# Patient Record
Sex: MALE | Race: WHITE | NOT HISPANIC OR LATINO | Employment: FULL TIME | URBAN - METROPOLITAN AREA
[De-identification: names, ages, dates, MRNs, and addresses within clinical notes are randomized per-mention and may not be internally consistent; named-entity substitution may affect disease eponyms.]

---

## 2017-01-22 ENCOUNTER — HOSPITAL ENCOUNTER (INPATIENT)
Facility: HOSPITAL | Age: 72
LOS: 2 days | Discharge: HOME | DRG: 726 | End: 2017-01-24
Attending: EMERGENCY MEDICINE | Admitting: INTERNAL MEDICINE
Payer: MEDICARE

## 2017-01-22 DIAGNOSIS — G47.33 OBSTRUCTIVE SLEEP APNEA: ICD-10-CM

## 2017-01-22 DIAGNOSIS — N39.0 URINARY TRACT INFECTION WITH HEMATURIA, SITE UNSPECIFIED: ICD-10-CM

## 2017-01-22 DIAGNOSIS — R65.10 SIRS (SYSTEMIC INFLAMMATORY RESPONSE SYNDROME): ICD-10-CM

## 2017-01-22 DIAGNOSIS — I10 ESSENTIAL HYPERTENSION: ICD-10-CM

## 2017-01-22 DIAGNOSIS — R31.9 URINARY TRACT INFECTION WITH HEMATURIA, SITE UNSPECIFIED: ICD-10-CM

## 2017-01-22 DIAGNOSIS — R33.9 URINARY RETENTION: Primary | ICD-10-CM

## 2017-01-22 DIAGNOSIS — D72.823 LEUKEMOID REACTION: ICD-10-CM

## 2017-01-22 DIAGNOSIS — K59.00 CONSTIPATION: ICD-10-CM

## 2017-01-22 LAB
ALBUMIN SERPL BCP-MCNC: 4.1 G/DL
ALP SERPL-CCNC: 60 U/L
ALT SERPL W/O P-5'-P-CCNC: 30 U/L
ANION GAP SERPL CALC-SCNC: 13 MMOL/L
AST SERPL-CCNC: 25 U/L
BACTERIA #/AREA URNS AUTO: ABNORMAL /HPF
BASOPHILS # BLD AUTO: 0.02 K/UL
BASOPHILS NFR BLD: 0.1 %
BILIRUB SERPL-MCNC: 1.3 MG/DL
BILIRUB UR QL STRIP: NEGATIVE
BUN SERPL-MCNC: 18 MG/DL
CALCIUM SERPL-MCNC: 10.1 MG/DL
CHLORIDE SERPL-SCNC: 106 MMOL/L
CLARITY UR REFRACT.AUTO: CLEAR
CO2 SERPL-SCNC: 20 MMOL/L
COLOR UR AUTO: YELLOW
CREAT SERPL-MCNC: 1 MG/DL
DIFFERENTIAL METHOD: ABNORMAL
EOSINOPHIL # BLD AUTO: 0 K/UL
EOSINOPHIL NFR BLD: 0 %
ERYTHROCYTE [DISTWIDTH] IN BLOOD BY AUTOMATED COUNT: 12.8 %
EST. GFR  (AFRICAN AMERICAN): >60 ML/MIN/1.73 M^2
EST. GFR  (NON AFRICAN AMERICAN): >60 ML/MIN/1.73 M^2
GLUCOSE SERPL-MCNC: 127 MG/DL
GLUCOSE UR QL STRIP: NEGATIVE
HCT VFR BLD AUTO: 49.2 %
HGB BLD-MCNC: 17.3 G/DL
HGB UR QL STRIP: ABNORMAL
KETONES UR QL STRIP: NEGATIVE
LACTATE SERPL-SCNC: 1.7 MMOL/L
LEUKOCYTE ESTERASE UR QL STRIP: NEGATIVE
LYMPHOCYTES # BLD AUTO: 0.5 K/UL
LYMPHOCYTES NFR BLD: 2.7 %
MCH RBC QN AUTO: 31 PG
MCHC RBC AUTO-ENTMCNC: 35.2 %
MCV RBC AUTO: 88 FL
MICROSCOPIC COMMENT: ABNORMAL
MONOCYTES # BLD AUTO: 0.9 K/UL
MONOCYTES NFR BLD: 5.1 %
NEUTROPHILS # BLD AUTO: 16.6 K/UL
NEUTROPHILS NFR BLD: 91.7 %
NITRITE UR QL STRIP: NEGATIVE
PH UR STRIP: 6 [PH] (ref 5–8)
PLATELET # BLD AUTO: 255 K/UL
PMV BLD AUTO: 9.9 FL
POTASSIUM SERPL-SCNC: 3.7 MMOL/L
PROT SERPL-MCNC: 7.5 G/DL
PROT UR QL STRIP: NEGATIVE
RBC # BLD AUTO: 5.58 M/UL
RBC #/AREA URNS AUTO: 71 /HPF (ref 0–4)
SODIUM SERPL-SCNC: 139 MMOL/L
SP GR UR STRIP: 1.01 (ref 1–1.03)
URN SPEC COLLECT METH UR: ABNORMAL
UROBILINOGEN UR STRIP-ACNC: 1 EU/DL
WBC # BLD AUTO: 18.05 K/UL
WBC #/AREA URNS AUTO: 1 /HPF (ref 0–5)

## 2017-01-22 PROCEDURE — 11000001 HC ACUTE MED/SURG PRIVATE ROOM

## 2017-01-22 PROCEDURE — 63600175 PHARM REV CODE 636 W HCPCS: Performed by: STUDENT IN AN ORGANIZED HEALTH CARE EDUCATION/TRAINING PROGRAM

## 2017-01-22 PROCEDURE — 80053 COMPREHEN METABOLIC PANEL: CPT

## 2017-01-22 PROCEDURE — 25000003 PHARM REV CODE 250: Performed by: STUDENT IN AN ORGANIZED HEALTH CARE EDUCATION/TRAINING PROGRAM

## 2017-01-22 PROCEDURE — 99284 EMERGENCY DEPT VISIT MOD MDM: CPT | Mod: ,,, | Performed by: EMERGENCY MEDICINE

## 2017-01-22 PROCEDURE — 81001 URINALYSIS AUTO W/SCOPE: CPT

## 2017-01-22 PROCEDURE — 51702 INSERT TEMP BLADDER CATH: CPT

## 2017-01-22 PROCEDURE — 83605 ASSAY OF LACTIC ACID: CPT

## 2017-01-22 PROCEDURE — 99285 EMERGENCY DEPT VISIT HI MDM: CPT

## 2017-01-22 PROCEDURE — 96365 THER/PROPH/DIAG IV INF INIT: CPT

## 2017-01-22 PROCEDURE — 96361 HYDRATE IV INFUSION ADD-ON: CPT

## 2017-01-22 PROCEDURE — 87040 BLOOD CULTURE FOR BACTERIA: CPT

## 2017-01-22 PROCEDURE — 85025 COMPLETE CBC W/AUTO DIFF WBC: CPT

## 2017-01-22 RX ORDER — ONDANSETRON 8 MG/1
8 TABLET, ORALLY DISINTEGRATING ORAL EVERY 8 HOURS PRN
Status: DISCONTINUED | OUTPATIENT
Start: 2017-01-22 | End: 2017-01-24 | Stop reason: HOSPADM

## 2017-01-22 RX ORDER — IBUPROFEN 200 MG
200 TABLET ORAL EVERY 6 HOURS PRN
Status: DISCONTINUED | OUTPATIENT
Start: 2017-01-22 | End: 2017-01-23

## 2017-01-22 RX ORDER — IBUPROFEN 600 MG/1
600 TABLET ORAL EVERY 6 HOURS PRN
Status: DISCONTINUED | OUTPATIENT
Start: 2017-01-22 | End: 2017-01-22

## 2017-01-22 RX ORDER — POLYETHYLENE GLYCOL 3350 17 G/17G
17 POWDER, FOR SOLUTION ORAL DAILY
Status: DISCONTINUED | OUTPATIENT
Start: 2017-01-23 | End: 2017-01-24 | Stop reason: HOSPADM

## 2017-01-22 RX ORDER — SODIUM CHLORIDE 0.9 % (FLUSH) 0.9 %
3 SYRINGE (ML) INJECTION EVERY 8 HOURS
Status: DISCONTINUED | OUTPATIENT
Start: 2017-01-23 | End: 2017-01-23

## 2017-01-22 RX ORDER — LISINOPRIL 20 MG/1
20 TABLET ORAL DAILY
Status: DISCONTINUED | OUTPATIENT
Start: 2017-01-23 | End: 2017-01-24 | Stop reason: HOSPADM

## 2017-01-22 RX ADMIN — CEFTRIAXONE 2 G: 2 INJECTION, SOLUTION INTRAVENOUS at 09:01

## 2017-01-22 RX ADMIN — SODIUM CHLORIDE 1000 ML: 0.9 INJECTION, SOLUTION INTRAVENOUS at 07:01

## 2017-01-22 NOTE — ED TRIAGE NOTES
Pt states the last time he was able to urinate was 0700. Pt states he had abd pain from being unable to urinate and he thought he may have been constipated. Pt states having trouble urinating in the past but has always been able to go, today he was unable to go.

## 2017-01-22 NOTE — IP AVS SNAPSHOT
[facility]  [address]  [facility] [address]  Phone: [phone]           Patient Discharge Instructions     Our goal is to set you up for success. This packet includes information on your condition, medications, and your home care. It will help you to care for yourself so you don't get sicker and need to go back to the hospital.     Please ask your nurse if you have any questions.        There are many details to remember when preparing to leave the hospital. Here is what you will need to do:    1. Take your medicine. If you are prescribed medications, review your Medication List in the following pages. You may have new medications to  at the pharmacy and others that you'll need to stop taking. Review the instructions for how and when to take your medications. Talk with your doctor or nurses if you are unsure of what to do.     2. Go to your follow-up appointments. Specific follow-up information is listed in the following pages. Your may be contacted by a transition nurse or clinical provider about future appointments. Be sure we have all of the phone numbers to reach you, if needed. Please contact your provider's office if you are unable to make an appointment.     3. Watch for warning signs. Your doctor or nurse will give you detailed warning signs to watch for and when to call for assistance. These instructions may also include educational information about your condition. If you experience any of warning signs to your health, call your doctor.               [facility]  Unless otherwise directed by your provider, please contact [facility], our nurse care line that is available for 24/7 assistance.     [phone] (toll-free)    Registered nurses in the [facility] provide clinical advisement, health education, appointment booking, and other advisory services.                    ** Verify the list of medication(s) below is accurate and up  to date. Carry this with you in case of emergency. If your medications have changed, please notify your healthcare provider.             Medication List      START taking these medications        Additional Info                      tamsulosin 0.4 mg Cp24   Commonly known as:  FLOMAX   Quantity:  30 capsule   Refills:  11   Dose:  0.4 mg    Last time this was given:  0.4 mg on 1/24/2017  9:28 AM   Instructions:  Take 1 capsule (0.4 mg total) by mouth once daily.     Begin Date    AM    Noon    PM    Bedtime         CONTINUE taking these medications        Additional Info                      CENTRUM SILVER ORAL   Refills:  0   Dose:  1 tablet    Instructions:  Take 1 tablet by mouth once daily.     Begin Date    AM    Noon    PM    Bedtime       lisinopril 20 MG tablet   Commonly known as:  PRINIVIL,ZESTRIL   Quantity:  90 tablet   Refills:  3   Dose:  20 mg    Last time this was given:  20 mg on 1/24/2017  9:28 AM   Instructions:  Take 1 tablet (20 mg total) by mouth once daily.     Begin Date    AM    Noon    PM    Bedtime         STOP taking these medications     ADVIL PM ORAL            Where to Get Your Medications      These medications were sent to [id number] - [place] [facility] - [address]  [address] [address]     Phone:  [phone]     tamsulosin 0.4 mg Cp24                  Please bring to all follow up appointments:    1. A copy of your discharge instructions.  2. All medicines you are currently taking in their original bottles.  3. Identification and insurance card.    Please arrive 15 minutes ahead of scheduled appointment time.    Please call 24 hours in advance if you must reschedule your appointment and/or time.        Your Scheduled Appointments     Jan 27, 2017  2:20 PM CST   Consult with MD Bryson Regalado - Urology 4th Floor ([address] [address] )    [address]  [facility] [address]   [phone]            Feb 08, 2017  8:40 AM [facility]  "Follow Up with MD Bryson Tamayo - Internal Medicine ([facility])    [address]  [facility] [address] [phone]                  Primary Diagnosis     Your primary diagnosis was:  Retention Of Urine      Admission Information     Date & Time Provider Department CSN    1/22/2017  3:37 PM Marcella Jimenez MD [facility] [id number]      Care Providers     Provider Role Specialty Primary office phone    Marcella Jimenez MD Attending Provider Hospitalist [phone]    Marcella Jimenez MD Team Attending  Hospitalist [phone]      Your Vitals Were     BP Pulse Temp Resp Height Weight    126/76 (BP Location: Left arm, Patient Position: Lying, BP Method: Automatic) 108 98.5 °F (36.9 °C) (Oral) 16 6' 1" (1.854 m) 113.4 kg (250 lb)    SpO2 BMI             95% 32.98 kg/m2         Recent Lab Values        12/9/2016                           8:25 AM           A1C 5.4           Comment for A1C at  8:25 AM on 12/9/2016:  According to ADA guidelines, hemoglobin A1C <7.0% represents  optimal control in non-pregnant diabetic patients.  Different  metrics may apply to specific populations.   Standards of Medical Care in Diabetes - 2016.  For the purpose of screening for the presence of diabetes:  <5.7%     Consistent with the absence of diabetes  5.7-6.4%  Consistent with increasing risk for diabetes   (prediabetes)  >or=6.5%  Consistent with diabetes  Currently no consensus exists for use of hemoglobin A1C  for diagnosis of diabetes for children.        Pending Labs     Order Current Status    Urine culture **CANNOT BE ORDERED STAT** In process    Blood culture #1 **CANNOT BE ORDERED STAT** Preliminary result    Blood culture #2 **CANNOT BE ORDERED STAT** Preliminary result      Allergies as of 1/24/2017     No Known Allergies      Advance Directives     An advance directive is a document which, in the event you are no longer able to make " decisions for yourself, tells your healthcare team what kind of treatment you do or do not want to receive, or who you would like to make those decisions for you.  If you do not currently have an advance directive, [facility] encourages you to create one.  For more information call:  [phone]), [phone] ([phone]),  or log on to [email or web address].        Smoking Cessation     If you would like to quit smoking:   You may be eligible for free services if you are a [place] resident and started smoking cigarettes before September 1, 1988.  Call the Smoking Cessation Trust ([facility]) toll free at [phone] or [phone].   Call 5-998-QUIT-NOW if you do not meet the above criteria.            Language Assistance Services     ATTENTION: Language assistance services are available, free of charge. Please call [phone].      ATENCIÓN: Si habla español, tiene a caldwell disposición servicios gratuitos de asistencia lingüística. Llame al [phone].     CHÚ Ý: N?u b?n nói Ti?ng Vi?t, có các d?ch v? h? tr? ngôn ng? mi?n phí dành cho b?n. G?i s? [phone].         [facility] complies with applicable Federal civil rights laws and does not discriminate on the basis of race, color, national origin, age, disability, or sex.

## 2017-01-22 NOTE — ED PROVIDER NOTES
Encounter Date: 1/22/2017    SCRIBE #1 NOTE: I, Orquidea Sanchez, am scribing for, and in the presence of,  Dr. Casper. I have scribed the following portions of the note - the Resident attestation.       History     Chief Complaint   Patient presents with    Male  Problem     can't urinate  no urine since 7 am and no bm gave myself enema had soft stool     Review of patient's allergies indicates:  No Known Allergies  HPI Comments: 72 y/o male w/ hx of HTN presents for evaluation of urinary retention since 7AM. In the past weeks pt has had increased frequency, weak stream, and difficulty urinating. Pt has an appointment w/ urologist in the upcoming week. Pt denies any weakness or numbness. Pt denies any loss of bowel. No fever. No hematuria. No previous episodes.     The history is provided by the patient.     Past Medical History   Diagnosis Date    Allergy 12/3/2015    Arthritis     Degenerative disc disease     Hypertension     Nuclear sclerosis - Both Eyes 7/30/2012     Past Medical History Pertinent Negatives   Diagnosis Date Noted    *Atrial fibrillation 9/18/2012    Anemia 9/18/2012    Anxiety 9/18/2012    Asthma 9/18/2012    Cancer 9/18/2012    CHF (congestive heart failure) 9/18/2012    Chronic kidney disease 9/18/2012    Clotting disorder 9/18/2012    COPD (chronic obstructive pulmonary disease) 9/18/2012    Coronary artery disease 9/18/2012    Deep vein thrombosis 9/18/2012    DEMENTIA 9/18/2012    Depression 9/18/2012    Diabetes mellitus type I 9/18/2012    Diabetes mellitus type II 9/18/2012    Emphysema of lung 9/18/2012    GERD (gastroesophageal reflux disease) 9/18/2012    Glaucoma 9/18/2012    Heart murmur 9/18/2012    HIV infection 9/18/2012    Hyperlipidemia 9/18/2012    Meningitis 9/18/2012    Myocardial infarction 9/18/2012    Neuromuscular disorder 9/18/2012    Osteoporosis 9/18/2012    Pulmonary embolism 9/18/2012    Seizures 9/18/2012    Sickle cell  anemia 9/18/2012    Stroke 9/18/2012    Substance abuse 9/18/2012    Thyroid disease 9/18/2012    Tuberculosis 9/18/2012     Past Surgical History   Procedure Laterality Date    Eye foreign body removal       childhood    Back surgery  2002    Eye surgery       Family History   Problem Relation Age of Onset    Cataracts Mother     Hypertension Mother     Cancer Father     Heart disease Father     Heart disease Brother     Stroke Paternal Grandfather     Heart disease Paternal Grandfather      Social History   Substance Use Topics    Smoking status: Former Smoker     Packs/day: 2.00     Years: 4.00     Types: Cigarettes, Cigars     Quit date: 1/1/1973    Smokeless tobacco: None    Alcohol use 0.6 oz/week     1 Cans of beer per week      Comment: social     Review of Systems   Constitutional: Negative for chills and fever.   HENT: Negative for rhinorrhea and sore throat.    Eyes: Negative for pain and visual disturbance.   Respiratory: Negative for cough and shortness of breath.    Cardiovascular: Negative for chest pain and leg swelling.   Gastrointestinal: Positive for constipation. Negative for abdominal pain and vomiting.   Genitourinary: Positive for difficulty urinating and frequency.   Musculoskeletal: Negative for back pain and neck pain.   Skin: Negative for wound.   Neurological: Negative for weakness and numbness.       Physical Exam   Initial Vitals   BP Pulse Resp Temp SpO2   01/22/17 1533 01/22/17 1533 01/22/17 1533 01/22/17 1533 01/22/17 1533   162/91 140 18 98.7 °F (37.1 °C) 97 %     Physical Exam    Nursing note and vitals reviewed.  Constitutional:   Agitated. Moderate distress.    HENT:   Mouth/Throat: Oropharynx is clear and moist.   Eyes: EOM are normal. Pupils are equal, round, and reactive to light.   Neck: Normal range of motion. Neck supple.   Cardiovascular: Regular rhythm and normal heart sounds. Exam reveals no gallop and no friction rub.    No murmur heard.  Tachycardia.     Abdominal: He exhibits distension. There is no tenderness.   Tenderness to palpation of the suprapubic region.    Musculoskeletal: Normal range of motion. He exhibits no edema or tenderness.   Neurological: He is alert and oriented to person, place, and time. No cranial nerve deficit or sensory deficit.   Skin: Skin is warm and dry.         ED Course   Procedures  Labs Reviewed   CBC W/ AUTO DIFFERENTIAL - Abnormal; Notable for the following:        Result Value    WBC 18.05 (*)     Gran # 16.6 (*)     Lymph # 0.5 (*)     Gran% 91.7 (*)     Lymph% 2.7 (*)     All other components within normal limits   COMPREHENSIVE METABOLIC PANEL - Abnormal; Notable for the following:     CO2 20 (*)     Glucose 127 (*)     Total Bilirubin 1.3 (*)     All other components within normal limits   URINALYSIS - Abnormal; Notable for the following:     Occult Blood UA 2+ (*)     All other components within normal limits    Narrative:     1 cup of urine   URINALYSIS MICROSCOPIC - Abnormal; Notable for the following:     RBC, UA 71 (*)     All other components within normal limits    Narrative:     1 cup of urine   CULTURE, URINE   LACTIC ACID, PLASMA             Medical Decision Making:   History:   Old Medical Records: I decided to obtain old medical records.  Clinical Tests:   Lab Tests: Ordered and Reviewed  Radiological Study: Ordered and Reviewed       APC / Resident Notes:   72 y/o male w/ hx of HTN presents for evaluation of acute urinary retention. For weeks increased frequency, difficulty urinating, and weak stream. Pt denies any neuro sx. Denies any fever or blood in urine. Tachy cardic. BP stable. Afebrile. L CTAB. RRR. Abd tender suprapubic. Neuro intact. Ddx: Urinary retention, side effects, BPH, constipation, cada equina. Will evaluate w/ posadas placement, cbc, cmp, UA, and urine culture.   Keenan Nazario MD  [employer] EM PGY1  01/22/2017 4:48 PM    Update 8:50 PM Pt put out 1200 cc of urine after posadas placement. Pt WBC was  elevated at 18. Creatine normal. Urine demonstrated RBC but no WBC and few bacteria. Nurse reports throwing the initial 1200 away, so had to use sample after initial large output. Pt noted to be tachycardic to 110's despite 1L of fluid. Fever noted to be 100.0F at bedside but was not documented. May represent obstructive UTI vs prostatitis. As pt meets SIRS criteria will admit to medicine for IV abx and further management. Pt understand and agrees to the plan. Discussed case w/ medicine.   Keenan Nazario MD  [employer] [facility] PGY1  01/22/2017 8:53 PM           Scribe Attestation:   Scribe #1: I performed the above scribed service and the documentation accurately describes the services I performed. I attest to the accuracy of the note.    Attending Attestation:   Physician Attestation Statement for Resident:  As the supervising MD   Physician Attestation Statement: I have personally seen and examined this patient.   I agree with the above history. -: This is a 71 year old male with history of HTN who presents to the ED with acute urinary retention x 21 hours. Pt endorses drinking a lot of water but is unable to urinate. Pt reports similar symptoms in the past with poor stream and frequency but never retention. He stated he produced a liquid BM this morning. Pt denies fever, flank pain, hematuria.    As the supervising MD I agree with the above PE.   -: Suprapubic tenderness. No other tenderness. Tachycardic.    As the supervising MD I agree with the above treatment, course, plan, and disposition.   -: Ultrasound showed over 500 cc's of fluid. Put in posadas. Will get KUB, UA, CBC, urine culture.           Physician Attestation for Scribe:  Physician Attestation Statement for Scribe #1: I, Dr. Casper, reviewed documentation, as scribed by Orquidea Sanchez in my presence, and it is both accurate and complete.                 ED Course     Clinical Impression:   Urinary Retention.   Disposition:   Disposition:  Admitted  Condition: Serious       Keenan Nazario MD  Resident  01/25/17 2049       King Casper MD  02/03/17 6229

## 2017-01-22 NOTE — ED NOTES
LOC: The patient is awake, alert, and oriented to place, time, situation. Affect is appropriated.  Speech is appropriate and clear.     APPEARANCE: Patient resting comfortably in no acute distress.  Patient is clean and well groomed.    SKIN: The skin is warm and dry; color consistent with ethnicity.  Patient has normal skin turgor and moist mucus membranes.  Skin intact; no breakdown or bruising noted.     MUSCULOSKELETAL: Patient moving upper and lower extremities without difficulty.  Denies weakness.     RESPIRATORY: Airway is open and patent. Lungs clear to auscultation in all lobes. Respirations spontaneous, even, easy, and non-labored.  Patient has a normal effort and rate.  No accessory muscle use noted. Denies cough.     CARDIAC:  Sinus tachycardia and rate noted.  No peripheral edema noted.   No complaints of chest pain      ABDOMEN: Soft and non tender to palpation.  No distention noted. Bowel sounds present x 4. Pt states he had intense abd pain prior to cath placement. Pt states feeling much better now.    NEUROLOGIC: PERRLA;  eyes open spontaneously.  Behavior appropriate to situation.  Follows commands; facial expression symmetrical; equal hand grasps bilaterally.  Purposeful motor response noted; normal sensation in all extremities.

## 2017-01-23 PROBLEM — R31.29 MICROSCOPIC HEMATURIA: Status: ACTIVE | Noted: 2017-01-23

## 2017-01-23 PROBLEM — R65.10 SIRS (SYSTEMIC INFLAMMATORY RESPONSE SYNDROME): Status: ACTIVE | Noted: 2017-01-23

## 2017-01-23 PROBLEM — R97.20 ELEVATED PSA, LESS THAN 10 NG/ML: Status: ACTIVE | Noted: 2017-01-23

## 2017-01-23 LAB
ANION GAP SERPL CALC-SCNC: 9 MMOL/L
BASOPHILS # BLD AUTO: 0.02 K/UL
BASOPHILS NFR BLD: 0.1 %
BUN SERPL-MCNC: 17 MG/DL
CALCIUM SERPL-MCNC: 9.4 MG/DL
CHLORIDE SERPL-SCNC: 105 MMOL/L
CO2 SERPL-SCNC: 26 MMOL/L
CREAT SERPL-MCNC: 1 MG/DL
DIFFERENTIAL METHOD: ABNORMAL
EOSINOPHIL # BLD AUTO: 0.1 K/UL
EOSINOPHIL NFR BLD: 0.4 %
ERYTHROCYTE [DISTWIDTH] IN BLOOD BY AUTOMATED COUNT: 13.3 %
EST. GFR  (AFRICAN AMERICAN): >60 ML/MIN/1.73 M^2
EST. GFR  (NON AFRICAN AMERICAN): >60 ML/MIN/1.73 M^2
GLUCOSE SERPL-MCNC: 88 MG/DL
GRAM STN SPEC: NORMAL
HCT VFR BLD AUTO: 49.3 %
HGB BLD-MCNC: 16.7 G/DL
LYMPHOCYTES # BLD AUTO: 2.3 K/UL
LYMPHOCYTES NFR BLD: 14.9 %
MCH RBC QN AUTO: 30.7 PG
MCHC RBC AUTO-ENTMCNC: 33.9 %
MCV RBC AUTO: 91 FL
MONOCYTES # BLD AUTO: 1.7 K/UL
MONOCYTES NFR BLD: 10.7 %
NEUTROPHILS # BLD AUTO: 11.5 K/UL
NEUTROPHILS NFR BLD: 73.7 %
PLATELET # BLD AUTO: 266 K/UL
PMV BLD AUTO: 10 FL
POTASSIUM SERPL-SCNC: 3.6 MMOL/L
RBC # BLD AUTO: 5.44 M/UL
SODIUM SERPL-SCNC: 140 MMOL/L
WBC # BLD AUTO: 15.53 K/UL

## 2017-01-23 PROCEDURE — 99222 1ST HOSP IP/OBS MODERATE 55: CPT | Mod: GC,,, | Performed by: UROLOGY

## 2017-01-23 PROCEDURE — 36415 COLL VENOUS BLD VENIPUNCTURE: CPT

## 2017-01-23 PROCEDURE — 11000001 HC ACUTE MED/SURG PRIVATE ROOM

## 2017-01-23 PROCEDURE — 87086 URINE CULTURE/COLONY COUNT: CPT

## 2017-01-23 PROCEDURE — 27000190 HC CPAP FULL FACE MASK W/VALVE

## 2017-01-23 PROCEDURE — 87205 SMEAR GRAM STAIN: CPT

## 2017-01-23 PROCEDURE — 94760 N-INVAS EAR/PLS OXIMETRY 1: CPT

## 2017-01-23 PROCEDURE — 94660 CPAP INITIATION&MGMT: CPT

## 2017-01-23 PROCEDURE — 80048 BASIC METABOLIC PNL TOTAL CA: CPT

## 2017-01-23 PROCEDURE — 25500020 PHARM REV CODE 255: Performed by: INTERNAL MEDICINE

## 2017-01-23 PROCEDURE — 85025 COMPLETE CBC W/AUTO DIFF WBC: CPT

## 2017-01-23 PROCEDURE — 25000003 PHARM REV CODE 250: Performed by: STUDENT IN AN ORGANIZED HEALTH CARE EDUCATION/TRAINING PROGRAM

## 2017-01-23 PROCEDURE — 99233 SBSQ HOSP IP/OBS HIGH 50: CPT | Mod: GC,,, | Performed by: INTERNAL MEDICINE

## 2017-01-23 RX ORDER — ACETAMINOPHEN 325 MG/1
650 TABLET ORAL EVERY 6 HOURS PRN
Status: DISCONTINUED | OUTPATIENT
Start: 2017-01-23 | End: 2017-01-24 | Stop reason: HOSPADM

## 2017-01-23 RX ORDER — SODIUM CHLORIDE 0.9 % (FLUSH) 0.9 %
3 SYRINGE (ML) INJECTION
Status: DISCONTINUED | OUTPATIENT
Start: 2017-01-23 | End: 2017-01-24 | Stop reason: HOSPADM

## 2017-01-23 RX ORDER — ACETAMINOPHEN 325 MG/1
650 TABLET ORAL EVERY 6 HOURS PRN
Refills: 0 | Status: CANCELLED | COMMUNITY
Start: 2017-01-23

## 2017-01-23 RX ORDER — TAMSULOSIN HYDROCHLORIDE 0.4 MG/1
0.4 CAPSULE ORAL DAILY
Qty: 30 CAPSULE | Refills: 11 | Status: CANCELLED | OUTPATIENT
Start: 2017-01-23 | End: 2018-01-23

## 2017-01-23 RX ORDER — AMOXICILLIN 250 MG
1 CAPSULE ORAL DAILY
Status: DISCONTINUED | OUTPATIENT
Start: 2017-01-23 | End: 2017-01-24 | Stop reason: HOSPADM

## 2017-01-23 RX ORDER — TAMSULOSIN HYDROCHLORIDE 0.4 MG/1
0.4 CAPSULE ORAL DAILY
Status: DISCONTINUED | OUTPATIENT
Start: 2017-01-23 | End: 2017-01-24 | Stop reason: HOSPADM

## 2017-01-23 RX ADMIN — TAMSULOSIN HYDROCHLORIDE 0.4 MG: 0.4 CAPSULE ORAL at 08:01

## 2017-01-23 RX ADMIN — STANDARDIZED SENNA CONCENTRATE AND DOCUSATE SODIUM 1 TABLET: 8.6; 5 TABLET, FILM COATED ORAL at 08:01

## 2017-01-23 RX ADMIN — IOHEXOL 125 ML: 350 INJECTION, SOLUTION INTRAVENOUS at 12:01

## 2017-01-23 RX ADMIN — LISINOPRIL 20 MG: 20 TABLET ORAL at 08:01

## 2017-01-23 NOTE — ASSESSMENT & PLAN NOTE
-likely due to leukemoid reactions versus UTI  -Rocephin IV x1 in ED; wait for NGTD x 24 hours on BCx  -UA is not strongly suggestive of UTI: few bacteria, no leukocyte esterase, nitrite negative  -The sample was post void of initial output   -IV fluids  -Lactic acid normal  -Prostate exam was negative for any prostate pain, so no high suspicion for acute prostatitis

## 2017-01-23 NOTE — ASSESSMENT & PLAN NOTE
Most recent PSA increased to 4.4  Has family hx of prostate cancer  Patient will likely require prostate biopsy after resolution of acute issues

## 2017-01-23 NOTE — SUBJECTIVE & OBJECTIVE
Interval History: NAEON. VSS. Patient reports improvement in his retention and is able to ambulate to the bathroom without issue.     Review of Systems   Constitutional: Negative for chills, fatigue and fever.   HENT: Negative for congestion.    Eyes: Negative for visual disturbance.   Respiratory: Negative for cough, chest tightness and wheezing.    Cardiovascular: Negative for chest pain and leg swelling.   Gastrointestinal: Positive for constipation. Negative for abdominal pain and blood in stool.   Genitourinary: Positive for decreased urine volume, difficulty urinating, dysuria and frequency. Negative for discharge, flank pain, penile swelling and scrotal swelling.   Musculoskeletal: Negative for arthralgias and back pain.   Skin: Negative for pallor and rash.   Neurological: Negative for dizziness, syncope and light-headedness.   Psychiatric/Behavioral: Negative for hallucinations and sleep disturbance.     Objective:     Vital Signs (Most Recent):  Temp: 97.8 °F (36.6 °C) (01/23/17 0855)  Pulse: 85 (01/23/17 1454)  Resp: 16 (01/23/17 0855)  BP: 108/66 (01/23/17 0855)  SpO2: 95 % (01/23/17 0855) Vital Signs (24h Range):  Temp:  [97.8 °F (36.6 °C)-99 °F (37.2 °C)] 97.8 °F (36.6 °C)  Pulse:  [] 85  Resp:  [16-23] 16  SpO2:  [94 %-97 %] 95 %  BP: (103-132)/(66-90) 108/66     Weight: 113.4 kg (250 lb)  Body mass index is 32.98 kg/(m^2).    Intake/Output Summary (Last 24 hours) at 01/23/17 1608  Last data filed at 01/23/17 1027   Gross per 24 hour   Intake              240 ml   Output             3025 ml   Net            -2785 ml      Physical Exam   Constitutional: He is oriented to person, place, and time. He appears well-developed and well-nourished. No distress.   HENT:   Head: Normocephalic and atraumatic.   Eyes: EOM are normal. Pupils are equal, round, and reactive to light.   Neck: Normal range of motion. Neck supple.   Cardiovascular: Normal rate, regular rhythm and normal heart sounds.  Exam reveals  no gallop and no friction rub.    No murmur heard.  Pulmonary/Chest: Effort normal and breath sounds normal. No respiratory distress. He has no wheezes. He has no rales.   Abdominal: Soft. Bowel sounds are normal. He exhibits no distension and no mass. There is no tenderness. There is no rebound and no guarding.   Genitourinary: Penis normal. No penile tenderness.   Musculoskeletal: Normal range of motion. He exhibits no edema.   Neurological: He is alert and oriented to person, place, and time.   Skin: Skin is warm and dry. He is not diaphoretic.   Psychiatric: He has a normal mood and affect. His behavior is normal. Thought content normal.       Significant Labs:   Blood Culture:   Recent Labs  Lab 01/22/17 2038 01/22/17 2039   LABBLOO No Growth to date No Growth to date     BMP:   Recent Labs  Lab 01/23/17 0417   GLU 88      K 3.6      CO2 26   BUN 17   CREATININE 1.0   CALCIUM 9.4     CBC:   Recent Labs  Lab 01/22/17  1720 01/23/17  0417   WBC 18.05* 15.53*   HGB 17.3 16.7   HCT 49.2 49.3    266       Significant Imaging: I have reviewed all pertinent imaging results/findings within the past 24 hours.

## 2017-01-23 NOTE — H&P
HISTORY & PHYSICAL  Hospital Medicine    Team: [facility] HOSP MED 2    Patient Name: Sreekanth Pitts Jr.  YOB: 1945    Admit Date: 1/22/2017                   LOS: 0    PRESENTING HISTORY     Chief Complaint/Reason for Admission: <principal problem not specified>    History of Present Illness:  Mr. Sreekanth Pitts Jr. is a 71 y.o. male with hx of HTN, BPH who presents to [facility] ED with a complaint of urinary retention since Sunday afternoon. He says he has been drinking plenty of water but is unable to urinate. Pt reports increased urinary frequency, difficulties initiating urination and a weak stream for the past week. In addition, he says he is unable to urinate when he feels constipated. He has been taking OTC laxatives to try to improve urinary retention but came to the ED when these didn't work. Pt denies lower back pain, fever, chills, flank pain weakness, or focal neuro deficits. He does report minor suprapubic tenderness and dysuria.     In the ED, 1200c urine in bladder after catheterization and WBC 18. UA shows RBC but no WBC, nitrite negative and few bacteria. Pt given 2g rocephin. Pt with continued tachycardia (110) despite 1L IVF. Plan to admit to hospital medicine Team 2.    Review of Systems:  General:  Denies weight loss, fever, chills, weakness, fatigue  HENT:  Denies rhinorrhea, sore throat, congestion  Eyes: Denies vision changes, red eyes  CVS:  Denies chest pain, pressure, palpitations  Resp:  Denies shortness of breath, cough, sputum  GI:  Denies diarrhea, +constipation, abdominal pain, nausea, vomiting  :  +dysuria, hematuria, +nocturia, +frequency  MSK:  Denies myalgias, arthralgias  Skin:  Denies rashes, bleeding  Neuro:  Denies headache, dizziness, syncope, numbness, paresthesias  All other systems otherwise negative    PAST HISTORY:     Past Medical History   Diagnosis Date    Allergy 12/3/2015    Arthritis     Degenerative disc disease     Hypertension     Nuclear  sclerosis - Both Eyes 7/30/2012       Past Surgical History   Procedure Laterality Date    Eye foreign body removal       childhood    Back surgery  2002    Eye surgery         Family History   Problem Relation Age of Onset    Cataracts Mother     Hypertension Mother     Cancer Father     Heart disease Father     Heart disease Brother     Stroke Paternal Grandfather     Heart disease Paternal Grandfather        Social History     Social History    Marital status:      Spouse name: N/A    Number of children: N/A    Years of education: N/A     Occupational History     [employer]     Social History Main Topics    Smoking status: Former Smoker     Packs/day: 2.00     Years: 4.00     Types: Cigarettes, Cigars     Quit date: 1/1/1973    Smokeless tobacco: None    Alcohol use 0.6 oz/week     1 Cans of beer per week      Comment: social    Drug use: No    Sexual activity: No     Other Topics Concern    None     Social History Narrative       MEDICATIONS & ALLERGIES:     No current facility-administered medications on file prior to encounter.      Current Outpatient Prescriptions on File Prior to Encounter   Medication Sig    lisinopril (PRINIVIL,ZESTRIL) 20 MG tablet Take 1 tablet (20 mg total) by mouth once daily.    MULTIVITAMIN W-MINERALS/LUTEIN (CENTRUM SILVER ORAL) Take 1 tablet by mouth as needed.        Review of patient's allergies indicates:  No Known Allergies    OBJECTIVE:     Vital Signs:  Temp:  [98.7 °F (37.1 °C)-99 °F (37.2 °C)] 99 °F (37.2 °C)  Pulse:  [118-140] 120  Resp:  [16-23] 23  SpO2:  [96 %-97 %] 96 %  BP: (120-162)/(78-91) 123/79  Body mass index is 32.98 kg/(m^2).     Physical Exam:  General:  Well developed, well nourished, no acute distress  Head: Normocephalic, atraumatic  Eyes: PERRL, EOMI, clear sclera  Throat: No posterior pharyngeal erythema or exudate, no tonsillar exudate  Neck: supple, normal ROM, no thyromegaly   CVS:  S1 and S2 normal, no  murmurs, rubs, tachycardia, gallops, 2+ peripheral pulses  Resp:  Lungs clear to auscultation, no wheezes, rales, rhonchi, cough  GI:  Abdomen soft, non-tender, non-distended, normoactive bowel sounds, no prostate tenderness on rectal exam  MSK:  No muscle atrophy, cyanosis, peripheral edema, full range of motion  Skin:  No rashes, ulcers, erythema  Neuro:  CNII-XII grossly intact  Psych:  Alert and oriented to person, place, and time    Laboratory    Recent Labs  Lab 01/22/17  1720   WBC 18.05*   HGB 17.3   HCT 49.2      MONO 5.1  0.9       Recent Labs  Lab 01/22/17  1720      K 3.7      CO2 20*   BUN 18   CREATININE 1.0   CALCIUM 10.1   PROT 7.5   BILITOT 1.3*   ALKPHOS 60   ALT 30   AST 25     No results found for: INR, PROTIME  Lab Results   Component Value Date    HGBA1C 5.4 12/09/2016     No results for input(s): POCTGLUCOSE in the last 72 hours.    Diagnostic Results:  Labs: Reviewed    ASSESSMENT & PLAN:   Mr. Sreekanth Pitts Jr. is a 71 y.o. male    Current Problems List:  Active Hospital Problems    Diagnosis  POA    Urinary retention [R33.9]  Yes      Resolved Hospital Problems    Diagnosis Date Resolved POA   No resolved problems to display.       Problem Assessment & Treatment Plan:    SIRS  -- Considering UTI vs Acute Bacterial Prostatitis  -- WBC 18, , RR 23   -- Rocephin 2g in ED; continue Rocephin 2g QD  -- Blood cx pending  -- Urine cx pending   -- No prostate tenderness on RIK    Urinary Retention  -- Could be 2/2 to BPH vs Prostatitis vs constipation  -- 500cc urine on bladder scan; 1200cc posadas catheter  -- Urology consult    HTN  -- Continue ACE-i    STEPHIE  -- CPAP QHS    DVT: SCD  Diet: Regular Diet  Dispo: pending work-up    Bryson Hemphill MD  Internal Medicine PGY1    I HAVE PERSONALLY TAKEN THE HISTORY, EXAMINED THIS PATIENT,  AND AGREE WITH THE RESIDENT'S NOTE AS STATED ABOVE WITH THE FOLLOWING EXCEPTIONS:  Patient seen and examined with the intern at 11:45pm on  1/22/17    Mr. Pitts is a 70yo man with a history of BPH with obstuction, HTN and STEPHIE.  He is followed by Urology for the BPH, and had an appt this week to see him.  Unfortunately he now comes with inability to urinate properly.  He has some passing of urine at 7am, but by 11am he could not go at all.  He took laxatives with no relief.      Assessment and plan:  Benign prostatic hypertrophy with urinary obstruction  Microscopic hematuria  -Urology consult  -Start Flomax 0.4mg po qday  -Renal US to rule out hydro    SIRS 3/4  -?UTI ?Inflammatory ?Prostatitis ?Occult bacteremia  -Agree with Rocephin iv until urine and blood cxs negative  -UA is not strongly suggestive of UTI: few bacteria, no leukocyte esterase, nitrite negative   -The sample was post void of initial output   -IV fluids  -Lactic acid normal  -Prostate exam by the intern was negative for any prostate pain, so no high suspicion for acute prostatitis    Essential hypertension  -Continue home medications    Obstructive sleep apnea  -Continue CPAP    Constipation  -Miralax and Senna-S    Recent Results (from the past 24 hour(s))   CBC auto differential    Collection Time: 01/22/17  5:20 PM   Result Value Ref Range    WBC 18.05 (H) 3.90 - 12.70 K/uL    RBC 5.58 4.60 - 6.20 M/uL    Hemoglobin 17.3 14.0 - 18.0 g/dL    Hematocrit 49.2 40.0 - 54.0 %    MCV 88 82 - 98 fL    MCH 31.0 27.0 - 31.0 pg    MCHC 35.2 32.0 - 36.0 %    RDW 12.8 11.5 - 14.5 %    Platelets 255 150 - 350 K/uL    MPV 9.9 9.2 - 12.9 fL    Gran # 16.6 (H) 1.8 - 7.7 K/uL    Lymph # 0.5 (L) 1.0 - 4.8 K/uL    Mono # 0.9 0.3 - 1.0 K/uL    Eos # 0.0 0.0 - 0.5 K/uL    Baso # 0.02 0.00 - 0.20 K/uL    Gran% 91.7 (H) 38.0 - 73.0 %    Lymph% 2.7 (L) 18.0 - 48.0 %    Mono% 5.1 4.0 - 15.0 %    Eosinophil% 0.0 0.0 - 8.0 %    Basophil% 0.1 0.0 - 1.9 %    Differential Method Automated    Comprehensive metabolic panel    Collection Time: 01/22/17  5:20 PM   Result Value Ref Range    Sodium 139 136 - 145  mmol/L    Potassium 3.7 3.5 - 5.1 mmol/L    Chloride 106 95 - 110 mmol/L    CO2 20 (L) 23 - 29 mmol/L    Glucose 127 (H) 70 - 110 mg/dL    BUN, Bld 18 8 - 23 mg/dL    Creatinine 1.0 0.5 - 1.4 mg/dL    Calcium 10.1 8.7 - 10.5 mg/dL    Total Protein 7.5 6.0 - 8.4 g/dL    Albumin 4.1 3.5 - 5.2 g/dL    Total Bilirubin 1.3 (H) 0.1 - 1.0 mg/dL    Alkaline Phosphatase 60 55 - 135 U/L    AST 25 10 - 40 U/L    ALT 30 10 - 44 U/L    Anion Gap 13 8 - 16 mmol/L    eGFR if African American >60.0 >60 mL/min/1.73 m^2    eGFR if non African American >60.0 >60 mL/min/1.73 m^2   Urinalysis    Collection Time: 01/22/17  5:31 PM   Result Value Ref Range    Specimen UA Urine, Catheterized     Color, UA Yellow Yellow, Straw, Sanjana    Appearance, UA Clear Clear    pH, UA 6.0 5.0 - 8.0    Specific Gravity, UA 1.010 1.005 - 1.030    Protein, UA Negative Negative    Glucose, UA Negative Negative    Ketones, UA Negative Negative    Bilirubin (UA) Negative Negative    Occult Blood UA 2+ (A) Negative    Nitrite, UA Negative Negative    Urobilinogen, UA 1.0 <2.0 EU/dL    Leukocytes, UA Negative Negative   Urinalysis Microscopic    Collection Time: 01/22/17  5:31 PM   Result Value Ref Range    RBC, UA 71 (H) 0 - 4 /hpf    WBC, UA 1 0 - 5 /hpf    Bacteria, UA Rare None-Occ /hpf    Microscopic Comment SEE COMMENT    Lactic acid, plasma    Collection Time: 01/22/17  8:37 PM   Result Value Ref Range    Lactate (Lactic Acid) 1.7 0.5 - 2.2 mmol/L

## 2017-01-23 NOTE — ASSESSMENT & PLAN NOTE
Maintain posadas catheter until patient sees Dr. Gaona in clinic on 1/27  Patient may undergo voiding trial as outpatient  Continue abx until culture results, narrow as appropriate  Recommend bowel regimen, should have at least 1 bowel movement per day

## 2017-01-23 NOTE — SUBJECTIVE & OBJECTIVE
Past Medical History   Diagnosis Date    Allergy 12/3/2015    Arthritis     Degenerative disc disease     Hypertension     Nuclear sclerosis - Both Eyes 7/30/2012       Past Surgical History   Procedure Laterality Date    Eye foreign body removal       childhood    Back surgery  2002    Eye surgery         Review of patient's allergies indicates:  No Known Allergies    Family History     Problem Relation (Age of Onset)    Cancer Father    Cataracts Mother    Heart disease Father, Brother, Paternal Grandfather    Hypertension Mother    Stroke Paternal Grandfather          Social History Main Topics    Smoking status: Former Smoker     Packs/day: 2.00     Years: 4.00     Types: Cigarettes, Cigars     Quit date: 1/1/1973    Smokeless tobacco: Not on file    Alcohol use 0.6 oz/week     1 Cans of beer per week      Comment: social    Drug use: No    Sexual activity: No       Review of Systems   Constitutional: Negative for activity change, appetite change, chills, fatigue and fever.   Eyes: Negative for visual disturbance.   Respiratory: Negative for chest tightness and shortness of breath.    Cardiovascular: Negative for chest pain and leg swelling.   Gastrointestinal: Positive for constipation. Negative for abdominal pain, blood in stool, diarrhea, nausea and vomiting.   Genitourinary: Positive for decreased urine volume, difficulty urinating, frequency, hematuria, nocturia and urgency. Negative for flank pain, penile pain, penile swelling, scrotal swelling and testicular pain.   Musculoskeletal: Negative for back pain and gait problem.   Skin: Negative for rash.   Neurological: Negative for syncope and weakness.   Hematological: Does not bruise/bleed easily.   Psychiatric/Behavioral: Negative for behavioral problems.       Objective:     Temp:  [98.4 °F (36.9 °C)-99 °F (37.2 °C)] 98.4 °F (36.9 °C)  Pulse:  [] 85  Resp:  [16-23] 16  SpO2:  [94 %-97 %] 97 %  BP: (103-162)/(73-91) 103/73     Body mass  index is 32.98 kg/(m^2).            Drains     Drain                 Urethral Catheter 01/22/17 1650 Coude 18 Fr. less than 1 day                Physical Exam   Constitutional: No distress.   HENT:   Head: Normocephalic and atraumatic.   Eyes: Conjunctivae are normal. No scleral icterus.   Neck: Normal range of motion.   Cardiovascular: Normal rate.    Pulmonary/Chest: Effort normal. No respiratory distress.   Abdominal: Soft. He exhibits no distension and no mass. There is no tenderness (appropriate). There is no rebound and no guarding.   Genitourinary: Penis normal.   Genitourinary Comments: circ'd  Normal scrotal contents  30g, no nodules, no abscess   Musculoskeletal: Normal range of motion.   SCDs in place   Neurological: He is alert.   Skin: Skin is warm and dry. He is not diaphoretic.     Psychiatric: He has a normal mood and affect.       Significant Labs:    BMP:    Recent Labs  Lab 01/22/17  1720 01/23/17  0417    140   K 3.7 3.6    105   CO2 20* 26   BUN 18 17   CREATININE 1.0 1.0   CALCIUM 10.1 9.4       CBC:    Recent Labs  Lab 01/22/17  1720 01/23/17  0417   WBC 18.05* 15.53*   HGB 17.3 16.7   HCT 49.2 49.3    266     PSA  12.19.2013 2.7  12.03.2015 3.2  12.09.2016 4.4    Significant Imaging: No upper tract imaging.  KUB - stool in rectum and RUQ

## 2017-01-23 NOTE — ASSESSMENT & PLAN NOTE
CT Urogram ordered  Patient will require cystoscopy as outpatient   Continue to treat for suspected UTI

## 2017-01-23 NOTE — PROGRESS NOTES
[facility] Hospital Medicine  Progress Note    Patient Name: Sreekanth Pitts Jr.  MRN: [id number]  Patient Class: IP- Inpatient   Admission Date: 1/22/2017  Length of Stay: 1 days  Attending Physician: Pearl Noriega MD  Primary Care Provider: Rubio Rod MD    [facility] Medicine Team: [facility] HOSP MED 2 Uri Dumont MD    Subjective:     Principal Problem:Urinary retention    HPI:  Mr. Sreekanth Pitts Jr. is a 71 y.o. male with hx of HTN, BPH who presents to [facility] ED with a complaint of urinary retention since Sunday afternoon. He says he has been drinking plenty of water but is unable to urinate. Pt reports increased urinary frequency, difficulties initiating urination and a weak stream for the past week. In addition, he says he is unable to urinate when he feels constipated. He has been taking OTC laxatives to try to improve urinary retention but came to the ED when these didn't work. Pt denies lower back pain, fever, chills, flank pain weakness, or focal neuro deficits. He does report minor suprapubic tenderness and dysuria.      In the ED, 1200c urine in bladder after catheterization and WBC 18. UA shows RBC but no WBC, nitrite negative and few bacteria. Pt given 2g rocephin. Pt with continued tachycardia (110) despite 1L IVF. Plan to admit to hospital medicine Team 2.      Hospital Course:  Patient started on antibiotics and urology consulted. Recommend indwelling posadas with removal in outpatient setting for urinary retention continued evaluation.     Interval History: NAEON. VSS. Patient reports improvement in his retention and is able to ambulate to the bathroom without issue.     Review of Systems   Constitutional: Negative for chills, fatigue and fever.   HENT: Negative for congestion.    Eyes: Negative for visual disturbance.   Respiratory: Negative for cough, chest tightness and wheezing.    Cardiovascular: Negative for chest pain and leg swelling.   Gastrointestinal:  Positive for constipation. Negative for abdominal pain and blood in stool.   Genitourinary: Positive for decreased urine volume, difficulty urinating, dysuria and frequency. Negative for discharge, flank pain, penile swelling and scrotal swelling.   Musculoskeletal: Negative for arthralgias and back pain.   Skin: Negative for pallor and rash.   Neurological: Negative for dizziness, syncope and light-headedness.   Psychiatric/Behavioral: Negative for hallucinations and sleep disturbance.     Objective:     Vital Signs (Most Recent):  Temp: 97.8 °F (36.6 °C) (01/23/17 0855)  Pulse: 85 (01/23/17 1454)  Resp: 16 (01/23/17 0855)  BP: 108/66 (01/23/17 0855)  SpO2: 95 % (01/23/17 0855) Vital Signs (24h Range):  Temp:  [97.8 °F (36.6 °C)-99 °F (37.2 °C)] 97.8 °F (36.6 °C)  Pulse:  [] 85  Resp:  [16-23] 16  SpO2:  [94 %-97 %] 95 %  BP: (103-132)/(66-90) 108/66     Weight: 113.4 kg (250 lb)  Body mass index is 32.98 kg/(m^2).    Intake/Output Summary (Last 24 hours) at 01/23/17 1608  Last data filed at 01/23/17 1027   Gross per 24 hour   Intake              240 ml   Output             3025 ml   Net            -2785 ml      Physical Exam   Constitutional: He is oriented to person, place, and time. He appears well-developed and well-nourished. No distress.   HENT:   Head: Normocephalic and atraumatic.   Eyes: EOM are normal. Pupils are equal, round, and reactive to light.   Neck: Normal range of motion. Neck supple.   Cardiovascular: Normal rate, regular rhythm and normal heart sounds.  Exam reveals no gallop and no friction rub.    No murmur heard.  Pulmonary/Chest: Effort normal and breath sounds normal. No respiratory distress. He has no wheezes. He has no rales.   Abdominal: Soft. Bowel sounds are normal. He exhibits no distension and no mass. There is no tenderness. There is no rebound and no guarding.   Genitourinary: Penis normal. No penile tenderness.   Musculoskeletal: Normal range of motion. He exhibits no  edema.   Neurological: He is alert and oriented to person, place, and time.   Skin: Skin is warm and dry. He is not diaphoretic.   Psychiatric: He has a normal mood and affect. His behavior is normal. Thought content normal.       Significant Labs:   Blood Culture:   Recent Labs  Lab 01/22/17 2038 01/22/17 2039   LABBLOO No Growth to date No Growth to date     BMP:   Recent Labs  Lab 01/23/17 0417   GLU 88      K 3.6      CO2 26   BUN 17   CREATININE 1.0   CALCIUM 9.4     CBC:   Recent Labs  Lab 01/22/17  1720 01/23/17 0417   WBC 18.05* 15.53*   HGB 17.3 16.7   HCT 49.2 49.3    266       Significant Imaging: I have reviewed all pertinent imaging results/findings within the past 24 hours.    Assessment/Plan:      * Urinary retention  -- Could be 2/2 to BPH vs Prostatitis vs constipation  -- 500cc urine on bladder scan; 1200cc posadas catheter  -- Urology consult; recommend keeping indwelling posadas and schedule patient for outpatient clinic follow up for removal and further evaluation  -- CT urogram ordered per recs      Hypertension  - continue home ACEI      Obstructive sleep apnea  - Continue CPAP      SIRS (systemic inflammatory response syndrome)  -likely due to leukemoid reactions versus UTI  -Rocephin IV x1 in ED; wait for NGTD x 24 hours on BCx  -UA is not strongly suggestive of UTI: few bacteria, no leukocyte esterase, nitrite negative  -The sample was post void of initial output   -IV fluids  -Lactic acid normal  -Prostate exam was negative for any prostate pain, so no high suspicion for acute prostatitis      VTE Risk Mitigation         Ordered     Low Risk of VTE  Once      01/22/17 5557          Uri Dumont MD  Department of Hospital Medicine   [facility]

## 2017-01-23 NOTE — ASSESSMENT & PLAN NOTE
-- Could be 2/2 to BPH vs Prostatitis vs constipation  -- 500cc urine on bladder scan; 1200cc posadas catheter  -- Urology consult; recommend keeping indwelling posadas and schedule patient for outpatient clinic follow up for removal and further evaluation  -- CT urogram ordered per recs

## 2017-01-23 NOTE — PLAN OF CARE
Rubio Rod MD   [address] / [place] [address]       [facility] - [place], [place] - [address]  [address]  [place] [address]  Phone: [phone] Fax: [phone]    [id number] - [place], [place] - [address]  [address]  [place] [address]  Phone: [phone] Fax: [phone]    [facility] - [place], [place] - [address] [place] [address]  [address] [place] [address]  [place] [address]  Phone: [phone] Fax: [phone]         01/23/17 1510   Discharge Assessment   Assessment Type Discharge Planning Assessment   Confirmed/corrected address and phone number on facesheet? Yes   Assessment information obtained from? Patient   Expected Length of Stay (days) 3   Communicated expected length of stay with patient/caregiver yes   Prior to hospitilization cognitive status: Alert/Oriented   Prior to hospitalization functional status: Independent   Current cognitive status: Alert/Oriented   Current Functional Status: Independent   Arrived From home or self-care   Lives With spouse   Able to Return to Prior Arrangements yes   Is patient able to care for self after discharge? Yes   Who are your caregiver(s) and their phone number(s)? Mary Pitts (spouse) [phone]   Patient's perception of discharge disposition home or selfcare   Readmission Within The Last 30 Days no previous admission in last 30 days   Patient currently being followed by outpatient case management? No   Patient currently receives home health services? No   Does the patient currently use HME? Yes   Patient currently receives private duty nursing? No   Patient currently receives any other outside agency services? No   Equipment Currently Used at Home CPAP   Do you have any problems affording any of your prescribed medications? No   Is the patient taking medications as prescribed? yes   Do you have any financial concerns preventing you from receiving the healthcare you need? No   Does the patient have  transportation to healthcare appointments? Yes   Transportation Available car;family or friend will provide   On Dialysis? No   Does the patient receive services at the Coumadin Clinic? No   Discharge Plan A Home with family   Patient/Family In Agreement With Plan yes       Patient/ family given the Patient Information and Education packet.

## 2017-01-23 NOTE — CONSULTS
[facility]  Urology  Consult Note    Patient Name: Sreekanth Pitts Jr.  MRN: [id number]  Admission Date: 1/22/2017  Hospital Length of Stay: 1   Code Status: Full Code   Attending Provider: Marquise Welsh MD  Consulting Provider: Kim Hatch MD  Primary Care Physician: Rubio Rod MD  Principal Problem:Urinary retention    Inpatient consult to Urology  Consult performed by: KIM HATCH  Consult ordered by: AYLA RENDON          Subjective:     HPI:  Sreekanth Pitts Jr. is a 71 y.o. male     - 1.5 yrs complaints of frequency, hesitancy, weak stream small volume voids  - no urgency, incontinence; nocturia x0  - presented to ED with abdominal pain yesterday, posadas placed with 1200cc output, first episode of urinary retention  - never been on flomax, finasteride  - never dysuria, UTI, or prostatitis  - +family hx of PCa (Brother with BCR after XRT)   - +hx of STEPHIE, on CPAP  - +laminectomy at L4/L5 2002  - no hx of DM  - has chronic issues with constipation, uses stool softeners/enema/suppositories; most recent BM last night  - hx of elevated PSA to 4.4, never biopsy    - +ED, given PDE5 in the past, no use of it now    - never gross hematuria  - no hx kidney stones, no personal hx of  malignancy, quit smoking age 27 3 years for 2ppd, no chemical or dye exposures, no chemo or radiation      Past Medical History   Diagnosis Date    Allergy 12/3/2015    Arthritis     Degenerative disc disease     Hypertension     Nuclear sclerosis - Both Eyes 7/30/2012       Past Surgical History   Procedure Laterality Date    Eye foreign body removal       childhood    Back surgery  2002    Eye surgery         Review of patient's allergies indicates:  No Known Allergies    Family History     Problem Relation (Age of Onset)    Cancer Father    Cataracts Mother    Heart disease Father, Brother, Paternal Grandfather    Hypertension Mother    Stroke Paternal Grandfather          Social  History Main Topics    Smoking status: Former Smoker     Packs/day: 2.00     Years: 4.00     Types: Cigarettes, Cigars     Quit date: 1/1/1973    Smokeless tobacco: Not on file    Alcohol use 0.6 oz/week     1 Cans of beer per week      Comment: social    Drug use: No    Sexual activity: No       Review of Systems   Constitutional: Negative for activity change, appetite change, chills, fatigue and fever.   Eyes: Negative for visual disturbance.   Respiratory: Negative for chest tightness and shortness of breath.    Cardiovascular: Negative for chest pain and leg swelling.   Gastrointestinal: Positive for constipation. Negative for abdominal pain, blood in stool, diarrhea, nausea and vomiting.   Genitourinary: Positive for decreased urine volume, difficulty urinating, frequency, hematuria, nocturia and urgency. Negative for flank pain, penile pain, penile swelling, scrotal swelling and testicular pain.   Musculoskeletal: Negative for back pain and gait problem.   Skin: Negative for rash.   Neurological: Negative for syncope and weakness.   Hematological: Does not bruise/bleed easily.   Psychiatric/Behavioral: Negative for behavioral problems.       Objective:     Temp:  [98.4 °F (36.9 °C)-99 °F (37.2 °C)] 98.4 °F (36.9 °C)  Pulse:  [] 85  Resp:  [16-23] 16  SpO2:  [94 %-97 %] 97 %  BP: (103-162)/(73-91) 103/73     Body mass index is 32.98 kg/(m^2).            Drains     Drain                 Urethral Catheter 01/22/17 1650 Coude 18 Fr. less than 1 day                Physical Exam   Constitutional: No distress.   HENT:   Head: Normocephalic and atraumatic.   Eyes: Conjunctivae are normal. No scleral icterus.   Neck: Normal range of motion.   Cardiovascular: Normal rate.    Pulmonary/Chest: Effort normal. No respiratory distress.   Abdominal: Soft. He exhibits no distension and no mass. There is no tenderness (appropriate). There is no rebound and no guarding.   Genitourinary: Penis normal.   Genitourinary  Comments: circ'd  Normal scrotal contents  30g, no nodules, no abscess   Musculoskeletal: Normal range of motion.   SCDs in place   Neurological: He is alert.   Skin: Skin is warm and dry. He is not diaphoretic.     Psychiatric: He has a normal mood and affect.       Significant Labs:    BMP:    Recent Labs  Lab 01/22/17  1720 01/23/17  0417    140   K 3.7 3.6    105   CO2 20* 26   BUN 18 17   CREATININE 1.0 1.0   CALCIUM 10.1 9.4       CBC:    Recent Labs  Lab 01/22/17  1720 01/23/17  0417   WBC 18.05* 15.53*   HGB 17.3 16.7   HCT 49.2 49.3    266     PSA  12.19.2013 2.7  12.03.2015 3.2  12.09.2016 4.4    Significant Imaging: No upper tract imaging.  KUB - stool in rectum and RUQ                    Assessment and Plan:     * Urinary retention  Maintain posadas catheter until patient sees Dr. Gaona in clinic on 1/27  Patient may undergo voiding trial as outpatient  Continue abx until culture results, narrow as appropriate  Recommend bowel regimen, should have at least 1 bowel movement per day    Elevated PSA, less than 10 ng/ml  Most recent PSA increased to 4.4  Has family hx of prostate cancer  Patient will likely require prostate biopsy after resolution of acute issues    Microscopic hematuria  CT Urogram ordered  Patient will require cystoscopy as outpatient   Continue to treat for suspected UTI      VTE Risk Mitigation         Ordered     Low Risk of VTE  Once      01/22/17 5416          Thank you for your consult. I will sign off. Please contact us if you have any additional questions.    Graciela Mayorga MD  Urology  [facility]

## 2017-01-24 ENCOUNTER — NURSE TRIAGE (OUTPATIENT)
Dept: ADMINISTRATIVE | Facility: CLINIC | Age: 72
End: 2017-01-24

## 2017-01-24 VITALS
RESPIRATION RATE: 16 BRPM | WEIGHT: 250 LBS | DIASTOLIC BLOOD PRESSURE: 76 MMHG | BODY MASS INDEX: 33.13 KG/M2 | HEART RATE: 108 BPM | OXYGEN SATURATION: 95 % | SYSTOLIC BLOOD PRESSURE: 126 MMHG | HEIGHT: 73 IN | TEMPERATURE: 99 F

## 2017-01-24 PROBLEM — R65.10 SIRS (SYSTEMIC INFLAMMATORY RESPONSE SYNDROME): Status: RESOLVED | Noted: 2017-01-23 | Resolved: 2017-01-24

## 2017-01-24 LAB
ANION GAP SERPL CALC-SCNC: 8 MMOL/L
BACTERIA UR CULT: NO GROWTH
BASOPHILS # BLD AUTO: 0.03 K/UL
BASOPHILS NFR BLD: 0.3 %
BUN SERPL-MCNC: 16 MG/DL
CALCIUM SERPL-MCNC: 8.8 MG/DL
CHLORIDE SERPL-SCNC: 105 MMOL/L
CO2 SERPL-SCNC: 23 MMOL/L
CREAT SERPL-MCNC: 0.9 MG/DL
DIFFERENTIAL METHOD: ABNORMAL
EOSINOPHIL # BLD AUTO: 0.1 K/UL
EOSINOPHIL NFR BLD: 1.2 %
ERYTHROCYTE [DISTWIDTH] IN BLOOD BY AUTOMATED COUNT: 13.4 %
EST. GFR  (AFRICAN AMERICAN): >60 ML/MIN/1.73 M^2
EST. GFR  (NON AFRICAN AMERICAN): >60 ML/MIN/1.73 M^2
GLUCOSE SERPL-MCNC: 87 MG/DL
HCT VFR BLD AUTO: 45.7 %
HGB BLD-MCNC: 15.4 G/DL
LYMPHOCYTES # BLD AUTO: 2.1 K/UL
LYMPHOCYTES NFR BLD: 20 %
MCH RBC QN AUTO: 30.6 PG
MCHC RBC AUTO-ENTMCNC: 33.7 %
MCV RBC AUTO: 91 FL
MONOCYTES # BLD AUTO: 1.1 K/UL
MONOCYTES NFR BLD: 10.2 %
NEUTROPHILS # BLD AUTO: 7.1 K/UL
NEUTROPHILS NFR BLD: 68 %
PLATELET # BLD AUTO: 240 K/UL
PMV BLD AUTO: 9.8 FL
POTASSIUM SERPL-SCNC: 4 MMOL/L
RBC # BLD AUTO: 5.04 M/UL
SODIUM SERPL-SCNC: 136 MMOL/L
WBC # BLD AUTO: 10.41 K/UL

## 2017-01-24 PROCEDURE — 85025 COMPLETE CBC W/AUTO DIFF WBC: CPT

## 2017-01-24 PROCEDURE — 80048 BASIC METABOLIC PNL TOTAL CA: CPT

## 2017-01-24 PROCEDURE — 99238 HOSP IP/OBS DSCHRG MGMT 30/<: CPT | Mod: GC,,, | Performed by: INTERNAL MEDICINE

## 2017-01-24 PROCEDURE — 25000003 PHARM REV CODE 250: Performed by: STUDENT IN AN ORGANIZED HEALTH CARE EDUCATION/TRAINING PROGRAM

## 2017-01-24 PROCEDURE — 94761 N-INVAS EAR/PLS OXIMETRY MLT: CPT

## 2017-01-24 PROCEDURE — 36415 COLL VENOUS BLD VENIPUNCTURE: CPT

## 2017-01-24 RX ORDER — TAMSULOSIN HYDROCHLORIDE 0.4 MG/1
0.4 CAPSULE ORAL DAILY
Qty: 30 CAPSULE | Refills: 11 | Status: SHIPPED | OUTPATIENT
Start: 2017-01-24 | End: 2017-01-24

## 2017-01-24 RX ORDER — TAMSULOSIN HYDROCHLORIDE 0.4 MG/1
0.4 CAPSULE ORAL DAILY
Qty: 30 CAPSULE | Refills: 11 | Status: SHIPPED | OUTPATIENT
Start: 2017-01-24 | End: 2017-06-01 | Stop reason: ALTCHOICE

## 2017-01-24 RX ADMIN — STANDARDIZED SENNA CONCENTRATE AND DOCUSATE SODIUM 1 TABLET: 8.6; 5 TABLET, FILM COATED ORAL at 09:01

## 2017-01-24 RX ADMIN — LISINOPRIL 20 MG: 20 TABLET ORAL at 09:01

## 2017-01-24 RX ADMIN — POLYETHYLENE GLYCOL 3350 17 G: 17 POWDER, FOR SOLUTION ORAL at 09:01

## 2017-01-24 RX ADMIN — TAMSULOSIN HYDROCHLORIDE 0.4 MG: 0.4 CAPSULE ORAL at 09:01

## 2017-01-24 NOTE — PLAN OF CARE
Future Appointments  Date Time Provider Department Center   1/27/2017 2:20 PM Uma Gaona MD [facility] UROLOGY [address]   2/8/2017 8:40 AM Rubio Rod MD [facility] IM [address] [facility]          01/24/17 1503   Final Note   Assessment Type Final Discharge Note   Discharge Disposition Home   Hospital Follow Up  Appt(s) scheduled? Yes   Offered [facility] Bedside Delivery? n/a   Discharge/Hospital Encounter Summary to ([facility]) PCP n/a   Referral to Outpatient Case Management complete? n/a   Referral to / orders for Home Health Complete? n/a   30 day supply of medicines given at discharge, if documented non-compliance / non-adherence? n/a   Any social issues identified prior to discharge? No   Did you assess the readiness or willingness of the family or caregiver to support self management of care? Yes   Right Care Referral Info   Post Acute Recommendation No Care

## 2017-01-24 NOTE — ASSESSMENT & PLAN NOTE
-- Could be 2/2 to BPH vs Prostatitis vs constipation  -- 500cc urine on bladder scan; 1200cc posadas catheter  -- Urology consult; recommend keeping indwelling posadas and schedule patient for outpatient clinic follow up for voiding trial and possible removal/further evaluation  -- CT urogram showed Marked prostatomegaly causing an impression on the inferoposterior urinary bladder

## 2017-01-24 NOTE — ASSESSMENT & PLAN NOTE
-likely due to leukemoid reactions versus UTI; resolved  -Rocephin IV x1 in ED; wait for NGTD x 24 hours on BCx  -UA is not strongly suggestive of UTI: few bacteria, no leukocyte esterase, nitrite negative  -The sample was post void of initial output   -IV fluids  -Lactic acid normal  -Prostate exam was negative for any prostate pain, so no high suspicion for acute prostatitis

## 2017-01-24 NOTE — PROGRESS NOTES
[facility] Hospital Medicine  Progress Note    Patient Name: Sreekanth Pitts Jr.  MRN: [id number]  Patient Class: IP- Inpatient   Admission Date: 1/22/2017  Length of Stay: 2 days  Attending Physician: Marcella Jimenez MD  Primary Care Provider: Rubio Rod MD    [facility] Medicine Team: [facility] 2 Uri Dumont MD    Subjective:     Principal Problem:Urinary retention    HPI:  Mr. Sreekanth Pitts Jr. is a 71 y.o. male with hx of HTN, BPH who presents to [facility] with a complaint of urinary retention since Sunday afternoon. He says he has been drinking plenty of water but is unable to urinate. Pt reports increased urinary frequency, difficulties initiating urination and a weak stream for the past week. In addition, he says he is unable to urinate when he feels constipated. He has been taking OTC laxatives to try to improve urinary retention but came to the ED when these didn't work. Pt denies lower back pain, fever, chills, flank pain weakness, or focal neuro deficits. He does report minor suprapubic tenderness and dysuria.      In the ED, 1200c urine in bladder after catheterization and WBC 18. UA shows RBC but no WBC, nitrite negative and few bacteria. Pt given 2g rocephin. Pt with continued tachycardia (110) despite 1L IVF. Plan to admit to hospital medicine Team 2.      Hospital Course:  Patient started on antibiotics and urology consulted. Recommend indwelling posadas with removal in outpatient setting for urinary retention continued evaluation.     No new subjective & objective note has been filed under this hospital service since the last note was generated.    Assessment/Plan:      SIRS (systemic inflammatory response syndrome)  -likely due to leukemoid reactions versus UTI; resolved  -Rocephin IV x1 in ED; no further antibiotics   -UA is not strongly suggestive of UTI: few bacteria, no leukocyte esterase, nitrite negative  -The sample was post void of initial output   -IV  fluids  -Lactic acid normal  -Prostate exam was negative for any prostate pain, so no high suspicion for acute prostatitis  - BCx 1/22: NGTD      VTE Risk Mitigation         Ordered     Low Risk of VTE  Once      01/22/17 8070          Uri Dumont MD  Department of Hospital Medicine   [facility]

## 2017-01-24 NOTE — PROGRESS NOTES
"[facility] Hospital Medicine  Progress Note    Patient Name: Sreekanth Pitts Jr.  MRN: [id number]  Patient Class: IP- Inpatient   Admission Date: 1/22/2017  Length of Stay: 2 days  Attending Physician: No att. providers found  Primary Care Provider: Rubio Rod MD    [facility] Medicine Team: [facility] Uri Dumont MD    Subjective:     Principal Problem:Urinary retention    HPI:  Mr. Sreekanth Pitts Jr. is a 71 y.o. male with hx of HTN, BPH who presents to [facility] with a complaint of urinary retention since Sunday afternoon. He says he has been drinking plenty of water but is unable to urinate. Pt reports increased urinary frequency, difficulties initiating urination and a weak stream for the past week. In addition, he says he is unable to urinate when he feels constipated. He has been taking OTC laxatives to try to improve urinary retention but came to the ED when these didn't work. Pt denies lower back pain, fever, chills, flank pain weakness, or focal neuro deficits. He does report minor suprapubic tenderness and dysuria.      In the ED, 1200c urine in bladder after catheterization and WBC 18. UA shows RBC but no WBC, nitrite negative and few bacteria. Pt given 2g rocephin. Pt with continued tachycardia (110) despite 1L IVF. Plan to admit to hospital medicine Team 2.      Hospital Course:  Patient started on antibiotics and urology consulted. Recommend indwelling posadas with removal in outpatient setting for urinary retention continued evaluation.     Interval History: NAEON. VSS. Patient denies any abdominal pain and reports "feeling great; when can I go home."    Review of Systems   Constitutional: Negative for chills, fatigue and fever.   HENT: Negative for congestion.    Eyes: Negative for visual disturbance.   Respiratory: Negative for cough, chest tightness and wheezing.    Cardiovascular: Negative for chest pain and leg swelling.   Gastrointestinal: Negative for abdominal " distention, abdominal pain, blood in stool, diarrhea and nausea.   Genitourinary: Negative for difficulty urinating, discharge, flank pain, penile swelling and scrotal swelling.   Musculoskeletal: Negative for arthralgias and back pain.   Skin: Negative for pallor and rash.   Neurological: Negative for dizziness, syncope and light-headedness.   Psychiatric/Behavioral: Negative for hallucinations and sleep disturbance.     Objective:     Vital Signs (Most Recent):  Temp: 98.5 °F (36.9 °C) (01/24/17 0900)  Pulse: 90 (01/24/17 0900)  Resp: 16 (01/24/17 0900)  BP: 126/76 (01/24/17 0900)  SpO2: 95 % (01/24/17 0900) Vital Signs (24h Range):  Temp:  [97.9 °F (36.6 °C)-99.1 °F (37.3 °C)] 98.5 °F (36.9 °C)  Pulse:  [] 90  Resp:  [16] 16  SpO2:  [93 %-100 %] 95 %  BP: (107-126)/(60-79) 126/76     Weight: 113.4 kg (250 lb)  Body mass index is 32.98 kg/(m^2).    Intake/Output Summary (Last 24 hours) at 01/24/17 0940  Last data filed at 01/24/17 0019   Gross per 24 hour   Intake              540 ml   Output             1675 ml   Net            -1135 ml      Physical Exam   Constitutional: He is oriented to person, place, and time. He appears well-developed and well-nourished. No distress.   HENT:   Head: Normocephalic and atraumatic.   Eyes: EOM are normal. Pupils are equal, round, and reactive to light.   Neck: Normal range of motion. Neck supple.   Cardiovascular: Normal rate, regular rhythm and normal heart sounds.  Exam reveals no gallop and no friction rub.    No murmur heard.  Pulmonary/Chest: Effort normal and breath sounds normal. No respiratory distress. He has no wheezes. He has no rales.   Abdominal: Soft. Bowel sounds are normal. He exhibits no distension and no mass. There is no tenderness. There is no rebound and no guarding.   Genitourinary: Penis normal. No penile tenderness.   Genitourinary Comments: Siddiqi in place; no gross hematuria    Musculoskeletal: Normal range of motion. He exhibits no edema.    Neurological: He is alert and oriented to person, place, and time.   Skin: Skin is warm and dry. He is not diaphoretic.   Psychiatric: He has a normal mood and affect. His behavior is normal. Thought content normal.       Significant Labs:   BMP:   Recent Labs  Lab 01/24/17  0421   GLU 87      K 4.0      CO2 23   BUN 16   CREATININE 0.9   CALCIUM 8.8     CBC:   Recent Labs  Lab 01/22/17  1720 01/23/17  0417 01/24/17  0421   WBC 18.05* 15.53* 10.41   HGB 17.3 16.7 15.4   HCT 49.2 49.3 45.7    266 240       Significant Imaging: I have reviewed all pertinent imaging results/findings within the past 24 hours.    Assessment/Plan:      * Urinary retention  -- Could be 2/2 to BPH vs Prostatitis vs constipation  -- 500cc urine on bladder scan; 1200cc posadas catheter  -- Urology consult; recommend keeping indwelling posadas and schedule patient for outpatient clinic follow up for voiding trial and possible removal/further evaluation  -- CT urogram showed Marked prostatomegaly causing an impression on the inferoposterior urinary bladder        Hypertension  - continue home ACEI        Obstructive sleep apnea  - Continue CPAP        Elevated PSA, less than 10 ng/ml        SIRS (systemic inflammatory response syndrome), resolved as of 1/24/2017  -likely due to leukemoid reactions versus UTI; resolved  -Rocephin IV x1 in ED; no further antibiotics   -UA is not strongly suggestive of UTI: few bacteria, no leukocyte esterase, nitrite negative  -The sample was post void of initial output   -IV fluids  -Lactic acid normal  -Prostate exam was negative for any prostate pain, so no high suspicion for acute prostatitis  - BCx 1/22: NGTD      -likely due to leukemoid reactions versus UTI; resolved  -Rocephin IV x1 in ED; wait for NGTD x 24 hours on BCx  -UA is not strongly suggestive of UTI: few bacteria, no leukocyte esterase, nitrite negative  -The sample was post void of initial output   -IV fluids  -Lactic acid  normal  -Prostate exam was negative for any prostate pain, so no high suspicion for acute prostatitis      VTE Risk Mitigation         Ordered     Low Risk of VTE  Once      01/22/17 3442          Uri Dumont MD  Department of Hospital Medicine   [facility]

## 2017-01-24 NOTE — PLAN OF CARE
Problem: Infection, Risk/Actual (Adult)  Intervention: Manage Suspected/Actual Infection    01/24/17 0725   Safety Interventions   Infection Management aseptic technique maintained;cultures obtained and sent to lab   Prevent/Manage Colorectal Surgical Infection   Fever Reduction/Comfort Measures fluid intake increased;lightweight bedding;lightweight clothing       Intervention: Prevent Infection/Maximize Resistance    01/24/17 0725   Hygiene Care   Bathing/Skin Care bedtime care   Respiratory Interventions   Airway/Ventilation Management calming measures promoted   Nutrition Interventions   Glycemic Management oral hydration promoted   Oral Nutrition Promotion physical activity promoted   Pain/Comfort/Sleep Interventions   Sleep/Rest Enhancement awakenings minimized;consistent schedule promoted;noise level reduced;regular sleep/rest pattern promoted;relaxation techniques promoted         Goal: Identify Related Risk Factors and Signs and Symptoms  Related risk factors and signs and symptoms are identified upon initiation of Human Response Clinical Practice Guideline (CPG)   Outcome: Ongoing (interventions implemented as appropriate)    01/24/17 0725   Infection, Risk/Actual   Related Risk Factors (Infection, Risk/Actual) exposure to microbes;prolonged hospitalization;treatment plan   Signs and Symptoms (Infection, Risk/Actual) heart rate increase       Goal: Infection Prevention/Resolution  Patient will demonstrate the desired outcomes by discharge/transition of care.   Outcome: Ongoing (interventions implemented as appropriate)    01/24/17 0725   Infection, Risk/Actual (Adult)   Infection Prevention/Resolution making progress toward outcome

## 2017-01-24 NOTE — TELEPHONE ENCOUNTER
"  Reason for Disposition   Leakage of urine around catheter    Answer Assessment - Initial Assessment Questions  1. SYMPTOMS: "What symptoms are you concerned about?"      Leakage around catheter  2. ONSET:  "When did the symptoms start?"      Pt discharged from hospital today about 1500. Before discharge they put a leg bag on him- when he got home he noted he felt like he was voiding and there was urine coming around the catheter at penis-did notice a few small clots in bag and there is urine in the bag  3. FEVER: "Is there a fever?" If so, ask: "What is the temperature, how was it measured, and when did it start?"      n/a  4. ABDOMINAL PAIN: "Is there any abdominal pain?" (e.g., Scale 1-10; or mild, moderate, severe)      denied  5. URINE COLOR: "What color is the urine?"  "Is there blood present in the urine?" (e.g., clear, yellow, cloudy, tea-colored, blood streaks, bright red)        6. ONSET: "When was the catheter inserted?"      Pt was seen in ER Sunday for urinary retention and had catheter inserted. Reported it was due to enlarged prostate  7. OTHER SYMPTOMS: "Do you have any other symptoms?" (e.g., back pain, bad urine odor)       None reported  8. PREGNANCY: "Is there any chance you are pregnant?" "When was your last menstrual period?"    Protocols used: ST URINARY CATHETER SYMPTOMS AND [id number]    "

## 2017-01-24 NOTE — SUBJECTIVE & OBJECTIVE
"Interval History: NAEON. VSS. Patient denies any abdominal pain and reports "feeling great; when can I go home."    Review of Systems   Constitutional: Negative for chills, fatigue and fever.   HENT: Negative for congestion.    Eyes: Negative for visual disturbance.   Respiratory: Negative for cough, chest tightness and wheezing.    Cardiovascular: Negative for chest pain and leg swelling.   Gastrointestinal: Negative for abdominal distention, abdominal pain, blood in stool, diarrhea and nausea.   Genitourinary: Negative for difficulty urinating, discharge, flank pain, penile swelling and scrotal swelling.   Musculoskeletal: Negative for arthralgias and back pain.   Skin: Negative for pallor and rash.   Neurological: Negative for dizziness, syncope and light-headedness.   Psychiatric/Behavioral: Negative for hallucinations and sleep disturbance.     Objective:     Vital Signs (Most Recent):  Temp: 98.5 °F (36.9 °C) (01/24/17 0900)  Pulse: 90 (01/24/17 0900)  Resp: 16 (01/24/17 0900)  BP: 126/76 (01/24/17 0900)  SpO2: 95 % (01/24/17 0900) Vital Signs (24h Range):  Temp:  [97.9 °F (36.6 °C)-99.1 °F (37.3 °C)] 98.5 °F (36.9 °C)  Pulse:  [] 90  Resp:  [16] 16  SpO2:  [93 %-100 %] 95 %  BP: (107-126)/(60-79) 126/76     Weight: 113.4 kg (250 lb)  Body mass index is 32.98 kg/(m^2).    Intake/Output Summary (Last 24 hours) at 01/24/17 0940  Last data filed at 01/24/17 0019   Gross per 24 hour   Intake              540 ml   Output             1675 ml   Net            -1135 ml      Physical Exam   Constitutional: He is oriented to person, place, and time. He appears well-developed and well-nourished. No distress.   HENT:   Head: Normocephalic and atraumatic.   Eyes: EOM are normal. Pupils are equal, round, and reactive to light.   Neck: Normal range of motion. Neck supple.   Cardiovascular: Normal rate, regular rhythm and normal heart sounds.  Exam reveals no gallop and no friction rub.    No murmur " heard.  Pulmonary/Chest: Effort normal and breath sounds normal. No respiratory distress. He has no wheezes. He has no rales.   Abdominal: Soft. Bowel sounds are normal. He exhibits no distension and no mass. There is no tenderness. There is no rebound and no guarding.   Genitourinary: Penis normal. No penile tenderness.   Genitourinary Comments: Siddiqi in place; no gross hematuria    Musculoskeletal: Normal range of motion. He exhibits no edema.   Neurological: He is alert and oriented to person, place, and time.   Skin: Skin is warm and dry. He is not diaphoretic.   Psychiatric: He has a normal mood and affect. His behavior is normal. Thought content normal.       Significant Labs:   BMP:   Recent Labs  Lab 01/24/17  0421   GLU 87      K 4.0      CO2 23   BUN 16   CREATININE 0.9   CALCIUM 8.8     CBC:   Recent Labs  Lab 01/22/17  1720 01/23/17  0417 01/24/17  0421   WBC 18.05* 15.53* 10.41   HGB 17.3 16.7 15.4   HCT 49.2 49.3 45.7    266 240       Significant Imaging: I have reviewed all pertinent imaging results/findings within the past 24 hours.

## 2017-01-24 NOTE — DISCHARGE SUMMARY
.  DISCHARGE SUMMARY  Hospital Medicine    Team: [facility] HOSP MED 2    Patient Name: Sreekanth Pitts Jr.  YOB: 1945    Admit Date: 1/22/2017    Discharge Date: 01/24/2017    Discharge Attending Physician: Dr. Jimenez    Resident on Service: Dr. Dumont    Chief Complaint: Urinary Retention    Princilpal Diagnoses:  Active Hospital Problems    Diagnosis  POA    *Urinary retention [R33.9]  Yes    Elevated PSA, less than 10 ng/ml [R97.20]  Yes    Microscopic hematuria [R31.29]  Yes    Leukemoid reaction [D72.823]  Yes    Obstructive sleep apnea [G47.33]  Yes    Hypertension [I10]  Yes      Resolved Hospital Problems    Diagnosis Date Resolved POA    SIRS (systemic inflammatory response syndrome) [R65.10] 01/24/2017 Yes       Discharged Condition: Admit problems have resolved      HOSPITAL COURSE:      Initial Presentation:    Mr. Sreekanth Pitts Jr. is a 71 y.o. male with hx of HTN, BPH who presents to [facility] ED with a complaint of urinary retention since Sunday afternoon. He says he has been drinking plenty of water but is unable to urinate. Pt reports increased urinary frequency, difficulties initiating urination and a weak stream for the past week. In addition, he says he is unable to urinate when he feels constipated. He has been taking OTC laxatives to try to improve urinary retention but came to the ED when these didn't work. Pt denies lower back pain, fever, chills, flank pain weakness, or focal neuro deficits. He does report minor suprapubic tenderness and dysuria. In the ED, 1200c urine in bladder after catheterization and WBC 18. UA shows RBC but no WBC, nitrite negative and few bacteria. Pt given 2g rocephin. Pt with continued tachycardia (110) despite 1L IVF.     Course of Principle Problem for Admission:    Patient had 500 mL on on bladder scan however after posadas was placed he subsequently produced 1200 mL with significant relief. Urology was also consulted which recommended keep the  posadas in place and scheduling for a short follow up as an outpatient for a voiding trial and further work up of patient's urinary retention. Patient also underwent a CT urogram which showed marked prostatomegaly causing an impression on the inferoposterior urinary bladder and likely the source of his retention. He also was taking Advil PM at home which contained diphenhydramine whose anti-colinergic properties could also play a role in his retention. He was instructed to stop taking this medication at time of discharge. After his initial posadas placement patient ad no further episodes of abdominal pain. His antibiotics were discontinued as it was felt that his WBC increase was a leukemoid reaction and not an infection; UA was not consistent with infection. He was discharged after the blood cultures were NGTD for >24 hours and patient remained asymptomatic and afebrile.     Other Medical Problems Addressed in the Hospital:    Hypertension  - continue home ACEI          Obstructive sleep apnea  - Continue CPAP          Elevated PSA, less than 10 ng/ml          SIRS (systemic inflammatory response syndrome), resolved as of 1/24/2017  -likely due to leukemoid reactions versus UTI; resolved  -Rocephin IV x1 in ED; no further antibiotics   -UA is not strongly suggestive of UTI: few bacteria, no leukocyte esterase, nitrite negative  -The sample was post void of initial output   -IV fluids  -Lactic acid normal  -Prostate exam was negative for any prostate pain, so no high suspicion for acute prostatitis  - BCx 1/22: NGTD      CONSULTS: Urology    Last CBC/BMP/HgbA1c (if applicable):  Recent Results (from the past 336 hour(s))   CBC auto differential    Collection Time: 01/24/17  4:21 AM   Result Value Ref Range    WBC 10.41 3.90 - 12.70 K/uL    Hemoglobin 15.4 14.0 - 18.0 g/dL    Hematocrit 45.7 40.0 - 54.0 %    Platelets 240 150 - 350 K/uL   CBC auto differential    Collection Time: 01/23/17  4:17 AM   Result Value Ref  Range    WBC 15.53 (H) 3.90 - 12.70 K/uL    Hemoglobin 16.7 14.0 - 18.0 g/dL    Hematocrit 49.3 40.0 - 54.0 %    Platelets 266 150 - 350 K/uL   CBC auto differential    Collection Time: 01/22/17  5:20 PM   Result Value Ref Range    WBC 18.05 (H) 3.90 - 12.70 K/uL    Hemoglobin 17.3 14.0 - 18.0 g/dL    Hematocrit 49.2 40.0 - 54.0 %    Platelets 255 150 - 350 K/uL     Recent Results (from the past 336 hour(s))   Basic Metabolic Panel    Collection Time: 01/24/17  4:21 AM   Result Value Ref Range    Sodium 136 136 - 145 mmol/L    Potassium 4.0 3.5 - 5.1 mmol/L    Chloride 105 95 - 110 mmol/L    CO2 23 23 - 29 mmol/L    BUN, Bld 16 8 - 23 mg/dL    Creatinine 0.9 0.5 - 1.4 mg/dL    Calcium 8.8 8.7 - 10.5 mg/dL    Anion Gap 8 8 - 16 mmol/L   Basic Metabolic Panel    Collection Time: 01/23/17  4:17 AM   Result Value Ref Range    Sodium 140 136 - 145 mmol/L    Potassium 3.6 3.5 - 5.1 mmol/L    Chloride 105 95 - 110 mmol/L    CO2 26 23 - 29 mmol/L    BUN, Bld 17 8 - 23 mg/dL    Creatinine 1.0 0.5 - 1.4 mg/dL    Calcium 9.4 8.7 - 10.5 mg/dL    Anion Gap 9 8 - 16 mmol/L     Lab Results   Component Value Date    HGBA1C 5.4 12/09/2016       Other Pertinent Lab Findings:  BCx NGTD    Pertinent/Significant Diagnostic Studies:  CT cystogram showing marked prostatomegaly causing an impression on the inferoposterior urinary bladder    Special Treatments/Procedures: Placement of indwelling posadas    Disposition:  Home    Future Scheduled Appointments:  Future Appointments  Date Time Provider Department Center   1/27/2017 2:20 PM Uma Gaona MD [facility] UROLOGY [facility]   2/8/2017 8:40 AM Rubio Rod MD [facility]       Discharge Medication List:       Medication List      START taking these medications          tamsulosin 0.4 mg Cp24   Commonly known as:  FLOMAX   Take 1 capsule (0.4 mg total) by mouth once daily.         CONTINUE taking these medications          CENTRUM SILVER ORAL       lisinopril 20 MG tablet    Commonly known as:  PRINIVIL,ZESTRIL   Take 1 tablet (20 mg total) by mouth once daily.         STOP taking these medications          ADVIL PM ORAL            Where to Get Your Medications      These medications were sent to [facility] [place] - [address]  [address] [address]     Phone:  [phone]     tamsulosin 0.4 mg Cp24             Patient Instructions:  No discharge procedures on file.    At the time of discharge patient was told to take all medications as prescribed, to keep all followup appointments, and to call their primary care physician or return to the emergency room if they have any worsening or concerning symptoms.    Signing Physician:  Uri Dumont MD

## 2017-01-24 NOTE — PROGRESS NOTES
D/C instructions given. IV removed with catheter intact. Pt waiting at bedside with wife waiting on hosp transport

## 2017-01-24 NOTE — ASSESSMENT & PLAN NOTE
-likely due to leukemoid reactions versus UTI; resolved  -Rocephin IV x1 in ED; no further antibiotics   -UA is not strongly suggestive of UTI: few bacteria, no leukocyte esterase, nitrite negative  -The sample was post void of initial output   -IV fluids  -Lactic acid normal  -Prostate exam was negative for any prostate pain, so no high suspicion for acute prostatitis  - BCx 1/22: NGTD

## 2017-01-25 ENCOUNTER — PATIENT OUTREACH (OUTPATIENT)
Dept: ADMINISTRATIVE | Facility: CLINIC | Age: 72
End: 2017-01-25
Payer: MEDICARE

## 2017-01-25 ENCOUNTER — OFFICE VISIT (OUTPATIENT)
Dept: UROLOGY | Facility: CLINIC | Age: 72
End: 2017-01-25
Payer: MEDICARE

## 2017-01-25 VITALS
WEIGHT: 254.19 LBS | DIASTOLIC BLOOD PRESSURE: 78 MMHG | SYSTOLIC BLOOD PRESSURE: 115 MMHG | BODY MASS INDEX: 33.54 KG/M2 | HEART RATE: 98 BPM

## 2017-01-25 DIAGNOSIS — Z80.42 FAMILY HISTORY OF PROSTATE CANCER: ICD-10-CM

## 2017-01-25 DIAGNOSIS — N32.89 BLADDER SPASM: Primary | ICD-10-CM

## 2017-01-25 DIAGNOSIS — R31.29 MICROSCOPIC HEMATURIA: ICD-10-CM

## 2017-01-25 DIAGNOSIS — N28.1 COMPLEX RENAL CYST: ICD-10-CM

## 2017-01-25 DIAGNOSIS — R97.20 ELEVATED PSA: ICD-10-CM

## 2017-01-25 DIAGNOSIS — R33.9 URINARY RETENTION: ICD-10-CM

## 2017-01-25 PROCEDURE — 99213 OFFICE O/P EST LOW 20 MIN: CPT | Mod: PBBFAC | Performed by: PHYSICIAN ASSISTANT

## 2017-01-25 PROCEDURE — 99213 OFFICE O/P EST LOW 20 MIN: CPT | Mod: S$PBB,,, | Performed by: PHYSICIAN ASSISTANT

## 2017-01-25 PROCEDURE — 99999 PR PBB SHADOW E&M-EST. PATIENT-LVL III: CPT | Mod: PBBFAC,,, | Performed by: PHYSICIAN ASSISTANT

## 2017-01-25 NOTE — MR AVS SNAPSHOT
[facility] Urology 4th Floor  [address]  [facility] [address]  Phone: [phone]                  Sreekanth Pitts Jr.   2017 10:20 AM   Office Visit    Description:  Male : 1945   Provider:  Ghazal Glaser PA-C   Department:  [facility] Urolog 4th Floor           Reason for Visit     Urinary Retention           Diagnoses this Visit        Comments    Bladder spasm    -  Primary            To Do List           Future Appointments        Provider Department Dept Phone    2017 2:20 PM Uma Gaona MD [facility] Urology 4th Floor [phone]    2017 8:40 AM Rubio Rod MD [facility] - Internal Medicine [phone]      Goals (5 Years of Data)     None      [facility]     [facility] On [facility] Nurse Care Line -  Assistance  Registered nurses in the [facility] On Call Center provide clinical advisement, health education, appointment booking, and other advisory services.  Call for this free service at [phone].             Medications           Message regarding Medications     Verify the changes and/or additions to your medication regime listed below are the same as discussed with your clinician today.  If any of these changes or additions are incorrect, please notify your healthcare provider.             Verify that the below list of medications is an accurate representation of the medications you are currently taking.  If none reported, the list may be blank. If incorrect, please contact your healthcare provider. Carry this list with you in case of emergency.           Current Medications     lisinopril (PRINIVIL,ZESTRIL) 20 MG tablet Take 1 tablet (20 mg total) by mouth once daily.    MULTIVITAMIN W-MINERALS/LUTEIN (CENTRUM SILVER ORAL) Take 1 tablet by mouth once daily.     tamsulosin (FLOMAX) 0.4 mg Cp24 Take 1 capsule (0.4 mg total) by mouth once daily.           Clinical Reference Information           Vital Signs - Last Recorded  Most recent update:  1/25/2017 10:19 AM by Tory Hayden LPN    BP Pulse Wt BMI       115/78 98 115.3 kg (254 lb 3.1 oz) 33.54 kg/m2       Blood Pressure          Most Recent Value    BP  115/78      Allergies as of 1/25/2017     No Known Allergies      Immunizations Administered on Date of Encounter - 1/25/2017     None

## 2017-01-25 NOTE — PROGRESS NOTES
CHIEF COMPLAINT:    Mr. Pitts is a 71 y.o. male presenting for leaking around the catheter.   PRESENTING ILLNESS:    Sreekanth Pitts Jr. is a 71 y.o. male  who presents for leaking around the catheter.  Yesterday he felt bladder pressure and reports urine leaking around the catheter.  He denies a prolong period in which his leg bag was empty or catheter stopped draining.   He also reports seeing a few blood clots in his leg bag.  He has not experienced bladder pressure or leaking of urine today.     Patient presented to the ED on 1/22/17 for inability to urinate, abdominal pain, and constipation.  He reports taking OTC laxatives given he had not had a bowel movement in 1-2 days.  He states he usually goes regularly. He reported increased urinary frequency, difficulties initiating urination and a weak stream for the past week.   Bladder scanned showed 500ml.  Siddiqi catheter was placed and 1.2L of urine was drained from bladder.  Flomax was initiated.  U/a showed microscopic hematuria.  He also was taking Advil PM at home which contained diphenhydramine whose anti-colinergic properties could also play a role in his retention. He was instructed to stop taking this medication at time of discharge. He is scheduled to follow up with Dr. Gaona in 2 days.      CT urogram 1/2017: showed marked prostatomegaly causing an impression on the inferoposterior urinary bladder.  A rounded 1.8 cm hypodensity at the anterior right renal interpolar region demonstrates attenuation greater than expected for simple cyst.      Reports a 1.5 yrs history of frequency, hesitancy, weak stream small volume voids.  No prior history of urgency, incontinence, gross hematuria or nocturia   Denies prior history of urinary retention.  No prior history of flomax and finasteride usage.  No history of dysuria, UTI, or prostatitis  Has Family hx of PCa (Brother with BCR after XRT)   Has hx of STEPHIE, on CPAP  Had a laminectomy at L4/L5 2002  No hx of  DM  Has hx of elevated PSA to 4.4, never biopsy     History of ED, given PDE5 in the past, no use of it now  No hx kidney stones, no personal hx of  malignancy, quit smoking age 27 3 years for 2ppd, no chemical or dye exposures, no chemo or radiation    No history of glaucoma.    REVIEW OF SYSTEMS:    Constitutional: Negative for fever and chills.   HENT: Negative for hearing loss.   Eyes: Negative for visual disturbance.   Respiratory: Negative for shortness of breath.   Cardiovascular: Negative for chest pain.   Gastrointestinal: Negative for vomiting.   Genitourinary:  Negative for urgency, frequency, nocturia, incontinence, dysuria, hematuria, intermittency, hesitancy, and decreased urine volume.   Neurological: Negative for dizziness.   Hematological: Does not bruise/bleed easily.   Psychiatric/Behavioral: Negative for confusion.       PATIENT HISTORY:    Past Medical History   Diagnosis Date    Allergy 12/3/2015    Arthritis     Degenerative disc disease     Hypertension     Nuclear sclerosis - Both Eyes 7/30/2012       Past Surgical History   Procedure Laterality Date    Eye foreign body removal       childhood    Back surgery  2002    Eye surgery         Family History   Problem Relation Age of Onset    Cataracts Mother     Hypertension Mother     Cancer Father     Heart disease Father     Heart disease Brother     Stroke Paternal Grandfather     Heart disease Paternal Grandfather        Social History     Social History    Marital status:      Spouse name: N/A    Number of children: N/A    Years of education: N/A     Occupational History     [employer]     Social History Main Topics    Smoking status: Former Smoker     Packs/day: 2.00     Years: 4.00     Types: Cigarettes, Cigars     Quit date: 1/1/1973    Smokeless tobacco: Not on file    Alcohol use 0.6 oz/week     1 Cans of beer per week      Comment: social    Drug use: No    Sexual activity: No     Other  Topics Concern    Not on file     Social History Narrative       Allergies:  Review of patient's allergies indicates no known allergies.    Medications:    Current Outpatient Prescriptions:     lisinopril (PRINIVIL,ZESTRIL) 20 MG tablet, Take 1 tablet (20 mg total) by mouth once daily., Disp: 90 tablet, Rfl: 3    MULTIVITAMIN W-MINERALS/LUTEIN (CENTRUM SILVER ORAL), Take 1 tablet by mouth once daily. , Disp: , Rfl:     tamsulosin (FLOMAX) 0.4 mg Cp24, Take 1 capsule (0.4 mg total) by mouth once daily., Disp: 30 capsule, Rfl: 11  No current facility-administered medications for this visit.     PHYSICAL EXAMINATION:    Constitutional: He is oriented to person, place, and time. He appears well-developed and well-nourished.  He is in no apparent distress.    Neck: No tracheal deviation present.     Cardiovascular: Normal rate.      Pulmonary/Chest: Effort normal. No respiratory distress.     Abdominal:  He exhibits no distension.  There is no CVA tenderness.     Lymphadenopathy:        Right: No supraclavicular adenopathy present.        Left: No supraclavicular adenopathy present.     Neurological: He is alert and oriented to person, place, and time.     Skin: Skin is warm and dry.     Extremities: No pitting edema noted in lower extremities bilaterally    Psych: Cooperative with normal affect.    Genitourinary: The penis is circumcised. Siddiqi catheter noted.  Yellow urine with one small blood clot noted in leg bag.      Physical Exam      LABS:    Lab Results   Component Value Date    PSA 4.4 (H) 12/09/2016    PSA 3.2 12/03/2015    PSADIAG 2.7 12/19/2013       IMPRESSION:    Encounter Diagnoses   Name Primary?    Bladder spasm Yes    Elevated PSA     Urinary retention     Family history of prostate cancer     Microscopic hematuria     Complex renal cyst          PLAN:    Discussed overactive bladder medication to treat bladder spasms.  He declined medication.   Continue flomax.    Keep follow up appointment  with Dr. Gaona in 2 days for further evaluation elevated psa, urinary retention and microscopic hematuria.     Ghazal Glaser PA-C

## 2017-01-25 NOTE — PATIENT INSTRUCTIONS
Urinary Retention (Male)  Urinary retention is the medical term for difficulty or inability to pass urine, even though your bladder is full.  Causes  The most common cause of urinary retention in men is the bladder outlet being blocked. This can be due to an enlarged prostate gland or a bladder infection. Certain medicines can also cause this problem. This condition is more likely to occur as men get older.    This condition is treated by insertion of a catheter into the bladder to drain the urine. This provides immediate relief. The catheter may need to remain in place for a few days to prevent a recurrence. The catheter has a balloon on the tip which was inflated after insertion. This prevents the catheter from falling out.  Symptoms  Common symptoms of urinary retention include:  · Pain (not experienced by everyone)  · Frequent urination  · Feeling that the bladder is still full after urinating  · Incontinence (not being able to control the release of urine)  · Swollen abdomen  Treatment  This condition is treated by inserting a tube (catheter) into the bladder to drain the urine. This provides immediate relief. The catheter may need to stay in place for a few days. The catheter has a balloon on the tip, which is inflated after insertion. This prevents the catheter from falling out.  Home care  · If you were given antibiotics, take them until they are used up, or your healthcare provider tells you to stop. It is important to finish the antibiotics even though you feel better. This is to make sure your infection has cleared.  · If a catheter was left in place, it is important to keep bacteria from getting into the collection bag. Do not disconnect the catheter from the collection bag.  · Use a leg band to secure the drainage tube, so it does not pull on the catheter. Drain the collection bag when it becomes full using the drain spout at the bottom of the bag.  · Do not pull on or try to remove your catheter.  This will injure your urethra. The catheter must be removed by a healthcare provider.  Follow-up care  Follow up with your healthcare provider, or as advised.  If a catheter was left in place, it can usually be removed within 3 to 7 days. Some conditions require the catheter to stay in longer. Your healthcare provider will tell you when to return to have the catheter removed.  When to seek medical advice  Call your healthcare provider right away if any of these occur:  · Fever of 100.4ºF (38ºC) or higher, or as directed by your healthcare provider  · Bladder or lower-abdominal pain or fullness  · Abdominal swelling, nausea, vomiting, or back pain  · Blood or urine leakage around the catheter  · Bloody urine coming from the catheter (if a new symptom)  · Weakness, dizziness, or fainting  · Confusion or change in usual level of alertness  · If a catheter was left in place, return if:  ¨ Catheter falls out  ¨ Catheter stops draining for 6 hours  © 2073-1508 [employer]. [address] [place] [address], [place] [address]. All rights reserved. This information is not intended as a substitute for professional medical care. Always follow your healthcare professional's instructions.

## 2017-01-27 ENCOUNTER — OFFICE VISIT (OUTPATIENT)
Dept: UROLOGY | Facility: CLINIC | Age: 72
End: 2017-01-27
Payer: MEDICARE

## 2017-01-27 VITALS
BODY MASS INDEX: 34.33 KG/M2 | WEIGHT: 259.06 LBS | DIASTOLIC BLOOD PRESSURE: 77 MMHG | HEART RATE: 96 BPM | SYSTOLIC BLOOD PRESSURE: 112 MMHG | HEIGHT: 73 IN

## 2017-01-27 DIAGNOSIS — R33.9 URINARY RETENTION: Primary | ICD-10-CM

## 2017-01-27 DIAGNOSIS — N48.89 PENILE PAIN: ICD-10-CM

## 2017-01-27 LAB
BACTERIA BLD CULT: NORMAL
BACTERIA BLD CULT: NORMAL

## 2017-01-27 PROCEDURE — 99213 OFFICE O/P EST LOW 20 MIN: CPT | Mod: PBBFAC | Performed by: UROLOGY

## 2017-01-27 PROCEDURE — 99213 OFFICE O/P EST LOW 20 MIN: CPT | Mod: S$PBB,,, | Performed by: UROLOGY

## 2017-01-27 PROCEDURE — 99999 PR PBB SHADOW E&M-EST. PATIENT-LVL III: CPT | Mod: PBBFAC,,, | Performed by: UROLOGY

## 2017-01-27 RX ORDER — DUTASTERIDE 0.5 MG/1
0.5 CAPSULE, LIQUID FILLED ORAL DAILY
Qty: 30 CAPSULE | Refills: 0 | Status: SHIPPED | OUTPATIENT
Start: 2017-01-27 | End: 2017-02-21 | Stop reason: SDUPTHER

## 2017-01-27 RX ORDER — CIPROFLOXACIN 250 MG/1
500 TABLET, FILM COATED ORAL ONCE
Status: CANCELLED | OUTPATIENT
Start: 2017-01-27 | End: 2017-01-27

## 2017-01-27 RX ORDER — LIDOCAINE HYDROCHLORIDE 20 MG/ML
JELLY TOPICAL
Qty: 30 ML | Refills: 3 | Status: SHIPPED | OUTPATIENT
Start: 2017-01-27 | End: 2017-12-01

## 2017-01-27 RX ORDER — LIDOCAINE HYDROCHLORIDE 20 MG/ML
JELLY TOPICAL ONCE
Status: CANCELLED | OUTPATIENT
Start: 2017-01-27 | End: 2017-01-27

## 2017-01-27 NOTE — LETTER
January 27, 2017      Rubio Rod MD  [address]  [facility] [address]           [facility] - Urology 4th Floor  [address]  [facility] [address]  Phone: [phone]          Patient: Sreekanth Pitts Jr.   MR Number: [id number]   YOB: 1945   Date of Visit: 1/27/2017       Dear Dr. Rubio Rod:    Thank you for referring Sreekanth Pitts to me for evaluation. Attached you will find relevant portions of my assessment and plan of care.    If you have questions, please do not hesitate to call me. I look forward to following Sreekanth Pitts along with you.    Sincerely,    Uma Gaona MD    Enclosure  CC:  No Recipients    If you would like to receive this communication electronically, please contact [email or web address] or [phone] to request more information on [employer] access.    For providers and/or their staff who would like to refer a patient to [facility], please contact us through our one-stop-shop provider referral line, [facility], at [phone].    If you feel you have received this communication in error or would no longer like to receive these types of communications, please e-mail [email or web address]

## 2017-01-27 NOTE — PROGRESS NOTES
CHIEF COMPLAINT:    Mr. Pitts is a 71 y.o. male presenting for a consultation at the request of Dr. Rubio Rod. Patient presents with urinary retention.    PRESENTING ILLNESS:    Sreekanth Pitts Jr. is a 71 y.o. male who went into urinary retention after having constipation on 1/22/2107.  He was previously seen and had an elevated PSA.  He was found to have 1200 ml in the bladder at the time of catheter placement.  He is now having normal bowel movements.  He would like to have the catheter out however, he presents for a Friday afternoon appointment.  Prior to catheter placement, he states he had a slow stream, had some urgency and urinary frequency.  He states he is bothered by the catheter particularly at the tip of the penis.      REVIEW OF SYSTEMS:    Review of Systems   Constitutional: Negative.    HENT: Negative.    Eyes: Negative.    Respiratory: Negative.    Cardiovascular: Negative.    Gastrointestinal: Positive for constipation (history of, no longer constipated).   Genitourinary:        Urinary retention, elevated PSA   Musculoskeletal: Negative.    Skin: Negative.    Neurological: Negative.    Endo/Heme/Allergies: Negative.    Psychiatric/Behavioral: Negative.      PATIENT HISTORY:    Past Medical History   Diagnosis Date    Allergy 12/3/2015    Arthritis     Degenerative disc disease     Hypertension     Nuclear sclerosis - Both Eyes 7/30/2012       Past Surgical History   Procedure Laterality Date    Eye foreign body removal       childhood    Back surgery  2002    Eye surgery         Family History   Problem Relation Age of Onset    Cataracts Mother     Hypertension Mother     Cancer Father     Heart disease Father     Heart disease Brother     Stroke Paternal Grandfather     Heart disease Paternal Grandfather        Social History    Marital status:      Occupational History     [employer]     Social History Main Topics    Smoking status: Former Smoker      Packs/day: 2.00     Years: 4.00     Types: Cigarettes, Cigars     Quit date: 1/1/1973    Smokeless tobacco: Not on file    Alcohol use 0.6 oz/week     1 Cans of beer per week      Comment: social    Drug use: No    Sexual activity: No     Allergies:  Review of patient's allergies indicates no known allergies.    Medications:  Outpatient Encounter Prescriptions as of 1/27/2017   Medication Sig Dispense Refill    lisinopril (PRINIVIL,ZESTRIL) 20 MG tablet Take 1 tablet (20 mg total) by mouth once daily. 90 tablet 3    MULTIVITAMIN W-MINERALS/LUTEIN (CENTRUM SILVER ORAL) Take 1 tablet by mouth once daily.       tamsulosin (FLOMAX) 0.4 mg Cp24 Take 1 capsule (0.4 mg total) by mouth once daily. 30 capsule 11    dutasteride (AVODART) 0.5 mg capsule Take 1 capsule (0.5 mg total) by mouth once daily. 30 capsule 0    lidocaine HCL 2% (XYLOCAINE) 2 % jelly Apply topically as needed. 30 mL 3     No facility-administered encounter medications on file as of 1/27/2017.          PHYSICAL EXAMINATION:    The patient generally appears in good health, is appropriately interactive, and is in no apparent distress.    Skin: No lesions.    Mental: Cooperative with normal affect.    Neuro: Grossly intact.    HEENT: Normal. No evidence of lymphadenopathy.    Chest: normal inspiratory effort.    Abdomen: Soft, non-tender. No masses or organomegaly. Bladder is not palpable. No evidence of flank discomfort. No evidence of inguinal hernia.    Extremities: No clubbing, cyanosis, or edema    IMPRESSION:    Encounter Diagnoses   Name Primary?    Urinary retention Yes    Penile pain        PLAN:    1.  Lidocaine gel for pain at the tip of the penis.  Could be referred prostate pain, could be that the catheter drags against the latex.  2.  Return first appointment on Monday am for catheter removal.  That way, he can return to the office if he needs it to be replaced  3.  Will plan to do TRUS/PNB under sedation.    Copy to:    Viola

## 2017-01-27 NOTE — PATIENT INSTRUCTIONS
What are Urodynamics Studies?  Urodynamics studies are a series of tests that give your doctor a close look at the working of your bladder and urethra. The tests can help your doctor learn about any problems storing urine or voiding (eliminating) urine from your body.     The bladder holds urine until it leaves the body through the urethra.   Understanding the lower urinary tract  The lower part of the urinary tract has several parts.  · The bladder stores urine until youre ready to release it.  · The urethra is the tube that carries urine from the bladder out of the body.  · The sphincter is made up of muscles around the opening of the bladder. The sphincter muscles tighten to hold urine in the bladder. They relax to let urine flow. Signals from the brain tell the sphincter when to tighten and relax. These signals also tell the bladder when to contract to let urine flow out of the body.  Why you need a urodynamics study  This test may be ordered if you:  · Are incontinent (leak urine).  · Have a bladder that does not empty all the way.  · Have symptoms such as the need to urinate often or a constant strong need to urinate.  · Have intermittent or weak urine stream.  · Have persistent urinary tract infections.  Preparing for the study  · Tell your doctor about any medications youre taking. Ask if you should stop them before the study.  · Keep a diary of your bathroom habits. Do this for a few days before the study. This diary can be a helpful part of the evaluation.  · Ask if you need to arrive for the study with a full bladder.  © 9762-4390 [employer]. [address] [place] [address], [place] [address]. All rights reserved. This information is not intended as a substitute for professional medical care. Always follow your healthcare professional's instructions.          Urodynamic Studies  Urodynamic studies may be done in your doctors office, a clinic, or a hospital. The studies may take up to an hour  or more. This depends on which tests your doctor does. The tests are generally painless. You wont need sedating medication.     The equipment used for the study varies depending upon the facility and what tests are done.   Tests that may be done  Uroflowmetry. This measures the amount and speed of urine you void from your bladder. You urinate into a funnel. Its attached to a computer that records your urine flow over time. The amount of urine left in your bladder after you void may also be measured right after this test.  Cystometry. This test evaluates how much your bladder can hold. It also measures how strong your bladder muscle is and how well the signals work that tell you when your bladder is full. Your health care provider fills your bladder with sterile water or saline solution, through a catheter. Your doctor will instruct you to report any sensations you feel. Mention if theyre similar to symptoms youve felt at home. Your doctor may ask you to cough, stand and walk, or bear down during this test.  Electromyogram. This helps evaluate the muscle contractions that control urination, such as sphincter muscle contractions. Your health care provider may place electrode patches or wires near your rectum or urethra to make the recording. He or she may ask you to try to tighten or relax your sphincter muscles during this test.  Pressure flow study. This test measures your detrusor, urethral, and abdominal pressures. Detrusor is the muscle surrounding the bladder walls that relaxes to allow your bladder to fill, and and contracts to squeeze out urine. A pressure flow study is often done after cystometry. Youre asked to urinate while a probe in your urethra measures pressures.  Video cystourethrography. This takes video pictures of urine flow through your urinary tract. It can help identify blockages or other problems. The bladder is filled with an X-ray contrast fluid. Then X-ray video pictures are taken as the  fluid is urinated out. Ultrasound imaging may also be combined with routine urodynamic studies.  Ambulatory urodynamics. This test can be used to evaluate you while doing usual activities.  Getting your results  After the study, youll get dressed and return to the consultation room. Test results may be ready soon after the study is finished. Or, you may return to your doctors office in a few days for your results. Your doctor can talk with you about the study report and your options.   © 9194-3683 [employer]. [address] [place] [address]. All rights reserved. This information is not intended as a substitute for professional medical care. Always follow your healthcare professional's instructions.          Cystoscopy  Cystoscopy is a procedure that lets your doctor look directly inside your urethra and bladder. It can be used to:  · Help diagnose a problem with your urethra, bladder, or kidneys.  · Take a sample (biopsy) of bladder or urethral tissue.  · Treat certain problems (such as removing kidney stones).  · Place a stent to bypass an obstruction.  · Take special X-rays of the kidneys.  Based on the findings, your doctor may recommend other tests or treatments.  What is a cystoscope?  A cystoscope is a telescope-like instrument that contains lenses and fiberoptics (small glass wires that make bright light). The cystoscope may be straight and rigid, or flexible to bend around curves in the urethra. The doctor may look directly into the cystoscope, or project the image onto a monitor.  Getting ready  · Ask your doctor if you should stop taking any medications prior to the procedure.  · Ask whether you should avoid eating or drinking anything after midnight before the procedure.  · Follow any other instructions your doctor gives you.  Tell your doctor before the exam if you:  · Take any medications, such as aspirin or blood thinners  · Have allergies to any medications  · Are pregnant    The procedure  Cystoscopy is done in the doctors office or hospital. The doctor and a nurse are present during the procedure. It takes only a few minutes, longer if a biopsy, X-ray, or treatment needs to be done.  During the procedure:  · You lie on an exam table on your back, knees bent and legs apart. You are covered with a drape.  · Your urethra and the area around it are washed. Anesthetic jelly may be applied to numb the urethra. Other pain medication is usually not needed. In some cases, you may be offered a mild sedative to help you relax. If a more extensive procedure is to be done, such as a biopsy or kidney stone removal, general anesthesia may be needed.  · The cystoscope is inserted. A sterile fluid is put into the bladder to expand it. You may feel pressure from this fluid.  · When the procedure is done, the cystoscope is removed.  After the procedure  If you had a sedative, general anesthesia, or spinal anesthesia, you must have someone drive you home. Once youre home:  · Drink plenty of fluids.  · You may have burning or light bleeding when you urinate--this is normal.  · Medications may be prescribed to ease any discomfort or prevent infection. Take these as directed.  · Call your doctor if you have heavy bleeding or blood clots, burning that lasts more than a day, a fever over 100°F  (38° C), or trouble urinating.  © 5923-3340 [employer]. [address] [place] [address], [place] [address]. All rights reserved. This information is not intended as a substitute for professional medical care. Always follow your healthcare professional's instructions.

## 2017-01-30 ENCOUNTER — OFFICE VISIT (OUTPATIENT)
Dept: UROLOGY | Facility: CLINIC | Age: 72
End: 2017-01-30
Payer: MEDICARE

## 2017-01-30 VITALS — HEIGHT: 73 IN | BODY MASS INDEX: 34.36 KG/M2 | WEIGHT: 259.25 LBS

## 2017-01-30 DIAGNOSIS — R33.9 URINARY RETENTION: Primary | ICD-10-CM

## 2017-01-30 PROCEDURE — 99213 OFFICE O/P EST LOW 20 MIN: CPT | Mod: PBBFAC | Performed by: UROLOGY

## 2017-01-30 PROCEDURE — 99999 PR PBB SHADOW E&M-EST. PATIENT-LVL III: CPT | Mod: PBBFAC,,, | Performed by: UROLOGY

## 2017-01-30 PROCEDURE — 99211 OFF/OP EST MAY X REQ PHY/QHP: CPT | Mod: S$PBB,,, | Performed by: UROLOGY

## 2017-01-31 NOTE — PROGRESS NOTES
Patient came in for catheter removal.  This was performed by nurse Cabrera.  The catheter was removed without difficulty and he was instructed to drink copious fluids.  Call back when he has urinated.

## 2017-02-08 ENCOUNTER — OFFICE VISIT (OUTPATIENT)
Dept: INTERNAL MEDICINE | Facility: CLINIC | Age: 72
End: 2017-02-08
Payer: MEDICARE

## 2017-02-08 VITALS
DIASTOLIC BLOOD PRESSURE: 88 MMHG | BODY MASS INDEX: 34.22 KG/M2 | SYSTOLIC BLOOD PRESSURE: 125 MMHG | HEIGHT: 73 IN | WEIGHT: 258.19 LBS | HEART RATE: 78 BPM

## 2017-02-08 DIAGNOSIS — R33.9 URINARY RETENTION: ICD-10-CM

## 2017-02-08 DIAGNOSIS — R97.20 ELEVATED PSA, LESS THAN 10 NG/ML: ICD-10-CM

## 2017-02-08 DIAGNOSIS — D72.823 LEUKEMOID REACTION: ICD-10-CM

## 2017-02-08 DIAGNOSIS — I10 ESSENTIAL HYPERTENSION: Primary | ICD-10-CM

## 2017-02-08 DIAGNOSIS — N28.89 RENAL MASS: ICD-10-CM

## 2017-02-08 DIAGNOSIS — G47.33 OBSTRUCTIVE SLEEP APNEA: ICD-10-CM

## 2017-02-08 PROCEDURE — 99213 OFFICE O/P EST LOW 20 MIN: CPT | Mod: S$PBB,,, | Performed by: FAMILY MEDICINE

## 2017-02-08 PROCEDURE — 99213 OFFICE O/P EST LOW 20 MIN: CPT | Mod: PBBFAC | Performed by: FAMILY MEDICINE

## 2017-02-08 PROCEDURE — 99999 PR PBB SHADOW E&M-EST. PATIENT-LVL III: CPT | Mod: PBBFAC,,, | Performed by: FAMILY MEDICINE

## 2017-02-08 PROCEDURE — 99495 TRANSJ CARE MGMT MOD F2F 14D: CPT | Mod: PBBFAC | Performed by: FAMILY MEDICINE

## 2017-02-08 NOTE — PROGRESS NOTES
Subjective:       Patient ID: Sreekanth Pitts Jr. is a 71 y.o. male.    Chief Complaint: Hospital Follow Up  Sreekanth Pitts Jr. 71 y.o. male is here for office visit to review care and physical exam, aparently had recent admission abd pain, urinary retention, increased WBCs.  Had catheter placed, symptoms resolved.  Has had Urology f/u.  CT Urogram: prostatomegaly1.8 cm hypodensity right renal interpolar region demonstrates attenuation greater than expected for simple cyst.  pulmonary nodule. Significant degenerative change of the spine- Bilateral pulmonary haziness, which may be consistent with edema.  HPI  Review of Systems   Constitutional: Negative for activity change, appetite change, fatigue, fever and unexpected weight change.   HENT: Negative for congestion, hearing loss, postnasal drip and rhinorrhea.    Eyes: Negative for pain, discharge and visual disturbance.   Respiratory: Negative for cough, choking and shortness of breath.    Cardiovascular: Negative for chest pain, palpitations and leg swelling.   Gastrointestinal: Negative for abdominal pain, diarrhea and vomiting.   Genitourinary: Negative for dysuria, flank pain, hematuria and urgency.   Musculoskeletal: Negative for arthralgias, back pain, joint swelling and neck pain.   Skin: Negative for color change and rash.   Neurological: Negative for dizziness, tremors, syncope, weakness, numbness and headaches.   Psychiatric/Behavioral: Negative for agitation and confusion. The patient is not hyperactive.        Objective:      Physical Exam    Assessment:       No diagnosis found.    Plan:       Sreekanth was seen today for hospital follow up.    Diagnoses and all orders for this visit:  Essential hypertension  - Controled  Urinary retention  - Resolved, has med tx  Elevated PSA, less than 10 ng/ml  - Has f/u  Leukemoid reaction  - Resolved  Obstructive sleep apnea  - CPAP  Renal mass  - Has Urology f/u

## 2017-02-08 NOTE — MR AVS SNAPSHOT
[facility]  [address]  [facility] [address]  Phone: [phone]  Fax: [phone]                  Sreekanth Pitts Jr.   2017 8:40 AM   Office Visit    Description:  Male : 1945   Provider:  Rubio Rod MD   Department:  [facility]           Reason for Visit     Hospital Follow Up           Diagnoses this Visit        Comments    Essential hypertension    -  Primary     Urinary retention         Elevated PSA, less than 10 ng/ml         Leukemoid reaction         Obstructive sleep apnea         Renal mass                To Do List           Future Appointments        Provider Department Dept Phone    3/6/2017 8:00 AM SUDS, UROLOGY [facility] 4th Floor [phone]      Goals (5 Years of Data)     None      Follow-Up and Disposition     Return in about 3 months (around 2017), or if symptoms worsen or fail to improve.      [facility] On [facility]     [facility] On Call Nurse Care Line -  Assistance  Registered nurses in the [facility] On Call Center provide clinical advisement, health education, appointment booking, and other advisory services.  Call for this free service at [phone].             Medications           Message regarding Medications     Verify the changes and/or additions to your medication regime listed below are the same as discussed with your clinician today.  If any of these changes or additions are incorrect, please notify your healthcare provider.             Verify that the below list of medications is an accurate representation of the medications you are currently taking.  If none reported, the list may be blank. If incorrect, please contact your healthcare provider. Carry this list with you in case of emergency.           Current Medications     dutasteride (AVODART) 0.5 mg capsule Take 1 capsule (0.5 mg total) by mouth once daily.    lidocaine HCL 2% (XYLOCAINE) 2 % jelly Apply topically as needed.    lisinopril  "(PRINIVIL,ZESTRIL) 20 MG tablet Take 1 tablet (20 mg total) by mouth once daily.    MULTIVITAMIN W-MINERALS/LUTEIN (CENTRUM SILVER ORAL) Take 1 tablet by mouth once daily.     tamsulosin (FLOMAX) 0.4 mg Cp24 Take 1 capsule (0.4 mg total) by mouth once daily.           Clinical Reference Information           Your Vitals Were     BP Pulse Height Weight BMI    125/88 78 6' 1" (1.854 m) 117.1 kg (258 lb 2.5 oz) 34.06 kg/m2      Blood Pressure          Most Recent Value    BP  125/88      Allergies as of 2/8/2017     No Known Allergies      Immunizations Administered on Date of Encounter - 2/8/2017     None      Language Assistance Services     ATTENTION: Language assistance services are available, free of charge. Please call [phone].      ATENCIÓN: Si chu mills, tiene a caldwell disposición servicios gratuitos de asistencia lingüística. Llame al [phone].     CHÚ Ý: N?u b?n nói Ti?ng Vi?t, có các d?ch v? h? tr? ngôn ng? mi?n phí dành cho b?n. G?i s? [phone].         [facility] complies with applicable Federal civil rights laws and does not discriminate on the basis of race, color, national origin, age, disability, or sex.        "

## 2017-02-10 DIAGNOSIS — I10 ESSENTIAL HYPERTENSION: ICD-10-CM

## 2017-02-13 RX ORDER — LISINOPRIL 20 MG/1
TABLET ORAL
Qty: 90 TABLET | Refills: 0 | Status: SHIPPED | OUTPATIENT
Start: 2017-02-13 | End: 2017-05-30 | Stop reason: SDUPTHER

## 2017-02-21 DIAGNOSIS — R33.9 URINARY RETENTION: ICD-10-CM

## 2017-02-22 RX ORDER — DUTASTERIDE 0.5 MG/1
CAPSULE, LIQUID FILLED ORAL
Qty: 30 CAPSULE | Refills: 11 | Status: SHIPPED | OUTPATIENT
Start: 2017-02-22 | End: 2018-03-09 | Stop reason: SDUPTHER

## 2017-03-01 ENCOUNTER — TELEPHONE (OUTPATIENT)
Dept: UROLOGY | Facility: CLINIC | Age: 72
End: 2017-03-01

## 2017-03-01 NOTE — TELEPHONE ENCOUNTER
----- Message from Isabel Easton sent at 3/1/2017  8:17 AM CST -----  Contact: self - [phone]  amador - wants to talk to dr english before he has his suds on Monday  - please call patient at  [phone]

## 2017-03-06 ENCOUNTER — PROCEDURE VISIT (OUTPATIENT)
Dept: UROLOGY | Facility: CLINIC | Age: 72
End: 2017-03-06
Payer: MEDICARE

## 2017-03-06 VITALS
HEART RATE: 93 BPM | WEIGHT: 258 LBS | BODY MASS INDEX: 34.19 KG/M2 | TEMPERATURE: 99 F | DIASTOLIC BLOOD PRESSURE: 87 MMHG | SYSTOLIC BLOOD PRESSURE: 121 MMHG | HEIGHT: 73 IN

## 2017-03-06 DIAGNOSIS — R33.9 URINARY RETENTION: ICD-10-CM

## 2017-03-06 PROCEDURE — 51797 INTRAABDOMINAL PRESSURE TEST: CPT | Mod: 26,S$PBB,, | Performed by: UROLOGY

## 2017-03-06 PROCEDURE — 52000 CYSTOURETHROSCOPY: CPT | Mod: 59,S$PBB,, | Performed by: UROLOGY

## 2017-03-06 PROCEDURE — 51797 INTRAABDOMINAL PRESSURE TEST: CPT | Mod: PBBFAC | Performed by: UROLOGY

## 2017-03-06 PROCEDURE — 51784 ANAL/URINARY MUSCLE STUDY: CPT | Mod: PBBFAC | Performed by: UROLOGY

## 2017-03-06 PROCEDURE — 51736 URINE FLOW MEASUREMENT: CPT | Mod: PBBFAC | Performed by: UROLOGY

## 2017-03-06 PROCEDURE — 51784 ANAL/URINARY MUSCLE STUDY: CPT | Mod: 26,51,S$PBB, | Performed by: UROLOGY

## 2017-03-06 PROCEDURE — 51741 ELECTRO-UROFLOWMETRY FIRST: CPT | Mod: 26,51,S$PBB, | Performed by: UROLOGY

## 2017-03-06 PROCEDURE — 51728 CYSTOMETROGRAM W/VP: CPT | Mod: 26,S$PBB,, | Performed by: UROLOGY

## 2017-03-06 PROCEDURE — 51728 CYSTOMETROGRAM W/VP: CPT | Mod: PBBFAC | Performed by: UROLOGY

## 2017-03-06 RX ORDER — LIDOCAINE HYDROCHLORIDE 20 MG/ML
JELLY TOPICAL ONCE
Status: COMPLETED | OUTPATIENT
Start: 2017-03-06 | End: 2017-03-06

## 2017-03-06 RX ORDER — CIPROFLOXACIN 250 MG/1
500 TABLET, FILM COATED ORAL ONCE
Status: COMPLETED | OUTPATIENT
Start: 2017-03-06 | End: 2017-03-06

## 2017-03-06 RX ADMIN — LIDOCAINE HYDROCHLORIDE: 20 JELLY TOPICAL at 10:03

## 2017-03-06 RX ADMIN — CIPROFLOXACIN 500 MG: 250 TABLET, FILM COATED ORAL at 10:03

## 2017-03-06 NOTE — PATIENT INSTRUCTIONS
SIMPLE URODYNAMIC STUDY (SUDS) & CYSTOSCOPY  UROLOGY CLINIC DISCHARGE INSTRUCTIONS    You have had a procedure that will require time to properly heal. Follow the instructions you have been given on how to care for yourself once you are home. Below is additional information to help in your recovery.    ACTIVITY  · There are no restrictions in activity. Start doing again the things you did before the procedure.  · You may experience a slight burning sensation. You may notice a small amount of blood in your urine. This will clear up within a day. Call the clinic if this continues beyond 48 hours.    DIET  · Continue your normal diet. You may eat the same foods you ate before your procedure.  · Drink plenty of fluids during the first 24-48 hours following your procedure.    MEDICATIONS  · Resume all other previous medications from your prescribing physician.  · Continue any pre=procedure antibiotics until they are all gone.    SIGNS AND SYMPTOMS TO REPORT TO THE DOCTOR  · Chills or fever greater than 101° F within 24 hours of procedure.  · Changes in urination, such as increased bleeding, foul smell, cloudy urine, or painful urination.  · Call your doctor with any questions or concerns.    For any emergency situation, call [phone] immediately or go to your nearest emergency room.    [facility]  [phone]      Instructed by_________________________________          Patient Signature___________________________________                                                                                                                                                                                             If any problems after hours or weekends, you may call [phone] and ask for the urology resident on call.

## 2017-03-06 NOTE — PROCEDURES
Procedures     Urodynamic Report    Indication:  History of urinary retention, on maximal medical therapy     Patient was taken to the Urodynamic Suite with a comfortably full bladder and asked to perform a free uroflow.  Next, the patient was prepped and the urinary residual was drained with a 14 Fr catheter.  A 7 Fr dual lumen catheter was placed to measure intravesical pressures.  A 10 Fr balloon manometer was placed into the rectum for abdominal pressure measurements.  Patch EMG electrodes were placed on the perineum.  The patient was connected to the [employer] Urodynamic machine, using a multichannel technique, the data were interpreted.  The bladder was filled with sterile water at room temperature at a rate of 30 ml/min.  Patient is filled to urgency.  Filling is performed with the patient in the seated position. The patient was then asked to sit and void for a pressure flow study.    The following are the results of the study:  1.  Uroflow       Q max:  81.9 ml/sec       Voided volume:  414 ml       Pattern of the curve:  Intermittent with the damián above baseline    2.  PVR:  200 ml    3.  CMG       Sensation:         First Desire:  137.7 ml         Normal Desire:  259.7 ml         Strong Desire:  419.6 ml         Urgency:  582.5 ml       Capacity:  911 ml       Abnormal Contractions:  none       Compliance:  normal    4.  EMG:  Normal guarding, relaxed with voiding    6.  Voiding phase       Q max:  22.1 ml/sec       P det at Q max:  34.7 cm H2O (however, the patient voided after the major contraction was done, max P det was 53 cm H2O)       Pattern of the curve:  continuous       Voided volume:  162.1 ml       PVR:  750 ml    7.  Analysis:  Decreased sensation, increased capacity, incomplete bladder emptying, if one moves the curve to the left, would be obstructing.      8.  Recommendations:       A.  See cystoscopy (has circumferential extension of the prostate into the bladder.)       B.  TURP recommended  to have good control of the anterior wall resection.  (would do in conjunction with prostate needle biopsy)       C.  Discussed the risk for positive prostate needle biopsy is 8% for high grade CAP, 24% low grade and 68% negative biopsy.  (Based on the prostate cancer individualized risk calculator age 71, , PSA 4.4, positive family history, no previous biopsy and negative RIK done on 1/22/2017 by Dr. Mayorga.)  We discussed that if he had a radical prostatectomy after a TURP, that it may make it more difficult to remove due to scarring.  The likelihood for nerve damage would be greater.  Alternatively, the elevation in PSA could be from incomplete emptying.  He will think about it.  He will let me know if he would prefer to have the biopsy first.        CYSTOSCOPY REPORT    Pre Procedure Diagnosis:  LUTS    Post Procedure Diagnosis:  Prostatic enlargement     Anesthesia: 10 cc 2% lidocaine jelly applied per urethra.    14 FR Flexible Olympus cystoscope used.    FINDINGS:  Dome, anterior, posterior, lateral walls and bladder base free of urothelial abnormalities. Right and left ureteral orifices cannot be seen unless the cystoscope is retroflexed.  They are in the normal postion and configuration, both effluxed clear urine.  The SVML was 3.0 cm, the prostate protrudes circumferentially into the bladder.  Bladder neck and urethra were normal.    Specimen:  none    The patient was taken to the cystoscopy suite and placed in supine position.  The genitalia was prepped and draped  in the usual sterile fashion.  Two percent lidocaine jelly was inserted in the urethra and held in place with a penile clamp.  After sufficent time had passed to allow good local anesthesia, the cystoscope was inserted in the urethra and passed into the bladder visualizing the urethra along its entire course.  The dome, anterior, posterior and lateral walls were examined systematically.  The ureteral orifices were in their usual  position and configuration.  The cystoscope was turned upon itself 180 degrees to visualize the bladder neck.  The cystoscope was then brought to the level of the bladder neck, the water was turned on and the prostate was visualized.  The cystoscope was removed and the patient was instructed to urinate prior to leaving the office.     ASSESSMENT/PLAN:  71 year old man status post flexible cystoscopy.  1. Push fluids for 24 hours.  2. May see blood in the urine, this should gradually improve over the next 2-3 days.  3. The patient was instructed to return to the office or go to the emergency should fever, chills, cloudy urine, or inability to urinate develop.  4. Follow up will likely have his TURP in April, with pre ops 1 month prior to.  See counselling above.

## 2017-03-06 NOTE — MR AVS SNAPSHOT
"    [facility] - Urology 4th Floor  [address]  [facility] [address]  Phone: [phone]                  Sreekanth Pitts Jr.   3/6/2017 8:00 AM   Procedure visit    Description:  Male : 1945   Provider:  SUDS, UROLOGY   Department:  [facility] - Urology 4th Floor           Diagnoses this Visit        Comments    Urinary retention                To Do List           Goals (5 Years of Data)     None      [facility] On Call     [facility] On Call Nurse Care Line -  Assistance  Registered nurses in the [facility] On Call Center provide clinical advisement, health education, appointment booking, and other advisory services.  Call for this free service at [phone].             Medications           Message regarding Medications     Verify the changes and/or additions to your medication regime listed below are the same as discussed with your clinician today.  If any of these changes or additions are incorrect, please notify your healthcare provider.             Verify that the below list of medications is an accurate representation of the medications you are currently taking.  If none reported, the list may be blank. If incorrect, please contact your healthcare provider. Carry this list with you in case of emergency.           Current Medications     dutasteride (AVODART) 0.5 mg capsule TAKE ONE DAILY    lisinopril (PRINIVIL,ZESTRIL) 20 MG tablet TAKE ONE DAILY    MULTIVITAMIN W-MINERALS/LUTEIN (CENTRUM SILVER ORAL) Take 1 tablet by mouth once daily.     lidocaine HCL 2% (XYLOCAINE) 2 % jelly Apply topically as needed.    tamsulosin (FLOMAX) 0.4 mg Cp24 Take 1 capsule (0.4 mg total) by mouth once daily.           Clinical Reference Information           Your Vitals Were     BP Pulse Temp Height Weight BMI    121/87 93 98.9 °F (37.2 °C) (Oral) 6' 1" (1.854 m) 117 kg (258 lb) 34.04 kg/m2      Blood Pressure          Most Recent Value    BP  (P) 121/87      Allergies as of 3/6/2017     No Known Allergies "      Immunizations Administered on Date of Encounter - 3/6/2017     None      Orders Placed During Today's Visit      Normal Orders This Visit    Simple Urodynamics w/ Cysto       Instructions    SIMPLE URODYNAMIC STUDY (SUDS) & CYSTOSCOPY  UROLOGY CLINIC DISCHARGE INSTRUCTIONS    You have had a procedure that will require time to properly heal. Follow the instructions you have been given on how to care for yourself once you are home. Below is additional information to help in your recovery.    ACTIVITY  · There are no restrictions in activity. Start doing again the things you did before the procedure.  · You may experience a slight burning sensation. You may notice a small amount of blood in your urine. This will clear up within a day. Call the clinic if this continues beyond 48 hours.    DIET  · Continue your normal diet. You may eat the same foods you ate before your procedure.  · Drink plenty of fluids during the first 24-48 hours following your procedure.    MEDICATIONS  · Resume all other previous medications from your prescribing physician.  · Continue any pre=procedure antibiotics until they are all gone.    SIGNS AND SYMPTOMS TO REPORT TO THE DOCTOR  · Chills or fever greater than 101° F within 24 hours of procedure.  · Changes in urination, such as increased bleeding, foul smell, cloudy urine, or painful urination.  · Call your doctor with any questions or concerns.    For any emergency situation, call 331 immediately or go to your nearest emergency room.    [facility]  [phone]      Instructed by_________________________________          Patient Signature___________________________________                                                                                                                                                                                             If any problems after hours or weekends, you may call [phone] and ask for the urology resident on call.        Language Assistance Services     ATTENTION: Language assistance services are available, free of charge. Please call [phone].      ATENCIÓN: Si habla lina, tiene a caldwell disposición servicios gratuitos de asistencia lingüística. Llame al [phone].     CHÚ Ý: N?u b?n nói Ti?ng Vi?t, có các d?ch v? h? tr? ngôn ng? mi?n phí dành cho b?n. G?i s? [phone].         [facility] - Urology 4th Floor complies with applicable Federal civil rights laws and does not discriminate on the basis of race, color, national origin, age, disability, or sex.

## 2017-03-07 ENCOUNTER — TELEPHONE (OUTPATIENT)
Dept: UROLOGY | Facility: CLINIC | Age: 72
End: 2017-03-07

## 2017-03-07 DIAGNOSIS — N40.1 BENIGN NODULAR PROSTATIC HYPERPLASIA WITH LOWER URINARY TRACT SYMPTOMS: ICD-10-CM

## 2017-03-07 DIAGNOSIS — R33.9 URINARY RETENTION: Primary | ICD-10-CM

## 2017-03-07 DIAGNOSIS — R97.20 ELEVATED PSA: ICD-10-CM

## 2017-03-31 ENCOUNTER — OFFICE VISIT (OUTPATIENT)
Dept: UROLOGY | Facility: CLINIC | Age: 72
End: 2017-03-31
Payer: MEDICARE

## 2017-03-31 VITALS — HEIGHT: 73 IN | WEIGHT: 257.94 LBS | BODY MASS INDEX: 34.19 KG/M2

## 2017-03-31 DIAGNOSIS — R97.20 ELEVATED PSA: ICD-10-CM

## 2017-03-31 DIAGNOSIS — N13.8 BPH (BENIGN PROSTATIC HYPERTROPHY) WITH URINARY OBSTRUCTION: Primary | ICD-10-CM

## 2017-03-31 DIAGNOSIS — N40.1 BPH (BENIGN PROSTATIC HYPERTROPHY) WITH URINARY OBSTRUCTION: Primary | ICD-10-CM

## 2017-03-31 PROCEDURE — 99213 OFFICE O/P EST LOW 20 MIN: CPT | Mod: PBBFAC | Performed by: UROLOGY

## 2017-03-31 PROCEDURE — 99999 PR PBB SHADOW E&M-EST. PATIENT-LVL III: CPT | Mod: PBBFAC,,, | Performed by: UROLOGY

## 2017-03-31 PROCEDURE — 99213 OFFICE O/P EST LOW 20 MIN: CPT | Mod: S$PBB,,, | Performed by: UROLOGY

## 2017-04-01 RX ORDER — CIPROFLOXACIN 500 MG/1
500 TABLET ORAL 2 TIMES DAILY
Qty: 6 TABLET | Refills: 0 | Status: SHIPPED | OUTPATIENT
Start: 2017-04-01 | End: 2017-04-04

## 2017-04-01 NOTE — PROGRESS NOTES
CHIEF COMPLAINT:    Mr. Pitts is a 71 y.o. male presenting to sign consents.  He has BPH with obstruction and an elevated PSA    PRESENTING ILLNESS:    Sreekanth Pitts Jr. is a 71 y.o. male who returns to sign consents accompanied by his wife.  We discussed the information that I had previously discussed as indicated below from our urodynamics and cystoscopy:    A.  (has circumferential extension of the prostate into the bladder.)  B. TURP recommended to have good control of the anterior wall resection. (would do in conjunction with prostate needle biopsy)  C. Discussed the risk for positive prostate needle biopsy is 8% for high grade CAP, 24% low grade and 68% negative biopsy. (Based on the prostate cancer individualized risk calculator age 71, , PSA 4.4, positive family history, no previous biopsy and negative RIK done on 1/22/2017 by Dr. Mayorga.) We discussed that if he had a radical prostatectomy after a TURP, that it may make it more difficult to remove due to scarring. The likelihood for nerve damage would be greater. Alternatively, the elevation in PSA could be from incomplete emptying. He will think about it. He will let me know if he would prefer to have the biopsy first.     Both were given the opportunity to ask questions.      REVIEW OF SYSTEMS:    Review of Systems   Constitutional: Negative.    HENT: Negative.    Eyes: Negative.    Respiratory: Negative.    Cardiovascular: Negative.    Gastrointestinal: Negative.    Genitourinary:        History of urinary retention   Musculoskeletal: Negative.    Skin: Negative.    Neurological: Negative.    Endo/Heme/Allergies: Negative.    Psychiatric/Behavioral: Negative.          PATIENT HISTORY:    Past Medical History:   Diagnosis Date    Allergy 12/3/2015    Arthritis     Degenerative disc disease     Hypertension     Nuclear sclerosis - Both Eyes 7/30/2012       Past Surgical History:   Procedure Laterality Date    BACK SURGERY  2002     EYE FOREIGN BODY REMOVAL      childhood    EYE SURGERY         Family History   Problem Relation Age of Onset    Cataracts Mother     Hypertension Mother     Cancer Father     Heart disease Father     Heart disease Brother     Stroke Paternal Grandfather     Heart disease Paternal Grandfather      Social History    Marital status:      Occupational History     [employer]     Social History Main Topics    Smoking status: Former Smoker     Packs/day: 2.00     Years: 4.00     Types: Cigarettes, Cigars     Quit date: 1/1/1973    Smokeless tobacco: Not on file    Alcohol use 0.6 oz/week     1 Cans of beer per week      Comment: social    Drug use: No    Sexual activity: No       Allergies:  Review of patient's allergies indicates no known allergies.    Medications:  Outpatient Encounter Prescriptions as of 3/31/2017   Medication Sig Dispense Refill    dutasteride (AVODART) 0.5 mg capsule TAKE ONE DAILY 30 capsule 11    lidocaine HCL 2% (XYLOCAINE) 2 % jelly Apply topically as needed. 30 mL 3    lisinopril (PRINIVIL,ZESTRIL) 20 MG tablet TAKE ONE DAILY 90 tablet 0    MULTIVITAMIN W-MINERALS/LUTEIN (CENTRUM SILVER ORAL) Take 1 tablet by mouth once daily.       tamsulosin (FLOMAX) 0.4 mg Cp24 Take 1 capsule (0.4 mg total) by mouth once daily. 30 capsule 11     No facility-administered encounter medications on file as of 3/31/2017.          PHYSICAL EXAMINATION:    The patient generally appears in good health, is appropriately interactive, and is in no apparent distress.    Skin: No lesions.    Mental: Cooperative with normal affect.    Neuro: Grossly intact.    HEENT: Normal. No evidence of lymphadenopathy.    Chest: Clear to ascultation bilaterally, normal inspiratory effort.    Heart: Regular rhythm.  No murmurs    Abdomen: BS active. Soft, non-tender. No masses or organomegaly. Bladder is not palpable. No evidence of flank discomfort. No evidence of inguinal hernia.    Extremities:  No clubbing, cyanosis, or edema      LABS:    UA 1.015, pH 5, tr blood, otherwise, negative    Lab Results   Component Value Date    PSA 4.4 (H) 12/09/2016    PSA 3.2 12/03/2015    PSADIAG 2.7 12/19/2013         IMPRESSION:    Encounter Diagnoses   Name Primary?    BPH (benign prostatic hypertrophy) with urinary obstruction Yes    Elevated PSA        PLAN:    1. We discussed the consent at length, he has not had surgery in the past.  We specifically discussed retrograde ejaculation.  He is not sexually active as he has had ED for many years.  We discussed that I would address that with him with medications.  If he later desires a prosthesis, would refer to Dr. Goodman  2.  Consent signed.   3.  Cipro starting the day before and an enema the am of.

## 2017-04-11 ENCOUNTER — ANESTHESIA EVENT (OUTPATIENT)
Dept: SURGERY | Facility: HOSPITAL | Age: 72
End: 2017-04-11
Payer: MEDICARE

## 2017-04-11 RX ORDER — IBUPROFEN 200 MG
200 TABLET ORAL EVERY 6 HOURS PRN
Status: ON HOLD | COMMUNITY
End: 2022-02-14 | Stop reason: HOSPADM

## 2017-04-11 NOTE — PRE ADMISSION SCREENING
Anesthesia Assessment: Preoperative EQUATION    Planned Procedure: Procedure(s) (LRB):  TURP NO LASER (N/A)  BIOPSY-PROSTATE NEEDLE (N/A)  Requested Anesthesia Type:General  Surgeon: Uma Gaona MD  Service: Urology  Known or anticipated Date of Surgery:4/18/2017    Surgeon notes: reviewed    Electronic QUestionnaire Assessment completed via nurse interview with patient.        Sreekanth Pitts JrLisa [[id number]] - 71 y.o. Male        Providers Outside of [facility]      No data to display       Surgical Risk Level      Surgical Risk Level:  2           caRDScore (Clinical Anesthesia Rapid Decision Score)        Very Very Low  Total Score: 5      5 Sum of Clinical Scores       caRDScores (Grouped)      caRDScore - Ane:  2                caRDScore - CVD:  1                caRDScore - Pul:  1                caRDScore - Met:  0                caRDScore - Phy:  1           caRDScore Items           Pre-admit from 4/18/2017 in [facility]     Anesthesia      Has sleep apnea confirmed by a sleep study / on CPAP  Yes     CVD      Activity similar to best ability for maximal activity or exercise  METS 4     Diagnosed with high blood pressure  Yes     Typical BP runs <150/90  Yes     Pulmonary      Has sleep apnea confirmed by a sleep study / on CPAP  Yes     Metabolic      Physiologic      Obesity Status  Mild Obesity (BMI 30-34.9)     Has problems emptying bladder/ u. retent. due to nerve/prostate/bladder/pelvic dis.  Yes       Flags      Red Flag Score:  0                Yellow Flag Score:  4           Red Flags           Pre-admit from 4/18/2017 in [facility]     Obesity Status  Mild Obesity (BMI 30-34.9)       Yellow Flags           Pre-admit from 4/18/2017 in [facility]     Takes herbal medications or vitamin supplements  Yes [mvi]     Obesity Status  Mild Obesity (BMI 30-34.9)     Is or has been a Smoker  Yes     NSAID  Yes [daily ibuprofen]     Has sleep apnea  confirmed by a sleep study / on CPAP  Yes       PONV Risk Score (assumes periop narcotic use = +1, Max=4)      PONV Risk Score:  2           PONV Risk Factors  Total Score: 1      1 Non-Smoker at present       Sleep Apnea  Total Score: 1      1 Has sleep apnea confirmed by a sleep study / on CPAP       STEPHIE STOP-Bang Risk Factors (Max=8)  Total Score: 4      1 Has loud snoring     1 Takes medication for high blood pressure     1 Age >50     1 Male       STEPHIE Risk Level - 1 (Low), 2 (Moderate), 3 (High)      STEPHIE Risk Level:  2           RCRI (Revised Cardiac Risk Indices of ACC/AHA guidelines, Max=6)  Total Score: 0        CAD Risk Factors  Total Score: 2      1 Male. Age >45     1 Diagnosed with high blood pressure       CHADS Score if applicable (history of atrial fib/flutter, Max=6)  Total Score: 1      1 Diagnosed with high blood pressure       Maximal Exercise Capacity           Pre-admit from 4/18/2017 in [facility]     Maximal Exercise Capacity  METS 4       Summary of Dependence  Total Score: 1      1 Is totally independent of others for activities of daily living       Phone Fraility Score (Max = 17)  Total Score: 2      1 Has had 1 hospitalization in last year     1 Uses 5 or more meds on reg basis       Pain Factors      No data to display       Risk Triggers (Evidence-Based Risk Triggers)        Pulmonary Risk Triggers  Total Score: 3      1 Age > or = 60     1 Obesity Status: Mild Obesity (BMI 30-34.9)     1 Has sleep apnea confirmed by a sleep study / on CPAP       Renal Risk Triggers  Total Score: 1      1 Diagnosed with high blood pressure       Delirium Risk Triggers  Total Score: 0        Urologic Risk Triggers  Total Score: 1      1 Has problems emptying bladder/ u. retent. due to nerve/prostate/bladder/pelvic dis.       Logistics        Pre-op Clinic Logistics  Total Score: 7      1 Has had 1 hospitalization in last year     2 Takes herbal medications or vitamin supplements     1  Has had anesthesia, either as adult or as a child     2 NSAID     1 Takes medication for high blood pressure       DOSC Logistics  Total Score: 1      1 NSAID       Discharge Logistics  Total Score: 1      1 Has sleep apnea confirmed by a sleep study / on CPAP       Discharge Planning           Pre-admit from 4/18/2017 in [facility]     Discharge Planning      Will assist patient 24/7, if needed  wife     Who will transport you to therapy, if need  wife       Fast Track <For office use only>      Total Score: 7        Surgical Risk Level Assessment                 Triage considerations:     The patient has no apparent active cardiac condition (No unstable coronary Syndrome such as severe unstable angina or recent [<1 month] myocardial infarction, decompensated CHF, severe valvular   disease or significant arrhythmia)    Previous anesthesia records:GETA, MAC, No problems and Not available    Last PCP note: within 3 months 2/8/17 Dr Rod  Subspecialty notes: n/a    Other important co-morbidities: HTN, obesity, STEPHIE     Tests already available:  Available tests,  within 3 months . 1/24 BMP, CBC.   NO EKG            Instructions given. (See in Nurse's note)    Optimization:  Anesthesia Preop Clinic Assessment  Indicated-not required for this procedure      Plan:    Testing:  EKG     Patient  has previously scheduled Medical Appointment:none    Navigation: Tests Scheduled. Am of surgery                          Straight Line to surgery.

## 2017-04-11 NOTE — ANESTHESIA PREPROCEDURE EVALUATION
Pre Admission Screening  Karli Hodges RN      []Hide copied text  Anesthesia Assessment: Preoperative EQUATION     Planned Procedure: Procedure(s) (LRB):  TURP NO LASER (N/A)  BIOPSY-PROSTATE NEEDLE (N/A)  Requested Anesthesia Type:General  Surgeon: Uma Gaona MD  Service: Urology  Known or anticipated Date of Surgery:4/18/2017     Surgeon notes: reviewed     Electronic QUestionnaire Assessment completed via nurse interview with patient.                    Sreekanth Pitts Jr. [[id number]] - 71 y.o. Male         Providers Outside of [facility]       No data to display        Surgical Risk Level       Surgical Risk Level:   2              caRDScore (Clinical Anesthesia Rapid Decision Score)         Very Very Low  Total Score: 5       5 Sum of Clinical Scores        caRDScores (Grouped)       caRDScore - Ane:   2                       caRDScore - CVD:   1                       caRDScore - Pul:   1                       caRDScore - Met:   0                       caRDScore - Phy:   1              caRDScore Items             Pre-admit from 4/18/2017 in [facility]      Anesthesia        Has sleep apnea confirmed by a sleep study / on CPAP   Yes      CVD        Activity similar to best ability for maximal activity or exercise   METS 4      Diagnosed with high blood pressure   Yes      Typical BP runs <150/90   Yes      Pulmonary        Has sleep apnea confirmed by a sleep study / on CPAP   Yes      Metabolic        Physiologic        Obesity Status   Mild Obesity (BMI 30-34.9)      Has problems emptying bladder/ u. retent. due to nerve/prostate/bladder/pelvic dis.   Yes        Flags       Red Flag Score:   0                       Yellow Flag Score:   4              Red Flags             Pre-admit from 4/18/2017 in [facility]      Obesity Status   Mild Obesity (BMI 30-34.9)        Yellow Flags             Pre-admit from 4/18/2017 in [facility]      Takes  herbal medications or vitamin supplements   Yes [mvi]      Obesity Status   Mild Obesity (BMI 30-34.9)      Is or has been a Smoker   Yes      NSAID   Yes [daily ibuprofen]      Has sleep apnea confirmed by a sleep study / on CPAP   Yes        PONV Risk Score (assumes periop narcotic use = +1, Max=4)       PONV Risk Score:   2              PONV Risk Factors  Total Score: 1       1 Non-Smoker at present        Sleep Apnea  Total Score: 1       1 Has sleep apnea confirmed by a sleep study / on CPAP        STEPHIE STOP-Bang Risk Factors (Max=8)  Total Score: 4       1 Has loud snoring      1 Takes medication for high blood pressure      1 Age >50      1 Male        STEPHIE Risk Level - 1 (Low), 2 (Moderate), 3 (High)       STEPHIE Risk Level:   2              RCRI (Revised Cardiac Risk Indices of ACC/AHA guidelines, Max=6)  Total Score: 0         CAD Risk Factors  Total Score: 2       1 Male. Age >45      1 Diagnosed with high blood pressure        CHADS Score if applicable (history of atrial fib/flutter, Max=6)  Total Score: 1       1 Diagnosed with high blood pressure        Maximal Exercise Capacity             Pre-admit from 4/18/2017 in [facility]      Maximal Exercise Capacity   METS 4        Summary of Dependence  Total Score: 1       1 Is totally independent of others for activities of daily living        Phone Fraility Score (Max = 17)  Total Score: 2       1 Has had 1 hospitalization in last year      1 Uses 5 or more meds on reg basis        Pain Factors       No data to display        Risk Triggers (Evidence-Based Risk Triggers)         Pulmonary Risk Triggers  Total Score: 3       1 Age > or = 60      1 Obesity Status: Mild Obesity (BMI 30-34.9)      1 Has sleep apnea confirmed by a sleep study / on CPAP        Renal Risk Triggers  Total Score: 1       1 Diagnosed with high blood pressure        Delirium Risk Triggers  Total Score: 0         Urologic Risk Triggers  Total Score: 1       1 Has  problems emptying bladder/ u. retent. due to nerve/prostate/bladder/pelvic dis.        Logistics         Pre-op Clinic Logistics  Total Score: 7       1 Has had 1 hospitalization in last year      2 Takes herbal medications or vitamin supplements      1 Has had anesthesia, either as adult or as a child      2 NSAID      1 Takes medication for high blood pressure        DOSC Logistics  Total Score: 1       1 NSAID        Discharge Logistics  Total Score: 1       1 Has sleep apnea confirmed by a sleep study / on CPAP        Discharge Planning             Pre-admit from 4/18/2017 in [facility]      Discharge Planning        Will assist patient 24/7, if needed   wife      Who will transport you to therapy, if need   wife        Fast Track <For office use only>       Total Score: 7          Surgical Risk Level Assessment                       Triage considerations:      The patient has no apparent active cardiac condition (No unstable coronary Syndrome such as severe unstable angina or recent [<1 month] myocardial infarction, decompensated CHF, severe valvular disease or significant arrhythmia)     Previous anesthesia records:GETA, MAC, No problems and Not available     Last PCP note: within 3 months 2/8/17 Dr Rod  Subspecialty notes: n/a     Other important co-morbidities: HTN, obesity, STEPHIE      Tests already available:  Available tests, within 3 months . 1/24 BMP, CBC.  NO EKG      Instructions given. (See in Nurse's note)     Optimization:  Anesthesia Preop Clinic Assessment Indicated-not required for this procedure      Plan:   Testing: EKG  Patient has previously scheduled Medical Appointment:none     Navigation: Tests Scheduled. Am of surgery      Straight Line to surgery.       Electronically signed by Karli Hodges RN at 4/11/2017  1:36 PM        Pre-admit on 4/18/2017              Detailed Report                                                                                                                          04/11/2017  Sreekanth Pitts Jr. is a 71 y.o., male.    Anesthesia Evaluation    I have reviewed the Patient Summary Reports.        Review of Systems  Anesthesia Hx:   Denies Personal Hx of Anesthesia complications.   Cardiovascular:   Hypertension    Pulmonary:   Sleep Apnea    Musculoskeletal:   Arthritis         Physical Exam  General:  Well nourished    Airway/Jaw/Neck:  Airway Findings: Mouth Opening: Normal Tongue: Normal  General Airway Assessment: Adult  Mallampati: II  Improves to II with phonation.  TM Distance: Normal, at least 6 cm      Dental:  Dental Findings: In tact   Chest/Lungs:  Chest/Lungs Clear    Heart/Vascular:  Heart Findings: Normal            Anesthesia Plan  Type of Anesthesia, risks & benefits discussed:  Anesthesia Type:  general  Patient's Preference:   Intra-op Monitoring Plan:   Intra-op Monitoring Plan Comments:   Post Op Pain Control Plan:   Post Op Pain Control Plan Comments:   Induction:   IV  Beta Blocker:  Patient is not currently on a Beta-Blocker (No further documentation required).       Informed Consent: Patient understands risks and agrees with Anesthesia plan.  Questions answered. Anesthesia consent signed with patient.  ASA Score: 2     Day of Surgery Review of History & Physical:  There are no significant changes.          Ready For Surgery From Anesthesia Perspective.

## 2017-04-17 ENCOUNTER — TELEPHONE (OUTPATIENT)
Dept: UROLOGY | Facility: CLINIC | Age: 72
End: 2017-04-17

## 2017-04-17 NOTE — TELEPHONE ENCOUNTER
Called pt's wife to confirm 12 noon arrival time for procedure. Gave pt's wife NPO instructions and gave pt's wife opportunity to ask questions. Pt's wife verbalized understanding.

## 2017-04-18 ENCOUNTER — ANESTHESIA (OUTPATIENT)
Dept: SURGERY | Facility: HOSPITAL | Age: 72
End: 2017-04-18
Payer: MEDICARE

## 2017-04-18 ENCOUNTER — SURGERY (OUTPATIENT)
Age: 72
End: 2017-04-18

## 2017-04-18 ENCOUNTER — HOSPITAL ENCOUNTER (OUTPATIENT)
Facility: HOSPITAL | Age: 72
Discharge: HOME | End: 2017-04-19
Attending: UROLOGY | Admitting: UROLOGY
Payer: MEDICARE

## 2017-04-18 DIAGNOSIS — R97.20 ELEVATED PSA: ICD-10-CM

## 2017-04-18 DIAGNOSIS — Z01.818 PREOPERATIVE TESTING: ICD-10-CM

## 2017-04-18 DIAGNOSIS — C61 PROSTATE CANCER: ICD-10-CM

## 2017-04-18 DIAGNOSIS — R33.9 URINARY RETENTION: Primary | ICD-10-CM

## 2017-04-18 LAB
ANION GAP SERPL CALC-SCNC: 8 MMOL/L
BASOPHILS # BLD AUTO: 0.02 K/UL
BASOPHILS NFR BLD: 0.3 %
BUN SERPL-MCNC: 13 MG/DL
CALCIUM SERPL-MCNC: 8.2 MG/DL
CHLORIDE SERPL-SCNC: 108 MMOL/L
CO2 SERPL-SCNC: 24 MMOL/L
CREAT SERPL-MCNC: 0.9 MG/DL
DIFFERENTIAL METHOD: NORMAL
EOSINOPHIL # BLD AUTO: 0.1 K/UL
EOSINOPHIL NFR BLD: 0.9 %
ERYTHROCYTE [DISTWIDTH] IN BLOOD BY AUTOMATED COUNT: 13.1 %
EST. GFR  (AFRICAN AMERICAN): >60 ML/MIN/1.73 M^2
EST. GFR  (NON AFRICAN AMERICAN): >60 ML/MIN/1.73 M^2
GLUCOSE SERPL-MCNC: 96 MG/DL
HCT VFR BLD AUTO: 43.8 %
HGB BLD-MCNC: 14.7 G/DL
LYMPHOCYTES # BLD AUTO: 1.7 K/UL
LYMPHOCYTES NFR BLD: 25.2 %
MAGNESIUM SERPL-MCNC: 2 MG/DL
MCH RBC QN AUTO: 30.3 PG
MCHC RBC AUTO-ENTMCNC: 33.6 %
MCV RBC AUTO: 90 FL
MONOCYTES # BLD AUTO: 0.6 K/UL
MONOCYTES NFR BLD: 8.4 %
NEUTROPHILS # BLD AUTO: 4.4 K/UL
NEUTROPHILS NFR BLD: 65.1 %
PHOSPHATE SERPL-MCNC: 3.8 MG/DL
PLATELET # BLD AUTO: 208 K/UL
PMV BLD AUTO: 9.2 FL
POTASSIUM SERPL-SCNC: 3.6 MMOL/L
RBC # BLD AUTO: 4.85 M/UL
SODIUM SERPL-SCNC: 140 MMOL/L
WBC # BLD AUTO: 6.78 K/UL

## 2017-04-18 PROCEDURE — 93010 ELECTROCARDIOGRAM REPORT: CPT | Mod: ,,, | Performed by: INTERNAL MEDICINE

## 2017-04-18 PROCEDURE — 36000706: Performed by: UROLOGY

## 2017-04-18 PROCEDURE — 52601 PROSTATECTOMY (TURP): CPT | Mod: ,,, | Performed by: UROLOGY

## 2017-04-18 PROCEDURE — 37000009 HC ANESTHESIA EA ADD 15 MINS: Performed by: UROLOGY

## 2017-04-18 PROCEDURE — 37000008 HC ANESTHESIA 1ST 15 MINUTES: Performed by: UROLOGY

## 2017-04-18 PROCEDURE — 76942 ECHO GUIDE FOR BIOPSY: CPT | Mod: 26,59,, | Performed by: UROLOGY

## 2017-04-18 PROCEDURE — 25000003 PHARM REV CODE 250: Performed by: STUDENT IN AN ORGANIZED HEALTH CARE EDUCATION/TRAINING PROGRAM

## 2017-04-18 PROCEDURE — 25000003 PHARM REV CODE 250: Performed by: UROLOGY

## 2017-04-18 PROCEDURE — 55700 PR BIOPSY OF PROSTATE,NEEDLE/PUNCH: CPT | Mod: 59,,, | Performed by: UROLOGY

## 2017-04-18 PROCEDURE — 88305 TISSUE EXAM BY PATHOLOGIST: CPT | Mod: 26,,, | Performed by: PATHOLOGY

## 2017-04-18 PROCEDURE — 93005 ELECTROCARDIOGRAM TRACING: CPT

## 2017-04-18 PROCEDURE — C1769 GUIDE WIRE: HCPCS | Performed by: UROLOGY

## 2017-04-18 PROCEDURE — 71000033 HC RECOVERY, INTIAL HOUR: Performed by: UROLOGY

## 2017-04-18 PROCEDURE — 83735 ASSAY OF MAGNESIUM: CPT

## 2017-04-18 PROCEDURE — 71000015 HC POSTOP RECOV 1ST HR: Performed by: UROLOGY

## 2017-04-18 PROCEDURE — 27201423 OPTIME MED/SURG SUP & DEVICES STERILE SUPPLY: Performed by: UROLOGY

## 2017-04-18 PROCEDURE — 12000002 HC ACUTE/MED SURGE SEMI-PRIVATE ROOM

## 2017-04-18 PROCEDURE — 84100 ASSAY OF PHOSPHORUS: CPT

## 2017-04-18 PROCEDURE — 63600175 PHARM REV CODE 636 W HCPCS: Performed by: NURSE ANESTHETIST, CERTIFIED REGISTERED

## 2017-04-18 PROCEDURE — 76872 US TRANSRECTAL: CPT | Mod: 26,,, | Performed by: UROLOGY

## 2017-04-18 PROCEDURE — 85025 COMPLETE CBC W/AUTO DIFF WBC: CPT

## 2017-04-18 PROCEDURE — D9220A PRA ANESTHESIA: Mod: CRNA,,, | Performed by: NURSE ANESTHETIST, CERTIFIED REGISTERED

## 2017-04-18 PROCEDURE — 80048 BASIC METABOLIC PNL TOTAL CA: CPT

## 2017-04-18 PROCEDURE — 88305 TISSUE EXAM BY PATHOLOGIST: CPT | Performed by: PATHOLOGY

## 2017-04-18 PROCEDURE — 63600175 PHARM REV CODE 636 W HCPCS: Performed by: UROLOGY

## 2017-04-18 PROCEDURE — D9220A PRA ANESTHESIA: Mod: ANES,,, | Performed by: ANESTHESIOLOGY

## 2017-04-18 PROCEDURE — 25000003 PHARM REV CODE 250: Performed by: NURSE ANESTHETIST, CERTIFIED REGISTERED

## 2017-04-18 PROCEDURE — 71000039 HC RECOVERY, EACH ADD'L HOUR: Performed by: UROLOGY

## 2017-04-18 PROCEDURE — 36000707: Performed by: UROLOGY

## 2017-04-18 RX ORDER — MIDAZOLAM HYDROCHLORIDE 1 MG/ML
INJECTION, SOLUTION INTRAMUSCULAR; INTRAVENOUS
Status: DISCONTINUED | OUTPATIENT
Start: 2017-04-18 | End: 2017-04-18

## 2017-04-18 RX ORDER — LIDOCAINE HYDROCHLORIDE 10 MG/ML
1 INJECTION, SOLUTION EPIDURAL; INFILTRATION; INTRACAUDAL; PERINEURAL ONCE
Status: COMPLETED | OUTPATIENT
Start: 2017-04-18 | End: 2017-04-18

## 2017-04-18 RX ORDER — ACETAMINOPHEN 10 MG/ML
1000 INJECTION, SOLUTION INTRAVENOUS EVERY 6 HOURS
Status: DISCONTINUED | OUTPATIENT
Start: 2017-04-18 | End: 2017-04-19 | Stop reason: HOSPADM

## 2017-04-18 RX ORDER — FENTANYL CITRATE 50 UG/ML
INJECTION, SOLUTION INTRAMUSCULAR; INTRAVENOUS
Status: DISCONTINUED | OUTPATIENT
Start: 2017-04-18 | End: 2017-04-18

## 2017-04-18 RX ORDER — ONDANSETRON 2 MG/ML
4 INJECTION INTRAMUSCULAR; INTRAVENOUS EVERY 8 HOURS PRN
Status: DISCONTINUED | OUTPATIENT
Start: 2017-04-18 | End: 2017-04-19 | Stop reason: HOSPADM

## 2017-04-18 RX ORDER — ONDANSETRON 2 MG/ML
INJECTION INTRAMUSCULAR; INTRAVENOUS
Status: DISCONTINUED | OUTPATIENT
Start: 2017-04-18 | End: 2017-04-18

## 2017-04-18 RX ORDER — DIPHENHYDRAMINE HYDROCHLORIDE 50 MG/ML
12.5 INJECTION INTRAMUSCULAR; INTRAVENOUS EVERY 6 HOURS PRN
Status: DISCONTINUED | OUTPATIENT
Start: 2017-04-18 | End: 2017-04-19 | Stop reason: HOSPADM

## 2017-04-18 RX ORDER — SODIUM CHLORIDE 9 MG/ML
INJECTION, SOLUTION INTRAVENOUS CONTINUOUS
Status: DISCONTINUED | OUTPATIENT
Start: 2017-04-18 | End: 2017-04-19

## 2017-04-18 RX ORDER — OXYCODONE HYDROCHLORIDE 5 MG/1
10 TABLET ORAL EVERY 4 HOURS PRN
Status: DISCONTINUED | OUTPATIENT
Start: 2017-04-18 | End: 2017-04-19 | Stop reason: HOSPADM

## 2017-04-18 RX ORDER — GLYCOPYRROLATE 0.2 MG/ML
INJECTION INTRAMUSCULAR; INTRAVENOUS
Status: DISCONTINUED | OUTPATIENT
Start: 2017-04-18 | End: 2017-04-18

## 2017-04-18 RX ORDER — NEOSTIGMINE METHYLSULFATE 1 MG/ML
INJECTION, SOLUTION INTRAVENOUS
Status: DISCONTINUED | OUTPATIENT
Start: 2017-04-18 | End: 2017-04-18

## 2017-04-18 RX ORDER — OXYCODONE HYDROCHLORIDE 5 MG/1
5 TABLET ORAL EVERY 4 HOURS PRN
Status: DISCONTINUED | OUTPATIENT
Start: 2017-04-18 | End: 2017-04-19 | Stop reason: HOSPADM

## 2017-04-18 RX ORDER — LIDOCAINE HYDROCHLORIDE 20 MG/ML
JELLY TOPICAL
Status: DISCONTINUED | OUTPATIENT
Start: 2017-04-18 | End: 2017-04-18 | Stop reason: HOSPADM

## 2017-04-18 RX ORDER — POLYETHYLENE GLYCOL 3350 17 G/17G
17 POWDER, FOR SOLUTION ORAL DAILY
Status: DISCONTINUED | OUTPATIENT
Start: 2017-04-18 | End: 2017-04-19 | Stop reason: HOSPADM

## 2017-04-18 RX ORDER — PROPOFOL 10 MG/ML
VIAL (ML) INTRAVENOUS
Status: DISCONTINUED | OUTPATIENT
Start: 2017-04-18 | End: 2017-04-18

## 2017-04-18 RX ORDER — ATROPA BELLADONNA AND OPIUM 16.2; 3 MG/1; MG/1
30 SUPPOSITORY RECTAL EVERY 8 HOURS PRN
Status: DISCONTINUED | OUTPATIENT
Start: 2017-04-18 | End: 2017-04-19 | Stop reason: HOSPADM

## 2017-04-18 RX ORDER — ROCURONIUM BROMIDE 10 MG/ML
INJECTION, SOLUTION INTRAVENOUS
Status: DISCONTINUED | OUTPATIENT
Start: 2017-04-18 | End: 2017-04-18

## 2017-04-18 RX ORDER — HYDROMORPHONE HYDROCHLORIDE 1 MG/ML
1 INJECTION, SOLUTION INTRAMUSCULAR; INTRAVENOUS; SUBCUTANEOUS
Status: DISCONTINUED | OUTPATIENT
Start: 2017-04-18 | End: 2017-04-19 | Stop reason: HOSPADM

## 2017-04-18 RX ORDER — LIDOCAINE HCL/PF 100 MG/5ML
SYRINGE (ML) INTRAVENOUS
Status: DISCONTINUED | OUTPATIENT
Start: 2017-04-18 | End: 2017-04-18

## 2017-04-18 RX ORDER — PHENYLEPHRINE HYDROCHLORIDE 10 MG/ML
INJECTION INTRAVENOUS
Status: DISCONTINUED | OUTPATIENT
Start: 2017-04-18 | End: 2017-04-18

## 2017-04-18 RX ADMIN — ROCURONIUM BROMIDE 40 MG: 10 INJECTION, SOLUTION INTRAVENOUS at 12:04

## 2017-04-18 RX ADMIN — SODIUM CHLORIDE: 0.9 INJECTION, SOLUTION INTRAVENOUS at 06:04

## 2017-04-18 RX ADMIN — PHENYLEPHRINE HYDROCHLORIDE 100 MCG: 10 INJECTION INTRAVENOUS at 01:04

## 2017-04-18 RX ADMIN — LIDOCAINE HYDROCHLORIDE 10 ML: 20 JELLY TOPICAL at 01:04

## 2017-04-18 RX ADMIN — ONDANSETRON 4 MG: 2 INJECTION INTRAMUSCULAR; INTRAVENOUS at 02:04

## 2017-04-18 RX ADMIN — ATROPA BELLADONNA AND OPIUM 30 MG: 16.2; 3 SUPPOSITORY RECTAL at 03:04

## 2017-04-18 RX ADMIN — POLYETHYLENE GLYCOL 3350 17 G: 17 POWDER, FOR SOLUTION ORAL at 06:04

## 2017-04-18 RX ADMIN — Medication 2 G: at 01:04

## 2017-04-18 RX ADMIN — SODIUM CHLORIDE, SODIUM GLUCONATE, SODIUM ACETATE, POTASSIUM CHLORIDE, MAGNESIUM CHLORIDE, SODIUM PHOSPHATE, DIBASIC, AND POTASSIUM PHOSPHATE: .53; .5; .37; .037; .03; .012; .00082 INJECTION, SOLUTION INTRAVENOUS at 01:04

## 2017-04-18 RX ADMIN — FENTANYL CITRATE 100 MCG: 50 INJECTION, SOLUTION INTRAMUSCULAR; INTRAVENOUS at 12:04

## 2017-04-18 RX ADMIN — GLYCOPYRROLATE 0.6 MG: 0.2 INJECTION, SOLUTION INTRAMUSCULAR; INTRAVENOUS at 02:04

## 2017-04-18 RX ADMIN — PROPOFOL 175 MG: 10 INJECTION, EMULSION INTRAVENOUS at 12:04

## 2017-04-18 RX ADMIN — LIDOCAINE HYDROCHLORIDE 0.2 MG: 10 INJECTION, SOLUTION EPIDURAL; INFILTRATION; INTRACAUDAL; PERINEURAL at 12:04

## 2017-04-18 RX ADMIN — OXYCODONE HYDROCHLORIDE 10 MG: 5 TABLET ORAL at 10:04

## 2017-04-18 RX ADMIN — NEOSTIGMINE METHYLSULFATE 5 MG: 1 INJECTION INTRAVENOUS at 02:04

## 2017-04-18 RX ADMIN — FENTANYL CITRATE 25 MCG: 50 INJECTION, SOLUTION INTRAMUSCULAR; INTRAVENOUS at 02:04

## 2017-04-18 RX ADMIN — SODIUM CHLORIDE: 0.9 INJECTION, SOLUTION INTRAVENOUS at 12:04

## 2017-04-18 RX ADMIN — ACETAMINOPHEN 1000 MG: 10 INJECTION, SOLUTION INTRAVENOUS at 04:04

## 2017-04-18 RX ADMIN — MIDAZOLAM HYDROCHLORIDE 2 MG: 1 INJECTION, SOLUTION INTRAMUSCULAR; INTRAVENOUS at 12:04

## 2017-04-18 RX ADMIN — PROPOFOL 50 MG: 10 INJECTION, EMULSION INTRAVENOUS at 02:04

## 2017-04-18 RX ADMIN — LIDOCAINE HYDROCHLORIDE 40 MG: 20 INJECTION, SOLUTION INTRAVENOUS at 12:04

## 2017-04-18 RX ADMIN — PHENYLEPHRINE HYDROCHLORIDE 100 MCG: 10 INJECTION INTRAVENOUS at 02:04

## 2017-04-18 RX ADMIN — FENTANYL CITRATE 50 MCG: 50 INJECTION, SOLUTION INTRAMUSCULAR; INTRAVENOUS at 01:04

## 2017-04-18 RX ADMIN — OXYCODONE HYDROCHLORIDE 10 MG: 5 TABLET ORAL at 04:04

## 2017-04-18 NOTE — OP NOTE
[facility]  Operative Note    Date: 04/18/2017    Pre-Op Diagnosis:   BPH with bladder outlet obstruction  Elevated PSA    Post-Op Diagnosis: same    Procedure(s) Performed:   TURP  Prostate biopsy    Specimen(s): prostate chips    Staff Surgeon: Uma Gaona MD    Assistant Surgeon: Therese Barbour MD, Graciela Mayorga    Anesthesia: General endotracheal anesthesia    Indications: Sreekanth Pitts Jr. is a 71 y.o. male with an elevated PSA, urodynamic findings suggesting good detrusor pressures but very high PVRs that is here for a TURP and TRUS biopsy.     Findings:   Trilobar hyperplasia. He did not have a very big median lobe. Kissing lateral lobes.  Veromontanum and UOs intact at the end of the case  Good hemostasis achieved  Open channel seen at the end of the case. We made sure to stay away from apical tissue to avoid urethral sphincter damage.   We did undermine the bladder neck at the 6 o clock position on a single swipe.  TRUS/Biopsy- 59 cc prostate. Standard 12 biopsies were taken as well as 2 lateral biopsies on the right and 1 lateral biopsy on the left.     Estimated Blood Loss: Less than 100 ml    Drains: 26 Fr [place] tip catheter- 3 way with 40 cc in the balloon on traction    Procedure in detail:  After the risks and benefits of the procedure were explained, consent was obtained, and the patient was taken to the operating suite and placed in the supine position.  Anesthesia was administered.  When adequately sedated, the patient was placed in dorsal lithotomy position and prepped and draped in normal sterile fashion.  Timeout was performed and preoperative ABx were confirmed.      A 28-Maltese sheath resectoscope was placed into the bladder using a visual obturator. The visual obturator was exchanged for the resecting mechanism. The ureteral orifices were identified. The median lobe was first resected with care to avoid the ureteral orifices. Once the median lobe was resected, we  then turned our attention to the right lateral lobe of the prostate.The right lateral lobe was then resected in a stepwise fashion from 7 o'clock to 12 o'clock with care to leave the verumontanum and sphincter intact. Once this was performed we directed our attention to the left lobe of the prostate and again the lateral lobe was resected from the 5 o'clock to the 12 o'clock position. Hemostasis was then achieved.    The ureteral orifices, verumontanum and sphincter were intact. The Ellik evacuator was used to remove all prostate chips and again hemostasis was obtained.     Once the bladder was drained, we could see that he had an open channel and the scope was removed.  A rigid scope was advanced into the bladder and a superstiff wire was placed into the bladder. The scope was removed keeping the wire in place. A Saxman  26  Fr 3-way catheter was placed in the bladder with 40 mL of water in the balloon.     We then turned our attention to the prostate biopsy.    The patient was kept in the lithotomy position. Sagittal and transverse views of the prostate were made. No hypoechoic lesions were noted and the prostate was measured to be 59 cc. Random sextant biopsies were performed. A total of 12 biopsies were made. After this, we took an additional 2 biopsies from the right lateral lobe of the rostate and 1 biopsy for the left lateral lobe.     We then exchanged the indwelling posadas for another 26 Fr three way over a super stiff wire. The posadas irrigated very easily.     The patient was placed on traction and on CBI. The patient was transferred to recovery in stable condition.     Disposition: The patient will remain on the urology service overnight for observation and CBI will be titrated.    MD CEFERINO Calderon was present for the entire case and agree with the above note.

## 2017-04-18 NOTE — NURSING TRANSFER
Nursing Transfer Note      4/18/2017     Transfer From: [facility] to th floor    Transfer via stretcher    Transfer with     Transported by PCT    Medicines sent: none    Chart send with patient: Yes    Notified: spouse at bedside    Patient reassessed at: 4/18/17 at 1705    Upon arrival to floor: patient oriented to room, call bell in reach and bed in lowest position    Report called to SKYLA starks, on 5th floor. Patient stable, tolerating fluids, no complaints noted. Adequate to be transferred up to 5th floor at this time.

## 2017-04-18 NOTE — ANESTHESIA POSTPROCEDURE EVALUATION
"Anesthesia Post Evaluation    Patient: Sreekanth Pitts Jr.    Procedure(s) Performed: Procedure(s) (LRB):  TURP NO LASER (N/A)  BIOPSY-PROSTATE NEEDLE (N/A)    Final Anesthesia Type: general  Patient location during evaluation: PACU  Patient participation: Yes- Able to Participate  Level of consciousness: awake  Post-procedure vital signs: reviewed and stable  Pain management: adequate  Airway patency: patent  PONV status at discharge: No PONV  Anesthetic complications: no      Cardiovascular status: stable  Respiratory status: unassisted  Hydration status: euvolemic  Follow-up not needed.        Visit Vitals    /84 (BP Location: Right arm, Patient Position: Sitting, BP Method: Automatic)    Pulse 86    Temp 36.6 °C (97.9 °F) (Temporal)    Resp 18    Ht 6' 1" (1.854 m)    Wt 117.9 kg (260 lb)    SpO2 100%    BMI 34.3 kg/m2       Pain/Darcy Score: Pain Assessment Performed: Yes (4/18/2017  2:57 PM)  Presence of Pain: complains of pain/discomfort (4/18/2017  2:57 PM)  Pain Rating Prior to Med Admin: 5 (4/18/2017  4:16 PM)  Darcy Score: 10 (4/18/2017  2:57 PM)      "

## 2017-04-18 NOTE — ANESTHESIA RELEASE NOTE
Anesthesia Release from PACU Note    Patient name: Sreekanth Pitts Jr.    Procedure(s): Procedure(s) (LRB):  TURP NO LASER (N/A)  BIOPSY-PROSTATE NEEDLE (N/A)    Anesthesia type: general    Post pain: adequate analgesia    Post assessment: no apparent complications    Last vitals:   Vitals:    04/18/17 1630   BP: 128/84   Pulse: 86   Resp: 18   Temp:        Post vital signs: stable    Level of consciousness: alert     Nausea/Vomiting: no nausea/no vomiting    Complications: none    Airway Patency:  patent    Respiratory: unassisted    Cardiovascular: stable and blood pressure at baseline    Hydration: euvolemic

## 2017-04-18 NOTE — INTERVAL H&P NOTE
The patient has been examined and the H&P has been reviewed:    Clinic urine and today's pre-op urine nitrites and leukocyte negative  Patient has been on cipro since yesterday  He has done his enema    I concur with the findings and no changes have occurred since H&P was written.    Anesthesia/Surgery risks, benefits and alternative options discussed and understood by patient/family.          Active Hospital Problems    Diagnosis  POA    Elevated PSA [R97.20]  Yes      Resolved Hospital Problems    Diagnosis Date Resolved POA   No resolved problems to display.     Patient seen in holding.  No changes in clinical condition.  Proceed with planned procedure.

## 2017-04-18 NOTE — TRANSFER OF CARE
"Anesthesia Transfer of Care Note    Patient: Sreekanth Pitts Jr.    Procedure(s) Performed: Procedure(s) (LRB):  TURP NO LASER (N/A)  BIOPSY-PROSTATE NEEDLE (N/A)    Patient location: PACU    Anesthesia Type: general    Transport from OR: Transported from OR on 6-10 L/min O2 by face mask with adequate spontaneous ventilation    Post pain: adequate analgesia    Post assessment: no apparent anesthetic complications and tolerated procedure well    Post vital signs: stable    Level of consciousness: awake, alert and oriented    Nausea/Vomiting: no nausea/vomiting    Complications: none          Last vitals:   Visit Vitals    /71    Pulse 80    Temp 37.4 °C (99.4 °F)    Resp 18    Ht 6' 1" (1.854 m)    Wt 117.9 kg (260 lb)    SpO2 97%    BMI 34.3 kg/m2     "

## 2017-04-18 NOTE — NURSING
Pt admitted to floor, stable, VSS, no complaints at this time noted or stated, I.S at bedside, SCDs intact. BITE pain menu, TV guide, pain control pamphlet, given, explained, and offered to patient. will hand over to nurse. Emily Perez RN

## 2017-04-18 NOTE — IP AVS SNAPSHOT
[facility]  [address]  [facility] [address]  Phone: [phone]           Patient Discharge Instructions   Our goal is to set you up for success. This packet includes information on your condition, medications, and your home care.  It will help you care for yourself to prevent having to return to the hospital.     Please ask your nurse if you have any questions.      There are many details to remember when preparing to leave the hospital. Here is what you will need to do:    1. Take your medicine. If you are prescribed medications, review your Medication List on the following pages. You may have new medications to  at the pharmacy and others that you'll need to stop taking. Review the instructions for how and when to take your medications. Talk with your doctor or nurses if you are unsure of what to do.     2. Go to your follow-up appointments. Specific follow-up information is listed in the following pages. Your may be contacted by a nurse or clinical provider about future appointments. Be sure we have all of the phone numbers to reach you. Please contact your provider's office if you are unable to make an appointment.     3. Watch for warning signs. Your doctor or nurse will give you detailed warning signs to watch for and when to call for assistance. These instructions may also include educational information about your condition. If you experience any of warning signs to your health, call your doctor.           [facility]  Unless otherwise directed by your provider, please   contact [facility], our nurse care line   that is available for 24/7 assistance.     [phone] (toll-free)     Registered nurses in the [facility]   provide: appointment scheduling, clinical advisement, health education, and other advisory services.                  ** Verify the list of medication(s) below is accurate and up to date. Carry this with you in case of  emergency. If your medications have changed, please notify your healthcare provider.             Medication List      START taking these medications        Additional Info                      oxycodone-acetaminophen 5-325 mg per tablet   Commonly known as:  PERCOCET   Quantity:  31 tablet   Refills:  0   Dose:  1-2 tablet    Instructions:  Take 1-2 tablets by mouth every 4 to 6 hours as needed.     Begin Date    AM    Noon    PM    Bedtime       polyethylene glycol 17 gram Pwpk   Commonly known as:  GLYCOLAX   Quantity:  30 packet   Refills:  0   Dose:  17 g    Last time this was given:  17 g on 4/19/2017  9:50 AM   Instructions:  Take 17 g by mouth once daily.     Begin Date    AM    Noon    PM    Bedtime         CONTINUE taking these medications        Additional Info                      CENTRUM SILVER ORAL   Refills:  0   Dose:  1 tablet    Instructions:  Take 1 tablet by mouth once daily.     Begin Date    AM    Noon    PM    Bedtime       dutasteride 0.5 mg capsule   Commonly known as:  AVODART   Quantity:  30 capsule   Refills:  11    Instructions:  TAKE ONE DAILY     Begin Date    AM    Noon    PM    Bedtime       ibuprofen 200 MG tablet   Commonly known as:  ADVIL,MOTRIN   Refills:  0   Dose:  200 mg    Instructions:  Take 200 mg by mouth every 6 (six) hours as needed for Pain.     Begin Date    AM    Noon    PM    Bedtime       lidocaine HCL 2% 2 % jelly   Commonly known as:  XYLOCAINE   Quantity:  30 mL   Refills:  3    Instructions:  Apply topically as needed.     Begin Date    AM    Noon    PM    Bedtime       lisinopril 20 MG tablet   Commonly known as:  PRINIVIL,ZESTRIL   Quantity:  90 tablet   Refills:  0    Instructions:  TAKE ONE DAILY     Begin Date    AM    Noon    PM    Bedtime       sodium phosphates 19-7 gram/118 mL Enem   Commonly known as:  FLEET ENEMA   Quantity:  1 enema   Refills:  0    Instructions:  Use the morning of surgery.     Begin Date    AM    Noon    PM    Bedtime        tamsulosin 0.4 mg Cp24   Commonly known as:  FLOMAX   Quantity:  30 capsule   Refills:  11   Dose:  0.4 mg    Instructions:  Take 1 capsule (0.4 mg total) by mouth once daily.     Begin Date    AM    Noon    PM    Bedtime            Where to Get Your Medications      You can get these medications from any pharmacy     Bring a paper prescription for each of these medications     oxycodone-acetaminophen 5-325 mg per tablet    polyethylene glycol 17 gram Pwpk                  Please bring to all follow up appointments:    1. A copy of your discharge instructions.  2. All medicines you are currently taking in their original bottles.  3. Identification and insurance card.    Please arrive 15 minutes ahead of scheduled appointment time.    Please call 24 hours in advance if you must reschedule your appointment and/or time.        Your Scheduled Appointments     Apr 24, 2017  8:00 AM CDT   Injection with MD Bryson Regalado regan - Urology 4th Floor ([facility] )    [address]  [place] [id number] [phone]            May 01, 2017  1:20 PM CDT   Post OP with MD Bryson Regalado - Urology 4th Floor ([facility] )    [address]  [place] [id number] [phone]              Follow-up Information     Follow up with Uma Gaona MD In 2 weeks.    Specialty:  Urology    Why:  post op    Contact information:    [address] [facility] [address]  [phone]          Discharge Instructions     Future Orders    Call MD for:  persistent nausea and vomiting or diarrhea     Call MD for:  severe uncontrolled pain     Call MD for:  temperature >100.4     Diet general     Questions:    Total calories:      Fat restriction, if any:      Protein restriction, if any:      Na restriction, if any:      Fluid restriction:      Additional restrictions:      No dressing needed         Discharge Instructions       Post Cystoscopy and Transurethral resection of  "Prostate Instructions  Do not strain to have a bowel movement  No strenuous exercise x 7 days  No driving while you are on narcotic pain medications or if your posadas  catheter is in place    You can expect:  To see blood in your urine.    Go to the ER if:  Your catheter stops draining  You are having severe abdominal pain  Inability to void if you do not have a catheter    Call the doctor if:   Temperature is greater than 101F   Persistent vomiting and inability to keep food down        Primary Diagnosis     Your primary diagnosis was:  Elevated Prostate Specific Antigen (Psa)      Admission Information     Date & Time Provider Department CSN    4/18/2017 11:59 AM Uma Gaona MD [facility] [id number]      Care Providers     Provider Role Specialty Primary office phone    Uma Gaona MD Attending Provider Urology [phone]    Uma Gaona MD Surgeon  Urology [phone]      Your Vitals Were     BP Pulse Temp Resp Height Weight    108/68 (BP Location: Right arm, Patient Position: Sitting, BP Method: Automatic) 67 98.9 °F (37.2 °C) (Oral) 18 6' 1" (1.854 m) 117.9 kg (260 lb)    SpO2 BMI             96% 34.3 kg/m2         Recent Lab Values        12/9/2016                           8:25 AM           A1C 5.4           Comment for A1C at  8:25 AM on 12/9/2016:  According to ADA guidelines, hemoglobin A1C <7.0% represents  optimal control in non-pregnant diabetic patients.  Different  metrics may apply to specific populations.   Standards of Medical Care in Diabetes - 2016.  For the purpose of screening for the presence of diabetes:  <5.7%     Consistent with the absence of diabetes  5.7-6.4%  Consistent with increasing risk for diabetes   (prediabetes)  >or=6.5%  Consistent with diabetes  Currently no consensus exists for use of hemoglobin A1C  for diagnosis of diabetes for children.        Pending Labs     Order Current Status    Specimen to Pathology - Surgery Collected " (04/18/17 1437)      Allergies as of 4/19/2017     No Known Allergies      Advance Directives     An advance directive is a document which, in the event you are no longer able to make decisions for yourself, tells your healthcare team what kind of treatment you do or do not want to receive, or who you would like to make those decisions for you.  If you do not currently have an advance directive, [facility] encourages you to create one.  For more information call:  [phone]), [phone] ([phone]),  or log on to [email or web address].        Smoking Cessation     If you would like to quit smoking:   You may be eligible for free services if you are a [place] resident and started smoking cigarettes before September 1, 1988.  Call the Smoking Cessation Trust (SCT) toll free at (746) 524-2492 or (313) 105-4276.   Call 9-960-QUIT-NOW if you do not meet the above criteria.   Contact us via email: [email or web address]   View our website for more information: [email or web address]        Language Assistance Services     ATTENTION: Language assistance services are available, free of charge. Please call [phone].      ATENCIÓN: Si habla español, tiene a caldwell disposición servicios gratuitos de asistencia lingüística. Llame al [phone].     [facility] Ý: N?u b?n nói Ti?ng Vi?t, có các d?ch v? h? tr? ngôn ng? mi?n phí dành cho b?n. G?i s? [phone].         [facility] complies with applicable Federal civil rights laws and does not discriminate on the basis of race, color, national origin, age, disability, or sex.

## 2017-04-19 VITALS
RESPIRATION RATE: 18 BRPM | HEIGHT: 73 IN | WEIGHT: 260 LBS | BODY MASS INDEX: 34.46 KG/M2 | TEMPERATURE: 99 F | OXYGEN SATURATION: 96 % | HEART RATE: 67 BPM | DIASTOLIC BLOOD PRESSURE: 68 MMHG | SYSTOLIC BLOOD PRESSURE: 108 MMHG

## 2017-04-19 PROCEDURE — 25000003 PHARM REV CODE 250: Performed by: UROLOGY

## 2017-04-19 PROCEDURE — 63600175 PHARM REV CODE 636 W HCPCS: Performed by: UROLOGY

## 2017-04-19 RX ORDER — OXYCODONE AND ACETAMINOPHEN 5; 325 MG/1; MG/1
1-2 TABLET ORAL
Qty: 31 TABLET | Refills: 0 | Status: SHIPPED | OUTPATIENT
Start: 2017-04-19 | End: 2017-04-19

## 2017-04-19 RX ORDER — POLYETHYLENE GLYCOL 3350 17 G/17G
17 POWDER, FOR SOLUTION ORAL DAILY
Qty: 30 PACKET | Refills: 0 | Status: SHIPPED | OUTPATIENT
Start: 2017-04-19 | End: 2017-06-01

## 2017-04-19 RX ORDER — OXYCODONE AND ACETAMINOPHEN 5; 325 MG/1; MG/1
1-2 TABLET ORAL
Qty: 31 TABLET | Refills: 0 | Status: SHIPPED | OUTPATIENT
Start: 2017-04-19 | End: 2017-06-01

## 2017-04-19 RX ORDER — POLYETHYLENE GLYCOL 3350 17 G/17G
17 POWDER, FOR SOLUTION ORAL DAILY
Qty: 30 PACKET | Refills: 0 | OUTPATIENT
Start: 2017-04-19 | End: 2017-04-19

## 2017-04-19 RX ADMIN — ACETAMINOPHEN 1000 MG: 10 INJECTION, SOLUTION INTRAVENOUS at 12:04

## 2017-04-19 RX ADMIN — POLYETHYLENE GLYCOL 3350 17 G: 17 POWDER, FOR SOLUTION ORAL at 09:04

## 2017-04-19 RX ADMIN — ACETAMINOPHEN 1000 MG: 10 INJECTION, SOLUTION INTRAVENOUS at 06:04

## 2017-04-19 NOTE — SUBJECTIVE & OBJECTIVE
Interval History:   No acute events overnight  Pain controlled  Tolerating diet  Ambulating well    Review of Systems  Objective:     Temp:  [97.6 °F (36.4 °C)-99.4 °F (37.4 °C)] 98.3 °F (36.8 °C)  Pulse:  [] 73  Resp:  [18] 18  SpO2:  [95 %-100 %] 96 %  BP: (103-143)/(70-85) 112/71     Body mass index is 34.3 kg/(m^2).            Drains     Drain                 Urethral Catheter 01/22/17 1650 Coude 18 Fr. 86 days         Urethral Catheter 04/18/17 1439 Latex;Triple-lumen 24 Fr. less than 1 day                Physical Exam   Constitutional: He is oriented to person, place, and time. He appears well-developed and well-nourished. No distress.   Eyes: No scleral icterus.   Neck: No JVD present.   Cardiovascular: Normal rate and regular rhythm.    Pulmonary/Chest: Effort normal. No respiratory distress.   Abdominal: Soft. He exhibits no distension. There is no tenderness. There is no rebound and no guarding.   Genitourinary:   Genitourinary Comments: 24 Fr 3-way posadas draining light pink on slow drip CBI   Neurological: He is alert and oriented to person, place, and time.   Skin: He is not diaphoretic. No pallor.     Psychiatric: He has a normal mood and affect. His behavior is normal. Thought content normal.       Significant Labs:    BMP:    Recent Labs  Lab 04/18/17  1501      K 3.6      CO2 24   BUN 13   CREATININE 0.9   CALCIUM 8.2*       CBC:     Recent Labs  Lab 04/18/17  1501   WBC 6.78   HGB 14.7   HCT 43.8          All pertinent labs results from the past 24 hours have been reviewed.    Significant Imaging:  All pertinent imaging results/findings from the past 24 hours have been reviewed.

## 2017-04-19 NOTE — ASSESSMENT & PLAN NOTE
- IV tylenol, PO oxycodone for pain control  - regular diet  - HLIV  - Ambulate pm of surgery and qid  - CBI clamped this am  - Drains: posadas to remain in for 1 week  - Prophylaxis: IS, SCDs, GI ppx    Plan for DC this am

## 2017-04-19 NOTE — PROGRESS NOTES
[facility]  Urology  Progress Note    Patient Name: Sreekanth Pitts Jr.  MRN: [id number]  Admission Date: 4/18/2017  Hospital Length of Stay: 1 days  Code Status: Prior   Attending Provider: Uma Gaona MD   Primary Care Physician: Rubio Rod MD    Subjective:     HPI:  Sreekanth Pitts Jr. is a 71 y.o. male POD 1 s/p TURP and TRUS bx    Interval History:   No acute events overnight  Pain controlled  Tolerating diet  Ambulating well    Review of Systems  Objective:     Temp:  [97.6 °F (36.4 °C)-99.4 °F (37.4 °C)] 98.3 °F (36.8 °C)  Pulse:  [] 73  Resp:  [18] 18  SpO2:  [95 %-100 %] 96 %  BP: (103-143)/(70-85) 112/71     Body mass index is 34.3 kg/(m^2).            Drains     Drain                 Urethral Catheter 01/22/17 1650 Coude 18 Fr. 86 days         Urethral Catheter 04/18/17 1439 Latex;Triple-lumen 24 Fr. less than 1 day                Physical Exam   Constitutional: He is oriented to person, place, and time. He appears well-developed and well-nourished. No distress.   Eyes: No scleral icterus.   Neck: No JVD present.   Cardiovascular: Normal rate and regular rhythm.    Pulmonary/Chest: Effort normal. No respiratory distress.   Abdominal: Soft. He exhibits no distension. There is no tenderness. There is no rebound and no guarding.   Genitourinary:   Genitourinary Comments: 24 Fr 3-way posadas draining light pink on slow drip CBI   Neurological: He is alert and oriented to person, place, and time.   Skin: He is not diaphoretic. No pallor.     Psychiatric: He has a normal mood and affect. His behavior is normal. Thought content normal.       Significant Labs:    BMP:    Recent Labs  Lab 04/18/17  1501      K 3.6      CO2 24   BUN 13   CREATININE 0.9   CALCIUM 8.2*       CBC:     Recent Labs  Lab 04/18/17  1501   WBC 6.78   HGB 14.7   HCT 43.8          All pertinent labs results from the past 24 hours have been reviewed.    Significant Imaging:  All pertinent  imaging results/findings from the past 24 hours have been reviewed.                  Assessment/Plan:     Elevated PSA  - IV tylenol, PO oxycodone for pain control  - regular diet  - HLIV  - Ambulate pm of surgery and qid  - CBI clamped this am  - Drains: posadas to remain in for 1 week  - Prophylaxis: IS, SCDs, GI ppx    Plan for DC this am       VTE Risk Mitigation         Ordered     Place EMILIO hose  Until discontinued      04/18/17 1501     Place sequential compression device  Until discontinued      04/18/17 1501     Place sequential compression device  Until discontinued      04/18/17 1215     Place EMILIO hose  Until discontinued      04/18/17 1215          Graciela Mayorga MD  Urology  [facility]

## 2017-04-19 NOTE — DISCHARGE SUMMARY
[facility]  Urology  Discharge Summary      Patient Name: Sreekanth Pitts Jr.  MRN: [id number]  Admission Date: 4/18/2017  Hospital Length of Stay: 0 days  Discharge Date and Time: 4/19/2017 11:15 AM  Attending Physician: Uma Gaona MD  Discharging Provider: Graciela Mayorga MD  Primary Care Physician: Rubio Rod MD    HPI: Sreekanth Pitts Jr. is a 71 y.o. male with hx of BPH and elevated PSA.    Procedure(s) (LRB):  TURP NO LASER (N/A)  BIOPSY-PROSTATE NEEDLE (N/A)     Indwelling Lines/Drains at time of discharge:   Lines/Drains/Airways     Drain                 Urethral Catheter 04/18/17 1439 Latex;Triple-lumen 24 Fr. less than 1 day                Hospital Course (synopsis of major diagnoses, care, treatment, and services provided during the course of the hospital stay): Patient admitted following TURP and TRUS bx. He tolerated the procedure well. On POD 1 he was ambulating, tolerating a regular diet, and pain was well controlled. He was discharged home with posadas in place.     Consults:     Significant Diagnostic Studies: see chart review    Pending Diagnostic Studies:     None          Final Active Diagnoses:    Diagnosis Date Noted POA    PRINCIPAL PROBLEM:  Elevated PSA [R97.20] 04/18/2017 Yes    Urinary retention [R33.9] 01/22/2017 Yes      Problems Resolved During this Admission:    Diagnosis Date Noted Date Resolved POA       Discharged Condition: good    Disposition: Home or Self Care    Follow Up:  Follow-up Information     Follow up with Uma Gaona MD In 2 weeks.    Specialty:  Urology    Why:  post op    Contact information:    [address]  [facility] [address]  [phone]          Patient Instructions:     Diet general     Call MD for:  temperature >100.4     Call MD for:  persistent nausea and vomiting or diarrhea     Call MD for:  severe uncontrolled pain     No dressing needed       Medications:  Reconciled Home Medications:   Discharge Medication List  as of 4/19/2017 10:23 AM      CONTINUE these medications which have CHANGED    Details   oxycodone-acetaminophen (PERCOCET) 5-325 mg per tablet Take 1-2 tablets by mouth every 4 to 6 hours as needed., Starting 4/19/2017, Until Discontinued, Print      polyethylene glycol (GLYCOLAX) 17 gram PwPk Take 17 g by mouth once daily., Starting 4/19/2017, Until Discontinued, Print         CONTINUE these medications which have NOT CHANGED    Details   dutasteride (AVODART) 0.5 mg capsule TAKE ONE DAILY, Normal      ibuprofen (ADVIL,MOTRIN) 200 MG tablet Take 200 mg by mouth every 6 (six) hours as needed for Pain., Until Discontinued, Historical Med      lisinopril (PRINIVIL,ZESTRIL) 20 MG tablet TAKE ONE DAILY, Normal      sodium phosphates (FLEET ENEMA) 19-7 gram/118 mL Enem Use the morning of surgery., Normal      tamsulosin (FLOMAX) 0.4 mg Cp24 Take 1 capsule (0.4 mg total) by mouth once daily., Starting 1/24/2017, Until Wed 1/24/18, Normal      lidocaine HCL 2% (XYLOCAINE) 2 % jelly Apply topically as needed., Starting 1/27/2017, Until Discontinued, Normal      MULTIVITAMIN W-MINERALS/LUTEIN (CENTRUM SILVER ORAL) Take 1 tablet by mouth once daily. , Until Discontinued, Historical Med             Time spent on the discharge of patient: 20 minutes    Graciela Mayorga MD  Urology  [facility]    Agree with the above.

## 2017-04-19 NOTE — PLAN OF CARE
Problem: Patient Care Overview  Goal: Plan of Care Review  Outcome: Ongoing (interventions implemented as appropriate)  Patient is awake, alert and oriented. Patient remained free from complaints this shift. Patient is ambulatory and independent. Patient urine is very light pink now. Patient has ambulated. Remained free from injury and falls this shift. Bed is in the low and locked position with side rail up x 2. Call light is within reach. Informed to call if need assistance. Will continue to monitor.

## 2017-04-19 NOTE — NURSING
Siddiqi teaching and care done with patient and wife, both verbalized understanding. Written instructions also given for reference. Emily Perez RN

## 2017-04-19 NOTE — DISCHARGE INSTRUCTIONS
Post Cystoscopy and Transurethral resection of Prostate Instructions  Do not strain to have a bowel movement  No strenuous exercise x 7 days  No driving while you are on narcotic pain medications or if your posadas  catheter is in place    You can expect:  To see blood in your urine.    Go to the ER if:  Your catheter stops draining  You are having severe abdominal pain  Inability to void if you do not have a catheter    Call the doctor if:   Temperature is greater than 101F   Persistent vomiting and inability to keep food down

## 2017-04-19 NOTE — NURSING
Pt discharged in stable condition, discharge instructions and prescriptions given, pt verbalized understanding. JOSELIN Siddiqi teaching done by primary nurse. Patient waiting on transport. Emily Perez RN

## 2017-04-24 ENCOUNTER — CLINICAL SUPPORT (OUTPATIENT)
Dept: UROLOGY | Facility: CLINIC | Age: 72
End: 2017-04-24
Payer: MEDICARE

## 2017-04-24 VITALS — HEIGHT: 73 IN | BODY MASS INDEX: 34.42 KG/M2 | WEIGHT: 259.69 LBS

## 2017-04-24 DIAGNOSIS — Z98.890 POST-OPERATIVE STATE: Primary | ICD-10-CM

## 2017-04-24 PROCEDURE — 99999 PR PBB SHADOW E&M-EST. PATIENT-LVL III: CPT | Mod: PBBFAC,,, | Performed by: UROLOGY

## 2017-04-24 PROCEDURE — 99213 OFFICE O/P EST LOW 20 MIN: CPT | Mod: PBBFAC | Performed by: UROLOGY

## 2017-04-24 NOTE — PROGRESS NOTES
Mr. Pitts came in for catheter removal.  This was done without difficulty, the urine was slightly pink.    He called back 4 hours later stating that he was urinating well.  Will plan to see him for follow up at his 2 week post op.

## 2017-05-01 ENCOUNTER — OFFICE VISIT (OUTPATIENT)
Dept: UROLOGY | Facility: CLINIC | Age: 72
End: 2017-05-01
Payer: MEDICARE

## 2017-05-01 VITALS — BODY MASS INDEX: 32.47 KG/M2 | WEIGHT: 253 LBS | HEIGHT: 74 IN

## 2017-05-01 DIAGNOSIS — Z98.890 POST-OPERATIVE STATE: Primary | ICD-10-CM

## 2017-05-01 PROBLEM — R33.9 URINARY RETENTION: Status: RESOLVED | Noted: 2017-01-22 | Resolved: 2017-05-01

## 2017-05-01 PROCEDURE — 99999 PR PBB SHADOW E&M-EST. PATIENT-LVL III: CPT | Mod: PBBFAC,,, | Performed by: UROLOGY

## 2017-05-01 PROCEDURE — 99213 OFFICE O/P EST LOW 20 MIN: CPT | Mod: PBBFAC | Performed by: UROLOGY

## 2017-05-01 PROCEDURE — 99024 POSTOP FOLLOW-UP VISIT: CPT | Mod: S$PBB,,, | Performed by: UROLOGY

## 2017-05-01 NOTE — PROGRESS NOTES
CHIEF COMPLAINT:    Mr. Pitts is a 71 y.o. male presenting for a 2 week follow up after TURP (4/18/2017.)    PRESENTING ILLNESS:    Sreekanth Pitts Jr. is a 71 y.o. male who returns for follow up.  He has nocturia x 1, and has urinary urgency, frequency with small volume voids.  Most of the time is ranges from  ml, sometimes it can be 300 ml.  He has gross hematuria that lightens up sometimes.  No clots.  No dysuria, no fevers, chills.  The stream is stronger when his bladder is full.  When it is less full, he tends to spray and has to use a urinal so that he does not spray around the toilet.      Allergies:   no known allergies.    Medications:  Outpatient Encounter Prescriptions as of 5/1/2017   Medication Sig Dispense Refill    dutasteride (AVODART) 0.5 mg capsule TAKE ONE DAILY 30 capsule 11    ibuprofen (ADVIL,MOTRIN) 200 MG tablet Take 200 mg by mouth every 6 (six) hours as needed for Pain.      lidocaine HCL 2% (XYLOCAINE) 2 % jelly Apply topically as needed. 30 mL 3    lisinopril (PRINIVIL,ZESTRIL) 20 MG tablet TAKE ONE DAILY 90 tablet 0    MULTIVITAMIN W-MINERALS/LUTEIN (CENTRUM SILVER ORAL) Take 1 tablet by mouth once daily.       oxycodone-acetaminophen (PERCOCET) 5-325 mg per tablet Take 1-2 tablets by mouth every 4 to 6 hours as needed. 31 tablet 0    polyethylene glycol (GLYCOLAX) 17 gram PwPk Take 17 g by mouth once daily. 30 packet 0    sodium phosphates (FLEET ENEMA) 19-7 gram/118 mL Enem Use the morning of surgery. 1 enema 0    tamsulosin (FLOMAX) 0.4 mg Cp24 Take 1 capsule (0.4 mg total) by mouth once daily. 30 capsule 11     No facility-administered encounter medications on file as of 5/1/2017.          PHYSICAL EXAMINATION:    The patient generally appears in good health, is appropriately interactive, and is in no apparent distress.    Skin: No lesions.    Mental: Cooperative with normal affect.    Neuro: Grossly intact.    HEENT: Normal. No evidence of  lymphadenopathy.    Chest: , normal inspiratory effort.    Extremities: No clubbing, cyanosis, or edema      LABS:    Path is still pending from the TUR chips and the biopsies.      IMPRESSION:    Encounter Diagnoses   Name Primary?    Post-operative state Yes       PLAN:    1. Reassured the patient that the gross hematuria can continue.  He should have improvement over the next 6 weeks.   2.  Follow up in 1 month  3.  Continue Flomax and dutesteride.  Will re evaluate at the next visit.

## 2017-05-25 ENCOUNTER — TELEPHONE (OUTPATIENT)
Dept: UROLOGY | Facility: CLINIC | Age: 72
End: 2017-05-25

## 2017-05-25 NOTE — TELEPHONE ENCOUNTER
Called Lisa Gisselle to let him know that the biopsies were negative for cancer.  He states he still has some frequency and discomfort.  His TURP was on 4/18/2017 so it is still early.  He is going to [place] today.  Encouraged him to relax.  He has a follow up on 6/1/2017

## 2017-05-30 DIAGNOSIS — I10 ESSENTIAL HYPERTENSION: ICD-10-CM

## 2017-05-30 RX ORDER — LISINOPRIL 20 MG/1
TABLET ORAL
Qty: 90 TABLET | Refills: 3 | Status: SHIPPED | OUTPATIENT
Start: 2017-05-30 | End: 2018-08-31 | Stop reason: SDUPTHER

## 2017-06-01 ENCOUNTER — OFFICE VISIT (OUTPATIENT)
Dept: UROLOGY | Facility: CLINIC | Age: 72
End: 2017-06-01
Payer: MEDICARE

## 2017-06-01 VITALS
SYSTOLIC BLOOD PRESSURE: 109 MMHG | DIASTOLIC BLOOD PRESSURE: 75 MMHG | HEART RATE: 89 BPM | HEIGHT: 73 IN | WEIGHT: 258.63 LBS | BODY MASS INDEX: 34.28 KG/M2

## 2017-06-01 DIAGNOSIS — Z98.890 POST-OPERATIVE STATE: Primary | ICD-10-CM

## 2017-06-01 PROCEDURE — 99024 POSTOP FOLLOW-UP VISIT: CPT | Mod: ,,, | Performed by: UROLOGY

## 2017-06-01 PROCEDURE — 99213 OFFICE O/P EST LOW 20 MIN: CPT | Mod: PBBFAC | Performed by: UROLOGY

## 2017-06-01 PROCEDURE — 99999 PR PBB SHADOW E&M-EST. PATIENT-LVL III: CPT | Mod: PBBFAC,,, | Performed by: UROLOGY

## 2017-06-01 NOTE — PROGRESS NOTES
CHIEF COMPLAINT:    Mr. Pitts is a 72 y.o. male presenting for a post op follow up status post TURP on 4/18/2017.    PRESENTING ILLNESS:    Sreekanth Pitts Jr. is a 72 y.o. male who returns for follow up.  He states he is doing better.  He has a good stream.  Every now and then he has slight dysuria but nothing like it was before.  He has had a hard time with this surgery because it was his first surgery and he is not used to being unwell.  He states that he has had a problem with bowel movements.  They used to be 2-3 times a day, now he is irregular and sometimes they are hard in consistency.  No blood per rectum.    Allergies:  Review of patient's allergies indicates no known allergies.    Medications:  Outpatient Encounter Prescriptions as of 6/1/2017   Medication Sig Dispense Refill    dutasteride (AVODART) 0.5 mg capsule TAKE ONE DAILY 30 capsule 11    ibuprofen (ADVIL,MOTRIN) 200 MG tablet Take 200 mg by mouth every 6 (six) hours as needed for Pain.      lidocaine HCL 2% (XYLOCAINE) 2 % jelly Apply topically as needed. 30 mL 3    lisinopril (PRINIVIL,ZESTRIL) 20 MG tablet TAKE ONE DAILY 90 tablet 3    MULTIVITAMIN W-MINERALS/LUTEIN (CENTRUM SILVER ORAL) Take 1 tablet by mouth once daily.       polyethylene glycol (GLYCOLAX) 17 gram PwPk Take 17 g by mouth once daily. 30 packet 0    sodium phosphates (FLEET ENEMA) 19-7 gram/118 mL Enem Use the morning of surgery. 1 enema 0    tamsulosin (FLOMAX) 0.4 mg Cp24 Take 1 capsule (0.4 mg total) by mouth once daily. 30 capsule 11    [DISCONTINUED] oxycodone-acetaminophen (PERCOCET) 5-325 mg per tablet Take 1-2 tablets by mouth every 4 to 6 hours as needed. 31 tablet 0     No facility-administered encounter medications on file as of 6/1/2017.          PHYSICAL EXAMINATION:    The patient generally appears in good health, is appropriately interactive, and is in no apparent distress.    Skin: No lesions.    Mental: Cooperative with normal affect.    Neuro:  Grossly intact.    HEENT: Normal. No evidence of lymphadenopathy.    Chest:  normal inspiratory effort.    Extremities: No clubbing, cyanosis, or edema      LABS:    Lab Results   Component Value Date    BUN 13 04/18/2017    CREATININE 0.9 04/18/2017     Path had been discussed see last phone call.  (was benign)    IMPRESSION:    Encounter Diagnoses   Name Primary?    Post-operative state Yes       PLAN:    1.  Recommended a probiotic  2.  Follow up in 6 months.  3.  At this point, OK to stop the Flomax.  If he feels that it improved voiding, then he can resume but must take the dose at night as there is first dose syncope.    4.  Continue Avodart as there is a prostate cancer risk reduction with it.

## 2017-06-01 NOTE — PATIENT INSTRUCTIONS
[employer] Ultra 25 Billion CFU Probiotic Complex, Multi Strain.  The Multi Strain is specifically the one that is important as the greater variety of strains is better.  Make sure the product is not .

## 2017-06-21 ENCOUNTER — OFFICE VISIT (OUTPATIENT)
Dept: DERMATOLOGY | Facility: CLINIC | Age: 72
End: 2017-06-21
Payer: MEDICARE

## 2017-06-21 VITALS — WEIGHT: 258 LBS | BODY MASS INDEX: 34.04 KG/M2

## 2017-06-21 DIAGNOSIS — L57.0 MULTIPLE ACTINIC KERATOSES: Primary | ICD-10-CM

## 2017-06-21 DIAGNOSIS — L71.9 ROSACEA: ICD-10-CM

## 2017-06-21 DIAGNOSIS — L82.1 SEBORRHEIC KERATOSES: ICD-10-CM

## 2017-06-21 PROCEDURE — 17003 DESTRUCT PREMALG LES 2-14: CPT | Mod: S$PBB,,, | Performed by: DERMATOLOGY

## 2017-06-21 PROCEDURE — 17000 DESTRUCT PREMALG LESION: CPT | Mod: S$PBB,,, | Performed by: DERMATOLOGY

## 2017-06-21 PROCEDURE — 99202 OFFICE O/P NEW SF 15 MIN: CPT | Mod: 25,S$PBB,, | Performed by: DERMATOLOGY

## 2017-06-21 PROCEDURE — 17000 DESTRUCT PREMALG LESION: CPT | Mod: PBBFAC,PO | Performed by: DERMATOLOGY

## 2017-06-21 PROCEDURE — 1159F MED LIST DOCD IN RCRD: CPT | Mod: ,,, | Performed by: DERMATOLOGY

## 2017-06-21 PROCEDURE — 17003 DESTRUCT PREMALG LES 2-14: CPT | Mod: PBBFAC,PO | Performed by: DERMATOLOGY

## 2017-06-21 PROCEDURE — 99212 OFFICE O/P EST SF 10 MIN: CPT | Mod: PBBFAC,PO | Performed by: DERMATOLOGY

## 2017-06-21 PROCEDURE — 99999 PR PBB SHADOW E&M-EST. PATIENT-LVL II: CPT | Mod: PBBFAC,,, | Performed by: DERMATOLOGY

## 2017-06-21 RX ORDER — FLUOROURACIL 50 MG/G
CREAM TOPICAL
Qty: 40 G | Refills: 3 | Status: SHIPPED | OUTPATIENT
Start: 2017-06-21 | End: 2017-12-01

## 2017-06-21 RX ORDER — METRONIDAZOLE 7.5 MG/G
GEL TOPICAL
Qty: 45 G | Refills: 6 | Status: SHIPPED | OUTPATIENT
Start: 2017-06-21 | End: 2017-12-01

## 2017-06-21 NOTE — PROGRESS NOTES
Subjective:       Patient ID:  Sreekanth Pitts Jr. is a 72 y.o. male who presents for   Chief Complaint   Patient presents with    Spot     face    Skin Check     UBSE     History of Present Illness: The patient presents with chief complaint of lesions .  Location: scalp forehead left ear  Duration: months  Signs/Symptoms: none    Prior treatments: none    Also rosacea for years no tx.         Spot         Review of Systems   Constitutional: Negative for fever.   Skin: Negative for itching and rash.   Hematologic/Lymphatic: Does not bruise/bleed easily.        Objective:    Physical Exam   Constitutional: He appears well-developed and well-nourished. No distress.   Neurological: He is alert and oriented to person, place, and time. He is not disoriented.   Psychiatric: He has a normal mood and affect.   Skin:   Areas Examined (abnormalities noted in diagram):   Scalp / Hair Palpated and Inspected  Head / Face Inspection Performed  Neck Inspection Performed  Chest / Axilla Inspection Performed  Abdomen Inspection Performed  Back Inspection Performed  RUE Inspected  LUE Inspection Performed                   Diagram Legend     Erythematous scaling macule/papule c/w actinic keratosis         Assessment / Plan:        Multiple actinic keratoses   Cryosurgery Procedure Note    Verbal consent from the patient is obtained and the patient is aware of the precancerous quality and need for treatment of these lesions. Liquid nitrogen cryosurgery is applied to the 3 actinic keratoses scalp and left ear, to produce a freeze injury.    -     fluorouracil (EFUDEX) 5 % cream; Use hs for 2 weeks  Dispense: 40 g; Refill: 3    Seborrheic keratoses  reassurance      Rosacea  -     metronidazole (METROGEL) 0.75 % gel; Use hs on face for rosacea  Dispense: 45 g; Refill: 6             Return in about 3 months (around 9/21/2017).

## 2017-08-23 ENCOUNTER — OFFICE VISIT (OUTPATIENT)
Dept: DERMATOLOGY | Facility: CLINIC | Age: 72
End: 2017-08-23
Payer: MEDICARE

## 2017-08-23 VITALS — BODY MASS INDEX: 34.04 KG/M2 | WEIGHT: 258 LBS

## 2017-08-23 DIAGNOSIS — D22.9 NEVUS OF MULTIPLE SITES: ICD-10-CM

## 2017-08-23 DIAGNOSIS — L82.1 SEBORRHEIC KERATOSES: ICD-10-CM

## 2017-08-23 DIAGNOSIS — L81.4 LENTIGINES: ICD-10-CM

## 2017-08-23 DIAGNOSIS — D18.00 ANGIOMA: ICD-10-CM

## 2017-08-23 DIAGNOSIS — Z87.2 HISTORY OF ACTINIC KERATOSES: Primary | ICD-10-CM

## 2017-08-23 PROCEDURE — 99999 PR PBB SHADOW E&M-EST. PATIENT-LVL II: CPT | Mod: PBBFAC,,, | Performed by: DERMATOLOGY

## 2017-08-23 PROCEDURE — 99213 OFFICE O/P EST LOW 20 MIN: CPT | Mod: S$PBB,,, | Performed by: DERMATOLOGY

## 2017-08-23 PROCEDURE — 1159F MED LIST DOCD IN RCRD: CPT | Mod: ,,, | Performed by: DERMATOLOGY

## 2017-08-23 PROCEDURE — 99212 OFFICE O/P EST SF 10 MIN: CPT | Mod: PBBFAC,PO | Performed by: DERMATOLOGY

## 2017-08-23 NOTE — PROGRESS NOTES
"  Subjective:       Patient ID:  Sreekanth Pitts Jr. is a 72 y.o. male who presents for   Chief Complaint   Patient presents with    Follow-up     UBSE    Lesion     History of Present Illness: The patient presents with chief complaint of spots.  Location: arms  Duration: months  Signs/Symptoms: none    Prior treatments: none        Lesion         Review of Systems   Constitutional: Negative for fever.   Skin: Negative for itching and rash.   Hematologic/Lymphatic: Does not bruise/bleed easily.        Objective:    Physical Exam   Constitutional: He appears well-developed and well-nourished. No distress.   Neurological: He is alert and oriented to person, place, and time. He is not disoriented.   Psychiatric: He has a normal mood and affect.   Skin:   Areas Examined (abnormalities noted in diagram):   Scalp / Hair Palpated and Inspected  Head / Face Inspection Performed  Neck Inspection Performed  Chest / Axilla Inspection Performed  Abdomen Inspection Performed  Back Inspection Performed  RUE Inspected  LUE Inspection Performed              Diagram Legend        Vascular papule c/w angioma      Pigmented verrucoid papule/plaque c/w seborrheic keratosis       Assessment / Plan:        1. History of actinic keratoses   Resolved with efudex      2. Seborrheic keratoses   reassurance  Am lactin lotion for arms    3. Lentigines   The "ABCD" rules to observe pigmented lesions were reviewed.      4. Angioma   reassurance  Brochure provided      [place]evi of multiple sites  The "ABCD" rules to observe pigmented lesions were reviewed.             One year return  "

## 2017-12-01 ENCOUNTER — OFFICE VISIT (OUTPATIENT)
Dept: SLEEP MEDICINE | Facility: CLINIC | Age: 72
End: 2017-12-01
Payer: MEDICARE

## 2017-12-01 VITALS
DIASTOLIC BLOOD PRESSURE: 70 MMHG | WEIGHT: 259.94 LBS | BODY MASS INDEX: 34.45 KG/M2 | HEART RATE: 80 BPM | SYSTOLIC BLOOD PRESSURE: 104 MMHG | HEIGHT: 73 IN

## 2017-12-01 DIAGNOSIS — I10 ESSENTIAL HYPERTENSION: ICD-10-CM

## 2017-12-01 DIAGNOSIS — J30.9 ALLERGIC RHINITIS, UNSPECIFIED CHRONICITY, UNSPECIFIED SEASONALITY, UNSPECIFIED TRIGGER: ICD-10-CM

## 2017-12-01 DIAGNOSIS — G47.33 OBSTRUCTIVE SLEEP APNEA: Primary | ICD-10-CM

## 2017-12-01 DIAGNOSIS — E66.9 OBESITY, UNSPECIFIED CLASSIFICATION, UNSPECIFIED OBESITY TYPE, UNSPECIFIED WHETHER SERIOUS COMORBIDITY PRESENT: ICD-10-CM

## 2017-12-01 PROCEDURE — 99213 OFFICE O/P EST LOW 20 MIN: CPT | Mod: PBBFAC | Performed by: NURSE PRACTITIONER

## 2017-12-01 PROCEDURE — 99999 PR PBB SHADOW E&M-EST. PATIENT-LVL III: CPT | Mod: PBBFAC,,, | Performed by: NURSE PRACTITIONER

## 2017-12-01 PROCEDURE — 99214 OFFICE O/P EST MOD 30 MIN: CPT | Mod: S$PBB,,, | Performed by: NURSE PRACTITIONER

## 2017-12-01 NOTE — PROGRESS NOTES
This 72 y.o. male with STEPHIE returns for CPAP check and management. Last seen 12/1/16    Continues to use apap nightly. No chin strap. Occasional oral drying. Dislikes nasal cool air, bit better since higher humidity 7/8. WAte rin hose bothersome, keeps bedroom very cold (60's). Daytime sleepiness overall better. Still hard to get happy face, having poor mask seal. Had TURP in interim (not fun to have cath!). Lost 2# in interim. Not yet purchased mask liner, but will go to [facility]. Chronic difficulty getting more than 5-6hr sleep time.     Interrogation- L Eson mask, port filter dirty, 16/30d>4h. AHI 1.5, 90% tile 11.6cm. avg use 4.4h/n    HISTORY:  12/2/15:  He is seen in the context of medical comorbidities of obestiy, HTN, and allergic rhinitis. He was seen last on 11/4/15, just prior to 31st day after setup with cpap. He continues to use cpap every single night. Having some oral drying w/o use of chin strap . DME has not mailed one yet. He had first used FFM then changed to nasal mask. Back using former comfortgel mask. He has leaks from mask, which disrupt his wife's sleep. She thinks not using the machine would be better than listening to this noise. ESS=8-10    Long time user CPAP '05, machine was malfunctioning and no baseline studies obtainable, so he underwent a split-night PSG recently. He got new machine, apap 10-20cm setup on 10/7/15 by [facility]. Old chin strap substandard. His previous cpap setting was 12cm. He notes cessation of snoring, gasping, as a result of CPAP use. Sleep is undisturbed.    CPAP interrogation: fairly new machine condition, 30d avg 5.8h/n. Heat 6/8. AHI 3.9, 90% tile 12cm, 29/30d>4h    12/2/16: He continues to use cpap nightly. Continued mask leaks, reports never getting a happy face for seal. Infrequent disrupted sleep. Continued resolution of snoring. ESS=9.     Interrogation: 90% 12cm, AHI 2.6, GARCIA 0.2, 5.5h/n avg 30d.         PSG 9/21/15: AHI was 30.2 with an oxygen damián of  "82.0%. Supine AHI was 102.5, and AHI on sides was 10.3 and 16.2. Initial improvement was noted at 8 cm, but higher pressures were needed during REM. Good control of sleep disordered breathing was seen during side position NREM and REM stages of sleep at a pressure of 10 cm of water. Upon turning supine, an effective pressure was not demonstrable due to onset of central apnea, frequent arousals, and sleep-wake transitions. An effective pressure was not demonstrated for supine position sleep       Complains of allergy season. Nose is stuffy.  He does depend on nightly Flonase use to keep his nasal passages open. He has been using it for 1-2 years.  Has seen Dr. Antunez in past re: surgical options        Past Medical History:   Diagnosis Date    Allergy 12/3/2015    Arthritis     Degenerative disc disease     Hypertension     Nuclear sclerosis - Both Eyes 7/30/2012     ROS: In addition to above symptoms, 2# loss, +sinus congestion (not using flonase regularly), otherwise a balance review of 10-systems is negative.   .    EXAM:  /70   Pulse 80   Ht 6' 1" (1.854 m)   Wt 117.9 kg (259 lb 14.8 oz)   BMI 34.29 kg/m²   W/D,obese well groomed    IMPRESSION:   1. STEPHIE, severe, symptomatically improved by CPAP. He continues to be adherent to CPAP.Using therapy nightly. Mask leaks/excessive moisture in hose/nasal cooling at times  2. Chronic allergic rhinitis - stable    His medical comorbidities are obestiy, HTN (optimal today)    PLAN:  1. Continue APAP 10-16cm. Continue nightly use. DME [employer] supplies. Consider mask liner and get hose snuggie  2. Continue Flonase prn to avoid UARS (qhs)  3. Encouraged continued weight loss efforts for potential improvement of STEPHIE and overall health benefits. See PCP re: HTN  4. Discussed effectiveness of therapy and potential ramifications of untreated STEPHIE, including heart disease, hypertension, cognitive difficulties, stroke, and diabetes.    5. Do not drive if sleepy  6. RTC " 1-yr adherence monitoring, sooner if needed

## 2017-12-26 ENCOUNTER — TELEPHONE (OUTPATIENT)
Dept: INTERNAL MEDICINE | Facility: CLINIC | Age: 72
End: 2017-12-26

## 2017-12-26 DIAGNOSIS — Z13.9 ENCOUNTER FOR SCREENING: Primary | ICD-10-CM

## 2017-12-28 ENCOUNTER — LAB VISIT (OUTPATIENT)
Dept: LAB | Facility: HOSPITAL | Age: 72
End: 2017-12-28
Attending: FAMILY MEDICINE
Payer: MEDICARE

## 2017-12-28 DIAGNOSIS — Z13.9 ENCOUNTER FOR SCREENING: ICD-10-CM

## 2017-12-28 LAB
ALBUMIN SERPL BCP-MCNC: 4.1 G/DL
ALP SERPL-CCNC: 63 U/L
ALT SERPL W/O P-5'-P-CCNC: 34 U/L
ANION GAP SERPL CALC-SCNC: 8 MMOL/L
AST SERPL-CCNC: 26 U/L
BASOPHILS # BLD AUTO: 0.03 K/UL
BASOPHILS NFR BLD: 0.4 %
BILIRUB SERPL-MCNC: 0.8 MG/DL
BUN SERPL-MCNC: 17 MG/DL
CALCIUM SERPL-MCNC: 9.1 MG/DL
CHLORIDE SERPL-SCNC: 104 MMOL/L
CHOLEST SERPL-MCNC: 162 MG/DL
CHOLEST/HDLC SERPL: 5.2 {RATIO}
CO2 SERPL-SCNC: 27 MMOL/L
COMPLEXED PSA SERPL-MCNC: 1.9 NG/ML
CREAT SERPL-MCNC: 0.9 MG/DL
DIFFERENTIAL METHOD: NORMAL
EOSINOPHIL # BLD AUTO: 0.2 K/UL
EOSINOPHIL NFR BLD: 3.4 %
ERYTHROCYTE [DISTWIDTH] IN BLOOD BY AUTOMATED COUNT: 13.5 %
EST. GFR  (AFRICAN AMERICAN): >60 ML/MIN/1.73 M^2
EST. GFR  (NON AFRICAN AMERICAN): >60 ML/MIN/1.73 M^2
ESTIMATED AVG GLUCOSE: 100 MG/DL
GLUCOSE SERPL-MCNC: 99 MG/DL
HBA1C MFR BLD HPLC: 5.1 %
HCT VFR BLD AUTO: 46.2 %
HDLC SERPL-MCNC: 31 MG/DL
HDLC SERPL: 19.1 %
HGB BLD-MCNC: 15.6 G/DL
LDLC SERPL CALC-MCNC: 83.8 MG/DL
LYMPHOCYTES # BLD AUTO: 1.7 K/UL
LYMPHOCYTES NFR BLD: 23.8 %
MCH RBC QN AUTO: 30.2 PG
MCHC RBC AUTO-ENTMCNC: 33.8 G/DL
MCV RBC AUTO: 90 FL
MONOCYTES # BLD AUTO: 0.8 K/UL
MONOCYTES NFR BLD: 10.7 %
NEUTROPHILS # BLD AUTO: 4.4 K/UL
NEUTROPHILS NFR BLD: 61.6 %
NONHDLC SERPL-MCNC: 131 MG/DL
PLATELET # BLD AUTO: 239 K/UL
PMV BLD AUTO: 9.9 FL
POTASSIUM SERPL-SCNC: 3.8 MMOL/L
PROT SERPL-MCNC: 6.9 G/DL
RBC # BLD AUTO: 5.16 M/UL
SODIUM SERPL-SCNC: 139 MMOL/L
TRIGL SERPL-MCNC: 236 MG/DL
WBC # BLD AUTO: 7.11 K/UL

## 2017-12-28 PROCEDURE — 84153 ASSAY OF PSA TOTAL: CPT

## 2017-12-28 PROCEDURE — 80053 COMPREHEN METABOLIC PANEL: CPT

## 2017-12-28 PROCEDURE — 36415 COLL VENOUS BLD VENIPUNCTURE: CPT

## 2017-12-28 PROCEDURE — 80061 LIPID PANEL: CPT

## 2017-12-28 PROCEDURE — 83036 HEMOGLOBIN GLYCOSYLATED A1C: CPT

## 2017-12-28 PROCEDURE — 85025 COMPLETE CBC W/AUTO DIFF WBC: CPT

## 2018-01-04 ENCOUNTER — OFFICE VISIT (OUTPATIENT)
Dept: INTERNAL MEDICINE | Facility: CLINIC | Age: 73
End: 2018-01-04
Payer: MEDICARE

## 2018-01-04 VITALS
BODY MASS INDEX: 33.67 KG/M2 | SYSTOLIC BLOOD PRESSURE: 109 MMHG | DIASTOLIC BLOOD PRESSURE: 72 MMHG | HEIGHT: 74 IN | OXYGEN SATURATION: 98 % | HEART RATE: 82 BPM | WEIGHT: 262.38 LBS

## 2018-01-04 DIAGNOSIS — I10 ESSENTIAL HYPERTENSION: Primary | ICD-10-CM

## 2018-01-04 DIAGNOSIS — H91.90 DECREASED HEARING, UNSPECIFIED LATERALITY: ICD-10-CM

## 2018-01-04 DIAGNOSIS — G47.33 OBSTRUCTIVE SLEEP APNEA: ICD-10-CM

## 2018-01-04 DIAGNOSIS — C61 MALIGNANT NEOPLASM OF PROSTATE: ICD-10-CM

## 2018-01-04 DIAGNOSIS — N28.89 RENAL MASS: ICD-10-CM

## 2018-01-04 PROCEDURE — 99213 OFFICE O/P EST LOW 20 MIN: CPT | Mod: S$PBB,,, | Performed by: FAMILY MEDICINE

## 2018-01-04 PROCEDURE — 99999 PR PBB SHADOW E&M-EST. PATIENT-LVL IV: CPT | Mod: PBBFAC,,, | Performed by: FAMILY MEDICINE

## 2018-01-04 PROCEDURE — 99214 OFFICE O/P EST MOD 30 MIN: CPT | Mod: PBBFAC | Performed by: FAMILY MEDICINE

## 2018-01-04 NOTE — PROGRESS NOTES
Subjective:       Patient ID: Sreekanth Pitts Jr. is a 72 y.o. male.    Chief Complaint: Annual Exam  Sreekanth Pitts Jr. 72 y.o. male is here for office visit to review care and physical exam, reports doing well in general.  Was walking 1-3 miles a day until cold weather.  Diet could be better, has noted Trig up a little.  Wonders about Urology f/u.      HPI  Review of Systems   Constitutional: Negative for activity change, appetite change, fatigue, fever and unexpected weight change.   HENT: Negative for congestion, hearing loss, postnasal drip and rhinorrhea.    Eyes: Negative for pain, discharge and visual disturbance.   Respiratory: Negative for cough, choking and shortness of breath.    Cardiovascular: Negative for chest pain, palpitations and leg swelling.   Gastrointestinal: Negative for abdominal pain, diarrhea and vomiting.   Genitourinary: Negative for dysuria, flank pain, hematuria and urgency.   Musculoskeletal: Negative for arthralgias, back pain, joint swelling and neck pain.   Skin: Negative for color change and rash.   Neurological: Negative for dizziness, tremors, syncope, weakness, numbness and headaches.   Psychiatric/Behavioral: Negative for agitation and confusion. The patient is not hyperactive.        Objective:      Physical Exam   Constitutional: He is oriented to person, place, and time. He appears well-developed and well-nourished.   HENT:   Head: Normocephalic.   Eyes: EOM are normal. Pupils are equal, round, and reactive to light.   Neck: Normal range of motion. Neck supple. No thyromegaly present.   Cardiovascular: Normal rate.  Exam reveals no gallop and no friction rub.    No murmur heard.  Pulmonary/Chest: Effort normal. No respiratory distress. He has no wheezes.   Abdominal: Soft. Bowel sounds are normal. He exhibits no mass. There is no tenderness.   Musculoskeletal: He exhibits no edema or tenderness.   Lymphadenopathy:     He has no cervical adenopathy.   Neurological: He is  alert and oriented to person, place, and time. He has normal reflexes. No cranial nerve deficit.   Skin: Skin is warm. No rash noted.   Psychiatric: He has a normal mood and affect. His behavior is normal.       Assessment:       No diagnosis found.    Plan:       Sreekanth was seen today for annual exam.    Diagnoses and all orders for this visit:    Essential hypertension  - FOllow  Malignant neoplasm of prostate  -  Discussed  Obstructive sleep apnea  - Uses CPAP  Renal mass  - Discussed

## 2018-02-07 ENCOUNTER — OFFICE VISIT (OUTPATIENT)
Dept: UROLOGY | Facility: CLINIC | Age: 73
End: 2018-02-07
Payer: MEDICARE

## 2018-02-07 VITALS
SYSTOLIC BLOOD PRESSURE: 115 MMHG | DIASTOLIC BLOOD PRESSURE: 79 MMHG | BODY MASS INDEX: 32.47 KG/M2 | HEART RATE: 85 BPM | WEIGHT: 253 LBS | HEIGHT: 74 IN

## 2018-02-07 DIAGNOSIS — N28.1 RENAL CYSTS, ACQUIRED, BILATERAL: Primary | ICD-10-CM

## 2018-02-07 PROCEDURE — 99213 OFFICE O/P EST LOW 20 MIN: CPT | Mod: S$PBB,,, | Performed by: UROLOGY

## 2018-02-07 PROCEDURE — 1126F AMNT PAIN NOTED NONE PRSNT: CPT | Mod: ,,, | Performed by: UROLOGY

## 2018-02-07 PROCEDURE — 99999 PR PBB SHADOW E&M-EST. PATIENT-LVL III: CPT | Mod: PBBFAC,,, | Performed by: UROLOGY

## 2018-02-07 PROCEDURE — 99213 OFFICE O/P EST LOW 20 MIN: CPT | Mod: PBBFAC | Performed by: UROLOGY

## 2018-02-07 PROCEDURE — 1159F MED LIST DOCD IN RCRD: CPT | Mod: ,,, | Performed by: UROLOGY

## 2018-02-07 NOTE — PROGRESS NOTES
CHIEF COMPLAINT:    Mr. Pitts is a 72 y.o. male presenting for a follow up after TURP (4/18/2018) and for follow up of right renal mass.      PRESENTING ILLNESS:    Sreekanth Pitts Jr. is a 72 y.o. male who returns for follow up.  He states that he is doing well.  He still has some urgency, but if he delays the first sensation then he is able to have a good stream the next time he has a sensation to urinate.  He states that his symptoms are improved from before his TURP.  We had discussed having him discontinue the tamsulosin at his last visit but he did not wish to risk it at the time.  He is more open to it now.      REVIEW OF SYSTEMS:    Review of Systems   Constitutional: Negative.    HENT: Negative.    Eyes: Negative.    Respiratory: Negative.    Cardiovascular: Negative.    Gastrointestinal: Negative.    Genitourinary: Positive for urgency.   Musculoskeletal: Negative.    Skin: Negative.    Neurological: Negative.    Endo/Heme/Allergies: Negative.    Psychiatric/Behavioral: Negative.      PATIENT HISTORY:    Past Medical History:   Diagnosis Date    Allergy 12/3/2015    Arthritis     Degenerative disc disease     Hypertension     Malignant neoplasm of prostate 1/4/2018    Nuclear sclerosis - Both Eyes 7/30/2012       Past Surgical History:   Procedure Laterality Date    BACK SURGERY  2002    EYE FOREIGN BODY REMOVAL      childhood    EYE SURGERY         Family History   Problem Relation Age of Onset    Cataracts Mother     Hypertension Mother     Cancer Father     Heart disease Father     Heart disease Brother     Stroke Paternal Grandfather     Heart disease Paternal Grandfather      Social History    Marital status:      Occupational History     [employer]     Social History Main Topics    Smoking status: Former Smoker     Packs/day: 2.00     Years: 4.00     Types: Cigarettes, Cigars     Quit date: 1/1/1973    Smokeless tobacco: Not on file    Alcohol use 0.6  oz/week     1 Cans of beer per week      Comment: social    Drug use: No    Sexual activity: No       Allergies:  Patient has no known allergies.    Medications:  Outpatient Encounter Prescriptions as of 2/7/2018   Medication Sig Dispense Refill    diphenhydramine-acetaminophen (TYLENOL PM)  mg Tab Take 1 tablet by mouth nightly as needed.      dutasteride (AVODART) 0.5 mg capsule TAKE ONE DAILY 30 capsule 11    ibuprofen (ADVIL,MOTRIN) 200 MG tablet Take 200 mg by mouth every 6 (six) hours as needed for Pain.      lisinopril (PRINIVIL,ZESTRIL) 20 MG tablet TAKE ONE DAILY 90 tablet 3    MULTIVITAMIN W-MINERALS/LUTEIN (CENTRUM SILVER ORAL) Take 1 tablet by mouth once daily.       TAMSULOSIN HCL (FLOMAX ORAL) Take by mouth.       No facility-administered encounter medications on file as of 2/7/2018.          PHYSICAL EXAMINATION:    The patient generally appears in good health, is appropriately interactive, and is in no apparent distress.    Skin: No lesions.    Mental: Cooperative with normal affect.    Neuro: Grossly intact.    HEENT: Normal. No evidence of lymphadenopathy.    Chest:  normal inspiratory effort.    Abdomen: Soft, non-tender. No masses or organomegaly. Bladder is not palpable. No evidence of flank discomfort. No evidence of inguinal hernia.    Extremities: No clubbing, cyanosis, or edema      LABS:    Lab Results   Component Value Date    BUN 17 12/28/2017    CREATININE 0.9 12/28/2017     Lab Results   Component Value Date    PSA 1.9 12/28/2017    PSA 4.4 (H) 12/09/2016    PSA 3.2 12/03/2015    PSADIAG 2.7 12/19/2013         IMPRESSION:    Encounter Diagnoses   Name Primary?    Renal cysts, acquired, bilateral Yes       PLAN:    1.  Renal ultrasound to follow up on the cysts  2.  He just had his PSA which has come down nicely  3.  He will hold the tamsulosin, if it makes no difference, then will dc altogether  4.  Continue dutesteride for the prostate cancer risk reduction  5.  If  ultrasound is normal follow up in 1 year.

## 2018-02-07 NOTE — LETTER
February 8, 2018      Rubio Rod MD  [address]  [facility] [address]           [facility]  [address]  [facility] [address]  Phone: [phone]          Patient: Sreekanth Pitts Jr.   MR Number: [id number]   YOB: 1945   Date of Visit: 2/7/2018       Dear Dr. Rubio Rod:    Thank you for referring Sreekanth Pitts to me for evaluation. Attached you will find relevant portions of my assessment and plan of care.    If you have questions, please do not hesitate to call me. I look forward to following Sreekanth Pitts along with you.    Sincerely,    Uma Gaona MD    Enclosure  CC:  No Recipients    If you would like to receive this communication electronically, please contact [email or web address] or [phone] to request more information on [employer] access.    For providers and/or their staff who would like to refer a patient to [facility], please contact us through our one-stop-shop provider referral line, [facility], at [phone].    If you feel you have received this communication in error or would no longer like to receive these types of communications, please e-mail [email or web address]

## 2018-02-08 ENCOUNTER — HOSPITAL ENCOUNTER (OUTPATIENT)
Dept: RADIOLOGY | Facility: HOSPITAL | Age: 73
Discharge: HOME | End: 2018-02-08
Attending: UROLOGY
Payer: MEDICARE

## 2018-02-08 DIAGNOSIS — N28.1 RENAL CYSTS, ACQUIRED, BILATERAL: ICD-10-CM

## 2018-02-08 PROBLEM — R97.20 ELEVATED PSA, LESS THAN 10 NG/ML: Status: RESOLVED | Noted: 2017-01-23 | Resolved: 2018-02-08

## 2018-02-08 PROBLEM — R97.20 ELEVATED PSA: Status: RESOLVED | Noted: 2017-04-18 | Resolved: 2018-02-08

## 2018-02-08 PROCEDURE — 76770 US EXAM ABDO BACK WALL COMP: CPT | Mod: TC

## 2018-02-08 PROCEDURE — 76770 US EXAM ABDO BACK WALL COMP: CPT | Mod: 26,GC,, | Performed by: RADIOLOGY

## 2018-02-12 ENCOUNTER — TELEPHONE (OUTPATIENT)
Dept: UROLOGY | Facility: CLINIC | Age: 73
End: 2018-02-12

## 2018-02-12 ENCOUNTER — LAB VISIT (OUTPATIENT)
Dept: LAB | Facility: HOSPITAL | Age: 73
End: 2018-02-12
Attending: UROLOGY
Payer: MEDICARE

## 2018-02-12 DIAGNOSIS — N28.89 RENAL MASS: Primary | ICD-10-CM

## 2018-02-12 DIAGNOSIS — N28.89 RENAL MASS: ICD-10-CM

## 2018-02-12 LAB
ANION GAP SERPL CALC-SCNC: 7 MMOL/L
BUN SERPL-MCNC: 13 MG/DL
CALCIUM SERPL-MCNC: 9.1 MG/DL
CHLORIDE SERPL-SCNC: 106 MMOL/L
CO2 SERPL-SCNC: 28 MMOL/L
CREAT SERPL-MCNC: 0.9 MG/DL
EST. GFR  (AFRICAN AMERICAN): >60 ML/MIN/1.73 M^2
EST. GFR  (NON AFRICAN AMERICAN): >60 ML/MIN/1.73 M^2
GLUCOSE SERPL-MCNC: 77 MG/DL
POTASSIUM SERPL-SCNC: 4.3 MMOL/L
SODIUM SERPL-SCNC: 141 MMOL/L

## 2018-02-12 PROCEDURE — 36415 COLL VENOUS BLD VENIPUNCTURE: CPT

## 2018-02-12 PROCEDURE — 80048 BASIC METABOLIC PNL TOTAL CA: CPT

## 2018-02-12 NOTE — TELEPHONE ENCOUNTER
Renal ultrasound showed an area on the right kidney with a new exophytic component.  BMP and CT Abd with and without contrast was ordered.     Patient notified.

## 2018-02-20 ENCOUNTER — HOSPITAL ENCOUNTER (OUTPATIENT)
Dept: RADIOLOGY | Facility: HOSPITAL | Age: 73
Discharge: HOME | End: 2018-02-20
Attending: UROLOGY
Payer: MEDICARE

## 2018-02-20 ENCOUNTER — TELEPHONE (OUTPATIENT)
Dept: UROLOGY | Facility: CLINIC | Age: 73
End: 2018-02-20

## 2018-02-20 DIAGNOSIS — N28.89 RENAL MASS: ICD-10-CM

## 2018-02-20 PROCEDURE — 74160 CT ABDOMEN W/CONTRAST: CPT | Mod: TC

## 2018-02-20 PROCEDURE — 74160 CT ABDOMEN W/CONTRAST: CPT | Mod: 26,GC,, | Performed by: RADIOLOGY

## 2018-02-20 PROCEDURE — 25500020 PHARM REV CODE 255: Performed by: UROLOGY

## 2018-02-20 RX ADMIN — IOHEXOL 100 ML: 350 INJECTION, SOLUTION INTRAVENOUS at 04:02

## 2018-02-20 NOTE — TELEPHONE ENCOUNTER
"----- Message from Deborah De Jesus LPN sent at 2/19/2018  8:21 AM CST -----  Contact: [phone]      ----- Message -----  From: Isa Pearson MA  Sent: 2/19/2018   8:15 AM  To: [facility] Staff    [phone]  Pt saw something in his "[facility]" report that he would like to discuss with Dr Gaona. Pt did not wish to go in to detail other than its a visit report from Dr Rod that he feels  Dr Gaona should know about,  "

## 2018-02-20 NOTE — TELEPHONE ENCOUNTER
"Called the patient.  He had a question because on the problem list, there was prostate cancer.  Reviewed the pathology with the patient.  Discussed that he does not have prostate cancer.  All of the biopsies and the prostate chips were negative for carcinoma.      Also discussed the CT scan, he was concerned because he has had an MRI in the past and felt claustrophobic.  Discussed the process of a CT scan, but did warn that when the contrast is administered, can have a hot "rush" type feeling and some people feel like they have wet themselves but it is just a sensation.  He expressed understanding.   "

## 2018-03-09 DIAGNOSIS — R33.9 URINARY RETENTION: ICD-10-CM

## 2018-03-09 RX ORDER — DUTASTERIDE 0.5 MG/1
CAPSULE, LIQUID FILLED ORAL
Qty: 30 CAPSULE | Refills: 11 | Status: SHIPPED | OUTPATIENT
Start: 2018-03-09 | End: 2019-03-18 | Stop reason: SDUPTHER

## 2018-03-12 ENCOUNTER — CLINICAL SUPPORT (OUTPATIENT)
Dept: AUDIOLOGY | Facility: CLINIC | Age: 73
End: 2018-03-12
Payer: MEDICARE

## 2018-03-12 ENCOUNTER — OFFICE VISIT (OUTPATIENT)
Dept: OTOLARYNGOLOGY | Facility: CLINIC | Age: 73
End: 2018-03-12
Payer: MEDICARE

## 2018-03-12 DIAGNOSIS — H90.3 SENSORINEURAL HEARING LOSS, BILATERAL: Primary | ICD-10-CM

## 2018-03-12 DIAGNOSIS — H90.3 SENSORINEURAL HEARING LOSS (SNHL) OF BOTH EARS: Primary | ICD-10-CM

## 2018-03-12 DIAGNOSIS — H61.23 BILATERAL IMPACTED CERUMEN: ICD-10-CM

## 2018-03-12 PROCEDURE — 69210 REMOVE IMPACTED EAR WAX UNI: CPT | Mod: 50,PBBFAC | Performed by: OTOLARYNGOLOGY

## 2018-03-12 PROCEDURE — 92557 COMPREHENSIVE HEARING TEST: CPT | Mod: PBBFAC | Performed by: AUDIOLOGIST

## 2018-03-12 PROCEDURE — 69210 REMOVE IMPACTED EAR WAX UNI: CPT | Mod: S$PBB,GC,, | Performed by: OTOLARYNGOLOGY

## 2018-03-12 PROCEDURE — 99203 OFFICE O/P NEW LOW 30 MIN: CPT | Mod: 25,S$PBB,, | Performed by: OTOLARYNGOLOGY

## 2018-03-12 PROCEDURE — 92567 TYMPANOMETRY: CPT | Mod: PBBFAC | Performed by: AUDIOLOGIST

## 2018-03-12 NOTE — PROGRESS NOTES
Otology/Neurotology Consultation Report       Chief Complaint: hearing loss     History of Present Illness: 72 y.o. year old male presents with several month history of progressive hearing loss. Patient relates having increased volume of TV and mild difficulty understanding in crowded noisy environments. Denies otalgia, otorrhea, dizziness, vertigo, hx of trauma. No family history of hearing loss. No exposure to hazardous noise levels or ototoxic medications. No previous ear surgery.      Review of Systems   Constitutional: Negative.    HENT: Positive for ear discharge and hearing loss. Negative for congestion, ear pain, nosebleeds, sinus pain, sore throat and tinnitus.    Eyes: Negative.    Respiratory: Negative.  Negative for stridor.    Cardiovascular: Negative.    Gastrointestinal: Negative.    Genitourinary: Negative.    Musculoskeletal: Negative.    Skin: Negative.    Neurological: Negative.    Endo/Heme/Allergies: Negative.    Psychiatric/Behavioral: Negative.           Past Medical History: Patient has a past medical history of Allergy (12/3/2015); Arthritis; Degenerative disc disease; Hypertension; Malignant neoplasm of prostate (1/4/2018); and Nuclear sclerosis - Both Eyes (7/30/2012).    Past Surgical History: Patient has a past surgical history that includes Eye foreign body removal; Back surgery (2002); and Eye surgery.    Social History: Patient reports that he quit smoking about 45 years ago. His smoking use included Cigarettes and Cigars. He has a 8.00 pack-year smoking history. He does not have any smokeless tobacco history on file. He reports that he drinks about 0.6 oz of alcohol per week . He reports that he does not use drugs.    Family History: family history includes Cancer in his father; Cataracts in his mother; Heart disease in his brother, father, and paternal grandfather; Hypertension in his mother; Stroke in his paternal grandfather.    Medications:   Current Outpatient Prescriptions on  File Prior to Visit   Medication Sig Dispense Refill    diphenhydramine-acetaminophen (TYLENOL PM)  mg Tab Take 1 tablet by mouth nightly as needed.      dutasteride (AVODART) 0.5 mg capsule TAKE ONE DAILY 30 capsule 11    ibuprofen (ADVIL,MOTRIN) 200 MG tablet Take 200 mg by mouth every 6 (six) hours as needed for Pain.      lisinopril (PRINIVIL,ZESTRIL) 20 MG tablet TAKE ONE DAILY 90 tablet 3    MULTIVITAMIN W-MINERALS/LUTEIN (CENTRUM SILVER ORAL) Take 1 tablet by mouth once daily.       TAMSULOSIN HCL (FLOMAX ORAL) Take by mouth.       No current facility-administered medications on file prior to visit.          Allergies: Patient has No Known Allergies.    Physical Exam    Constitutional: Well appearing / communicating.  NAD.  Eyes: EOM I Bilaterally  Head/Face: Normocephalic.  Negative paranasal sinus pressure/tenderness.  Salivary glands WNL.  House Brackmann I Bilaterally.    Right Ear: Cerumen impaction, removed External Auditory Canal WNL,TM w/o masses/lesions/perforations.  Auricle WNL.  Left Ear: Cerumen impaction, removed.  External Auditory Canal WNL,TM w/o masses/lesions/perforations. Auricle WNL.  Nose: No gross nasal septal deviation. Inferior Turbinates 2+ bilaterally. No septal perforation. No masses/lesions. External nasal skin without masses/lesions.  Oral Cavity: Gingiva/lips WNL.  FOM Soft, no masses palpated. Oral Tongue mobile. Hard Palate WNL.   Oropharynx: BOT WNL. No masses/lesions noted. Tonsillar fossa/pharyngeal wall without lesions. Posterior oropharynx WNL.  Soft palate without masses. Midline uvula.   Neck/Lymphatic: No LAD I-VI bilaterally.  No thyromegaly.  No masses noted on exam.  Neuro/Psychiatric: AOx3.  Normal mood and affect.   Cardiovascular: Normal carotid pulses bilaterally, no increasing jugular venous distention noted at cervical region bilaterally.    Respiratory: Normal respiratory effort, no stridor, no retractions noted.                Cerumen removal:  Ears cleared under microscopic vision with curette, forceps and suction as necessary.        Diagnoses and all orders for this visit:    Sensorineural hearing loss (SNHL) of both ears    Bilateral impacted cerumen      Hearing protection   Amplification when ready

## 2018-03-12 NOTE — PROGRESS NOTES
Sreekanth Pitts was seen in the clinic today for an audiological evaluation.   Mr. Pitts reported bilateral hearing loss.    Audiological testing revealed a mild SNHL from 1891-9975 Hz, AU.  A speech reception threshold was obtained at 20 dBHL for each ear.  Speech discrimination testing was 88% at 60 dBHL for the right ear and 100% at 60 dBHL for the left ear.      Tympanometry testing revealed Type A tympanograms, AU.      Recommendations:  1. Otologic evaluation  2. Annual hearing evaluation  3. Hearing protection when in noise   4. Hearing aid consultation following medical clearance

## 2018-03-13 ENCOUNTER — OFFICE VISIT (OUTPATIENT)
Dept: DERMATOLOGY | Facility: CLINIC | Age: 73
End: 2018-03-13
Payer: MEDICARE

## 2018-03-13 VITALS — WEIGHT: 253 LBS | BODY MASS INDEX: 32.93 KG/M2

## 2018-03-13 DIAGNOSIS — L82.1 SEBORRHEIC KERATOSES: Primary | ICD-10-CM

## 2018-03-13 DIAGNOSIS — D18.00 ANGIOMA: ICD-10-CM

## 2018-03-13 DIAGNOSIS — L81.4 LENTIGINES: ICD-10-CM

## 2018-03-13 DIAGNOSIS — Z87.2 HISTORY OF ACTINIC KERATOSES: ICD-10-CM

## 2018-03-13 PROCEDURE — 99212 OFFICE O/P EST SF 10 MIN: CPT | Mod: PBBFAC,PO | Performed by: DERMATOLOGY

## 2018-03-13 PROCEDURE — 99213 OFFICE O/P EST LOW 20 MIN: CPT | Mod: S$PBB,,, | Performed by: DERMATOLOGY

## 2018-03-13 PROCEDURE — 99999 PR PBB SHADOW E&M-EST. PATIENT-LVL II: CPT | Mod: PBBFAC,,, | Performed by: DERMATOLOGY

## 2018-03-13 NOTE — PROGRESS NOTES
Subjective:       Patient ID:  Sreekanth Pitts Jr. is a 72 y.o. male who presents for   Chief Complaint   Patient presents with    Skin Check     UBSE    Spot     face     History of Present Illness: The patient presents with chief complaint of spot.  Location: nose  Duration: months  Signs/Symptoms: none    Prior treatments: improved with efudex           Review of Systems   Constitutional: Negative for fever.   Skin: Negative for itching and rash.   Hematologic/Lymphatic: Does not bruise/bleed easily.        Objective:    Physical Exam   Constitutional: He appears well-developed and well-nourished. No distress.   Neurological: He is alert and oriented to person, place, and time. He is not disoriented.   Psychiatric: He has a normal mood and affect.   Skin:   Areas Examined (abnormalities noted in diagram):   Scalp / Hair Palpated and Inspected  Head / Face Inspection Performed  Neck Inspection Performed  Chest / Axilla Inspection Performed  Abdomen Inspection Performed  Back Inspection Performed  RUE Inspected  LUE Inspection Performed                   Diagram Legend     Erythematous scaling macule/papule c/w actinic keratosis       Vascular papule c/w angioma      Pigmented verrucoid papule/plaque c/w seborrheic keratosis      Yellow umbilicated papule c/w sebaceous hyperplasia      Irregularly shaped tan macule c/w lentigo     1-2 mm smooth white papules consistent with Milia      Movable subcutaneous cyst with punctum c/w epidermal inclusion cyst      Subcutaneous movable cyst c/w pilar cyst      Firm pink to brown papule c/w dermatofibroma      Pedunculated fleshy papule(s) c/w skin tag(s)      Evenly pigmented macule c/w junctional nevus     Mildly variegated pigmented, slightly irregular-bordered macule c/w mildly atypical nevus      Flesh colored to evenly pigmented papule c/w intradermal nevus       Pink pearly papule/plaque c/w basal cell carcinoma      Erythematous hyperkeratotic cursted plaque c/w  "SCC      Surgical scar with no sign of skin cancer recurrence      Open and closed comedones      Inflammatory papules and pustules      Verrucoid papule consistent consistent with wart     Erythematous eczematous patches and plaques     Dystrophic onycholytic nail with subungual debris c/w onychomycosis     Umbilicated papule    Erythematous-base heme-crusted tan verrucoid plaque consistent with inflamed seborrheic keratosis     Erythematous Silvery Scaling Plaque c/w Psoriasis     See annotation      Assessment / Plan:        Seborrheic keratoses  reassurance        Angiomas  reassurance      Lentigines  The "ABCD" rules to observe pigmented lesions were reviewed.      History of actinic keratoses    Xerosis arms, given gold bond eczema cream         Follow-up in about 6 months (around 9/13/2018).  "

## 2018-07-10 ENCOUNTER — OFFICE VISIT (OUTPATIENT)
Dept: INTERNAL MEDICINE | Facility: CLINIC | Age: 73
End: 2018-07-10
Payer: MEDICARE

## 2018-07-10 ENCOUNTER — LAB VISIT (OUTPATIENT)
Dept: LAB | Facility: HOSPITAL | Age: 73
End: 2018-07-10
Attending: FAMILY MEDICINE
Payer: MEDICARE

## 2018-07-10 VITALS
WEIGHT: 265.63 LBS | BODY MASS INDEX: 34.09 KG/M2 | SYSTOLIC BLOOD PRESSURE: 104 MMHG | HEART RATE: 86 BPM | HEIGHT: 74 IN | OXYGEN SATURATION: 65 % | DIASTOLIC BLOOD PRESSURE: 72 MMHG

## 2018-07-10 DIAGNOSIS — N28.89 RENAL MASS: ICD-10-CM

## 2018-07-10 DIAGNOSIS — R53.82 CHRONIC FATIGUE: ICD-10-CM

## 2018-07-10 DIAGNOSIS — G47.33 OBSTRUCTIVE SLEEP APNEA: Primary | ICD-10-CM

## 2018-07-10 DIAGNOSIS — I10 ESSENTIAL HYPERTENSION: ICD-10-CM

## 2018-07-10 LAB
ALBUMIN SERPL BCP-MCNC: 4.2 G/DL
ALP SERPL-CCNC: 56 U/L
ALT SERPL W/O P-5'-P-CCNC: 28 U/L
ANION GAP SERPL CALC-SCNC: 6 MMOL/L
AST SERPL-CCNC: 25 U/L
BASOPHILS # BLD AUTO: 0.03 K/UL
BASOPHILS NFR BLD: 0.4 %
BILIRUB SERPL-MCNC: 1 MG/DL
BUN SERPL-MCNC: 16 MG/DL
CALCIUM SERPL-MCNC: 9.9 MG/DL
CHLORIDE SERPL-SCNC: 106 MMOL/L
CHOLEST SERPL-MCNC: 167 MG/DL
CHOLEST/HDLC SERPL: 5.4 {RATIO}
CO2 SERPL-SCNC: 27 MMOL/L
CREAT SERPL-MCNC: 0.9 MG/DL
DIFFERENTIAL METHOD: NORMAL
EOSINOPHIL # BLD AUTO: 0.2 K/UL
EOSINOPHIL NFR BLD: 2.5 %
ERYTHROCYTE [DISTWIDTH] IN BLOOD BY AUTOMATED COUNT: 13.4 %
EST. GFR  (AFRICAN AMERICAN): >60 ML/MIN/1.73 M^2
EST. GFR  (NON AFRICAN AMERICAN): >60 ML/MIN/1.73 M^2
ESTIMATED AVG GLUCOSE: 108 MG/DL
GLUCOSE SERPL-MCNC: 86 MG/DL
HBA1C MFR BLD HPLC: 5.4 %
HCT VFR BLD AUTO: 46.9 %
HDLC SERPL-MCNC: 31 MG/DL
HDLC SERPL: 18.6 %
HGB BLD-MCNC: 15.8 G/DL
LDLC SERPL CALC-MCNC: 84.2 MG/DL
LYMPHOCYTES # BLD AUTO: 1.9 K/UL
LYMPHOCYTES NFR BLD: 24.3 %
MCH RBC QN AUTO: 30 PG
MCHC RBC AUTO-ENTMCNC: 33.7 G/DL
MCV RBC AUTO: 89 FL
MONOCYTES # BLD AUTO: 0.9 K/UL
MONOCYTES NFR BLD: 11.8 %
NEUTROPHILS # BLD AUTO: 4.6 K/UL
NEUTROPHILS NFR BLD: 60.9 %
NONHDLC SERPL-MCNC: 136 MG/DL
PLATELET # BLD AUTO: 245 K/UL
PMV BLD AUTO: 9.6 FL
POTASSIUM SERPL-SCNC: 4 MMOL/L
PROT SERPL-MCNC: 7.1 G/DL
RBC # BLD AUTO: 5.26 M/UL
SODIUM SERPL-SCNC: 139 MMOL/L
TRIGL SERPL-MCNC: 259 MG/DL
TSH SERPL DL<=0.005 MIU/L-ACNC: 1.09 UIU/ML
WBC # BLD AUTO: 7.61 K/UL

## 2018-07-10 PROCEDURE — 84443 ASSAY THYROID STIM HORMONE: CPT

## 2018-07-10 PROCEDURE — 83036 HEMOGLOBIN GLYCOSYLATED A1C: CPT

## 2018-07-10 PROCEDURE — 36415 COLL VENOUS BLD VENIPUNCTURE: CPT

## 2018-07-10 PROCEDURE — 99214 OFFICE O/P EST MOD 30 MIN: CPT | Mod: PBBFAC | Performed by: FAMILY MEDICINE

## 2018-07-10 PROCEDURE — 99999 PR PBB SHADOW E&M-EST. PATIENT-LVL IV: CPT | Mod: PBBFAC,,, | Performed by: FAMILY MEDICINE

## 2018-07-10 PROCEDURE — 85025 COMPLETE CBC W/AUTO DIFF WBC: CPT

## 2018-07-10 PROCEDURE — 80053 COMPREHEN METABOLIC PANEL: CPT

## 2018-07-10 PROCEDURE — 99215 OFFICE O/P EST HI 40 MIN: CPT | Mod: S$PBB,,, | Performed by: FAMILY MEDICINE

## 2018-07-10 PROCEDURE — 80061 LIPID PANEL: CPT

## 2018-07-10 NOTE — PROGRESS NOTES
Subjective:       Patient ID: Sreekanth Pitts Jr. is a 73 y.o. male.    Chief Complaint: Follow-up (6mo.) and Fatigue (5-6mo.)  Sreekanth Pitts Jr. 73 y.o. male is here for office visit to review care and physical exam, feeling well but tired, uses CPAP, working?  No other concern      HPI  Review of Systems   Constitutional: Negative for activity change, appetite change, fatigue, fever and unexpected weight change.   HENT: Negative for congestion, hearing loss, postnasal drip and rhinorrhea.    Eyes: Negative for pain, discharge and visual disturbance.   Respiratory: Negative for cough, choking and shortness of breath.    Cardiovascular: Negative for chest pain, palpitations and leg swelling.   Gastrointestinal: Negative for abdominal pain, diarrhea and vomiting.   Genitourinary: Negative for dysuria, flank pain, hematuria and urgency.   Musculoskeletal: Negative for arthralgias, back pain, joint swelling and neck pain.   Skin: Negative for color change and rash.   Neurological: Negative for dizziness, tremors, syncope, weakness, numbness and headaches.   Psychiatric/Behavioral: Negative for agitation and confusion. The patient is not hyperactive.        Objective:      Physical Exam   Constitutional: He is oriented to person, place, and time. He appears well-developed and well-nourished.   HENT:   Head: Normocephalic.   Eyes: EOM are normal. Pupils are equal, round, and reactive to light.   Neck: Normal range of motion. Neck supple. No thyromegaly present.   Cardiovascular: Normal rate.  Exam reveals no gallop and no friction rub.    No murmur heard.  Pulmonary/Chest: Effort normal. No respiratory distress. He has no wheezes.   Abdominal: Soft. Bowel sounds are normal. He exhibits no mass. There is no tenderness.   Musculoskeletal: He exhibits no edema or tenderness.   Lymphadenopathy:     He has no cervical adenopathy.   Neurological: He is alert and oriented to person, place, and time. He has normal reflexes.  No cranial nerve deficit.   Skin: Skin is warm. No rash noted.   Psychiatric: He has a normal mood and affect. His behavior is normal.       Assessment:       1. Obstructive sleep apnea    2. Renal mass    3. Essential hypertension    4. Chronic fatigue        Plan:       Sreekanth was seen today for follow-up and fatigue.    Diagnoses and all orders for this visit:    Obstructive sleep apnea  -     Ambulatory consult to Sleep Disorders    Renal mass  -     Ambulatory Referral to Urology    Essential hypertension  -     Comprehensive metabolic panel; Future  -     CBC auto differential; Future    Chronic fatigue  -     Ambulatory Referral to Urology  -     Ambulatory consult to Sleep Disorders  -     Comprehensive metabolic panel; Future  -     Lipid panel; Future  -     CBC auto differential; Future  -     Hemoglobin A1c; Future  -     TSH; Future

## 2018-07-15 NOTE — PROGRESS NOTES
CHIEF COMPLAINT:    Mr. Pitts is a 73 y.o. male presenting for a follow up on right renal mass    PRESENTING ILLNESS:    Sreekanth Pitts Jr. is a 73 y.o. male who returns stating that he recently saw Dr. Rod for follow up when he was noted to be fatigued.  He has a right renal mass that did not take up the contrast in [place], but had planned to recheck the CT scan in 6 months.  He is status post TURP on 4/20/2017 and had noted no erectile function since then.  He previously had poor erectile function.      REVIEW OF SYSTEMS:    Review of Systems   Constitutional: Positive for malaise/fatigue.   HENT: Negative.    Eyes: Negative.    Respiratory: Negative.    Cardiovascular: Negative.    Gastrointestinal: Negative.    Genitourinary: Negative.    Musculoskeletal: Negative.    Skin: Negative.    Endo/Heme/Allergies: Negative.    Psychiatric/Behavioral: Negative.        PATIENT HISTORY:    Past Medical History:   Diagnosis Date    Allergy 12/3/2015    Arthritis     Degenerative disc disease     Hypertension     Malignant neoplasm of prostate 1/4/2018    Nuclear sclerosis - Both Eyes 7/30/2012       Past Surgical History:   Procedure Laterality Date    BACK SURGERY  2002    EYE FOREIGN BODY REMOVAL      childhood    EYE SURGERY         Family History   Problem Relation Age of Onset    Cataracts Mother     Hypertension Mother     Cancer Father     Heart disease Father     Heart disease Brother     Stroke Paternal Grandfather     Heart disease Paternal Grandfather      Social History    Marital status:      Occupational History     [employer]     Social History Main Topics    Smoking status: Former Smoker     Packs/day: 2.00     Years: 4.00     Types: Cigarettes, Cigars     Quit date: 1/1/1973    Smokeless tobacco: Not on file    Alcohol use 0.6 oz/week     1 Cans of beer per week      Comment: social    Drug use: No    Sexual activity: No       Allergies:  Patient has no  known allergies.    Medications:  Outpatient Encounter Prescriptions as of 7/16/2018   Medication Sig Dispense Refill    dutasteride (AVODART) 0.5 mg capsule TAKE ONE DAILY 30 capsule 11    ibuprofen (ADVIL,MOTRIN) 200 MG tablet Take 200 mg by mouth every 6 (six) hours as needed for Pain.      ibuprofen-diphenhydramine HCl (IBUPROFEN PM) 200-25 mg Cap Take 1 capsule by mouth every evening.      lisinopril (PRINIVIL,ZESTRIL) 20 MG tablet TAKE ONE DAILY 90 tablet 3    MULTIVITAMIN W-MINERALS/LUTEIN (CENTRUM SILVER ORAL) Take 1 tablet by mouth once daily.       [DISCONTINUED] diphenhydramine-acetaminophen (TYLENOL PM)  mg Tab Take 1 tablet by mouth nightly as needed.       No facility-administered encounter medications on file as of 7/16/2018.          PHYSICAL EXAMINATION:    The patient generally appears in good health, is appropriately interactive, and is in no apparent distress.    Skin: No lesions.    Mental: Cooperative with normal affect.    Neuro: Grossly intact.    HEENT: Normal. No evidence of lymphadenopathy.    Chest:  normal inspiratory effort.    Abdomen: Soft, non-tender. No masses or organomegaly. Bladder is not palpable. No evidence of flank discomfort. No evidence of inguinal hernia.    Extremities: No clubbing, cyanosis, or edema      LABS:    Lab Results   Component Value Date    BUN 16 07/10/2018    CREATININE 0.9 07/10/2018       Lab Results   Component Value Date    PSA 1.9 12/28/2017    PSA 4.4 (H) 12/09/2016    PSA 3.2 12/03/2015    PSADIAG 2.7 12/19/2013     IMPRESSION:    Encounter Diagnoses   Name Primary?    Right renal mass Yes       PLAN:    1.  For repeat CT scan   2.  Discussed intimacy and aging issues.  He plans to discuss with his wife so we discussed using olive oil as a vaginal lubricant.      Copy to: Dr. Rod

## 2018-07-16 ENCOUNTER — OFFICE VISIT (OUTPATIENT)
Dept: UROLOGY | Facility: CLINIC | Age: 73
End: 2018-07-16
Payer: MEDICARE

## 2018-07-16 VITALS
DIASTOLIC BLOOD PRESSURE: 78 MMHG | HEIGHT: 74 IN | SYSTOLIC BLOOD PRESSURE: 117 MMHG | BODY MASS INDEX: 33.55 KG/M2 | WEIGHT: 261.44 LBS | HEART RATE: 87 BPM

## 2018-07-16 DIAGNOSIS — N28.89 RIGHT RENAL MASS: Primary | ICD-10-CM

## 2018-07-16 PROCEDURE — 99999 PR PBB SHADOW E&M-EST. PATIENT-LVL III: CPT | Mod: PBBFAC,,, | Performed by: UROLOGY

## 2018-07-16 PROCEDURE — 99213 OFFICE O/P EST LOW 20 MIN: CPT | Mod: PBBFAC | Performed by: UROLOGY

## 2018-07-16 PROCEDURE — 99213 OFFICE O/P EST LOW 20 MIN: CPT | Mod: S$PBB,,, | Performed by: UROLOGY

## 2018-07-16 NOTE — LETTER
July 16, 2018      Rubio Rod MD  [address]  [facility] [address]           [facility] 4[facility]  [address]  [facility] [address]  Phone: [phone]          Patient: Sreekanth Pitts Jr.   MR Number: [id number]   YOB: 1945   Date of Visit: 7/16/2018       Dear Dr. Rubio Rod:    Thank you for referring Sreekanth Pitts to me for evaluation. Attached you will find relevant portions of my assessment and plan of care.    If you have questions, please do not hesitate to call me. I look forward to following Sreekanth Pitts along with you.    Sincerely,    Uma Gaona MD    Enclosure  CC:  No Recipients    If you would like to receive this communication electronically, please contact [email or web address] or [phone] to request more information on [employer] Link access.    For providers and/or their staff who would like to refer a patient to [facility], please contact us through our one-stop-shop provider referral line, [facility], at [phone].    If you feel you have received this communication in error or would no longer like to receive these types of communications, please e-mail [email or web address]

## 2018-07-16 NOTE — PATIENT INSTRUCTIONS
May use olive oil as a vaginal lubricant.  I recommend keeping a separate bottle by the bedside to separate it from your salad oil so there is no contamination.  Do not use oil if using condoms.  Must use a water soluble lubricant.

## 2018-07-18 ENCOUNTER — TELEPHONE (OUTPATIENT)
Dept: SLEEP MEDICINE | Facility: CLINIC | Age: 73
End: 2018-07-18

## 2018-07-19 ENCOUNTER — HOSPITAL ENCOUNTER (OUTPATIENT)
Dept: RADIOLOGY | Facility: HOSPITAL | Age: 73
Discharge: HOME | End: 2018-07-19
Attending: UROLOGY
Payer: MEDICARE

## 2018-07-19 ENCOUNTER — OFFICE VISIT (OUTPATIENT)
Dept: SLEEP MEDICINE | Facility: CLINIC | Age: 73
End: 2018-07-19
Payer: MEDICARE

## 2018-07-19 VITALS
WEIGHT: 261 LBS | DIASTOLIC BLOOD PRESSURE: 80 MMHG | BODY MASS INDEX: 34.59 KG/M2 | SYSTOLIC BLOOD PRESSURE: 110 MMHG | HEART RATE: 80 BPM | HEIGHT: 73 IN

## 2018-07-19 DIAGNOSIS — N28.89 RIGHT RENAL MASS: ICD-10-CM

## 2018-07-19 DIAGNOSIS — G47.33 OSA (OBSTRUCTIVE SLEEP APNEA): Primary | ICD-10-CM

## 2018-07-19 PROCEDURE — 99213 OFFICE O/P EST LOW 20 MIN: CPT | Mod: S$PBB,,, | Performed by: PSYCHIATRY & NEUROLOGY

## 2018-07-19 PROCEDURE — 74170 CT ABD WO CNTRST FLWD CNTRST: CPT | Mod: TC

## 2018-07-19 PROCEDURE — 25500020 PHARM REV CODE 255: Performed by: UROLOGY

## 2018-07-19 PROCEDURE — 99213 OFFICE O/P EST LOW 20 MIN: CPT | Mod: PBBFAC | Performed by: PSYCHIATRY & NEUROLOGY

## 2018-07-19 PROCEDURE — 74170 CT ABD WO CNTRST FLWD CNTRST: CPT | Mod: 26,,, | Performed by: RADIOLOGY

## 2018-07-19 PROCEDURE — 99999 PR PBB SHADOW E&M-EST. PATIENT-LVL III: CPT | Mod: PBBFAC,,, | Performed by: PSYCHIATRY & NEUROLOGY

## 2018-07-19 RX ADMIN — IOHEXOL 15 ML: 350 INJECTION, SOLUTION INTRAVENOUS at 05:07

## 2018-07-19 RX ADMIN — IOHEXOL 100 ML: 350 INJECTION, SOLUTION INTRAVENOUS at 05:07

## 2018-07-19 NOTE — LETTER
July 19, 2018      Rubio Rod MD  [address]  [facility] [address]           [facility]  [address]  [facility] [address]  Phone: [phone]          Patient: Sreekanth Pitts Jr.   MR Number: [id number]   YOB: 1945   Date of Visit: 7/19/2018       Dear Dr. Rubio Rod:    Thank you for referring Sreekanth Pitts to me for evaluation. Attached you will find relevant portions of my assessment and plan of care.    If you have questions, please do not hesitate to call me. I look forward to following Sreekanth Pitts along with you.    Sincerely,    Keenan Weaver MD    Enclosure  CC:  No Recipients    If you would like to receive this communication electronically, please contact [email or web address] or [phone] to request more information on [employer] Link access.    For providers and/or their staff who would like to refer a patient to [facility], please contact us through our one-stop-shop provider referral line, [facility], at [phone].    If you feel you have received this communication in error or would no longer like to receive these types of communications, please e-mail [email or web address]

## 2018-07-19 NOTE — PROGRESS NOTES
"This 73 y.o. male with STEPHIE returns for CPAP check and management.    Patient is concerned about his degree of daytime tiredness. Feels out of energy, for last year or so ago.    1. STEPHIE - as a result of nightly CPAP use, he feels that his sleep is more consolidated and restful.    CPAP Interrogation ResMED machine AirSense 10, set 10-16 cm;  Large Eson nasal mask, 30/30d > 4h. AHI 0.9, 90% tile 11.6 cm; avg use 5.7 h/n; leak 68;     DME = [employer]    PSG 9/21/15: AHI was 30.2 with an oxygen damián of 82.0%. Supine AHI was 102.5, and AHI on sides was 10.3 and 16.2. Initial improvement was noted at 8 cm, but higher pressures were needed during REM. Good control of sleep disordered breathing was seen during side position NREM and REM stages of sleep at a pressure of 10 cm of water. Upon turning supine, an effective pressure was not demonstrable due to onset of central apnea, frequent arousals, and sleep-wake transitions. An effective pressure was not demonstrated for supine position sleep     2. Not having issues with nasal congestion    Denies mood disorder    Past Medical History:   Diagnosis Date    Allergy 12/3/2015    Arthritis     Degenerative disc disease     Hypertension     Malignant neoplasm of prostate 1/4/2018    Nuclear sclerosis - Both Eyes 7/30/2012       ROS:   +sinus congestion (not using flonase regularly)    EXAM:  /80 (BP Location: Right arm, Patient Position: Sitting, BP Method: Medium (Manual))   Pulse 80   Ht 6' 1" (1.854 m)   Wt 118.4 kg (261 lb 0.4 oz)   BMI 34.44 kg/m²   W/D,obese well groomed      IMPRESSION:   1. STEPHIE, severe, symptomatically improved by CPAP. He continues to be adherent to CPAP.Using therapy nightly. His sleep apnea appears to be effectively controlled. Residual AHI <5. With CPAP being optimized, I can not see that this is contributing to his sense of daytime fatigue.    2. Chronic allergic rhinitis - stable    His medical comorbidities are obestiy, HTN (optimal " today)    PLAN:  1. Continue Auto PAP 10-16 cm. Continue nightly use. DME [employer] supplies. Consider mask liner and get hose snuggie  2. Encouraged continued weight loss efforts for potential improvement of STEPHIE and overall health benefits. See PCP re: HTN  3. Consider vitamin B12 or vitamin D supplements for energy.

## 2018-07-20 ENCOUNTER — TELEPHONE (OUTPATIENT)
Dept: UROLOGY | Facility: CLINIC | Age: 73
End: 2018-07-20

## 2018-07-20 DIAGNOSIS — N28.1 RENAL CYST, ACQUIRED, RIGHT: Primary | ICD-10-CM

## 2018-07-21 NOTE — TELEPHONE ENCOUNTER
Called Mr. Pitts to let him know that the cystic lesions remain stable in size.  Will plan to repeat the CT scan in 6 months.

## 2018-08-13 ENCOUNTER — PATIENT MESSAGE (OUTPATIENT)
Dept: UROLOGY | Facility: CLINIC | Age: 73
End: 2018-08-13

## 2018-08-13 ENCOUNTER — OFFICE VISIT (OUTPATIENT)
Dept: UROLOGY | Facility: CLINIC | Age: 73
End: 2018-08-13
Payer: MEDICARE

## 2018-08-13 VITALS — HEART RATE: 120 BPM | SYSTOLIC BLOOD PRESSURE: 139 MMHG | DIASTOLIC BLOOD PRESSURE: 87 MMHG

## 2018-08-13 DIAGNOSIS — R33.9 URINARY RETENTION: Primary | ICD-10-CM

## 2018-08-13 PROCEDURE — 99213 OFFICE O/P EST LOW 20 MIN: CPT | Mod: PBBFAC | Performed by: NURSE PRACTITIONER

## 2018-08-13 PROCEDURE — 99214 OFFICE O/P EST MOD 30 MIN: CPT | Mod: S$PBB,25,, | Performed by: NURSE PRACTITIONER

## 2018-08-13 PROCEDURE — 51702 INSERT TEMP BLADDER CATH: CPT | Mod: S$PBB,,, | Performed by: NURSE PRACTITIONER

## 2018-08-13 PROCEDURE — 51702 INSERT TEMP BLADDER CATH: CPT | Mod: PBBFAC | Performed by: NURSE PRACTITIONER

## 2018-08-13 PROCEDURE — 87086 URINE CULTURE/COLONY COUNT: CPT

## 2018-08-13 PROCEDURE — 99999 PR PBB SHADOW E&M-EST. PATIENT-LVL III: CPT | Mod: PBBFAC,,, | Performed by: NURSE PRACTITIONER

## 2018-08-13 PROCEDURE — 51798 US URINE CAPACITY MEASURE: CPT | Mod: PBBFAC | Performed by: NURSE PRACTITIONER

## 2018-08-13 RX ORDER — TAMSULOSIN HYDROCHLORIDE 0.4 MG/1
0.4 CAPSULE ORAL NIGHTLY
Qty: 30 CAPSULE | Refills: 2 | Status: SHIPPED | OUTPATIENT
Start: 2018-08-13 | End: 2018-10-10

## 2018-08-13 NOTE — PROGRESS NOTES
Subjective:       Patient ID: Sreekanth Pitts Jr. is a 73 y.o. male.    Chief Complaint: Urinary Retention      HPI: Sreekanth Pitts Jr. is a 73 y.o. White male who presents today for evaluation and management of urinary retention. He is an established patient with [facility] but a new patient to me. His last clinic visit was with Dr. Gaona on 7/16/18.    S/p TURP 4/18/17  Findings:   Trilobar hyperplasia. He did not have a very big median lobe. Kissing lateral lobes.  Veromontanum and UOs intact at the end of the case  Good hemostasis achieved  Open channel seen at the end of the case. We made sure to stay away from apical tissue to avoid urethral sphincter damage.   We did undermine the bladder neck at the 6 o clock position on a single swipe.  TRUS/Biopsy- 59 cc prostate. Standard 12 biopsies were taken as well as 2 lateral biopsies on the right and 1 lateral biopsy on the left.     3/6/17 SUDS/cysto     Has right renal mass which is followed by Dr. Gaona    Today he reports he last urinated at 5:15 am this morning. He went for a 2 mile walk after urinating and has not been able to void since. He feels bladder pain/pressure and a pressure to his rectum, similar to the same pressure he experienced when he had urinary retention prior to TURP 4/2017. He took stool softeners this morning in case it was d/t constipation. He has been having loose stool since taking stool softener. He is concerned his prostate has grown back. He stopped flomax 6 mths ago. He is on Avodart. Denies dysuria, hematuria or flank pain. Denies f/c/n/v.  Baseline urination includes frequency and urgency. Denies nocturia, urinary incontinence, difficulty urinating, hesitancy or intermittent stream.        Review of patient's allergies indicates:  No Known Allergies    Current Outpatient Medications   Medication Sig Dispense Refill    dutasteride (AVODART) 0.5 mg capsule TAKE ONE DAILY 30 capsule 11    ibuprofen (ADVIL,MOTRIN) 200 MG tablet  Take 200 mg by mouth every 6 (six) hours as needed for Pain.      ibuprofen-diphenhydramine HCl (IBUPROFEN PM) 200-25 mg Cap Take 1 capsule by mouth every evening.      lisinopril (PRINIVIL,ZESTRIL) 20 MG tablet TAKE ONE DAILY 90 tablet 3    MULTIVITAMIN W-MINERALS/LUTEIN (CENTRUM SILVER ORAL) Take 1 tablet by mouth once daily.       tamsulosin (FLOMAX) 0.4 mg Cap Take 1 capsule (0.4 mg total) by mouth every evening. 30 capsule 2     No current facility-administered medications for this visit.        Past Medical History:   Diagnosis Date    Allergy 12/3/2015    Arthritis     Degenerative disc disease     Hypertension     Malignant neoplasm of prostate 1/4/2018    Nuclear sclerosis - Both Eyes 7/30/2012       Past Surgical History:   Procedure Laterality Date    BACK SURGERY  2002    EYE FOREIGN BODY REMOVAL      childhood    EYE SURGERY         Family History   Problem Relation Age of Onset    Cataracts Mother     Hypertension Mother     Cancer Father     Heart disease Father     Heart disease Brother     Stroke Paternal Grandfather     Heart disease Paternal Grandfather        Review of Systems   Constitutional: Negative for chills, diaphoresis and fever.   HENT: Negative for congestion and trouble swallowing.    Eyes: Negative for visual disturbance.   Respiratory: Negative for chest tightness and shortness of breath.    Cardiovascular: Negative for chest pain and palpitations.   Gastrointestinal: Negative for nausea and vomiting.        Rectal pressure   Genitourinary: Positive for difficulty urinating. Negative for discharge, dysuria, flank pain, hematuria, penile pain, penile swelling, scrotal swelling and testicular pain.        See HPI  + bladder pain/pressure   Musculoskeletal: Negative for gait problem.   Skin: Negative for rash.   Allergic/Immunologic: Negative for immunocompromised state.   Neurological: Negative for seizures, syncope and headaches.   Hematological: Negative for  adenopathy.   Psychiatric/Behavioral: Negative for confusion. The patient is nervous/anxious.          All other systems were reviewed and were negative.    Objective:     Vitals:    08/13/18 1320   BP: 139/87   Pulse: (!) 120        Physical Exam   Nursing note and vitals reviewed.  Constitutional: He is oriented to person, place, and time. He appears well-developed and well-nourished.  Non-toxic appearance. He does not have a sickly appearance. He does not appear ill. No distress.   HENT:   Head: Normocephalic.   Eyes: Conjunctivae are normal.   Neck: Normal range of motion.   Cardiovascular: Regular rhythm.    Pulmonary/Chest: Effort normal. No respiratory distress.   Abdominal: Soft. He exhibits no distension. There is tenderness (suprapubic ).   Genitourinary:   Genitourinary Comments: Prostate exam deferred  Pt feeling rectal pressure and having loose stool d/t taking stool softeners   Musculoskeletal: Normal range of motion.   Neurological: He is alert and oriented to person, place, and time.   Skin: Skin is warm and dry.     Psychiatric: His behavior is normal. Judgment and thought content normal. His mood appears anxious.         Lab Results   Component Value Date    CREATININE 0.9 07/10/2018     Lab Results   Component Value Date    EGFRNONAA >60 07/10/2018     Lab Results   Component Value Date    ESTGFRAFRICA >60 07/10/2018     PVR: done in clinic with bladder scanner by Nurse Tory was 467 ml.    Assessment:       1. Urinary retention        Plan:     Sreekanth was seen today for urinary retention.    Diagnoses and all orders for this visit:    Urinary retention  -     tamsulosin (FLOMAX) 0.4 mg Cap; Take 1 capsule (0.4 mg total) by mouth every evening.  -     Insert posadas catheter  -     Urine culture    -Discussed plan of care with patient  -Discussed CIC vs indwelling posadas catheter for urinary retention. He prefers to have indwelling catheter for 1 week and f/u for voiding trial.  Posadas catheter was  inserted by Nurse Spears using sterile procedure. Urethra meatus was prepped with betadine. 16 fr catheter placed. Balloon inflated with 10cc of sterile water. 500 ml of yellow urine drained from bladder after catheter insertion. Catheter was connected to leg bag and secured to leg. Pt tolerated well.  Pt reports relief of pain and pressure once bladder was drained after catheter insertion.  -Discussed catheter care instructions  -Restart flomax. Discussed side effects, indications, and MOA for flomax. Prescription sent to the pharmacy. Pt verbalized understanding.   -Catheterized urine specimen sent for UC  -RTC 1 week for VT        I spent 25 minutes with the patient of which more than half was spent in coordinating the patient's care as well as in direct consultation with the patient in regards to our treatment and plan.

## 2018-08-13 NOTE — TELEPHONE ENCOUNTER
----- Message from Yennifer Zamorano MA sent at 8/13/2018  9:49 AM CDT -----  Contact: self  [phone]  Needs Advice    Reason for call:    I am unable to urinate - fell constipated  Communication Preference:  Phone# above  Additional Information:  na

## 2018-08-14 LAB — BACTERIA UR CULT: NO GROWTH

## 2018-08-20 ENCOUNTER — OFFICE VISIT (OUTPATIENT)
Dept: UROLOGY | Facility: CLINIC | Age: 73
End: 2018-08-20
Payer: MEDICARE

## 2018-08-20 VITALS
HEIGHT: 73 IN | DIASTOLIC BLOOD PRESSURE: 80 MMHG | HEART RATE: 96 BPM | SYSTOLIC BLOOD PRESSURE: 151 MMHG | BODY MASS INDEX: 34.59 KG/M2 | WEIGHT: 261 LBS

## 2018-08-20 DIAGNOSIS — R33.9 URINARY RETENTION: Primary | ICD-10-CM

## 2018-08-20 DIAGNOSIS — Z46.6 ENCOUNTER FOR FOLEY CATHETER REMOVAL: ICD-10-CM

## 2018-08-20 DIAGNOSIS — Z90.79 S/P TURP: ICD-10-CM

## 2018-08-20 DIAGNOSIS — Z97.8 FOLEY CATHETER PRESENT: ICD-10-CM

## 2018-08-20 PROCEDURE — 99214 OFFICE O/P EST MOD 30 MIN: CPT | Mod: S$PBB,25,, | Performed by: NURSE PRACTITIONER

## 2018-08-20 PROCEDURE — 99213 OFFICE O/P EST LOW 20 MIN: CPT | Mod: PBBFAC,25 | Performed by: NURSE PRACTITIONER

## 2018-08-20 PROCEDURE — 99999 PR PBB SHADOW E&M-EST. PATIENT-LVL III: CPT | Mod: PBBFAC,,, | Performed by: NURSE PRACTITIONER

## 2018-08-20 PROCEDURE — 51700 IRRIGATION OF BLADDER: CPT | Mod: PBBFAC | Performed by: NURSE PRACTITIONER

## 2018-08-20 PROCEDURE — 51700 IRRIGATION OF BLADDER: CPT | Mod: S$PBB,,, | Performed by: NURSE PRACTITIONER

## 2018-08-20 RX ORDER — CIPROFLOXACIN 250 MG/1
500 TABLET, FILM COATED ORAL ONCE
Status: CANCELLED | OUTPATIENT
Start: 2018-08-20 | End: 2018-08-20

## 2018-08-20 RX ORDER — LIDOCAINE HYDROCHLORIDE 20 MG/ML
JELLY TOPICAL ONCE
Status: CANCELLED | OUTPATIENT
Start: 2018-08-20 | End: 2018-08-20

## 2018-08-20 NOTE — LETTER
August 20, 2018      Rubio Rod MD  [address]  [facility] [address]           [facility] - Urology 4th Floor  [address]  [facility] [address]  Phone: [phone]          Patient: Sreekanth Pitts Jr.   MR Number: [id number]   YOB: 1945   Date of Visit: 8/20/2018       Dear Dr. Rubio Rod:    Thank you for referring Sreekanth Pitts to me for evaluation. Attached you will find relevant portions of my assessment and plan of care.    If you have questions, please do not hesitate to call me. I look forward to following Sreekanth Pitts along with you.    Sincerely,    Maya Green NP    Enclosure  CC:  No Recipients    If you would like to receive this communication electronically, please contact [email or web address] or [phone] to request more information on [employer] Link access.    For providers and/or their staff who would like to refer a patient to [facility], please contact us through our one-stop-shop provider referral line, [facility], at [phone].    If you feel you have received this communication in error or would no longer like to receive these types of communications, please e-mail [email or web address]

## 2018-08-20 NOTE — PATIENT INSTRUCTIONS
Patient was instructed to drink plenty of fluids today.  Instructed patient to call at 5 p.m. to give an update on urine output.  Informed patient to return to clinic or emergency department (if after clinic hours) to have posadas catheter put back in if unable to urinate within 5 hours of posadas catheter removal or starts to experience bladder pressure/pain, decrease flow, straining/difficulty urinating.      -Continue flomax      Cystoscopy    Cystoscopy is a procedure that lets your doctor look directly inside your urethra and bladder. It can be used to:  · Help diagnose a problem with your urethra, bladder, or kidneys.  · Take a sample (biopsy) of bladder or urethral tissue.  · Treat certain problems (such as removing kidney stones).  · Place a stent to bypass an obstruction.  · Take special X-rays of the kidneys.  Based on the findings, your doctor may recommend other tests or treatments.  What is a cystoscope?  A cystoscope is a telescope-like instrument that contains lenses and fiberoptics (small glass wires that make bright light). The cystoscope may be straight and rigid, or flexible to bend around curves in the urethra. The doctor may look directly into the cystoscope, or project the image onto a monitor.  Getting ready  · Ask your doctor if you should stop taking any medicines before the procedure.  · Ask whether you should avoid eating or drinking anything after midnight before the procedure.  · Follow any other instructions your doctor gives you.  Tell your doctor before the exam if you:  · Take any medicines, such as aspirin or blood thinners  · Have allergies to any medicines  · Are pregnant   The procedure  Cystoscopy is done in the doctors office, surgery center, or hospital. The doctor and a nurse are present during the procedure. It takes only a few minutes, longer if a biopsy, X-ray, or treatment needs to be done.  During the procedure:  · You lie on an exam table on your back, knees bent and legs  apart. You are covered with a drape.  · Your urethra and the area around it are washed. Anesthetic jelly may be applied to numb the urethra. Other pain medicine is usually not needed. In some cases, you may be offered a mild sedative to help you relax. If a more extensive procedure is to be done, such as a biopsy or kidney stone removal, general anesthesia may be needed.  · The cystoscope is inserted. A sterile fluid is put into the bladder to expand it. You may feel pressure from this fluid.  · When the procedure is done, the cystoscope is removed.  After the procedure  If you had a sedative, general anesthesia, or spinal anesthesia, you must have someone drive you home. Once youre home:  · Drink plenty of fluids.  · You may have burning or light bleeding when you urinate--this is normal.  · Medicines may be prescribed to ease any discomfort or prevent infection. Take these as directed.  · Call your doctor if you have heavy bleeding or blood clots, burning that lasts more than a day, a fever over 100°F  (38° C), or trouble urinating.  Date Last Reviewed: 1/1/2017  © 2474-2851 [employer]. [address] [place] [address], [place] [address]. All rights reserved. This information is not intended as a substitute for professional medical care. Always follow your healthcare professional's instructions.

## 2018-08-20 NOTE — H&P (VIEW-ONLY)
Subjective:       Patient ID: Sreekanth Pitts Jr. is a 73 y.o. male.    Chief Complaint: Urinary Retention (Has had catheter for a week, voiding trial)      HPI: Sreekanth Pitts Jr. is a 73 y.o. White male who presents today for voiding trial and catheter removal. His last clinic visit was 8/13/18.    S/p TURP 4/18/17  Findings:   Trilobar hyperplasia. He did not have a very big median lobe. Kissing lateral lobes.  Veromontanum and UOs intact at the end of the case  Good hemostasis achieved  Open channel seen at the end of the case. We made sure to stay away from apical tissue to avoid urethral sphincter damage.   We did undermine the bladder neck at the 6 o clock position on a single swipe.  TRUS/Biopsy- 59 cc prostate. Standard 12 biopsies were taken as well as 2 lateral biopsies on the right and 1 lateral biopsy on the left.      3/6/17 SUDS/cysto     At last clinic visit, he was seen for urinary retention and posadas catheter was placed. He was discharged home with posadas.    Today he reports catheter has been draining without difficulty. Denies hematuria, urgency, bladder spasms or flank pain. Denies f/c/n/v. He has been taking flomax daily.    8/13/18 Urine culture: no growth      Review of patient's allergies indicates:  No Known Allergies    Current Outpatient Medications   Medication Sig Dispense Refill    dutasteride (AVODART) 0.5 mg capsule TAKE ONE DAILY 30 capsule 11    ibuprofen (ADVIL,MOTRIN) 200 MG tablet Take 200 mg by mouth every 6 (six) hours as needed for Pain.      ibuprofen-diphenhydramine HCl (IBUPROFEN PM) 200-25 mg Cap Take 1 capsule by mouth every evening.      lisinopril (PRINIVIL,ZESTRIL) 20 MG tablet TAKE ONE DAILY 90 tablet 3    MULTIVITAMIN W-MINERALS/LUTEIN (CENTRUM SILVER ORAL) Take 1 tablet by mouth once daily.       tamsulosin (FLOMAX) 0.4 mg Cap Take 1 capsule (0.4 mg total) by mouth every evening. 30 capsule 2     No current facility-administered medications for this visit.         Past Medical History:   Diagnosis Date    Allergy 12/3/2015    Arthritis     Degenerative disc disease     Hypertension     Malignant neoplasm of prostate 1/4/2018    Nuclear sclerosis - Both Eyes 7/30/2012       Past Surgical History:   Procedure Laterality Date    BACK SURGERY  2002    EYE FOREIGN BODY REMOVAL      childhood    EYE SURGERY         Family History   Problem Relation Age of Onset    Cataracts Mother     Hypertension Mother     Cancer Father     Heart disease Father     Heart disease Brother     Stroke Paternal Grandfather     Heart disease Paternal Grandfather        Review of Systems   Constitutional: Negative for chills, diaphoresis and fever.   HENT: Negative for congestion and trouble swallowing.    Eyes: Negative for visual disturbance.   Respiratory: Negative for chest tightness and shortness of breath.    Cardiovascular: Negative for chest pain and palpitations.   Gastrointestinal: Negative for constipation, diarrhea, nausea and vomiting.   Genitourinary: Positive for difficulty urinating. Negative for decreased urine volume, discharge, flank pain, hematuria, penile pain, penile swelling, scrotal swelling, testicular pain and urgency.   Musculoskeletal: Negative for gait problem.   Skin: Negative for rash.   Allergic/Immunologic: Negative for immunocompromised state.   Neurological: Negative for seizures, syncope and headaches.   Hematological: Negative for adenopathy.   Psychiatric/Behavioral: Negative for confusion.         All other systems were reviewed and were negative.    Objective:     Vitals:    08/20/18 1053   BP: (!) 151/80   Pulse: 96        Physical Exam   Nursing note and vitals reviewed.  Constitutional: He is oriented to person, place, and time. He appears well-developed and well-nourished.  Non-toxic appearance. He does not have a sickly appearance. He does not appear ill. No distress.   HENT:   Head: Normocephalic.   Eyes: Conjunctivae are normal.    Neck: Normal range of motion.   Cardiovascular: Normal rate and regular rhythm.    Pulmonary/Chest: Effort normal. No respiratory distress.   Abdominal: Soft. He exhibits no distension.   Genitourinary:   Genitourinary Comments: Indwelling posadas catheter in place   Musculoskeletal: Normal range of motion.   Neurological: He is alert and oriented to person, place, and time.   Skin: Skin is warm and dry.     Psychiatric: He has a normal mood and affect. His behavior is normal. Judgment and thought content normal.         Lab Results   Component Value Date    CREATININE 0.9 07/10/2018     Lab Results   Component Value Date    EGFRNONAA >60 07/10/2018     Lab Results   Component Value Date    ESTGFRAFRICA >60 07/10/2018         Assessment:       1. Urinary retention    2. Encounter for Posadas catheter removal    3. Posadas catheter present    4. S/P TURP        Plan:     Sreekanth was seen today for urinary retention.    Diagnoses and all orders for this visit:    Urinary retention  -     Voiding Trial  -     lidocaine HCl 2% urojet; Place into the urethra once.  -     ciprofloxacin HCl tablet 500 mg; Take 2 tablets (500 mg total) by mouth once.  -     Cystoscopy; Future    Encounter for Posadas catheter removal  -     Voiding Trial    Posadas catheter present  -     Voiding Trial    S/P TURP  -     Cystoscopy; Future    -Discussed plan with patient  -Voiding trial performed by Nurse Roth. 300 ml of sterile water was instilled into bladder. Balloon deflated and posadas catheter was removed. Patient urinated 400 ml without difficulty.  Voiding trial passed.  Patient was instructed to drink plenty of fluids today.  Instructed patient to call at 5 p.m. to give an update on urine output.  Informed patient to return to clinic or emergency department (if after clinic hours) to have posadas catheter put back in if unable to urinate within 5 hours of posadas catheter removal or starts to experience bladder pressure/pain, decrease flow,  straining/difficulty urinating.    Patient voiced understanding  -Continue flomax  -Cysto ordered and scheduled with Dr. Gaona to evaluate prostate and bladder.  -RTC 4 weeks for PVR check       I spent 25 minutes with the patient of which more than half was spent in coordinating the patient's care as well as in direct consultation with the patient in regards to our treatment and plan.

## 2018-08-27 ENCOUNTER — TELEPHONE (OUTPATIENT)
Dept: UROLOGY | Facility: CLINIC | Age: 73
End: 2018-08-27

## 2018-08-27 ENCOUNTER — HOSPITAL ENCOUNTER (OUTPATIENT)
Dept: UROLOGY | Facility: HOSPITAL | Age: 73
Discharge: HOME | End: 2018-08-27
Attending: UROLOGY
Payer: MEDICARE

## 2018-08-27 VITALS
BODY MASS INDEX: 33.02 KG/M2 | HEIGHT: 74 IN | SYSTOLIC BLOOD PRESSURE: 169 MMHG | RESPIRATION RATE: 18 BRPM | DIASTOLIC BLOOD PRESSURE: 126 MMHG | HEART RATE: 84 BPM | WEIGHT: 257.25 LBS | TEMPERATURE: 99 F

## 2018-08-27 DIAGNOSIS — R33.9 URINARY RETENTION: ICD-10-CM

## 2018-08-27 DIAGNOSIS — N40.1 BENIGN NODULAR PROSTATIC HYPERPLASIA WITH LOWER URINARY TRACT SYMPTOMS: Primary | ICD-10-CM

## 2018-08-27 DIAGNOSIS — Z90.79 S/P TURP: ICD-10-CM

## 2018-08-27 PROCEDURE — 52000 CYSTOURETHROSCOPY: CPT

## 2018-08-27 PROCEDURE — 52000 CYSTOURETHROSCOPY: CPT | Mod: ,,, | Performed by: UROLOGY

## 2018-08-27 RX ORDER — LIDOCAINE HYDROCHLORIDE 20 MG/ML
JELLY TOPICAL ONCE
Status: COMPLETED | OUTPATIENT
Start: 2018-08-27 | End: 2018-08-27

## 2018-08-27 RX ORDER — CIPROFLOXACIN 500 MG/1
500 TABLET ORAL ONCE
Status: COMPLETED | OUTPATIENT
Start: 2018-08-27 | End: 2018-08-27

## 2018-08-27 RX ADMIN — CIPROFLOXACIN 500 MG: 500 TABLET ORAL at 11:08

## 2018-08-27 RX ADMIN — LIDOCAINE HYDROCHLORIDE 10 ML: 20 JELLY TOPICAL at 10:08

## 2018-08-27 NOTE — PATIENT INSTRUCTIONS
What to Expect After a Cystoscopy  For the next 24-48 hours, you may feel a mild burning when you urinate. This burning is normal and expected. Drink plenty of water to dilute the urine to help relieve the burning sensation. You may also see a small amount of blood in your urine after the procedure.    Unless you are already taking antibiotics, you may be given an antibiotic after the test to prevent infection.    Signs and Symptoms to Report  Call the [facility] at [phone] if you develop any of the following:  · Fever of 101 degrees or higher  · Chills or persistent bleeding  · Inability to urinate

## 2018-08-27 NOTE — TELEPHONE ENCOUNTER
CHIEF COMPLAINT:    Mr. Pitts is a 73 y.o. male presenting for a TURP after going into urinary retention    PRESENTING ILLNESS:    Sreekanth Pitts Jr. is a 73 y.o. male who has a history of urinary retention, is status post TURP on 7/18/2017.  He was placed on Avodart in March.  Unfortunately, he went into urinary retention on 8/13/2018 after not being able to void following a walk.  He had 500 ml in the bladder at the time of the catheter insertion.  He followed up 1 week later and passed a voiding trial.  He was found to have meatal stenosis, however, the urinary retention had a sudden onset, similar to what happened to him in 2017.  After the TURP, the catheter had to be left in place, as there was some undermining of the bladder neck and a concern that a catheter would not be able to be inserted easily should it be removed prematurely.      REVIEW OF SYSTEMS:    Review of Systems   Gen:  Pleasant, alert and appropriate.  HEENT:  No issues  Lungs:  Normal  Heart:  No issues  GI: negative  Renal:  Negative  Neuro:  No issues  Endocrine:  Normal.       PATIENT HISTORY:    Past Medical History:   Diagnosis Date    Allergy 12/3/2015    Arthritis     BPH without obstruction/lower urinary tract symptoms 01/04/2018    Degenerative disc disease     Hypertension     Nuclear sclerosis - Both Eyes 7/30/2012       Past Surgical History:   Procedure Laterality Date    BACK SURGERY  2002    EYE FOREIGN BODY REMOVAL      childhood    EYE SURGERY         Family History   Problem Relation Age of Onset    Cataracts Mother     Hypertension Mother     Cancer Father     Heart disease Father     Heart disease Brother     Stroke Paternal Grandfather     Heart disease Paternal Grandfather      Socioeconomic History    Marital status:    Occupational History     Employer: [employer]   Tobacco Use    Smoking status: Former Smoker     Packs/day: 2.00     Years: 4.00     Pack years: 8.00     Types:  Cigarettes, Cigars     Last attempt to quit: 1973     Years since quittin.6    Smokeless tobacco: Never Used   Substance and Sexual Activity    Alcohol use: Yes     Alcohol/week: 0.6 oz     Types: 1 Cans of beer per week     Comment: rarely    Drug use: No    Sexual activity: No       Allergies:  Patient has no known allergies.    Medications:  Outpatient Encounter Medications as of 2018   Medication Sig Dispense Refill    dutasteride (AVODART) 0.5 mg capsule TAKE ONE DAILY 30 capsule 11    ibuprofen (ADVIL,MOTRIN) 200 MG tablet Take 200 mg by mouth every 6 (six) hours as needed for Pain.      ibuprofen-diphenhydramine HCl (IBUPROFEN PM) 200-25 mg Cap Take 1 capsule by mouth every evening.      lisinopril (PRINIVIL,ZESTRIL) 20 MG tablet TAKE ONE DAILY 90 tablet 3    MULTIVITAMIN W-MINERALS/LUTEIN (CENTRUM SILVER ORAL) Take 1 tablet by mouth once daily.       tamsulosin (FLOMAX) 0.4 mg Cap Take 1 capsule (0.4 mg total) by mouth every evening. 30 capsule 2     Facility-Administered Encounter Medications as of 2018   Medication Dose Route Frequency Provider Last Rate Last Dose    [COMPLETED] ciprofloxacin HCl tablet 500 mg  500 mg Oral Once Maya ALEX Green, NP   500 mg at 18 1103    [COMPLETED] lidocaine HCl 2% urojet   Urethral Once Maya Green, NP   10 mL at 18 1050         PHYSICAL EXAMINATION:    The patient generally appears in good health, is appropriately interactive, and is in no apparent distress.    Skin: No lesions.    Mental: Cooperative with normal affect.    Neuro: Grossly intact.    HEENT: Normal. No evidence of lymphadenopathy.    Chest:  normal inspiratory effort.    Abdomen: Soft, non-tender. No masses or organomegaly. Bladder is not palpable. No evidence of flank discomfort. No evidence of inguinal hernia.    Extremities: No clubbing, cyanosis, or edema      LABS:    Lab Results   Component Value Date    BUN 16 07/10/2018    CREATININE 0.9  07/10/2018     Lab Results   Component Value Date    PSA 1.9 12/28/2017    PSA 4.4 (H) 12/09/2016    PSA 3.2 12/03/2015    PSADIAG 2.7 12/19/2013       IMPRESSION:    Encounter Diagnoses   Name Primary?    Benign nodular prostatic hyperplasia with lower urinary tract symptoms Yes    Urinary retention        PLAN:    1.  For TURP  2.  Consent obtained.

## 2018-08-27 NOTE — INTERVAL H&P NOTE
The patient has been examined and the H&P has been reviewed:    I concur with the findings and no changes have occurred since H&P was written.    Proceed with planned cystoscopy.     There are no hospital problems to display for this patient.

## 2018-08-27 NOTE — PROCEDURES
CYSTOSCOPY REPORT    Pre Procedure Diagnosis:  Urinary retention    Post Procedure Diagnosis:  Prostatic regrowth, submeatal stenosis    Anesthesia: 10 cc 2% lidocaine jelly applied per urethra.    14 FR Flexible Olympus cystoscope used.    FINDINGS:  The cystoscope could not be inserted past the urethral meatus easily.  Female sounds were used sequentially from 18 Fr to 22 Fr to gently dilate the urethra.  He had prostatic regrowth, particularly of the left side.     Specimen:  none    The patient was taken to the cystoscopy suite and placed in supine position.  The genitalia was prepped and draped  in the usual sterile fashion.  Two percent lidocaine jelly was inserted in the urethra and held in place with a penile clamp.  After sufficent time had passed to allow good local anesthesia, the cystoscope was inserted in the urethra and passed into the bladder visualizing the urethra along its entire course.  The dome, anterior, posterior and lateral walls were examined systematically.  The ureteral orifices were in their usual position and configuration.  The cystoscope was turned upon itself 180 degrees to visualize the bladder neck.  The cystoscope was then brought to the level of the bladder neck, the water was turned on and the prostate was visualized.  Most of the regrowth was seen on the left side, though on retroflexion, there was significant prostatic growth circumferentially.  SVML was 2 cm The cystoscope was removed and the patient was instructed to urinate prior to leaving the office.     Post procedure medication:  Cipro 500 mg x 1.  Note:  Patient is on Avodart.      ASSESSMENT/PLAN:  73 year old man status post flexible cystoscopy.  1. Push fluids for 24 hours.  2. May see blood in the urine, this should gradually improve over the next 2-3 days.  3. The patient was instructed to return to the office or go to the emergency should fever, chills, cloudy urine, or inability to urinate develop.  4. Follow up  for TURP.  Consent obtained

## 2018-08-30 DIAGNOSIS — I10 ESSENTIAL HYPERTENSION: ICD-10-CM

## 2018-08-30 RX ORDER — LISINOPRIL 20 MG/1
TABLET ORAL
Refills: 0 | Status: CANCELLED | OUTPATIENT
Start: 2018-08-30

## 2018-08-31 DIAGNOSIS — I10 ESSENTIAL HYPERTENSION: ICD-10-CM

## 2018-08-31 RX ORDER — LISINOPRIL 20 MG/1
20 TABLET ORAL DAILY
Qty: 90 TABLET | Refills: 3 | Status: SHIPPED | OUTPATIENT
Start: 2018-08-31 | End: 2019-08-19 | Stop reason: SDUPTHER

## 2018-09-04 ENCOUNTER — ANESTHESIA EVENT (OUTPATIENT)
Dept: SURGERY | Facility: HOSPITAL | Age: 73
End: 2018-09-04
Payer: MEDICARE

## 2018-09-04 NOTE — ANESTHESIA PREPROCEDURE EVALUATION
Karli Hodges, RN   Registered Nurse      Pre Admission Screening   Signed                             [x]Hide copied text    []Hover for details      Anesthesia Assessment: Preoperative EQUATION     Planned Procedure: Procedure(s) (LRB):  TURP, WITHOUT USING LASER (N/A)  Requested Anesthesia Type:General  Surgeon: Uma Gaona MD  Service: Urology  Known or anticipated Date of Surgery:9/11/2018     Surgeon notes: reviewed     Electronic QUestionnaire Assessment completed via nurse interview with patient.         NO AQ     Triage considerations:      The patient has no apparent active cardiac condition (No unstable coronary Syndrome such as severe unstable angina or recent [<1 month] myocardial infarction, decompensated CHF, severe valvular   disease or significant arrhythmia)     Previous anesthesia records:GETA, LMA General and No problems 04/18/17 Placement Time: 1305 Method of Intubation: Pires Inserted by: CRNA Airway Device: Endotracheal Tube Mask Ventilation: Easy - oral Intubated: Postinduction Blade: Other (see comments) , Pires MAC #3  Airway Device Size: 8.0 Style: Cuffed Cuff Inflation: Minimal occlusive pressure Placement Verified By: Auscultation;Capnometry Grade: Grade I , per Pires  Complicating Factors: None Findings Post-Intubation: Positive EtCO2;Bilateral breath sounds;Atraumatic/Condition of teeth unchanged Depth of Insertion (cm): 24 Secured at: Lips Complications: None Breath Sounds: Equal Bilateral Insertion attempts (enter comment if more than 2 attempts): 1   Airway/Jaw/Neck:  Airway Findings: Mouth Opening: Normal Tongue: Normal  General Airway Assessment: Adult  Mallampati: II  Improves to II with phonation.  TM Distance: Normal, at least 6 cm      Dental:  Dental Findings: In tact         Last PCP note: within 3 months , within [facility]   Subspecialty notes: n/a     Other important co-morbidities: HTN, STEPHIE, obesity     Tests already available:  Available tests,   "within 3 months . 7/10/18 CMP, CBC, A1c. 4/2017 EKG.                            Instructions given. (See in Nurse's note)     Optimization:  Anesthesia Preop Clinic Assessment  Indicated-not required for this procedure                Plan:    Testing:  none                           Patient  has previously scheduled Medical Appointment: none     Navigation:              Straight Line to surgery.                          No tests, anesthesia preop clinic visit, or consult required.                                                        Electronically signed by Karli Hodges RN at 9/4/2018  2:44 PM       Pre-admit on 9/11/2018            Detailed Report                                                                                                               09/04/2018  Sreekanth Pitts Jr. is a 73 y.o., male.    Anesthesia Evaluation         Review of Systems  Anesthesia Hx:  No problems with previous Anesthesia History of prior surgery of interest to airway management or planning: Previous anesthesia: General 4/2017 TURP with general anesthesia.  Procedure performed at an [facility]. Denies Family Hx of Anesthesia complications.   Denies Personal Hx of Anesthesia complications.   Hematology/Oncology:  Hematology Normal   Oncology Normal     EENT/Dental:EENT/Dental Normal   Cardiovascular:   Exercise tolerance: good Hypertension Denies MI.    Denies Angina.  Functional Capacity 4 METS  Hypertension , Well Controlled on Rx , Recent typical home B/P of 120/75   Pulmonary:   Denies Shortness of breath.  Denies Recent URI. Sleep Apnea, CPAP  Obstructive Sleep Apnea (STEPHIE), CPAP used.   Renal/:   Pt has a "spot" on his right kidney -evaluated q6 months-asymptomatic and no change in last 2 years.    S/p TURP Renal Symptoms/Infections/Stones: retention.  Other Renal / Gu Conditions: Benign Prostatic Hypertrophy (BPH)  Hepatic/GI:  Hepatic/GI Normal    Musculoskeletal:   Arthritis     Neurological:  Neurology " Normal    Endocrine:  Metabolic Disorders, Obesity / BMI > 30  Psych:  Psychiatric Normal           Physical Exam  General:  Well nourished    Airway/Jaw/Neck:  Airway Findings: Mouth Opening: Normal Tongue: Normal  General Airway Assessment: Adult  Mallampati: I  TM Distance: Normal, at least 6 cm      Dental:  Dental Findings: In tact   Chest/Lungs:  Chest/Lungs Findings: Normal Respiratory Rate     Heart/Vascular:  Heart Findings: Rate: Normal        Mental Status:  Mental Status Findings:  Alert and Oriented         Anesthesia Plan  Type of Anesthesia, risks & benefits discussed:  Anesthesia Type:  general  Patient's Preference:   Intra-op Monitoring Plan: standard ASA monitors  Intra-op Monitoring Plan Comments:   Post Op Pain Control Plan: multimodal analgesia, IV/PO Opioids PRN and per primary service following discharge from PACU  Post Op Pain Control Plan Comments:   Induction:   IV  Beta Blocker:  Patient is not currently on a Beta-Blocker (No further documentation required).       Informed Consent: Patient understands risks and agrees with Anesthesia plan.  Questions answered. Anesthesia consent signed with patient.  ASA Score: 2     Day of Surgery Review of History & Physical:    H&P update referred to the surgeon.         Ready For Surgery From Anesthesia Perspective.

## 2018-09-04 NOTE — PRE ADMISSION SCREENING
Anesthesia Assessment: Preoperative EQUATION    Planned Procedure: Procedure(s) (LRB):  TURP, WITHOUT USING LASER (N/A)  Requested Anesthesia Type:General  Surgeon: Uma Gaona MD  Service: Urology  Known or anticipated Date of Surgery:9/11/2018    Surgeon notes: reviewed    Electronic QUestionnaire Assessment completed via nurse interview with patient.        NO AQ    Triage considerations:     The patient has no apparent active cardiac condition (No unstable coronary Syndrome such as severe unstable angina or recent [<1 month] myocardial infarction, decompensated CHF, severe valvular   disease or significant arrhythmia)    Previous anesthesia records:GETA, LMA General and No problems 04/18/17 Placement Time: 1305 Method of Intubation: Pires Inserted by: CRNA Airway Device: Endotracheal Tube Mask Ventilation: Easy - oral Intubated: Postinduction Blade: Other (see comments) , Pires MAC #3  Airway Device Size: 8.0 Style: Cuffed Cuff Inflation: Minimal occlusive pressure Placement Verified By: Auscultation;Capnometry Grade: Grade I , per Pires  Complicating Factors: None Findings Post-Intubation: Positive EtCO2;Bilateral breath sounds;Atraumatic/Condition of teeth unchanged Depth of Insertion (cm): 24 Secured at: Lips Complications: None Breath Sounds: Equal Bilateral Insertion attempts (enter comment if more than 2 attempts): 1   Airway/Jaw/Neck:  Airway Findings: Mouth Opening: Normal Tongue: Normal  General Airway Assessment: Adult  Mallampati: II  Improves to II with phonation.  TM Distance: Normal, at least 6 cm      Dental:  Dental Findings: In tact       Last [facility]   Subspecialty notes: n/a    Other important co-morbidities: HTN, STEPHIE, obesity     Tests already available:  Available tests,  within 3 months . 7/10/18 CMP, CBC, A1c. 4/2017 EKG.            Instructions given. (See in Nurse's note)    Optimization:  Anesthesia Preop Clinic Assessment  Indicated-not  required for this procedure      Plan:    Testing:  none     Patient  has previously scheduled Medical Appointment: none    Navigation:             Straight Line to surgery.               No tests, anesthesia preop clinic visit, or consult required.

## 2018-09-10 ENCOUNTER — TELEPHONE (OUTPATIENT)
Dept: UROLOGY | Facility: CLINIC | Age: 73
End: 2018-09-10

## 2018-09-11 ENCOUNTER — ANESTHESIA (OUTPATIENT)
Dept: SURGERY | Facility: HOSPITAL | Age: 73
End: 2018-09-11
Payer: MEDICARE

## 2018-09-11 ENCOUNTER — HOSPITAL ENCOUNTER (OUTPATIENT)
Facility: HOSPITAL | Age: 73
Discharge: HOME | End: 2018-09-12
Attending: UROLOGY | Admitting: UROLOGY
Payer: MEDICARE

## 2018-09-11 DIAGNOSIS — N40.1 BPH WITH OBSTRUCTION/LOWER URINARY TRACT SYMPTOMS: Primary | ICD-10-CM

## 2018-09-11 DIAGNOSIS — N13.8 BPH WITH OBSTRUCTION/LOWER URINARY TRACT SYMPTOMS: Primary | ICD-10-CM

## 2018-09-11 LAB
ANION GAP SERPL CALC-SCNC: 8 MMOL/L
BASOPHILS # BLD AUTO: 0.06 K/UL
BASOPHILS NFR BLD: 0.5 %
BUN SERPL-MCNC: 12 MG/DL
CALCIUM SERPL-MCNC: 8.4 MG/DL
CHLORIDE SERPL-SCNC: 106 MMOL/L
CO2 SERPL-SCNC: 25 MMOL/L
CREAT SERPL-MCNC: 0.8 MG/DL
DIFFERENTIAL METHOD: ABNORMAL
EOSINOPHIL # BLD AUTO: 0.1 K/UL
EOSINOPHIL NFR BLD: 0.7 %
ERYTHROCYTE [DISTWIDTH] IN BLOOD BY AUTOMATED COUNT: 12.7 %
EST. GFR  (AFRICAN AMERICAN): >60 ML/MIN/1.73 M^2
EST. GFR  (NON AFRICAN AMERICAN): >60 ML/MIN/1.73 M^2
GLUCOSE SERPL-MCNC: 89 MG/DL
HCT VFR BLD AUTO: 44 %
HGB BLD-MCNC: 14.7 G/DL
IMM GRANULOCYTES # BLD AUTO: 0.07 K/UL
IMM GRANULOCYTES NFR BLD AUTO: 0.6 %
LYMPHOCYTES # BLD AUTO: 1.3 K/UL
LYMPHOCYTES NFR BLD: 11.7 %
MCH RBC QN AUTO: 30.6 PG
MCHC RBC AUTO-ENTMCNC: 33.4 G/DL
MCV RBC AUTO: 92 FL
MONOCYTES # BLD AUTO: 0.4 K/UL
MONOCYTES NFR BLD: 3.7 %
NEUTROPHILS # BLD AUTO: 9.4 K/UL
NEUTROPHILS NFR BLD: 82.8 %
NRBC BLD-RTO: 0 /100 WBC
PLATELET # BLD AUTO: 197 K/UL
PMV BLD AUTO: 9.4 FL
POTASSIUM SERPL-SCNC: 4 MMOL/L
RBC # BLD AUTO: 4.81 M/UL
SODIUM SERPL-SCNC: 139 MMOL/L
WBC # BLD AUTO: 11.41 K/UL

## 2018-09-11 PROCEDURE — 63600175 PHARM REV CODE 636 W HCPCS: Performed by: NURSE ANESTHETIST, CERTIFIED REGISTERED

## 2018-09-11 PROCEDURE — 71000044 HC DOSC ROUTINE RECOVERY FIRST HOUR: Performed by: UROLOGY

## 2018-09-11 PROCEDURE — 25000003 PHARM REV CODE 250: Performed by: NURSE ANESTHETIST, CERTIFIED REGISTERED

## 2018-09-11 PROCEDURE — 88305 TISSUE EXAM BY PATHOLOGIST: CPT | Performed by: PATHOLOGY

## 2018-09-11 PROCEDURE — 25000003 PHARM REV CODE 250: Performed by: STUDENT IN AN ORGANIZED HEALTH CARE EDUCATION/TRAINING PROGRAM

## 2018-09-11 PROCEDURE — 80048 BASIC METABOLIC PNL TOTAL CA: CPT

## 2018-09-11 PROCEDURE — 36000707: Performed by: UROLOGY

## 2018-09-11 PROCEDURE — 27201423 OPTIME MED/SURG SUP & DEVICES STERILE SUPPLY: Performed by: UROLOGY

## 2018-09-11 PROCEDURE — 36000706: Performed by: UROLOGY

## 2018-09-11 PROCEDURE — 63600175 PHARM REV CODE 636 W HCPCS: Performed by: STUDENT IN AN ORGANIZED HEALTH CARE EDUCATION/TRAINING PROGRAM

## 2018-09-11 PROCEDURE — 85025 COMPLETE CBC W/AUTO DIFF WBC: CPT

## 2018-09-11 PROCEDURE — 71000015 HC POSTOP RECOV 1ST HR: Performed by: UROLOGY

## 2018-09-11 PROCEDURE — 88305 TISSUE EXAM BY PATHOLOGIST: CPT | Mod: 26,,, | Performed by: PATHOLOGY

## 2018-09-11 PROCEDURE — 37000008 HC ANESTHESIA 1ST 15 MINUTES: Performed by: UROLOGY

## 2018-09-11 PROCEDURE — D9220A PRA ANESTHESIA: Mod: ANES,,, | Performed by: ANESTHESIOLOGY

## 2018-09-11 PROCEDURE — D9220A PRA ANESTHESIA: Mod: CRNA,,, | Performed by: NURSE ANESTHETIST, CERTIFIED REGISTERED

## 2018-09-11 PROCEDURE — 52630 REMOVE PROSTATE REGROWTH: CPT | Mod: ,,, | Performed by: UROLOGY

## 2018-09-11 PROCEDURE — 71000016 HC POSTOP RECOV ADDL HR: Performed by: UROLOGY

## 2018-09-11 PROCEDURE — 37000009 HC ANESTHESIA EA ADD 15 MINS: Performed by: UROLOGY

## 2018-09-11 RX ORDER — GLYCOPYRROLATE 0.2 MG/ML
INJECTION INTRAMUSCULAR; INTRAVENOUS
Status: DISCONTINUED | OUTPATIENT
Start: 2018-09-11 | End: 2018-09-11

## 2018-09-11 RX ORDER — PROPOFOL 10 MG/ML
VIAL (ML) INTRAVENOUS
Status: DISCONTINUED | OUTPATIENT
Start: 2018-09-11 | End: 2018-09-11

## 2018-09-11 RX ORDER — LIDOCAINE HCL/PF 100 MG/5ML
SYRINGE (ML) INTRAVENOUS
Status: DISCONTINUED | OUTPATIENT
Start: 2018-09-11 | End: 2018-09-11

## 2018-09-11 RX ORDER — ROCURONIUM BROMIDE 10 MG/ML
INJECTION, SOLUTION INTRAVENOUS
Status: DISCONTINUED | OUTPATIENT
Start: 2018-09-11 | End: 2018-09-11

## 2018-09-11 RX ORDER — SODIUM CHLORIDE 9 MG/ML
INJECTION, SOLUTION INTRAVENOUS CONTINUOUS
Status: DISCONTINUED | OUTPATIENT
Start: 2018-09-11 | End: 2018-09-12 | Stop reason: HOSPADM

## 2018-09-11 RX ORDER — ACETAMINOPHEN 325 MG/1
650 TABLET ORAL EVERY 6 HOURS PRN
Status: ON HOLD | COMMUNITY
End: 2023-03-27 | Stop reason: HOSPADM

## 2018-09-11 RX ORDER — LIDOCAINE HYDROCHLORIDE 10 MG/ML
1 INJECTION, SOLUTION EPIDURAL; INFILTRATION; INTRACAUDAL; PERINEURAL ONCE
Status: DISCONTINUED | OUTPATIENT
Start: 2018-09-11 | End: 2018-09-11

## 2018-09-11 RX ORDER — ONDANSETRON 8 MG/1
8 TABLET, ORALLY DISINTEGRATING ORAL EVERY 8 HOURS PRN
Status: DISCONTINUED | OUTPATIENT
Start: 2018-09-11 | End: 2018-09-12 | Stop reason: HOSPADM

## 2018-09-11 RX ORDER — SODIUM CHLORIDE 0.9 % (FLUSH) 0.9 %
3 SYRINGE (ML) INJECTION
Status: DISCONTINUED | OUTPATIENT
Start: 2018-09-11 | End: 2018-09-11

## 2018-09-11 RX ORDER — MIDAZOLAM HYDROCHLORIDE 1 MG/ML
INJECTION, SOLUTION INTRAMUSCULAR; INTRAVENOUS
Status: DISCONTINUED | OUTPATIENT
Start: 2018-09-11 | End: 2018-09-11

## 2018-09-11 RX ORDER — LIDOCAINE HYDROCHLORIDE 10 MG/ML
1 INJECTION INFILTRATION; PERINEURAL ONCE
Status: DISCONTINUED | OUTPATIENT
Start: 2018-09-11 | End: 2018-09-11

## 2018-09-11 RX ORDER — TAMSULOSIN HYDROCHLORIDE 0.4 MG/1
0.4 CAPSULE ORAL NIGHTLY
Status: DISCONTINUED | OUTPATIENT
Start: 2018-09-11 | End: 2018-09-12 | Stop reason: HOSPADM

## 2018-09-11 RX ORDER — NEOSTIGMINE METHYLSULFATE 1 MG/ML
INJECTION, SOLUTION INTRAVENOUS
Status: DISCONTINUED | OUTPATIENT
Start: 2018-09-11 | End: 2018-09-11

## 2018-09-11 RX ORDER — LIDOCAINE HYDROCHLORIDE 20 MG/ML
JELLY TOPICAL EVERY 4 HOURS PRN
Status: DISCONTINUED | OUTPATIENT
Start: 2018-09-11 | End: 2018-09-12 | Stop reason: HOSPADM

## 2018-09-11 RX ORDER — SODIUM CHLORIDE 9 MG/ML
INJECTION, SOLUTION INTRAVENOUS CONTINUOUS
Status: DISCONTINUED | OUTPATIENT
Start: 2018-09-11 | End: 2018-09-11

## 2018-09-11 RX ORDER — CEFAZOLIN SODIUM 1 G/3ML
2 INJECTION, POWDER, FOR SOLUTION INTRAMUSCULAR; INTRAVENOUS
Status: COMPLETED | OUTPATIENT
Start: 2018-09-11 | End: 2018-09-11

## 2018-09-11 RX ORDER — FENTANYL CITRATE 50 UG/ML
INJECTION, SOLUTION INTRAMUSCULAR; INTRAVENOUS
Status: DISCONTINUED | OUTPATIENT
Start: 2018-09-11 | End: 2018-09-11

## 2018-09-11 RX ORDER — FAMOTIDINE 20 MG/1
20 TABLET, FILM COATED ORAL 2 TIMES DAILY
Status: DISCONTINUED | OUTPATIENT
Start: 2018-09-11 | End: 2018-09-12 | Stop reason: HOSPADM

## 2018-09-11 RX ORDER — LIDOCAINE HYDROCHLORIDE 10 MG/ML
INJECTION, SOLUTION EPIDURAL; INFILTRATION; INTRACAUDAL; PERINEURAL
Status: DISPENSED
Start: 2018-09-11 | End: 2018-09-11

## 2018-09-11 RX ORDER — DEXAMETHASONE SODIUM PHOSPHATE 4 MG/ML
INJECTION, SOLUTION INTRA-ARTICULAR; INTRALESIONAL; INTRAMUSCULAR; INTRAVENOUS; SOFT TISSUE
Status: DISCONTINUED | OUTPATIENT
Start: 2018-09-11 | End: 2018-09-11

## 2018-09-11 RX ORDER — OXYCODONE HCL 5 MG/5 ML
5 SOLUTION, ORAL ORAL EVERY 4 HOURS PRN
Status: DISCONTINUED | OUTPATIENT
Start: 2018-09-11 | End: 2018-09-12 | Stop reason: HOSPADM

## 2018-09-11 RX ORDER — ONDANSETRON 2 MG/ML
4 INJECTION INTRAMUSCULAR; INTRAVENOUS EVERY 12 HOURS PRN
Status: DISCONTINUED | OUTPATIENT
Start: 2018-09-11 | End: 2018-09-12 | Stop reason: HOSPADM

## 2018-09-11 RX ORDER — OXYCODONE HYDROCHLORIDE 5 MG/1
10 TABLET ORAL EVERY 4 HOURS PRN
Status: DISCONTINUED | OUTPATIENT
Start: 2018-09-11 | End: 2018-09-12 | Stop reason: HOSPADM

## 2018-09-11 RX ORDER — POLYETHYLENE GLYCOL 3350 17 G/17G
17 POWDER, FOR SOLUTION ORAL DAILY
Status: DISCONTINUED | OUTPATIENT
Start: 2018-09-11 | End: 2018-09-12 | Stop reason: HOSPADM

## 2018-09-11 RX ORDER — ACETAMINOPHEN 10 MG/ML
1000 INJECTION, SOLUTION INTRAVENOUS EVERY 6 HOURS
Status: DISPENSED | OUTPATIENT
Start: 2018-09-11 | End: 2018-09-12

## 2018-09-11 RX ADMIN — ROCURONIUM BROMIDE 50 MG: 10 INJECTION, SOLUTION INTRAVENOUS at 01:09

## 2018-09-11 RX ADMIN — GLYCOPYRROLATE 0.6 MG: 0.2 INJECTION, SOLUTION INTRAMUSCULAR; INTRAVENOUS at 02:09

## 2018-09-11 RX ADMIN — OXYCODONE HYDROCHLORIDE 10 MG: 5 TABLET ORAL at 08:09

## 2018-09-11 RX ADMIN — SODIUM CHLORIDE: 0.9 INJECTION, SOLUTION INTRAVENOUS at 05:09

## 2018-09-11 RX ADMIN — ROCURONIUM BROMIDE 10 MG: 10 INJECTION, SOLUTION INTRAVENOUS at 02:09

## 2018-09-11 RX ADMIN — OXYCODONE HYDROCHLORIDE 10 MG: 5 TABLET ORAL at 03:09

## 2018-09-11 RX ADMIN — TAMSULOSIN HYDROCHLORIDE 0.4 MG: 0.4 CAPSULE ORAL at 08:09

## 2018-09-11 RX ADMIN — FAMOTIDINE 20 MG: 20 TABLET ORAL at 08:09

## 2018-09-11 RX ADMIN — FENTANYL CITRATE 50 MCG: 50 INJECTION, SOLUTION INTRAMUSCULAR; INTRAVENOUS at 01:09

## 2018-09-11 RX ADMIN — LIDOCAINE HYDROCHLORIDE 100 MG: 20 INJECTION, SOLUTION INTRAVENOUS at 01:09

## 2018-09-11 RX ADMIN — NEOSTIGMINE METHYLSULFATE 5 MG: 1 INJECTION INTRAVENOUS at 02:09

## 2018-09-11 RX ADMIN — PROPOFOL 100 MG: 10 INJECTION, EMULSION INTRAVENOUS at 02:09

## 2018-09-11 RX ADMIN — DEXAMETHASONE SODIUM PHOSPHATE 4 MG: 4 INJECTION, SOLUTION INTRAMUSCULAR; INTRAVENOUS at 01:09

## 2018-09-11 RX ADMIN — PROPOFOL 200 MG: 10 INJECTION, EMULSION INTRAVENOUS at 01:09

## 2018-09-11 RX ADMIN — SODIUM CHLORIDE: 0.9 INJECTION, SOLUTION INTRAVENOUS at 01:09

## 2018-09-11 RX ADMIN — POLYETHYLENE GLYCOL 3350 17 G: 17 POWDER, FOR SOLUTION ORAL at 10:09

## 2018-09-11 RX ADMIN — CEFAZOLIN 2 G: 330 INJECTION, POWDER, FOR SOLUTION INTRAMUSCULAR; INTRAVENOUS at 01:09

## 2018-09-11 RX ADMIN — SODIUM CHLORIDE, SODIUM GLUCONATE, SODIUM ACETATE, POTASSIUM CHLORIDE, MAGNESIUM CHLORIDE, SODIUM PHOSPHATE, DIBASIC, AND POTASSIUM PHOSPHATE: .53; .5; .37; .037; .03; .012; .00082 INJECTION, SOLUTION INTRAVENOUS at 01:09

## 2018-09-11 RX ADMIN — MIDAZOLAM HYDROCHLORIDE 2 MG: 1 INJECTION, SOLUTION INTRAMUSCULAR; INTRAVENOUS at 01:09

## 2018-09-11 NOTE — TRANSFER OF CARE
"Anesthesia Transfer of Care Note    Patient: Sreekanth Pitts Jr.    Procedure(s) Performed: Procedure(s) (LRB):  TURP, WITHOUT USING LASER (N/A)    Patient location: PACU    Anesthesia Type: general    Transport from OR: Transported from OR on 6-10 L/min O2 by face mask with adequate spontaneous ventilation    Post pain: adequate analgesia    Post assessment: no apparent anesthetic complications and tolerated procedure well    Post vital signs: stable    Level of consciousness: awake    Nausea/Vomiting: no nausea/vomiting    Complications: none    Transfer of care protocol was followed      Last vitals:   Visit Vitals  BP (!) 146/83 (BP Location: Left arm, Patient Position: Lying)   Pulse 88   Temp 36.8 °C (98.2 °F) (Oral)   Resp 17   Ht 6' 1" (1.854 m)   Wt 116.1 kg (256 lb)   SpO2 98%   BMI 33.78 kg/m²     "

## 2018-09-11 NOTE — NURSING TRANSFER
Nursing Transfer Note      9/11/2018     Transfer To: 542A    Transfer via stretcher    Transfer with Siddiqi and CBI    Transported by Bree PCT    Medicines sent: CBI     Chart send with patient: Yes    Notified: spouse (at bedside) and Claudia RN on 5th Floor    Patient reassessed at: 1620, 9/11/18    Upon arrival to floor: patient oriented to room, call bell in reach and bed in lowest position

## 2018-09-11 NOTE — OP NOTE
[facility]  Operative Note    Date: 09/11/2018    Pre-Op Diagnosis:   BPH with bladder outlet obstruction      Patient Active Problem List    Diagnosis Date Noted    BPH with obstruction/lower urinary tract symptoms 09/11/2018    Chronic fatigue 07/10/2018    Renal mass 02/08/2017    Microscopic hematuria 01/23/2017    Leukemoid reaction     Acute bilateral low back pain 08/24/2016    Allergy 12/03/2015    Obstructive sleep apnea 11/04/2015    Achilles bursitis or tendinitis 09/25/2014    Hypertension 09/18/2012    Nuclear sclerosis - Both Eyes 07/30/2012         Post-Op Diagnosis: same    Procedure(s) Performed:   TURP  Urethral dilation     Specimen(s): prostate chips    Staff Surgeon: Uma Gaona MD    Assistant Surgeon: Rajan Kearney MD    Anesthesia: General endotracheal anesthesia    Indications: Sreekanth Pitts Jr. is a 73 y.o. male with urodynamic findings suggesting good detrusor pressures but very high PVRs who previously underwent TURP 4/2017.  He went into urinary retention and a cystoscopy was performed and he had significant prostatic regrowth.      Findings:   Circumferential prostatic hypertrophy with significant left lateral lobe hypertrophy  Veromontanum and UOs intact at the end of the case  Good hemostasis achieved  Wide open prostatic fossa at end of case   we made sure to stay away from apical tissue to avoid urethral sphincter damage     Estimated Blood Loss: Less than 100 ml    Drains: 24 Fr 3 way posadas catheter with 50 cc in the balloon on traction    Procedure in detail:  After the risks and benefits of the procedure were explained, consent was obtained, and the patient was taken to the operating suite and placed in the supine position.  Anesthesia was administered.  When adequately sedated, the patient was placed in dorsal lithotomy position and prepped and draped in normal sterile fashion.  Timeout was performed and preoperative antibiotics were  confirmed.      The urethral meatus would not accommodate the 26 fr resectoscope, so Mike sounds were used to dilate the meatus from 22 fr to 30 fr.  A 26-Bulgarian sheath resectoscope was placed into the bladder using a visual obturator. The visual obturator was exchanged for the resecting mechanism. The ureteral orifices were identified. The median lobe was first resected with care to avoid the ureteral orifices. Once the median lobe was resected, we then turned our attention to the right lateral lobe of the prostate.The right lateral lobe was then resected in a stepwise fashion from 7 o'clock to 12 o'clock with care to leave the verumontanum and sphincter intact. Once this was performed we directed our attention to the left lobe of the prostate, which was significantly hypertrophied, and again the lateral lobe was resected from the 5 o'clock to the 12 o'clock position. Hemostasis was then achieved.    The ureteral orifices, verumontanum and sphincter were intact. The [email or web address] evacuator was used to remove all prostate chips and again hemostasis was obtained.     Once the bladder was drained, we could see that the prostatic fossa was wide open and the scope was removed. A 24 Fr 3 way posadas catheter was inserted with 50 cc in the balloon.      The posadas was placed on traction and on CBI. The patient was transferred to recovery in stable condition.     Disposition: The patient will remain on the urology service overnight for observation and CBI will be titrated.     MD CEFERINO Moon was present for the entire case and agree with the above note.

## 2018-09-11 NOTE — INTERVAL H&P NOTE
The patient has been examined and the H&P has been reviewed:    I concur with the findings and no changes have occurred since H&P was written.     Urine dipstick today negative for all components    Anesthesia/Surgery risks, benefits and alternative options discussed and understood by patient/family.    There are no hospital problems to display for this patient.      Patient seen in holding.  No changes in clinical condition.  Proceed with planned procedure.

## 2018-09-12 ENCOUNTER — TELEPHONE (OUTPATIENT)
Dept: UROLOGY | Facility: HOSPITAL | Age: 73
End: 2018-09-12

## 2018-09-12 ENCOUNTER — TELEPHONE (OUTPATIENT)
Dept: UROLOGY | Facility: CLINIC | Age: 73
End: 2018-09-12

## 2018-09-12 VITALS
WEIGHT: 255.94 LBS | SYSTOLIC BLOOD PRESSURE: 137 MMHG | TEMPERATURE: 96 F | BODY MASS INDEX: 33.92 KG/M2 | HEART RATE: 95 BPM | HEIGHT: 73 IN | OXYGEN SATURATION: 94 % | DIASTOLIC BLOOD PRESSURE: 76 MMHG | RESPIRATION RATE: 18 BRPM

## 2018-09-12 LAB
ANION GAP SERPL CALC-SCNC: 9 MMOL/L
BASOPHILS # BLD AUTO: 0.01 K/UL
BASOPHILS NFR BLD: 0.1 %
BUN SERPL-MCNC: 12 MG/DL
CALCIUM SERPL-MCNC: 8.5 MG/DL
CHLORIDE SERPL-SCNC: 106 MMOL/L
CO2 SERPL-SCNC: 23 MMOL/L
CREAT SERPL-MCNC: 0.8 MG/DL
DIFFERENTIAL METHOD: ABNORMAL
EOSINOPHIL # BLD AUTO: 0 K/UL
EOSINOPHIL NFR BLD: 0.1 %
ERYTHROCYTE [DISTWIDTH] IN BLOOD BY AUTOMATED COUNT: 12.7 %
EST. GFR  (AFRICAN AMERICAN): >60 ML/MIN/1.73 M^2
EST. GFR  (NON AFRICAN AMERICAN): >60 ML/MIN/1.73 M^2
GLUCOSE SERPL-MCNC: 107 MG/DL
HCT VFR BLD AUTO: 43.6 %
HGB BLD-MCNC: 14.4 G/DL
IMM GRANULOCYTES # BLD AUTO: 0.05 K/UL
IMM GRANULOCYTES NFR BLD AUTO: 0.4 %
LYMPHOCYTES # BLD AUTO: 1 K/UL
LYMPHOCYTES NFR BLD: 8.4 %
MCH RBC QN AUTO: 29.9 PG
MCHC RBC AUTO-ENTMCNC: 33 G/DL
MCV RBC AUTO: 91 FL
MONOCYTES # BLD AUTO: 0.7 K/UL
MONOCYTES NFR BLD: 6.2 %
NEUTROPHILS # BLD AUTO: 10.2 K/UL
NEUTROPHILS NFR BLD: 84.8 %
NRBC BLD-RTO: 0 /100 WBC
PLATELET # BLD AUTO: 213 K/UL
PMV BLD AUTO: 9.2 FL
POTASSIUM SERPL-SCNC: 4.1 MMOL/L
RBC # BLD AUTO: 4.81 M/UL
SODIUM SERPL-SCNC: 138 MMOL/L
WBC # BLD AUTO: 12.01 K/UL

## 2018-09-12 PROCEDURE — 25000003 PHARM REV CODE 250: Performed by: STUDENT IN AN ORGANIZED HEALTH CARE EDUCATION/TRAINING PROGRAM

## 2018-09-12 PROCEDURE — 36415 COLL VENOUS BLD VENIPUNCTURE: CPT

## 2018-09-12 PROCEDURE — 80048 BASIC METABOLIC PNL TOTAL CA: CPT

## 2018-09-12 PROCEDURE — 85025 COMPLETE CBC W/AUTO DIFF WBC: CPT

## 2018-09-12 PROCEDURE — 63600175 PHARM REV CODE 636 W HCPCS: Performed by: STUDENT IN AN ORGANIZED HEALTH CARE EDUCATION/TRAINING PROGRAM

## 2018-09-12 RX ORDER — POLYETHYLENE GLYCOL 3350 17 G/17G
17 POWDER, FOR SOLUTION ORAL DAILY
Qty: 30 PACKET | Refills: 0 | Status: SHIPPED | OUTPATIENT
Start: 2018-09-12 | End: 2021-02-12

## 2018-09-12 RX ORDER — OXYCODONE AND ACETAMINOPHEN 5; 325 MG/1; MG/1
1 TABLET ORAL EVERY 6 HOURS PRN
Qty: 8 TABLET | Refills: 0 | Status: SHIPPED | OUTPATIENT
Start: 2018-09-12 | End: 2018-09-14

## 2018-09-12 RX ADMIN — POLYETHYLENE GLYCOL 3350 17 G: 17 POWDER, FOR SOLUTION ORAL at 08:09

## 2018-09-12 RX ADMIN — OXYCODONE HYDROCHLORIDE 5 MG: 5 SOLUTION ORAL at 10:09

## 2018-09-12 RX ADMIN — FAMOTIDINE 20 MG: 20 TABLET ORAL at 08:09

## 2018-09-12 RX ADMIN — LIDOCAINE HYDROCHLORIDE: 20 JELLY TOPICAL at 12:09

## 2018-09-12 RX ADMIN — ACETAMINOPHEN 1000 MG: 10 INJECTION, SOLUTION INTRAVENOUS at 12:09

## 2018-09-12 NOTE — PLAN OF CARE
Problem: Patient Care Overview  Goal: Plan of Care Review  Outcome: Ongoing (interventions implemented as appropriate)  POC reviewed with patient, verbalized understanding. AAO, VSS. CBI in place, output light pink. C/o discomfort/ irritation to insertion site and requested lidocaine overnight also requested stool softener - discussed with urology MD and orders placed. Denies pain this morning. Safety maintained. Hopeful for discharge today. Will continue to monitor.

## 2018-09-12 NOTE — DISCHARGE SUMMARY
[facility]  Urology  Discharge Summary      Patient Name: Sreekanth Pitts Jr.  MRN: [id number]  Admission Date: 9/11/2018  Hospital Length of Stay: 0 days  Discharge Date and Time:  09/12/2018 7:25 AM  Attending Physician: Uma Gaona MD   Discharging Provider: Rajan Kearney MD  Primary Care Physician: Rubio Rod MD (Inactive)    HPI: 73 YOM POD 1 s/p:     Procedure(s) (LRB):  TURP, WITHOUT USING LASER (N/A)     Indwelling Lines/Drains at time of discharge:   Lines/Drains/Airways          None          Hospital Course (synopsis of major diagnoses, care, treatment, and services provided during the course of the hospital stay):     Patient was admitted on 9/11/2018 for above procedure.  Patient tolerated the procedure well, hospital course was uncomplicated, and patient was discharged home in good condition with pain well controlled on 9/12/2018 after posadas catheter was removed, and patient ambulated, voided, and tolerated regular diet.      Consults:     Significant Diagnostic Studies:     Pending Diagnostic Studies:     None          Final Active Diagnoses:    Diagnosis Date Noted POA    PRINCIPAL PROBLEM:  BPH with obstruction/lower urinary tract symptoms [N40.1, N13.8] 09/11/2018 Yes    Chronic fatigue [R53.82] 07/10/2018 Yes    Microscopic hematuria [R31.29] 01/23/2017 Yes    Obstructive sleep apnea [G47.33] 11/04/2015 Yes    Hypertension [I10] 09/18/2012 Yes    Nuclear sclerosis - Both Eyes [H25.10] 07/30/2012 Yes      Problems Resolved During this Admission:       Discharged Condition: good    Disposition:     Follow Up:  Follow-up Information     Uma Gaona MD In 1 month.    Specialty:  Urology  Why:  post op TURP   Contact information:  [address]  [place] [id number] [phone]                 Patient Instructions:      Call MD for:  persistent nausea and vomiting or diarrhea     Call MD for:  severe uncontrolled pain     Call MD for:  redness,  tenderness, or signs of infection (pain, swelling, redness, odor or green/yellow discharge around incision site)     Call MD for:  difficulty breathing or increased cough     Call MD for:  severe persistent headache     Call MD for:  worsening rash     Call MD for:  persistent dizziness, light-headedness, or visual disturbances     Call MD for:  increased confusion or weakness     No dressing needed     No driving until:   Scheduling Instructions: No longer taking narcotic pain medication     Activity as tolerated     Medications:  Reconciled Home Medications:      Medication List      START taking these medications    oxyCODONE-acetaminophen 5-325 mg per tablet  Commonly known as:  PERCOCET  Take 1 tablet by mouth every 6 (six) hours as needed.     polyethylene glycol 17 gram Pwpk  Commonly known as:  GLYCOLAX  Take 17 g by mouth once daily.        CONTINUE taking these medications    acetaminophen 325 MG tablet  Commonly known as:  TYLENOL  Take 650 mg by mouth every 6 (six) hours as needed for Pain.     CENTRUM SILVER ORAL  Take 1 tablet by mouth once daily.     diphenhydramine-acetaminophen  mg Tab  Commonly known as:  TYLENOL PM  Take 1 tablet by mouth nightly as needed.     dutasteride 0.5 mg capsule  Commonly known as:  AVODART  TAKE ONE DAILY     ibuprofen 200 MG tablet  Commonly known as:  ADVIL,MOTRIN  Take 200 mg by mouth every 6 (six) hours as needed for Pain.     IBUPROFEN -25 mg Cap  Generic drug:  ibuprofen-diphenhydramine HCl  Take 1 capsule by mouth every evening.     lisinopril 20 MG tablet  Commonly known as:  PRINIVIL,ZESTRIL  Take 1 tablet (20 mg total) by mouth once daily.     tamsulosin 0.4 mg Cap  Commonly known as:  FLOMAX  Take 1 capsule (0.4 mg total) by mouth every evening.            Time spent on the discharge of patient: 30 minutes    Rajan Kearney MD  Urology  [facility]    As above.

## 2018-09-12 NOTE — TELEPHONE ENCOUNTER
----- Message from Yennifer Zamorano MA sent at 9/12/2018 12:16 PM CDT -----  Contact: self  [phone]  Needs Advice    Reason for call:  I have a question regarding the pain I am having after the surgery.  I am in more pain than what I was in when I had this surgery last time.        Communication Preference:  Phone# above    Additional Information:  na

## 2018-09-12 NOTE — SUBJECTIVE & OBJECTIVE
Interval History:     SERGEI  Pain controlled with one 5mg oxycodone   Tolerating regular diet   Ambulating very well     Review of Systems  Objective:     Temp:  [96 °F (35.6 °C)-98.4 °F (36.9 °C)] 96 °F (35.6 °C)  Pulse:  [] 82  Resp:  [16-18] 18  SpO2:  [94 %-100 %] 95 %  BP: (110-148)/(68-93) 115/68     Body mass index is 33.77 kg/m².            Drains          None          Physical Exam   Constitutional: He is oriented to person, place, and time. He appears well-developed and well-nourished.   HENT:   Head: Normocephalic and atraumatic.   Cardiovascular: Normal rate.    Pulmonary/Chest: Effort normal. No respiratory distress.   Abdominal: Soft. He exhibits no distension. There is no tenderness. There is no rebound.   Genitourinary:   Genitourinary Comments: 24 fr 3 way posadas catheter draining clear yellow off CBI, posadas off traction      Neurological: He is alert and oriented to person, place, and time.   Skin: Skin is warm and dry.     Psychiatric: He has a normal mood and affect.       Significant Labs:    BMP:  Recent Labs   Lab  09/11/18   1539  09/12/18   0430   NA  139  138   K  4.0  4.1   CL  106  106   CO2  25  23   BUN  12  12   CREATININE  0.8  0.8   CALCIUM  8.4*  8.5*       CBC:   Recent Labs   Lab  09/11/18   1539  09/12/18   0430   WBC  11.41  12.01   HGB  14.7  14.4   HCT  44.0  43.6   PLT  197  213       All pertinent labs results from the past 24 hours have been reviewed.    Significant Imaging:  All pertinent imaging results/findings from the past 24 hours have been reviewed.

## 2018-09-12 NOTE — PROGRESS NOTES
[facility]  Urology  Progress Note    Patient Name: Sreekanth Pitts Jr.  MRN: [id number]  Admission Date: 9/11/2018  Hospital Length of Stay: 0 days  Code Status: Prior   Attending Provider: Uma Gaona MD   Primary Care Physician: Rubio Rod MD (Inactive)    Subjective:     HPI:  73 YOM POD 1 s/p TURP     Interval History:     SERGEI  Pain controlled with one 5mg oxycodone   Tolerating regular diet   Ambulating very well     Review of Systems  Objective:     Temp:  [96 °F (35.6 °C)-98.4 °F (36.9 °C)] 96 °F (35.6 °C)  Pulse:  [] 82  Resp:  [16-18] 18  SpO2:  [94 %-100 %] 95 %  BP: (110-148)/(68-93) 115/68     Body mass index is 33.77 kg/m².            Drains          None          Physical Exam   Constitutional: He is oriented to person, place, and time. He appears well-developed and well-nourished.   HENT:   Head: Normocephalic and atraumatic.   Cardiovascular: Normal rate.    Pulmonary/Chest: Effort normal. No respiratory distress.   Abdominal: Soft. He exhibits no distension. There is no tenderness. There is no rebound.   Genitourinary:   Genitourinary Comments: 24 fr 3 way posadas catheter draining clear yellow off CBI, posadas off traction      Neurological: He is alert and oriented to person, place, and time.   Skin: Skin is warm and dry.     Psychiatric: He has a normal mood and affect.       Significant Labs:    BMP:  Recent Labs   Lab  09/11/18   1539  09/12/18   0430   NA  139  138   K  4.0  4.1   CL  106  106   CO2  25  23   BUN  12  12   CREATININE  0.8  0.8   CALCIUM  8.4*  8.5*       CBC:   Recent Labs   Lab  09/11/18   1539  09/12/18   0430   WBC  11.41  12.01   HGB  14.7  14.4   HCT  44.0  43.6   PLT  197  213       All pertinent labs results from the past 24 hours have been reviewed.    Significant Imaging:  All pertinent imaging results/findings from the past 24 hours have been reviewed.                  Assessment/Plan:     * BPH with obstruction/lower urinary tract  symptoms        - IV tylenol, PO oxycodone for pain control  - regular diet  - IVF   - CBC, BMP wnl   - Ambulate pm of surgery and qid  - Drains: posadas removed on rounds, voiding trial prior to discharge   - Prophylaxis: IS, SCDs, GI ppx    Dispo:  Home today after voiding               VTE Risk Mitigation (From admission, onward)        Ordered     IP VTE HIGH RISK PATIENT  Once      09/11/18 1533     Place EMILIO hose  Until discontinued      09/11/18 1533     Place sequential compression device  Until discontinued      09/11/18 1533          Rajan Kearney MD  Urology  [facility]

## 2018-09-12 NOTE — ASSESSMENT & PLAN NOTE
- IV tylenol, PO oxycodone for pain control  - regular diet  - IVF   - CBC, BMP wnl   - Ambulate pm of surgery and qid  - Drains: posadas removed on rounds, voiding trial prior to discharge   - Prophylaxis: IS, SCDs, GI ppx    Dispo:  Home today after voiding

## 2018-09-13 NOTE — ANESTHESIA POSTPROCEDURE EVALUATION
"Anesthesia Post Evaluation    Patient: Sreekanth Pitts Jr.    Procedure(s) Performed: Procedure(s) (LRB):  TURP, WITHOUT USING LASER (N/A)    Final Anesthesia Type: general  Patient location during evaluation: floor  Patient participation: Yes- Able to Participate  Level of consciousness: awake and alert  Post-procedure vital signs: reviewed and stable  Pain management: adequate  Airway patency: patent  PONV status at discharge: No PONV  Anesthetic complications: no      Cardiovascular status: blood pressure returned to baseline  Respiratory status: unassisted, room air and spontaneous ventilation  Hydration status: euvolemic  Follow-up not needed.        Visit Vitals  /76 (Patient Position: Lying)   Pulse 95   Temp 35.8 °C (96.4 °F) (Oral)   Resp 18   Ht 6' 1" (1.854 m)   Wt 116.1 kg (255 lb 15.3 oz)   SpO2 (!) 94%   BMI 33.77 kg/m²       Pain/Darcy Score: Pain Assessment Performed: Yes (9/12/2018  7:33 AM)  Presence of Pain: denies (9/12/2018  7:33 AM)  Pain Rating Prior to Med Admin: 4 (9/12/2018 10:03 AM)        "

## 2018-09-13 NOTE — TELEPHONE ENCOUNTER
Patient is POD 1 from bipolar TURP, he called in stating that he had not voided since passing his voiding trial in the hospital this morning. He was instructed to come in to be seen in clinic. He was seen and a 18 Fr Coude catheter was placed without issue. 1200cc of clear urine was drained. He was irrigated with no clots returned. He will follow up with Dr. Gaona for voiding trial. Dr. Gaona was present for catheter placement.    Archie Heath MD  Urology [facility]  [phone]

## 2018-09-14 ENCOUNTER — OFFICE VISIT (OUTPATIENT)
Dept: UROLOGY | Facility: CLINIC | Age: 73
End: 2018-09-14
Payer: MEDICARE

## 2018-09-14 ENCOUNTER — TELEPHONE (OUTPATIENT)
Dept: UROLOGY | Facility: CLINIC | Age: 73
End: 2018-09-14

## 2018-09-14 VITALS — WEIGHT: 255.94 LBS | BODY MASS INDEX: 33.92 KG/M2 | HEIGHT: 73 IN

## 2018-09-14 DIAGNOSIS — Z98.890 POST-OPERATIVE STATE: Primary | ICD-10-CM

## 2018-09-14 PROCEDURE — 99213 OFFICE O/P EST LOW 20 MIN: CPT | Mod: PBBFAC | Performed by: UROLOGY

## 2018-09-14 PROCEDURE — 99999 PR PBB SHADOW E&M-EST. PATIENT-LVL III: CPT | Mod: PBBFAC,,, | Performed by: UROLOGY

## 2018-09-14 PROCEDURE — 99024 POSTOP FOLLOW-UP VISIT: CPT | Mod: POP,,, | Performed by: UROLOGY

## 2018-09-14 NOTE — PROGRESS NOTES
CHIEF COMPLAINT:    Mr. Pitts is a 73 y.o. male presenting for a follow up    PRESENTING ILLNESS:    Sreekanth Pitts Jr. is a 73 y.o. male who had a TURP on 9/11/2018 and was on CBI overnight.  The catheter was removed and he was discharged home but he went into retention and came back a 7 pm. When the catheter was placed he had about 1300 ml in the bladder.  There was no clot, but the urine was mildly pink. He states started with a bm yesterday afternoon and had two yesterday, one small one this morning.  He feels well.  The urine is now yellow.  Today is Friday.      Allergies:  Patient has no known allergies.    Medications:  Outpatient Encounter Medications as of 9/14/2018   Medication Sig Dispense Refill    acetaminophen (TYLENOL) 325 MG tablet Take 650 mg by mouth every 6 (six) hours as needed for Pain.      diphenhydramine-acetaminophen (TYLENOL PM)  mg Tab Take 1 tablet by mouth nightly as needed.      dutasteride (AVODART) 0.5 mg capsule TAKE ONE DAILY 30 capsule 11    lisinopril (PRINIVIL,ZESTRIL) 20 MG tablet Take 1 tablet (20 mg total) by mouth once daily. 90 tablet 3    MULTIVITAMIN W-MINERALS/LUTEIN (CENTRUM SILVER ORAL) Take 1 tablet by mouth once daily.       polyethylene glycol (GLYCOLAX) 17 gram PwPk Take 17 g by mouth once daily. 30 packet 0    tamsulosin (FLOMAX) 0.4 mg Cap Take 1 capsule (0.4 mg total) by mouth every evening. 30 capsule 2    ibuprofen (ADVIL,MOTRIN) 200 MG tablet Take 200 mg by mouth every 6 (six) hours as needed for Pain.      ibuprofen-diphenhydramine HCl (IBUPROFEN PM) 200-25 mg Cap Take 1 capsule by mouth every evening.      [DISCONTINUED] oxyCODONE-acetaminophen (PERCOCET) 5-325 mg per tablet Take 1 tablet by mouth every 6 (six) hours as needed. 8 tablet 0     No facility-administered encounter medications on file as of 9/14/2018.          PHYSICAL EXAMINATION:    The patient generally appears in good health, is appropriately interactive, and is in no  apparent distress.    Skin: No lesions.    Mental: Cooperative with normal affect.    Neuro: Grossly intact.    HEENT: Normal. No evidence of lymphadenopathy.    Chest:  normal inspiratory effort.    Abdomen: Soft, non-tender. No masses or organomegaly. Bladder is not palpable. No evidence of flank discomfort. No evidence of inguinal hernia.    Extremities: No clubbing, cyanosis, or edema      LABS:    Lab Results   Component Value Date    BUN 12 09/12/2018    CREATININE 0.8 09/12/2018           IMPRESSION:    Encounter Diagnoses   Name Primary?    Post-operative state Yes       PLAN:    1. Because he had 1300 in the bladder there can be a myogenic component.  Would rather him get the catheter out on Monday to give the muscle a chance to recover  2.  Follow up Monday am for catheter removal.

## 2018-09-14 NOTE — TELEPHONE ENCOUNTER
Called the patient and let him know that the prostate chips were BPH and no cancer.  He was relieved.

## 2018-09-17 ENCOUNTER — OFFICE VISIT (OUTPATIENT)
Dept: UROLOGY | Facility: CLINIC | Age: 73
End: 2018-09-17
Payer: MEDICARE

## 2018-09-17 ENCOUNTER — TELEPHONE (OUTPATIENT)
Dept: UROLOGY | Facility: CLINIC | Age: 73
End: 2018-09-17

## 2018-09-17 VITALS
HEIGHT: 73 IN | BODY MASS INDEX: 33.92 KG/M2 | DIASTOLIC BLOOD PRESSURE: 84 MMHG | WEIGHT: 255.94 LBS | SYSTOLIC BLOOD PRESSURE: 132 MMHG | HEART RATE: 99 BPM

## 2018-09-17 DIAGNOSIS — Z98.890 POST-OPERATIVE STATE: Primary | ICD-10-CM

## 2018-09-17 PROCEDURE — 99213 OFFICE O/P EST LOW 20 MIN: CPT | Mod: PBBFAC | Performed by: UROLOGY

## 2018-09-17 PROCEDURE — 99999 PR PBB SHADOW E&M-EST. PATIENT-LVL III: CPT | Mod: PBBFAC,,, | Performed by: UROLOGY

## 2018-09-17 PROCEDURE — 99024 POSTOP FOLLOW-UP VISIT: CPT | Mod: POP,,, | Performed by: UROLOGY

## 2018-09-17 NOTE — PROGRESS NOTES
Pt identified using two identifiers (name and ). 20FR posadas catheter in place, noted draining clear, yellow urine to leg bag. Balloned deflated, 10mL of water removed. 20 FR posadas cath d/c with no problems. Pt instructed to drink plenty of fluids and to call back to clinic at no later than 2:00pm to report on urination. Pt voiced understanding.

## 2018-09-28 ENCOUNTER — OFFICE VISIT (OUTPATIENT)
Dept: UROLOGY | Facility: CLINIC | Age: 73
End: 2018-09-28
Payer: MEDICARE

## 2018-09-28 VITALS
SYSTOLIC BLOOD PRESSURE: 133 MMHG | WEIGHT: 257.94 LBS | HEART RATE: 88 BPM | HEIGHT: 73 IN | DIASTOLIC BLOOD PRESSURE: 85 MMHG | BODY MASS INDEX: 34.19 KG/M2

## 2018-09-28 DIAGNOSIS — Z12.5 PROSTATE CANCER SCREENING: ICD-10-CM

## 2018-09-28 DIAGNOSIS — N28.89 RENAL MASS: ICD-10-CM

## 2018-09-28 DIAGNOSIS — Z98.890 POST-OPERATIVE STATE: Primary | ICD-10-CM

## 2018-09-28 PROBLEM — N40.1 BPH WITH OBSTRUCTION/LOWER URINARY TRACT SYMPTOMS: Status: RESOLVED | Noted: 2018-09-11 | Resolved: 2018-09-28

## 2018-09-28 PROBLEM — N13.8 BPH WITH OBSTRUCTION/LOWER URINARY TRACT SYMPTOMS: Status: RESOLVED | Noted: 2018-09-11 | Resolved: 2018-09-28

## 2018-09-28 PROBLEM — R31.29 MICROSCOPIC HEMATURIA: Status: RESOLVED | Noted: 2017-01-23 | Resolved: 2018-09-28

## 2018-09-28 PROCEDURE — 99213 OFFICE O/P EST LOW 20 MIN: CPT | Mod: PBBFAC | Performed by: UROLOGY

## 2018-09-28 PROCEDURE — 99024 POSTOP FOLLOW-UP VISIT: CPT | Mod: POP,,, | Performed by: UROLOGY

## 2018-09-28 PROCEDURE — 99999 PR PBB SHADOW E&M-EST. PATIENT-LVL III: CPT | Mod: PBBFAC,,, | Performed by: UROLOGY

## 2018-09-28 NOTE — PROGRESS NOTES
CHIEF COMPLAINT:    Mr. Pitts is a 73 y.o. male presenting for a follow up status post TURP on 9/11/2018    PRESENTING ILLNESS:    Sreekanth Pitts Jr. is a 73 y.o. male who returns for follow up.  He states that after the first few days following catheter removal, he found that the urinary stream is very strong, even better than the first TUR.  He is very pleased, and he is continent.  No pain, no gross hematuria following catheter removal.     Allergies:  Patient has no known allergies.    Medications:  Outpatient Encounter Medications as of 9/28/2018   Medication Sig Dispense Refill    acetaminophen (TYLENOL) 325 MG tablet Take 650 mg by mouth every 6 (six) hours as needed for Pain.      diphenhydramine-acetaminophen (TYLENOL PM)  mg Tab Take 1 tablet by mouth nightly as needed.      dutasteride (AVODART) 0.5 mg capsule TAKE ONE DAILY 30 capsule 11    ibuprofen (ADVIL,MOTRIN) 200 MG tablet Take 200 mg by mouth every 6 (six) hours as needed for Pain.      ibuprofen-diphenhydramine HCl (IBUPROFEN PM) 200-25 mg Cap Take 1 capsule by mouth every evening.      lisinopril (PRINIVIL,ZESTRIL) 20 MG tablet Take 1 tablet (20 mg total) by mouth once daily. 90 tablet 3    MULTIVITAMIN W-MINERALS/LUTEIN (CENTRUM SILVER ORAL) Take 1 tablet by mouth once daily.       polyethylene glycol (GLYCOLAX) 17 gram PwPk Take 17 g by mouth once daily. 30 packet 0    tamsulosin (FLOMAX) 0.4 mg Cap Take 1 capsule (0.4 mg total) by mouth every evening. 30 capsule 2     No facility-administered encounter medications on file as of 9/28/2018.          PHYSICAL EXAMINATION:    The patient generally appears in good health, is appropriately interactive, and is in no apparent distress.    Skin: No lesions.    Mental: Cooperative with normal affect.    Neuro: Grossly intact.    HEENT: Normal. No evidence of lymphadenopathy.    Chest:  normal inspiratory effort.    Abdomen: Soft, non-tender. No masses or organomegaly. Bladder is  not palpable. No evidence of flank discomfort. No evidence of inguinal hernia.    Extremities: No clubbing, cyanosis, or edema    PVR by bladder scan was 43 ml  LABS:    Lab Results   Component Value Date    BUN 12 09/12/2018    CREATININE 0.8 09/12/2018     Lab Results   Component Value Date    PSA 1.9 12/28/2017    PSA 4.4 (H) 12/09/2016    PSA 3.2 12/03/2015    PSADIAG 2.7 12/19/2013         IMPRESSION:    Encounter Diagnoses   Name Primary?    Post-operative state Yes    Renal mass     Prostate cancer screening        PLAN:    1. He Is due to have another CT scan in January, BMP and PSA before the exam.  Appointment with me for follow up afterwards.

## 2018-10-10 ENCOUNTER — OFFICE VISIT (OUTPATIENT)
Dept: INTERNAL MEDICINE | Facility: CLINIC | Age: 73
End: 2018-10-10
Payer: MEDICARE

## 2018-10-10 ENCOUNTER — TELEPHONE (OUTPATIENT)
Dept: INTERNAL MEDICINE | Facility: CLINIC | Age: 73
End: 2018-10-10

## 2018-10-10 ENCOUNTER — CLINICAL SUPPORT (OUTPATIENT)
Dept: INTERNAL MEDICINE | Facility: CLINIC | Age: 73
End: 2018-10-10
Payer: MEDICARE

## 2018-10-10 VITALS
SYSTOLIC BLOOD PRESSURE: 102 MMHG | OXYGEN SATURATION: 97 % | HEART RATE: 96 BPM | BODY MASS INDEX: 34.71 KG/M2 | WEIGHT: 261.94 LBS | HEIGHT: 73 IN | DIASTOLIC BLOOD PRESSURE: 62 MMHG

## 2018-10-10 DIAGNOSIS — K21.9 GASTROESOPHAGEAL REFLUX DISEASE WITHOUT ESOPHAGITIS: Primary | ICD-10-CM

## 2018-10-10 DIAGNOSIS — Z00.00 PREVENTATIVE HEALTH CARE: Primary | ICD-10-CM

## 2018-10-10 DIAGNOSIS — R33.9 URINARY RETENTION: ICD-10-CM

## 2018-10-10 PROCEDURE — 99999 PR PBB SHADOW E&M-EST. PATIENT-LVL III: CPT | Mod: PBBFAC,,, | Performed by: INTERNAL MEDICINE

## 2018-10-10 PROCEDURE — 90670 PCV13 VACCINE IM: CPT | Mod: PBBFAC

## 2018-10-10 PROCEDURE — 99214 OFFICE O/P EST MOD 30 MIN: CPT | Mod: S$PBB,,, | Performed by: INTERNAL MEDICINE

## 2018-10-10 PROCEDURE — 99213 OFFICE O/P EST LOW 20 MIN: CPT | Mod: PBBFAC,25 | Performed by: INTERNAL MEDICINE

## 2018-10-10 RX ORDER — OMEPRAZOLE 40 MG/1
40 CAPSULE, DELAYED RELEASE ORAL EVERY MORNING
Qty: 30 CAPSULE | Refills: 2 | Status: SHIPPED | OUTPATIENT
Start: 2018-10-10 | End: 2019-02-19 | Stop reason: SDUPTHER

## 2018-10-10 RX ORDER — TAMSULOSIN HYDROCHLORIDE 0.4 MG/1
0.4 CAPSULE ORAL NIGHTLY
Qty: 30 CAPSULE | Refills: 2
Start: 2018-10-10 | End: 2019-02-05

## 2018-10-10 NOTE — PROGRESS NOTES
"Subjective:       Patient ID: Sreekanth Pitts Jr. is a 73 y.o. male.    Chief Complaint: Follow-up    Patient is here for followup for chronic conditions.    When he eats he belches, also feels like food gets partially stuck when swallowing. Will get painful. Symptoms here and there for yrs. Some incd frequ, daily now. Especially occurs with meat and spaghetti. Not with liquids.  denies other warning signs for GERD -- no wt loss, no blood in stool or melena, no fam hx of esophageal cancer.      Needs to lose weight, eats "too many cookies".          Review of Systems   Constitutional: Negative for appetite change and unexpected weight change.   Respiratory: Negative for chest tightness and shortness of breath.    Cardiovascular: Negative for chest pain.   Gastrointestinal: Negative for abdominal distention, abdominal pain, nausea and vomiting.   Genitourinary: Negative for difficulty urinating, scrotal swelling and testicular pain.   Skin:        No lesions   Psychiatric/Behavioral: Negative for sleep disturbance.       Objective:      Physical Exam   Constitutional: He is oriented to person, place, and time. He appears well-developed and well-nourished. No distress.   HENT:   Head: Normocephalic and atraumatic.   Eyes: No scleral icterus.   Neck: Normal range of motion. No thyromegaly present.   Cardiovascular: Normal rate, regular rhythm and normal heart sounds. Exam reveals no gallop and no friction rub.   No murmur heard.  Pulmonary/Chest: Effort normal and breath sounds normal. No respiratory distress. He has no wheezes. He has no rales.   Abdominal: Soft. Bowel sounds are normal. He exhibits no distension and no mass. There is no tenderness. There is no rebound and no guarding.   Musculoskeletal: Normal range of motion. He exhibits no edema.   Lymphadenopathy:     He has no cervical adenopathy.   Neurological: He is alert and oriented to person, place, and time.   Skin: No lesion noted.   Psychiatric: He has " a normal mood and affect. Thought content normal.       Assessment:       1. Gastroesophageal reflux disease without esophagitis    2. Urinary retention        Plan:       Sreekanth was seen today for follow-up.    Diagnoses and all orders for this visit:    Gastroesophageal reflux disease without esophagitis  -     omeprazole (PRILOSEC) 40 MG capsule; Take 1 capsule (40 mg total) by mouth every morning. Take 30 minutes before eating on an empty stomach  Explained that if not better in 1-2 weeks, pt should rtc/call PCP then refer to GI for possible egd. Has had periodic episodes of dysphagia for many yrs, but more frequ now. Reports remote egd, but I cannot see result in our system    Urinary retention  -     tamsulosin (FLOMAX) 0.4 mg Cap; Take 1 capsule (0.4 mg total) by mouth every evening.  controlled    HTN controlled.    Lengthy discussion re importance of wt loss (he needs to eat btr, is very active and walks).    Health Maintenance       Date Due Completion Date    Hepatitis C Screening 1945 ---    TETANUS VACCINE 05/06/1965 ---    PROSTATE-SPECIFIC ANTIGEN 12/28/2018 12/28/2017    Pneumococcal (65+) (2 of 2 - PPSV23) 10/10/2019 10/10/2018    Colonoscopy 10/19/2022 10/19/2012    Lipid Panel 07/10/2023 7/10/2018      Next time hep c screening  Patient Instructions   prevnar vaccine        Follow-up in about 1 year (around 10/10/2019).    Future Appointments   Date Time Provider Department Center   1/14/2019  8:20 AM LAB, APPOINTMENT [facility] INTM[facility] LAB IM [facility]   1/14/2019  9:30 AM [facility] OIC-CT1 500 LB LIMIT [facility]   1/18/2019  2:00 PM Uma Gaona MD [facility] UROLOGY [facility]

## 2018-11-26 DIAGNOSIS — R33.9 URINARY RETENTION: ICD-10-CM

## 2018-11-27 RX ORDER — TAMSULOSIN HYDROCHLORIDE 0.4 MG/1
CAPSULE ORAL
Qty: 30 CAPSULE | Refills: 0 | OUTPATIENT
Start: 2018-11-27

## 2018-12-03 DIAGNOSIS — R33.9 URINARY RETENTION: ICD-10-CM

## 2018-12-03 RX ORDER — TAMSULOSIN HYDROCHLORIDE 0.4 MG/1
CAPSULE ORAL
Qty: 30 CAPSULE | Refills: 0 | Status: SHIPPED | OUTPATIENT
Start: 2018-12-03 | End: 2019-02-05

## 2018-12-14 ENCOUNTER — OFFICE VISIT (OUTPATIENT)
Dept: URGENT CARE | Facility: CLINIC | Age: 73
End: 2018-12-14
Payer: MEDICARE

## 2018-12-14 VITALS
SYSTOLIC BLOOD PRESSURE: 129 MMHG | DIASTOLIC BLOOD PRESSURE: 102 MMHG | WEIGHT: 261 LBS | OXYGEN SATURATION: 96 % | BODY MASS INDEX: 34.59 KG/M2 | HEIGHT: 73 IN | TEMPERATURE: 97 F | HEART RATE: 73 BPM

## 2018-12-14 DIAGNOSIS — J06.9 UPPER RESPIRATORY TRACT INFECTION, UNSPECIFIED TYPE: Primary | ICD-10-CM

## 2018-12-14 DIAGNOSIS — R09.81 SINUS CONGESTION: ICD-10-CM

## 2018-12-14 PROCEDURE — 96372 THER/PROPH/DIAG INJ SC/IM: CPT | Mod: S$GLB,,, | Performed by: FAMILY MEDICINE

## 2018-12-14 PROCEDURE — 99214 OFFICE O/P EST MOD 30 MIN: CPT | Mod: 25,S$GLB,, | Performed by: FAMILY MEDICINE

## 2018-12-14 RX ORDER — AZELASTINE 1 MG/ML
1 SPRAY, METERED NASAL 2 TIMES DAILY
Qty: 30 ML | Refills: 0 | Status: SHIPPED | OUTPATIENT
Start: 2018-12-14 | End: 2021-02-12

## 2018-12-14 RX ORDER — BETAMETHASONE SODIUM PHOSPHATE AND BETAMETHASONE ACETATE 3; 3 MG/ML; MG/ML
9 INJECTION, SUSPENSION INTRA-ARTICULAR; INTRALESIONAL; INTRAMUSCULAR; SOFT TISSUE
Status: COMPLETED | OUTPATIENT
Start: 2018-12-14 | End: 2018-12-14

## 2018-12-14 RX ADMIN — BETAMETHASONE SODIUM PHOSPHATE AND BETAMETHASONE ACETATE 9 MG: 3; 3 INJECTION, SUSPENSION INTRA-ARTICULAR; INTRALESIONAL; INTRAMUSCULAR; SOFT TISSUE at 12:12

## 2018-12-14 NOTE — PROGRESS NOTES
"Subjective:       Patient ID: Sreekanth Pitts Jr. is a 73 y.o. male.    Vitals:  height is 6' 1" (1.854 m) and weight is 118.4 kg (261 lb). His oral temperature is 97 °F (36.1 °C). His blood pressure is 129/102 (abnormal) and his pulse is 73. His oxygen saturation is 96%.     Chief Complaint: URI    URI    This is a new problem. The current episode started in the past 7 days (3 days). There has been no fever. Associated symptoms include congestion, coughing, a plugged ear sensation, rhinorrhea and sinus pain. Pertinent negatives include no ear pain, nausea, rash, vomiting or wheezing. He has tried decongestant and acetaminophen for the symptoms. The treatment provided no relief.       Constitution: Positive for fatigue. Negative for chills, sweating and fever.   HENT: Positive for congestion, sinus pain and sinus pressure. Negative for ear pain and voice change.    Neck: Negative for painful lymph nodes.   Eyes: Negative for eye redness.   Respiratory: Positive for cough and sputum production. Negative for chest tightness, bloody sputum, COPD, shortness of breath, stridor, wheezing and asthma.    Gastrointestinal: Negative for nausea and vomiting.   Musculoskeletal: Negative for muscle ache.   Skin: Negative for rash and erythema.   Allergic/Immunologic: Positive for seasonal allergies. Negative for asthma.   Hematologic/Lymphatic: Negative for swollen lymph nodes.       Objective:      Physical Exam   Constitutional: He is oriented to person, place, and time. He appears well-developed and well-nourished.   HENT:   Head: Normocephalic and atraumatic.   Right Ear: External ear normal.   Left Ear: External ear normal.   Eyes: EOM are normal. Pupils are equal, round, and reactive to light.   Neck: Normal range of motion. Neck supple. No JVD present. No tracheal deviation present. No thyromegaly present.   Cardiovascular: Normal rate, regular rhythm and normal heart sounds. Exam reveals no gallop and no friction rub. "   No murmur heard.  Pulmonary/Chest: Breath sounds normal. No respiratory distress. He has no wheezes. He has no rales. He exhibits no tenderness.   Abdominal: Soft. Bowel sounds are normal. He exhibits no distension and no mass. There is no tenderness. There is no rebound and no guarding. No hernia.   Musculoskeletal: Normal range of motion. He exhibits no edema, tenderness or deformity.   Lymphadenopathy:     He has no cervical adenopathy.   Neurological: He is alert and oriented to person, place, and time. He displays normal reflexes. No cranial nerve deficit. He exhibits normal muscle tone. Coordination normal.   Skin: Skin is warm. Capillary refill takes less than 2 seconds. No rash noted. No erythema. No pallor.   Psychiatric: He has a normal mood and affect. His behavior is normal. Judgment and thought content normal.   Vitals reviewed.      Assessment:       1. Upper respiratory tract infection, unspecified type    2. Sinus congestion        Plan:         Upper respiratory tract infection, unspecified type    Sinus congestion  -     betamethasone acetate-betamethasone sodium phosphate injection 9 mg  -     azelastine (ASTELIN) 137 mcg (0.1 %) nasal spray; 1 spray (137 mcg total) by Nasal route 2 (two) times daily.  Dispense: 30 mL; Refill: 0          Patient Instructions     Viral Upper Respiratory Illness (Adult)  You have a viral upper respiratory illness (URI), which is another term for the common cold. This illness is contagious during the first few days. It is spread through the air by coughing and sneezing. It may also be spread by direct contact (touching the sick person and then touching your own eyes, nose, or mouth). Frequent handwashing will decrease risk of spread. Most viral illnesses go away within 7 to 10 days with rest and simple home remedies. Sometimes the illness may last for several weeks. Antibiotics will not kill a virus, and they are generally not prescribed for this condition.    Home  care  · If symptoms are severe, rest at home for the first 2 to 3 days. When you resume activity, don't let yourself get too tired.  · Avoid being exposed to cigarette smoke (yours or others).  · You may use acetaminophen or ibuprofen to control pain and fever, unless another medicine was prescribed. (Note: If you have chronic liver or kidney disease, have ever had a stomach ulcer or gastrointestinal bleeding, or are taking blood-thinning medicines, talk with your healthcare provider before using these medicines.) Aspirin should never be given to anyone under 18 years of age who is ill with a viral infection or fever. It may cause severe liver or brain damage.  · Your appetite may be poor, so a light diet is fine. Avoid dehydration by drinking 6 to 8 glasses of fluids per day (water, soft drinks, juices, tea, or soup). Extra fluids will help loosen secretions in the nose and lungs.  · Over-the-counter cold medicines will not shorten the length of time youre sick, but they may be helpful for the following symptoms: cough, sore throat, and nasal and sinus congestion. (Note: Do not use decongestants if you have high blood pressure.)  Follow-up care  Follow up with your healthcare provider, or as advised.  When to seek medical advice  Call your healthcare provider right away if any of these occur:  · Cough with lots of colored sputum (mucus)  · Severe headache; face, neck, or ear pain  · Difficulty swallowing due to throat pain  · Fever of 100.4°F (38°C)  Call 911, or get immediate medical care  Call emergency services right away if any of these occur:  · Chest pain, shortness of breath, wheezing, or difficulty breathing  · Coughing up blood  · Inability to swallow due to throat pain  Date Last Reviewed: 9/13/2015  © 5589-5207 [facility]. [address] [place] [address], [place] [address]. All rights reserved. This information is not intended as a substitute for professional medical care. Always follow your  healthcare professional's instructions.      Follow up with your doctor in a few days as needed.  Return to the urgent care or go to the ER if symptoms get worse.    Chauncey Morgan MD

## 2018-12-14 NOTE — PATIENT INSTRUCTIONS
Viral Upper Respiratory Illness (Adult)  You have a viral upper respiratory illness (URI), which is another term for the common cold. This illness is contagious during the first few days. It is spread through the air by coughing and sneezing. It may also be spread by direct contact (touching the sick person and then touching your own eyes, nose, or mouth). Frequent handwashing will decrease risk of spread. Most viral illnesses go away within 7 to 10 days with rest and simple home remedies. Sometimes the illness may last for several weeks. Antibiotics will not kill a virus, and they are generally not prescribed for this condition.    Home care  · If symptoms are severe, rest at home for the first 2 to 3 days. When you resume activity, don't let yourself get too tired.  · Avoid being exposed to cigarette smoke (yours or others).  · You may use acetaminophen or ibuprofen to control pain and fever, unless another medicine was prescribed. (Note: If you have chronic liver or kidney disease, have ever had a stomach ulcer or gastrointestinal bleeding, or are taking blood-thinning medicines, talk with your healthcare provider before using these medicines.) Aspirin should never be given to anyone under 18 years of age who is ill with a viral infection or fever. It may cause severe liver or brain damage.  · Your appetite may be poor, so a light diet is fine. Avoid dehydration by drinking 6 to 8 glasses of fluids per day (water, soft drinks, juices, tea, or soup). Extra fluids will help loosen secretions in the nose and lungs.  · Over-the-counter cold medicines will not shorten the length of time youre sick, but they may be helpful for the following symptoms: cough, sore throat, and nasal and sinus congestion. (Note: Do not use decongestants if you have high blood pressure.)  Follow-up care  Follow up with your healthcare provider, or as advised.  When to seek medical advice  Call your healthcare provider right away if any  of these occur:  · Cough with lots of colored sputum (mucus)  · Severe headache; face, neck, or ear pain  · Difficulty swallowing due to throat pain  · Fever of 100.4°F (38°C)  Call 911, or get immediate medical care  Call emergency services right away if any of these occur:  · Chest pain, shortness of breath, wheezing, or difficulty breathing  · Coughing up blood  · Inability to swallow due to throat pain  Date Last Reviewed: 9/13/2015 © 2000-2017 [employer]. [address] [place] [address]. All rights reserved. This information is not intended as a substitute for professional medical care. Always follow your healthcare professional's instructions.      Follow up with your doctor in a few days as needed.  Return to the urgent care or go to the ER if symptoms get worse.    Chauncey Morgan MD

## 2019-01-03 RX ORDER — TAMSULOSIN HYDROCHLORIDE 0.4 MG/1
CAPSULE ORAL
Qty: 30 CAPSULE | Refills: 0 | Status: SHIPPED | OUTPATIENT
Start: 2019-01-03 | End: 2019-02-05

## 2019-01-14 ENCOUNTER — HOSPITAL ENCOUNTER (OUTPATIENT)
Dept: RADIOLOGY | Facility: HOSPITAL | Age: 74
Discharge: HOME | End: 2019-01-14
Attending: UROLOGY
Payer: MEDICARE

## 2019-01-14 DIAGNOSIS — N28.1 RENAL CYST, ACQUIRED, RIGHT: ICD-10-CM

## 2019-01-14 PROCEDURE — 74170 CT ABD WO CNTRST FLWD CNTRST: CPT | Mod: 26,,, | Performed by: RADIOLOGY

## 2019-01-14 PROCEDURE — 25500020 PHARM REV CODE 255: Performed by: UROLOGY

## 2019-01-14 PROCEDURE — 74170 CT ABD WO CNTRST FLWD CNTRST: CPT | Mod: TC

## 2019-01-14 PROCEDURE — 74170 CT ABDOMEN W WO CONTRAST: ICD-10-PCS | Mod: 26,,, | Performed by: RADIOLOGY

## 2019-01-14 RX ADMIN — IOHEXOL 100 ML: 350 INJECTION, SOLUTION INTRAVENOUS at 10:01

## 2019-01-16 ENCOUNTER — TELEPHONE (OUTPATIENT)
Dept: UROLOGY | Facility: CLINIC | Age: 74
End: 2019-01-16

## 2019-01-16 NOTE — TELEPHONE ENCOUNTER
----- Message from Carmen Mccrcaken sent at 1/16/2019  2:28 PM CST -----  Contact: Self- [phone]  Jimenez- pt returning a missed call from Deborah- please contact pt at [phone]

## 2019-01-16 NOTE — TELEPHONE ENCOUNTER
----- Message from Deborah De Jesus LPN sent at 1/15/2019  2:26 PM CST -----      ----- Message -----  From: Uma Gaona MD  Sent: 1/15/2019   6:52 AM  To: [facility] Staff    Please let the patient know that the CT scan is stable.  The complex cyst has not grown.  PSA is 0.82 ng/ml which is great.

## 2019-02-05 RX ORDER — TAMSULOSIN HYDROCHLORIDE 0.4 MG/1
CAPSULE ORAL
Qty: 90 CAPSULE | Refills: 0 | Status: SHIPPED | OUTPATIENT
Start: 2019-02-05 | End: 2019-07-01 | Stop reason: SDUPTHER

## 2019-02-19 DIAGNOSIS — K21.9 GASTROESOPHAGEAL REFLUX DISEASE WITHOUT ESOPHAGITIS: ICD-10-CM

## 2019-02-19 RX ORDER — OMEPRAZOLE 40 MG/1
CAPSULE, DELAYED RELEASE ORAL
Qty: 30 CAPSULE | Refills: 11 | Status: SHIPPED | OUTPATIENT
Start: 2019-02-19 | End: 2019-08-19

## 2019-03-18 DIAGNOSIS — R33.9 URINARY RETENTION: ICD-10-CM

## 2019-03-18 RX ORDER — DUTASTERIDE 0.5 MG/1
CAPSULE, LIQUID FILLED ORAL
Qty: 30 CAPSULE | Refills: 6 | Status: SHIPPED | OUTPATIENT
Start: 2019-03-18 | End: 2020-01-21

## 2019-07-01 RX ORDER — TAMSULOSIN HYDROCHLORIDE 0.4 MG/1
CAPSULE ORAL
Qty: 30 CAPSULE | Refills: 0 | Status: SHIPPED | OUTPATIENT
Start: 2019-07-01 | End: 2019-08-07 | Stop reason: SDUPTHER

## 2019-07-31 ENCOUNTER — OFFICE VISIT (OUTPATIENT)
Dept: UROLOGY | Facility: CLINIC | Age: 74
End: 2019-07-31
Payer: MEDICARE

## 2019-07-31 DIAGNOSIS — R33.9 URINARY RETENTION: Primary | ICD-10-CM

## 2019-07-31 DIAGNOSIS — K59.03 DRUG-INDUCED CONSTIPATION: ICD-10-CM

## 2019-07-31 PROCEDURE — 99213 PR OFFICE/OUTPT VISIT, EST, LEVL III, 20-29 MIN: ICD-10-PCS | Mod: S$PBB,25,, | Performed by: UROLOGY

## 2019-07-31 PROCEDURE — 99213 OFFICE O/P EST LOW 20 MIN: CPT | Mod: S$PBB,25,, | Performed by: UROLOGY

## 2019-07-31 PROCEDURE — 51702 INSERT TEMP BLADDER CATH: CPT | Mod: S$PBB,,, | Performed by: UROLOGY

## 2019-07-31 PROCEDURE — 99999 PR PBB SHADOW E&M-EST. PATIENT-LVL II: ICD-10-PCS | Mod: PBBFAC,,, | Performed by: UROLOGY

## 2019-07-31 PROCEDURE — 51702 PR INSERTION OF TEMPORARY INDWELLING BLADDER CATHETER, SIMPLE: ICD-10-PCS | Mod: S$PBB,,, | Performed by: UROLOGY

## 2019-07-31 PROCEDURE — 99999 PR PBB SHADOW E&M-EST. PATIENT-LVL II: CPT | Mod: PBBFAC,,, | Performed by: UROLOGY

## 2019-07-31 PROCEDURE — 99212 OFFICE O/P EST SF 10 MIN: CPT | Mod: PBBFAC,25 | Performed by: UROLOGY

## 2019-07-31 PROCEDURE — 51702 INSERT TEMP BLADDER CATH: CPT | Mod: PBBFAC | Performed by: UROLOGY

## 2019-07-31 NOTE — PROGRESS NOTES
CHIEF COMPLAINT:    Mr. Pitts is a 74 y.o. male presenting for an urgent visit for urinary retention.     PRESENTING ILLNESS:    Sreekanth Pitts Jr. is a 74 y.o. male who states he was on a train trip for 2 weeks and towards the end of it, developed diarrhea for several days.  He took Pepto Bismol and it improved but did not resolve.  When it did not go away completely, he also took Immodium with simethicone.  He has not had a bowel movement since Saturday, July 27th.  He only had a bm today because he did an enema.  This morning he urinated a little bit first thing in the morning but as the day progressed he developed lower abdominal pain and could not void.  He then came in urgently.  Bladder scan was 895 ml.  While he was here, he had a good bowel movement.      He is status post redo TURP on 9/11/2018     REVIEW OF SYSTEMS:    Review of Systems   Constitutional: Negative.    HENT: Negative.    Eyes: Negative.    Respiratory: Negative.    Cardiovascular: Negative.    Gastrointestinal: Positive for constipation and diarrhea.   Genitourinary:        Urinary retention   Musculoskeletal: Negative.    Skin: Negative.    Neurological: Negative.    Endo/Heme/Allergies: Negative.    Psychiatric/Behavioral: Negative.        PATIENT HISTORY:    Past Medical History:   Diagnosis Date    Allergy 12/3/2015    Arthritis     BPH without obstruction/lower urinary tract symptoms 01/04/2018    Degenerative disc disease     Hypertension     Nuclear sclerosis - Both Eyes 7/30/2012       Past Surgical History:   Procedure Laterality Date    BACK SURGERY  2002    BIOPSY-PROSTATE NEEDLE N/A 4/18/2017    [facility]    EYE FOREIGN BODY REMOVAL      childhood    EYE SURGERY      TRANSURETHRAL RESECTION OF PROSTATE      April 2017    TURP NO LASER N/A 4/18/2017    [facility]    TURP, WITHOUT USING LASER N/A 9/11/2018    Performed by Uma  CHAVO [facility] at [facility]       Family History   Problem Relation Age of Onset    Cataracts Mother     Hypertension Mother     Cancer Father     Heart disease Father     Heart disease Brother     Stroke Paternal Grandfather     Heart disease Paternal Grandfather        Social History     Socioeconomic History    Marital status:    Occupational History     Employer: [employer]   Tobacco Use    Smoking status: Former Smoker     Packs/day: 2.00     Years: 4.00     Pack years: 8.00     Types: Cigarettes, Cigars     Last attempt to quit: 1973     Years since quittin.6    Smokeless tobacco: Never Used   Substance and Sexual Activity    Alcohol use: Yes     Alcohol/week: 0.6 oz     Types: 1 Cans of beer per week     Comment: rarely    Drug use: No    Sexual activity: Never   Other Topics Concern    Not on file   Social History Narrative    , consulting firm , 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 wife (healthy) lives at home, 2 dtrs, 45, 41. frequ walkinhg each day.       Allergies:  Patient has no known allergies.    Medications:  Outpatient Encounter Medications as of 2019   Medication Sig Dispense Refill    acetaminophen (TYLENOL) 325 MG tablet Take 650 mg by mouth every 6 (six) hours as needed for Pain.      diphenhydramine-acetaminophen (TYLENOL PM)  mg Tab Take 1 tablet by mouth nightly as needed.      dutasteride (AVODART) 0.5 mg capsule TAKE ONE DAILY 30 capsule 6    ibuprofen (ADVIL,MOTRIN) 200 MG tablet Take 200 mg by mouth every 6 (six) hours as needed for Pain.      ibuprofen-diphenhydramine HCl (IBUPROFEN PM) 200-25 mg Cap Take 1 capsule by mouth every evening.      lisinopril (PRINIVIL,ZESTRIL) 20 MG tablet Take 1 tablet (20 mg total) by mouth once daily. 90 tablet 3    MULTIVITAMIN W-MINERALS/LUTEIN (CENTRUM SILVER ORAL) Take 1 tablet by mouth once daily.       omeprazole (PRILOSEC) 40 MG capsule TAKE ONE BY MOUTH EVERY MORNING TAKE 30 MINUTE  BEFORE EATING ON EMPTYSTOMACH 30 capsule 11    pneumoc 13-terese conj-dip cr,PF, (PREVNAR 13, PF,) 0.5 mL Syrg Inject into the muscle. 0.5 mL 0    tamsulosin (FLOMAX) 0.4 mg Cap TAKE ONE BY MOUTH EVERY EVENING 30 capsule 0    azelastine (ASTELIN) 137 mcg (0.1 %) nasal spray 1 spray (137 mcg total) by Nasal route 2 (two) times daily. 30 mL 0    polyethylene glycol (GLYCOLAX) 17 gram PwPk Take 17 g by mouth once daily. 30 packet 0     No facility-administered encounter medications on file as of 7/31/2019.          PHYSICAL EXAMINATION:    The patient generally appears in good health, is appropriately interactive, and is in no apparent distress.    Skin: No lesions.    Mental: Cooperative with normal affect.    Neuro: Grossly intact.    HEENT: Normal. No evidence of lymphadenopathy.    Chest:  normal inspiratory effort.    Abdomen: Soft, non-tender. No masses or organomegaly. Bladder is not palpable. No evidence of flank discomfort. No evidence of inguinal hernia.    Extremities: No clubbing, cyanosis, or edema    800 ml drained from the catheter.  Catheter was placed by the nurse.  16 Fr, went in easily.    Circumcised.     LABS:    Lab Results   Component Value Date    BUN 14 01/14/2019    CREATININE 1.1 01/14/2019           IMPRESSION:    Encounter Diagnoses   Name Primary?    Urinary retention Yes    Drug-induced constipation        PLAN:    1.  Will let the bladder rest for 5 days.  2.  Follow up on Monday am for catheter removal  3.  Discussed probiotics, fermented foods and eating foods high in fiber to help with the bowel movements.  No more meds either for constipation or diarrhea, unless he needs an enema.

## 2019-08-05 ENCOUNTER — CLINICAL SUPPORT (OUTPATIENT)
Dept: UROLOGY | Facility: CLINIC | Age: 74
End: 2019-08-05
Payer: MEDICARE

## 2019-08-05 VITALS
BODY MASS INDEX: 34.59 KG/M2 | HEART RATE: 84 BPM | HEIGHT: 73 IN | SYSTOLIC BLOOD PRESSURE: 124 MMHG | WEIGHT: 261 LBS | DIASTOLIC BLOOD PRESSURE: 78 MMHG

## 2019-08-05 PROCEDURE — 99999 PR PBB SHADOW E&M-EST. PATIENT-LVL III: CPT | Mod: PBBFAC,,, | Performed by: UROLOGY

## 2019-08-05 PROCEDURE — 99213 OFFICE O/P EST LOW 20 MIN: CPT | Mod: PBBFAC | Performed by: UROLOGY

## 2019-08-05 PROCEDURE — 99999 PR PBB SHADOW E&M-EST. PATIENT-LVL III: ICD-10-PCS | Mod: PBBFAC,,, | Performed by: UROLOGY

## 2019-08-05 NOTE — PROGRESS NOTES
Pt identified using two identifiers (name and ). 16FR posadas catheter in place, noted draining clear, yellow urine to leg bag. Balloned deflated, 10mL of water removed. 16FR posadas cath d/c with no problems. Pt instructed to drink plenty of fluids and to call back to clinic at no later than 2:00pm to report on urination. Pt voiced understanding.

## 2019-08-07 RX ORDER — TAMSULOSIN HYDROCHLORIDE 0.4 MG/1
CAPSULE ORAL
Qty: 30 CAPSULE | Refills: 0 | Status: SHIPPED | OUTPATIENT
Start: 2019-08-07 | End: 2019-09-10 | Stop reason: SDUPTHER

## 2019-08-19 ENCOUNTER — OFFICE VISIT (OUTPATIENT)
Dept: INTERNAL MEDICINE | Facility: CLINIC | Age: 74
End: 2019-08-19
Payer: MEDICARE

## 2019-08-19 VITALS
HEIGHT: 73 IN | HEART RATE: 73 BPM | SYSTOLIC BLOOD PRESSURE: 128 MMHG | BODY MASS INDEX: 33.57 KG/M2 | OXYGEN SATURATION: 95 % | DIASTOLIC BLOOD PRESSURE: 80 MMHG | WEIGHT: 253.31 LBS

## 2019-08-19 DIAGNOSIS — E78.1 HYPERTRIGLYCERIDEMIA: ICD-10-CM

## 2019-08-19 DIAGNOSIS — I10 ESSENTIAL HYPERTENSION: ICD-10-CM

## 2019-08-19 DIAGNOSIS — K21.9 GASTROESOPHAGEAL REFLUX DISEASE WITHOUT ESOPHAGITIS: ICD-10-CM

## 2019-08-19 DIAGNOSIS — Z11.59 NEED FOR HEPATITIS C SCREENING TEST: Primary | ICD-10-CM

## 2019-08-19 PROCEDURE — 99214 PR OFFICE/OUTPT VISIT, EST, LEVL IV, 30-39 MIN: ICD-10-PCS | Mod: S$PBB,,, | Performed by: INTERNAL MEDICINE

## 2019-08-19 PROCEDURE — 99999 PR PBB SHADOW E&M-EST. PATIENT-LVL III: CPT | Mod: PBBFAC,,, | Performed by: INTERNAL MEDICINE

## 2019-08-19 PROCEDURE — 99214 OFFICE O/P EST MOD 30 MIN: CPT | Mod: S$PBB,,, | Performed by: INTERNAL MEDICINE

## 2019-08-19 PROCEDURE — 99999 PR PBB SHADOW E&M-EST. PATIENT-LVL III: ICD-10-PCS | Mod: PBBFAC,,, | Performed by: INTERNAL MEDICINE

## 2019-08-19 PROCEDURE — 99213 OFFICE O/P EST LOW 20 MIN: CPT | Mod: PBBFAC | Performed by: INTERNAL MEDICINE

## 2019-08-19 RX ORDER — OMEPRAZOLE 40 MG/1
CAPSULE, DELAYED RELEASE ORAL
Qty: 90 CAPSULE | Refills: 11 | Status: SHIPPED | OUTPATIENT
Start: 2019-08-19 | End: 2020-10-13

## 2019-08-19 RX ORDER — LISINOPRIL 20 MG/1
20 TABLET ORAL DAILY
Qty: 90 TABLET | Refills: 3 | Status: SHIPPED | OUTPATIENT
Start: 2019-08-19 | End: 2020-08-24 | Stop reason: SDUPTHER

## 2019-08-19 NOTE — PROGRESS NOTES
Subjective:       Patient ID: Sreekanth Pitts Jr. is a 74 y.o. male.    Chief Complaint: Nausea (diarrhea for 10 days, then constipated but is now doing ok with stools but just wants to make sure he is ok)    Patient is here for followup for chronic conditions.    2 wk vacation, came back 7/16 and diarrhea commenced. Self treated with imodium and pepto, which may have caused urinary retention. Needed cath.  Now bowels are regular, tid and soft.  Urinating OK as well.  He has had recurrent urinary retention in past and has had TURP x2.    Lump on L side of neck wants examined. 6 mos present and no increase in size, not painful. No other lumps in body aware of.    AUREA symptoms well treated on PPI.  denies warning signs for GERD -- no dysphagia, no odynophagia, no wt loss, no blood in stool or melena, no fam hx of esophageal cancer.      Review of Systems   Constitutional: Negative for appetite change, diaphoresis, fever and unexpected weight change.   Respiratory: Negative for chest tightness and shortness of breath.    Cardiovascular: Negative for chest pain.   Gastrointestinal: Negative for abdominal distention, abdominal pain, nausea and vomiting.   Genitourinary: Negative for difficulty urinating, scrotal swelling and testicular pain.   Skin: Negative for rash.        No lesions   Psychiatric/Behavioral: Negative for sleep disturbance.       Objective:      Physical Exam   Constitutional: He is oriented to person, place, and time. He appears well-developed and well-nourished. No distress.   HENT:   Head: Normocephalic and atraumatic.   Eyes: No scleral icterus.   Neck: Normal range of motion. No thyromegaly present.   Pea sized nodule L lateral neck,  moveable, nontender, pea sized, not hard.     Cardiovascular: Normal rate, regular rhythm and normal heart sounds. Exam reveals no gallop and no friction rub.   No murmur heard.  Pulmonary/Chest: Effort normal and breath sounds normal. No respiratory distress. He  has no wheezes. He has no rales.   Abdominal: Soft. Bowel sounds are normal. He exhibits no distension and no mass. There is no tenderness. There is no rebound and no guarding.   Musculoskeletal: Normal range of motion. He exhibits no edema.   Lymphadenopathy:     He has no cervical adenopathy.   Neurological: He is alert and oriented to person, place, and time.   Skin: No lesion noted.   Psychiatric: He has a normal mood and affect. Thought content normal.       Assessment:       1. Need for hepatitis C screening test    2. Essential hypertension    3. Gastroesophageal reflux disease without esophagitis    4. Hypertriglyceridemia        Plan:       Sreekanth was seen today for nausea.    Diagnoses and all orders for this visit:    Need for hepatitis C screening test  -     Hepatitis C antibody; Future    Essential hypertension  -     CBC auto differential; Future  -     Comprehensive metabolic panel; Future  -     lisinopril (PRINIVIL,ZESTRIL) 20 MG tablet; Take 1 tablet (20 mg total) by mouth once daily.  Controlled, offered swicth to [facility] since 1 study possible link lung ca (but not clear). He declines switch.    Gastroesophageal reflux disease without esophagitis  -     omeprazole (PRILOSEC) 40 MG capsule; TAKE ONE BY MOUTH EVERY MORNING TAKE 30 MINUTE BEFORE EATING ON EMPTYSTOMACH  Ok to d.c and see how his symptoms are off. If symptoms of GERD recur he can restart.    Hypertriglyceridemia  -     Lipid panel; Future    Pea sized node L side of neck, he will track how it feels/size change    Episode of urinary retention -- try to d/c benadryl meds as well. If diarrhea again try just small dose of imodium or pepto not both.    Health Maintenance       Date Due Completion Date    Hepatitis C Screening 1945 ---    TETANUS VACCINE 05/06/1965 ---    Shingles Vaccine (2 of 3) 09/27/2012 8/2/2012    Influenza Vaccine (1) 08/01/2019 10/9/2018    Pneumococcal Vaccine (65+ Low/Medium Risk) (2 of 2 - PPSV23) 10/10/2019  10/10/2018    PROSTATE-SPECIFIC ANTIGEN 01/14/2020 1/14/2019    Colonoscopy 10/19/2022 10/19/2012    Lipid Panel 07/10/2023 7/10/2018          Follow up in about 6 months (around 2/19/2020).    No future appointments.

## 2019-09-11 RX ORDER — TAMSULOSIN HYDROCHLORIDE 0.4 MG/1
CAPSULE ORAL
Qty: 30 CAPSULE | Refills: 0 | Status: SHIPPED | OUTPATIENT
Start: 2019-09-11 | End: 2019-10-08 | Stop reason: SDUPTHER

## 2019-10-09 RX ORDER — TAMSULOSIN HYDROCHLORIDE 0.4 MG/1
CAPSULE ORAL
Qty: 90 CAPSULE | Refills: 0 | Status: SHIPPED | OUTPATIENT
Start: 2019-10-09 | End: 2019-11-14 | Stop reason: SDUPTHER

## 2019-11-12 RX ORDER — TAMSULOSIN HYDROCHLORIDE 0.4 MG/1
CAPSULE ORAL
Qty: 30 CAPSULE | Refills: 0 | OUTPATIENT
Start: 2019-11-12

## 2019-11-14 RX ORDER — TAMSULOSIN HYDROCHLORIDE 0.4 MG/1
CAPSULE ORAL
Qty: 90 CAPSULE | Refills: 3 | Status: SHIPPED | OUTPATIENT
Start: 2019-11-14 | End: 2020-01-27 | Stop reason: SDUPTHER

## 2019-11-14 NOTE — TELEPHONE ENCOUNTER
----- Message from Kathy Roche sent at 11/14/2019  1:34 PM CST -----  Rx Refill/Request     Is this a Refill or New Rx:  Refill  Rx Name and Strength:  tamsulosin (FLOMAX) 0.4 mg Cap  Preferred Pharmacy with phone number:   [facility] ([place]) - [place] - [address]  [address] [place] [address]  Phone: [phone] Fax: [phone]  Communication Preference: [phone]  Additional Information: pt stated pharmacy told him that he needed an appt before a refill can be done. He stated it this is true why no one called to schedule an appt. Please advise

## 2020-01-21 DIAGNOSIS — R33.9 URINARY RETENTION: ICD-10-CM

## 2020-01-21 RX ORDER — DUTASTERIDE 0.5 MG/1
CAPSULE, LIQUID FILLED ORAL
Qty: 30 CAPSULE | Refills: 7 | Status: SHIPPED | OUTPATIENT
Start: 2020-01-21 | End: 2020-01-27 | Stop reason: SDUPTHER

## 2020-01-24 ENCOUNTER — PATIENT OUTREACH (OUTPATIENT)
Dept: ADMINISTRATIVE | Facility: OTHER | Age: 75
End: 2020-01-24

## 2020-01-24 NOTE — PROGRESS NOTES
Care Everywhere: n/a  Immunization: updated  Health Maintenance: updated  Media Review: n/a  Legacy Review: n/a  Order placed: n/a  Upcoming appts:n/a

## 2020-01-26 NOTE — PROGRESS NOTES
CHIEF COMPLAINT:    Mr. Pitts is a 74 y.o. male presenting for an annual follow up.    PRESENTING ILLNESS:    Sreekanth Pitts Jr. is a 74 y.o. male who returns for follow up.  He states he is doing well.  He sometimes has a weak stream that sprays.  Others it is strong and he feels like he empties to completion.  He feels that he is doing well overall.     He is also being followed for a complex right renal Cyst, last imaged on 1/14/2019 which was stable.     REVIEW OF SYSTEMS:    Review of Systems   Constitutional: Negative.    HENT: Negative.    Eyes: Negative.    Respiratory: Negative.    Cardiovascular: Negative.    Gastrointestinal: Negative.    Genitourinary: Negative.    Musculoskeletal: Negative.    Skin: Negative.    Neurological: Negative.    Psychiatric/Behavioral: Negative.        PATIENT HISTORY:    Past Medical History:   Diagnosis Date    Allergy 12/3/2015    Arthritis     BPH without obstruction/lower urinary tract symptoms 01/04/2018    Degenerative disc disease     Hypertension     Nuclear sclerosis - Both Eyes 7/30/2012       Past Surgical History:   Procedure Laterality Date    BACK SURGERY  2002    EYE FOREIGN BODY REMOVAL      childhood    EYE SURGERY      TRANSURETHRAL RESECTION OF PROSTATE      April 2017    TRANSURETHRAL RESECTION OF PROSTATE (TURP) WITHOUT USE OF LASER N/A 9/11/2018    Procedure: TURP, WITHOUT USING LASER;  Surgeon: Uma Gaona MD;  Location: [facility] OR [address] [place] [address];  Service: Urology;  Laterality: N/A;  1.5 hours       Family History   Problem Relation Age of Onset    Cataracts Mother     Hypertension Mother     Cancer Father     Heart disease Father     Heart disease Brother     Stroke Paternal Grandfather     Heart disease Paternal Grandfather        Social History     Socioeconomic History    Marital status:    Occupational History     Employer: [employer]   Tobacco Use    Smoking status: Former Smoker     Packs/day: 2.00      Years: 4.00     Pack years: 8.00     Types: Cigarettes, Cigars     Last attempt to quit: 1973     Years since quittin.0    Smokeless tobacco: Never Used   Substance and Sexual Activity    Alcohol use: Yes     Alcohol/week: 1.0 standard drinks     Types: 1 Cans of beer per week     Comment: rarely    Drug use: No    Sexual activity: Never   Social History Narrative    , consulting firm , 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 wife (healthy) lives at home, 2 dtrs, 43, 41. frequ walkinhg each day.       Allergies:  Patient has no known allergies.    Medications:  Outpatient Encounter Medications as of 2020   Medication Sig Dispense Refill    acetaminophen (TYLENOL) 325 MG tablet Take 650 mg by mouth every 6 (six) hours as needed for Pain.      dutasteride (AVODART) 0.5 mg capsule TAKE ONE DAILY 30 capsule 7    ibuprofen (ADVIL,MOTRIN) 200 MG tablet Take 200 mg by mouth every 6 (six) hours as needed for Pain.      lisinopril (PRINIVIL,ZESTRIL) 20 MG tablet Take 1 tablet (20 mg total) by mouth once daily. 90 tablet 3    MULTIVITAMIN W-MINERALS/LUTEIN (CENTRUM SILVER ORAL) Take 1 tablet by mouth once daily.       omeprazole (PRILOSEC) 40 MG capsule TAKE ONE BY MOUTH EVERY MORNING TAKE 30 MINUTE BEFORE EATING ON EMPTYSTOMACH 90 capsule 11    pneumoc 13-etrese conj-dip cr,PF, (PREVNAR 13, PF,) 0.5 mL Syrg Inject into the muscle. 0.5 mL 0    polyethylene glycol (GLYCOLAX) 17 gram PwPk Take 17 g by mouth once daily. 30 packet 0    tamsulosin (FLOMAX) 0.4 mg Cap TAKE ONE BY MOUTH EVERY EVENING 90 capsule 3    azelastine (ASTELIN) 137 mcg (0.1 %) nasal spray 1 spray (137 mcg total) by Nasal route 2 (two) times daily. 30 mL 0     No facility-administered encounter medications on file as of 2020.          PHYSICAL EXAMINATION:    The patient generally appears in good health, is appropriately interactive, and is in no apparent distress.    Skin: No lesions.    Mental: Cooperative with normal  affect.    Neuro: Grossly intact.    HEENT: Normal. No evidence of lymphadenopathy.    Chest:  normal inspiratory effort.    Abdomen: Soft, non-tender. No masses or organomegaly. Bladder is not palpable. No evidence of flank discomfort. No evidence of inguinal hernia.    Extremities: No clubbing, cyanosis, or edema    Scrotum showed no rashes or lesions. Testicles showed no masses or tenderness.  Epididymis showed no masses or tenderness.  Penis was circumcised. No meatal stenosis. No penile discharge.  No inguinal hernias.  No inguinal lymphadenopathy.    RIK:  50 g prostate without nodularity.  Normal rectal tone, no anal masses.   PVR by bladder scan was 0 ml    LABS:    Lab Results   Component Value Date    BUN 17 08/19/2019    CREATININE 0.9 08/19/2019     Lab Results   Component Value Date    PSA 0.82 01/14/2019    PSA 1.9 12/28/2017    PSA 4.4 (H) 12/09/2016    PSADIAG 2.7 12/19/2013     IMPRESSION:    Encounter Diagnoses   Name Primary?    Multiple renal cysts Yes    Benign prostatic hyperplasia without lower urinary tract symptoms     Prostate cancer screening        PLAN:    1.  BMP, PSA today  2.  CT Abd with and without contrast  3.  Refilled the avodart and flomax.   4.  If above are stable, follow up in 1 year.

## 2020-01-27 ENCOUNTER — LAB VISIT (OUTPATIENT)
Dept: LAB | Facility: HOSPITAL | Age: 75
End: 2020-01-27
Attending: UROLOGY
Payer: MEDICARE

## 2020-01-27 ENCOUNTER — OFFICE VISIT (OUTPATIENT)
Dept: UROLOGY | Facility: CLINIC | Age: 75
End: 2020-01-27
Payer: MEDICARE

## 2020-01-27 VITALS
HEART RATE: 75 BPM | DIASTOLIC BLOOD PRESSURE: 73 MMHG | SYSTOLIC BLOOD PRESSURE: 121 MMHG | HEIGHT: 73 IN | WEIGHT: 260.13 LBS | BODY MASS INDEX: 34.47 KG/M2

## 2020-01-27 DIAGNOSIS — Q61.02 MULTIPLE RENAL CYSTS: ICD-10-CM

## 2020-01-27 DIAGNOSIS — N40.0 BENIGN PROSTATIC HYPERPLASIA WITHOUT LOWER URINARY TRACT SYMPTOMS: ICD-10-CM

## 2020-01-27 DIAGNOSIS — Z12.5 PROSTATE CANCER SCREENING: ICD-10-CM

## 2020-01-27 DIAGNOSIS — Q61.02 MULTIPLE RENAL CYSTS: Primary | ICD-10-CM

## 2020-01-27 DIAGNOSIS — R33.9 URINARY RETENTION: ICD-10-CM

## 2020-01-27 LAB
ANION GAP SERPL CALC-SCNC: 9 MMOL/L (ref 8–16)
BUN SERPL-MCNC: 17 MG/DL (ref 8–23)
CALCIUM SERPL-MCNC: 9.2 MG/DL (ref 8.7–10.5)
CHLORIDE SERPL-SCNC: 100 MMOL/L (ref 95–110)
CO2 SERPL-SCNC: 26 MMOL/L (ref 23–29)
COMPLEXED PSA SERPL-MCNC: 1.5 NG/ML (ref 0–4)
CREAT SERPL-MCNC: 1 MG/DL (ref 0.5–1.4)
EST. GFR  (AFRICAN AMERICAN): >60 ML/MIN/1.73 M^2
EST. GFR  (NON AFRICAN AMERICAN): >60 ML/MIN/1.73 M^2
GLUCOSE SERPL-MCNC: 92 MG/DL (ref 70–110)
POTASSIUM SERPL-SCNC: 4 MMOL/L (ref 3.5–5.1)
SODIUM SERPL-SCNC: 135 MMOL/L (ref 136–145)

## 2020-01-27 PROCEDURE — 1159F MED LIST DOCD IN RCRD: CPT | Mod: ,,, | Performed by: UROLOGY

## 2020-01-27 PROCEDURE — 1126F PR PAIN SEVERITY QUANTIFIED, NO PAIN PRESENT: ICD-10-PCS | Mod: ,,, | Performed by: UROLOGY

## 2020-01-27 PROCEDURE — 1126F AMNT PAIN NOTED NONE PRSNT: CPT | Mod: ,,, | Performed by: UROLOGY

## 2020-01-27 PROCEDURE — 36415 COLL VENOUS BLD VENIPUNCTURE: CPT

## 2020-01-27 PROCEDURE — 99999 PR PBB SHADOW E&M-EST. PATIENT-LVL III: ICD-10-PCS | Mod: PBBFAC,,, | Performed by: UROLOGY

## 2020-01-27 PROCEDURE — 80048 BASIC METABOLIC PNL TOTAL CA: CPT

## 2020-01-27 PROCEDURE — 84153 ASSAY OF PSA TOTAL: CPT

## 2020-01-27 PROCEDURE — 99213 PR OFFICE/OUTPT VISIT, EST, LEVL III, 20-29 MIN: ICD-10-PCS | Mod: S$PBB,,, | Performed by: UROLOGY

## 2020-01-27 PROCEDURE — 99213 OFFICE O/P EST LOW 20 MIN: CPT | Mod: S$PBB,,, | Performed by: UROLOGY

## 2020-01-27 PROCEDURE — 99213 OFFICE O/P EST LOW 20 MIN: CPT | Mod: PBBFAC,25 | Performed by: UROLOGY

## 2020-01-27 PROCEDURE — 51798 US URINE CAPACITY MEASURE: CPT | Mod: PBBFAC | Performed by: UROLOGY

## 2020-01-27 PROCEDURE — 81002 URINALYSIS NONAUTO W/O SCOPE: CPT | Mod: PBBFAC | Performed by: UROLOGY

## 2020-01-27 PROCEDURE — 99999 PR PBB SHADOW E&M-EST. PATIENT-LVL III: CPT | Mod: PBBFAC,,, | Performed by: UROLOGY

## 2020-01-27 PROCEDURE — 1159F PR MEDICATION LIST DOCUMENTED IN MEDICAL RECORD: ICD-10-PCS | Mod: ,,, | Performed by: UROLOGY

## 2020-01-27 RX ORDER — DUTASTERIDE 0.5 MG/1
CAPSULE, LIQUID FILLED ORAL
Qty: 90 CAPSULE | Refills: 3 | Status: SHIPPED | OUTPATIENT
Start: 2020-01-27 | End: 2020-02-19

## 2020-01-27 RX ORDER — TAMSULOSIN HYDROCHLORIDE 0.4 MG/1
CAPSULE ORAL
Qty: 90 CAPSULE | Refills: 3 | Status: SHIPPED | OUTPATIENT
Start: 2020-01-27 | End: 2021-03-15 | Stop reason: SDUPTHER

## 2020-01-29 ENCOUNTER — OFFICE VISIT (OUTPATIENT)
Dept: SLEEP MEDICINE | Facility: CLINIC | Age: 75
End: 2020-01-29
Payer: MEDICARE

## 2020-01-29 VITALS
HEIGHT: 73 IN | HEART RATE: 76 BPM | WEIGHT: 258.63 LBS | SYSTOLIC BLOOD PRESSURE: 137 MMHG | DIASTOLIC BLOOD PRESSURE: 74 MMHG | BODY MASS INDEX: 34.28 KG/M2

## 2020-01-29 DIAGNOSIS — G47.33 OBSTRUCTIVE SLEEP APNEA: Primary | ICD-10-CM

## 2020-01-29 DIAGNOSIS — I10 ESSENTIAL HYPERTENSION: ICD-10-CM

## 2020-01-29 PROCEDURE — 1159F MED LIST DOCD IN RCRD: CPT | Mod: ,,, | Performed by: NURSE PRACTITIONER

## 2020-01-29 PROCEDURE — 99999 PR PBB SHADOW E&M-EST. PATIENT-LVL III: ICD-10-PCS | Mod: PBBFAC,,, | Performed by: NURSE PRACTITIONER

## 2020-01-29 PROCEDURE — 99214 OFFICE O/P EST MOD 30 MIN: CPT | Mod: S$PBB,,, | Performed by: NURSE PRACTITIONER

## 2020-01-29 PROCEDURE — 99213 OFFICE O/P EST LOW 20 MIN: CPT | Mod: PBBFAC | Performed by: NURSE PRACTITIONER

## 2020-01-29 PROCEDURE — 99999 PR PBB SHADOW E&M-EST. PATIENT-LVL III: CPT | Mod: PBBFAC,,, | Performed by: NURSE PRACTITIONER

## 2020-01-29 PROCEDURE — 1126F PR PAIN SEVERITY QUANTIFIED, NO PAIN PRESENT: ICD-10-PCS | Mod: ,,, | Performed by: NURSE PRACTITIONER

## 2020-01-29 PROCEDURE — 1126F AMNT PAIN NOTED NONE PRSNT: CPT | Mod: ,,, | Performed by: NURSE PRACTITIONER

## 2020-01-29 PROCEDURE — 99214 PR OFFICE/OUTPT VISIT, EST, LEVL IV, 30-39 MIN: ICD-10-PCS | Mod: S$PBB,,, | Performed by: NURSE PRACTITIONER

## 2020-01-29 PROCEDURE — 1159F PR MEDICATION LIST DOCUMENTED IN MEDICAL RECORD: ICD-10-PCS | Mod: ,,, | Performed by: NURSE PRACTITIONER

## 2020-01-29 RX ORDER — HYDROGEN PEROXIDE 3 %
20 SOLUTION, NON-ORAL MISCELLANEOUS
COMMUNITY
End: 2020-10-13

## 2020-01-29 NOTE — PROGRESS NOTES
"This 74 y.o. male with STEPHIE returns for CPAP check and management.    Sleeps well undisturbed when mask on, wished he could get more than 5-6h sleep  1. STEPHIE - as a result of nightly CPAP use, he feels that his sleep is more consolidated and restful.    CPAP Interrogation ResMED machine AirSense 10, set 10-16 cm;  Large Eson nasal mask, 30/30d > 4h. AHI 1.9, 90% tile 11.9 cm; avg use 5 h/n; leak 58;     DME = former [facility]    PSG 9/21/15: AHI was 30.2 with an oxygen damián of 82.0%. Supine AHI was 102.5, and AHI on sides was 10.3 and 16.2. Initial improvement was noted at 8 cm, but higher pressures were needed during REM. Good control of sleep disordered breathing was seen during side position NREM and REM stages of sleep at a pressure of 10 cm of water. Upon turning supine, an effective pressure was not demonstrable due to onset of central apnea, frequent arousals, and sleep-wake transitions. An effective pressure was not demonstrated for supine position sleep       ROS: In addition to above, +sinus congestion, 3# loss.  Otherwise a balance review of 10-systems is negative.         EXAM:  /74   Pulse 76   Ht 6' 1" (1.854 m)   Wt 117.3 kg (258 lb 9.6 oz)   BMI 34.12 kg/m²   W/D,obese well groomed      IMPRESSION:   1. STEPHIE, severe, symptomatically improved by CPAP. He continues to be adherent to CPAP.Using therapy nightly. His sleep apnea appears to be effectively controlled, AHI <5.   His medical comorbidities are obestiy, HTN (optimal today)    PLAN:  1. Continue Auto PAP 10-16 cm. Continue nightly use.Switch from [facility] to [facility]. GET [facility]  And climate control hose  2. Encouraged continued weight loss efforts for potential improvement of STEPHIE and overall health benefits. See PCP re: HTN/continue med      "

## 2020-02-19 DIAGNOSIS — R33.9 URINARY RETENTION: ICD-10-CM

## 2020-02-19 RX ORDER — DUTASTERIDE 0.5 MG/1
CAPSULE, LIQUID FILLED ORAL
Qty: 30 CAPSULE | Refills: 11 | Status: SHIPPED | OUTPATIENT
Start: 2020-02-19 | End: 2021-03-02

## 2020-02-21 ENCOUNTER — HOSPITAL ENCOUNTER (OUTPATIENT)
Dept: RADIOLOGY | Facility: HOSPITAL | Age: 75
Discharge: HOME | End: 2020-02-21
Attending: UROLOGY
Payer: MEDICARE

## 2020-02-21 DIAGNOSIS — N40.0 BENIGN PROSTATIC HYPERPLASIA WITHOUT LOWER URINARY TRACT SYMPTOMS: ICD-10-CM

## 2020-02-21 DIAGNOSIS — Q61.02 MULTIPLE RENAL CYSTS: ICD-10-CM

## 2020-02-21 PROCEDURE — 74170 CT ABD WO CNTRST FLWD CNTRST: CPT | Mod: TC

## 2020-02-21 PROCEDURE — 74170 CT ABD WO CNTRST FLWD CNTRST: CPT | Mod: 26,,, | Performed by: RADIOLOGY

## 2020-02-21 PROCEDURE — 74170 CT ABDOMEN W WO CONTRAST: ICD-10-PCS | Mod: 26,,, | Performed by: RADIOLOGY

## 2020-02-21 PROCEDURE — 25500020 PHARM REV CODE 255: Performed by: UROLOGY

## 2020-02-21 RX ADMIN — IOHEXOL 100 ML: 350 INJECTION, SOLUTION INTRAVENOUS at 10:02

## 2020-04-27 ENCOUNTER — TELEPHONE (OUTPATIENT)
Dept: UROLOGY | Facility: CLINIC | Age: 75
End: 2020-04-27

## 2020-04-27 NOTE — TELEPHONE ENCOUNTER
Left detailed message advising pt to call [facility] pharmacy to initiate refill request as he does not need a new script. Current script good until 02/2021.

## 2020-04-27 NOTE — TELEPHONE ENCOUNTER
----- Message from Page Caldwell sent at 4/27/2020 10:49 AM CDT -----  Contact: pt  or     dutasteride (AVODART) 0.5 mg capsule  Pt is requesting a 90 day supply   [facility] ([place]) - [place] - [address]  [address] [place] [address]  Phone: [phone] Fax: [phone]

## 2020-07-17 DIAGNOSIS — Z71.89 COMPLEX CARE COORDINATION: ICD-10-CM

## 2020-07-22 ENCOUNTER — HOSPITAL ENCOUNTER (EMERGENCY)
Facility: HOSPITAL | Age: 75
Discharge: HOME | End: 2020-07-22
Attending: EMERGENCY MEDICINE
Payer: MEDICARE

## 2020-07-22 ENCOUNTER — NURSE TRIAGE (OUTPATIENT)
Dept: ADMINISTRATIVE | Facility: CLINIC | Age: 75
End: 2020-07-22

## 2020-07-22 VITALS
SYSTOLIC BLOOD PRESSURE: 132 MMHG | HEIGHT: 73 IN | RESPIRATION RATE: 18 BRPM | DIASTOLIC BLOOD PRESSURE: 86 MMHG | WEIGHT: 260 LBS | HEART RATE: 97 BPM | TEMPERATURE: 99 F | BODY MASS INDEX: 34.46 KG/M2

## 2020-07-22 DIAGNOSIS — S31.31XA LACERATION OF SCROTUM, INITIAL ENCOUNTER: Primary | ICD-10-CM

## 2020-07-22 PROCEDURE — 99284 EMERGENCY DEPT VISIT MOD MDM: CPT | Mod: 25,,, | Performed by: EMERGENCY MEDICINE

## 2020-07-22 PROCEDURE — 99284 PR EMERGENCY DEPT VISIT,LEVEL IV: ICD-10-PCS | Mod: 25,,, | Performed by: EMERGENCY MEDICINE

## 2020-07-22 PROCEDURE — 12001 RPR S/N/AX/GEN/TRNK 2.5CM/<: CPT | Mod: ,,, | Performed by: EMERGENCY MEDICINE

## 2020-07-22 PROCEDURE — 12001 PR RESUPERF WND BODY <2.5CM: ICD-10-PCS | Mod: ,,, | Performed by: EMERGENCY MEDICINE

## 2020-07-22 PROCEDURE — 25000003 PHARM REV CODE 250: Performed by: EMERGENCY MEDICINE

## 2020-07-22 PROCEDURE — 99283 EMERGENCY DEPT VISIT LOW MDM: CPT

## 2020-07-22 RX ORDER — IBUPROFEN 200 MG
800 TABLET ORAL DAILY
COMMUNITY
End: 2021-02-12

## 2020-07-22 RX ORDER — SILVER NITRATE 38.21; 12.74 MG/1; MG/1
1 STICK TOPICAL
Status: COMPLETED | OUTPATIENT
Start: 2020-07-22 | End: 2020-07-22

## 2020-07-22 RX ADMIN — SILVER NITRATE 1 APPLICATOR: 38.21; 12.74 STICK TOPICAL at 07:07

## 2020-07-22 NOTE — TELEPHONE ENCOUNTER
Patient in route to ER.    Reason for Disposition   Patient already left for the hospital/clinic.    Protocols used: NO CONTACT OR DUPLICATE CONTACT CALL-A-AH

## 2020-07-22 NOTE — ED PROVIDER NOTES
Encounter Date: 7/22/2020    SCRIBE #1 NOTE: I, Joe Piper, am scribing for, and in the presence of,  Varinder Stewart MD. I have scribed the following portions of the note - Other sections scribed: ROS, PE.       History     Chief Complaint   Patient presents with    Male  Problem     i  have a vein on my scrotom that is bleeding     74 yo M with pmhx HTN, BPH presents with a chief complaint of bleeding scrotum.  Patient was in the shower 1 hr prior when he felt there was a pubic hair that he pulled and noticed that after he pulled that, it was overlying a vein and continued to bleed.  Patient denies any pain.  No lightheadedness.  No anticoagulant use.  He does use NSAIDs.  No history of similar symptoms.    The history is provided by the patient and medical records.     Review of patient's allergies indicates:  No Known Allergies  Past Medical History:   Diagnosis Date    Allergy 12/3/2015    Arthritis     BPH without obstruction/lower urinary tract symptoms 01/04/2018    Degenerative disc disease     Hypertension     Nuclear sclerosis - Both Eyes 7/30/2012     Past Surgical History:   Procedure Laterality Date    BACK SURGERY  2002    EYE FOREIGN BODY REMOVAL      childhood    EYE SURGERY      TRANSURETHRAL RESECTION OF PROSTATE      April 2017    TRANSURETHRAL RESECTION OF PROSTATE (TURP) WITHOUT USE OF LASER N/A 9/11/2018    Procedure: TURP, WITHOUT USING LASER;  Surgeon: Uma Gaona MD;  Location: [facility] OR [address] [place] [address];  Service: Urology;  Laterality: N/A;  1.5 hours     Family History   Problem Relation Age of Onset    Cataracts Mother     Hypertension Mother     Cancer Father     Heart disease Father     Heart disease Brother     Stroke Paternal Grandfather     Heart disease Paternal Grandfather      Social History     Tobacco Use    Smoking status: Former Smoker     Packs/day: 2.00     Years: 4.00     Pack years: 8.00     Types: Cigarettes, Cigars     Quit date: 1/1/1973     Years  since quittin.5    Smokeless tobacco: Never Used   Substance Use Topics    Alcohol use: Yes     Alcohol/week: 1.0 standard drinks     Types: 1 Cans of beer per week     Comment: rarely    Drug use: No     Review of Systems   Constitutional: Negative for fever.   Genitourinary: Negative for genital sores, penile swelling and scrotal swelling.        Positive for scrotal bleeding   Skin:        Pos Bleeding scrotum   Neurological: Negative for light-headedness.       Physical Exam     Initial Vitals [20 1802]   BP Pulse Resp Temp SpO2   132/86 97 18 99.1 °F (37.3 °C) --      MAP       --         Physical Exam    Nursing note and vitals reviewed.  Constitutional: He appears well-developed and well-nourished. He is not diaphoretic. No distress.   HENT:   Head: Normocephalic.   Neck: Neck supple.   Cardiovascular: Normal rate.   Pulmonary/Chest: No respiratory distress.   Genitourinary:    Penis normal.      Genitourinary Comments: 2 mm oozing area overlying vein in the patient's scrotum, nonpulsatile, no tenderness or crepitus to palpation throughout scrotum     Neurological: He is alert.   Skin: Skin is warm.         ED Course   Lac Repair    Date/Time: 2020 7:08 PM  Performed by: Varinder Stewart MD  Authorized by: Varinder Stewart MD   Body area: anogenital  Location details: scrotal wall  Laceration length: 0.2 cm  Wound skin closure material used: silver nitrate stick.  Approximation difficulty: simple  Dressing: bulky dressing  Patient tolerance: Patient tolerated the procedure well with no immediate complications        Labs Reviewed - No data to display       Imaging Results    None          Medical Decision Making:   History:   Old Medical Records: I decided to obtain old medical records.  Old Records Summarized: records from clinic visits.  Initial Assessment:   76 yo M with pmhx HTN, BPH presents with a chief complaint of bleeding scrotum.  Differential Diagnosis:   Scrotum laceration  ED  Management:  Bleeding controlled with pressure and silver nitrate application as above.  Patient provided with return precautions.  No evidence of infection or Biju's.            Scribe Attestation:   Scribe #1: I performed the above scribed service and the documentation accurately describes the services I performed. I attest to the accuracy of the note.    Attending Attestation:           Physician Attestation for Scribe:      Comments: I, Dr. Varinder Stewart, personally performed the services described in this documentation. All medical record entries made by the scribe were at my direction and in my presence.  I have reviewed the chart and agree that the record reflects my personal performance and is accurate and complete. Varinder Stewart MD.  7:17 PM 07/22/2020                            Clinical Impression:       ICD-10-CM ICD-9-CM   1. Laceration of scrotum, initial encounter  S31.31XA 878.2             ED Disposition Condition    Discharge Stable        ED Prescriptions     None        Follow-up Information    None                                    Varinder Stewart MD  07/22/20 1917

## 2020-07-22 NOTE — ED TRIAGE NOTES
Patient states he was in shower, pulled a pubic hair, started bleeding. States bled x 15 min. Has not checked since he placed bladder pad. No blood thinners, does take Advil daily.

## 2020-07-22 NOTE — ED NOTES
Patient identifiers verified and correct for Mr Pitts  C/C: Bleeding from scrotum SEE NN  APPEARANCE: awake and alert in NAD.  SKIN: warm, dry and intact. No breakdown or bruising.Scrotum not observed, per physician, cont oozing blood,.   MUSCULOSKELETAL: Patient moving all extremities spontaneously, no obvious swelling or deformities noted. Ambulates independently.  RESPIRATORY: Denies shortness of breath.Respirations unlabored.   CARDIAC: Denies CP, 2+ distal pulses; no peripheral edema  ABDOMEN: S/ND/NT, Denies nausea  : voids spontaneously, denies difficulty  Neurologic: AAO x 4; follows commands equal strength in all extremities; denies numbness/tingling. Denies dizziness Denies weakness

## 2020-07-23 NOTE — DISCHARGE INSTRUCTIONS
If the bleeding returns, apply firm pressure for 15 minutes.  Return to the emergency department for any fever, red streaking up your scrotum, or other concerning symptoms.

## 2020-08-07 ENCOUNTER — TELEPHONE (OUTPATIENT)
Dept: INTERNAL MEDICINE | Facility: CLINIC | Age: 75
End: 2020-08-07

## 2020-08-07 DIAGNOSIS — K21.9 GASTROESOPHAGEAL REFLUX DISEASE WITHOUT ESOPHAGITIS: ICD-10-CM

## 2020-08-07 DIAGNOSIS — I10 ESSENTIAL HYPERTENSION: Primary | ICD-10-CM

## 2020-08-07 DIAGNOSIS — E78.1 HYPERTRIGLYCERIDEMIA: ICD-10-CM

## 2020-08-07 DIAGNOSIS — Z12.5 SCREENING PSA (PROSTATE SPECIFIC ANTIGEN): ICD-10-CM

## 2020-08-07 NOTE — TELEPHONE ENCOUNTER
----- Message from Mary Da Silva sent at 8/7/2020 10:37 AM CDT -----  Regarding: order  Contact: Patient  [phone]  Doctor appointment and lab have been scheduled.  Please link lab orders to the lab appointment.  Date of doctor appointment:  8/24/2020  Date of lab appointment:  08/19/2020  Physical or EP: physical

## 2020-08-10 ENCOUNTER — PATIENT OUTREACH (OUTPATIENT)
Dept: ADMINISTRATIVE | Facility: HOSPITAL | Age: 75
End: 2020-08-10

## 2020-08-10 NOTE — TELEPHONE ENCOUNTER
Hi, please contact the patient to assist in scheduling    Orders Placed This Encounter    CBC auto differential    Comprehensive metabolic panel    Lipid Panel       Thank you, Torsten Bejarano

## 2020-08-10 NOTE — PROGRESS NOTES
Health Maintenance Due   Topic Date Due    TETANUS VACCINE  05/06/1965    Shingles Vaccine (2 of 3) 09/27/2012    Pneumococcal Vaccine (65+ Low/Medium Risk) (2 of 2 - PPSV23) 10/10/2019     Chart review completed.

## 2020-08-19 ENCOUNTER — LAB VISIT (OUTPATIENT)
Dept: LAB | Facility: HOSPITAL | Age: 75
End: 2020-08-19
Attending: INTERNAL MEDICINE
Payer: MEDICARE

## 2020-08-19 DIAGNOSIS — E78.1 HYPERTRIGLYCERIDEMIA: ICD-10-CM

## 2020-08-19 DIAGNOSIS — I10 ESSENTIAL HYPERTENSION: ICD-10-CM

## 2020-08-19 LAB
ALBUMIN SERPL BCP-MCNC: 4.1 G/DL (ref 3.5–5.2)
ALP SERPL-CCNC: 62 U/L (ref 55–135)
ALT SERPL W/O P-5'-P-CCNC: 25 U/L (ref 10–44)
ANION GAP SERPL CALC-SCNC: 9 MMOL/L (ref 8–16)
AST SERPL-CCNC: 25 U/L (ref 10–40)
BASOPHILS # BLD AUTO: 0.05 K/UL (ref 0–0.2)
BASOPHILS NFR BLD: 0.8 % (ref 0–1.9)
BILIRUB SERPL-MCNC: 0.8 MG/DL (ref 0.1–1)
BUN SERPL-MCNC: 17 MG/DL (ref 8–23)
CALCIUM SERPL-MCNC: 9.2 MG/DL (ref 8.7–10.5)
CHLORIDE SERPL-SCNC: 107 MMOL/L (ref 95–110)
CHOLEST SERPL-MCNC: 161 MG/DL (ref 120–199)
CHOLEST/HDLC SERPL: 4.7 {RATIO} (ref 2–5)
CO2 SERPL-SCNC: 25 MMOL/L (ref 23–29)
CREAT SERPL-MCNC: 0.9 MG/DL (ref 0.5–1.4)
DIFFERENTIAL METHOD: NORMAL
EOSINOPHIL # BLD AUTO: 0.1 K/UL (ref 0–0.5)
EOSINOPHIL NFR BLD: 1.8 % (ref 0–8)
ERYTHROCYTE [DISTWIDTH] IN BLOOD BY AUTOMATED COUNT: 12.9 % (ref 11.5–14.5)
EST. GFR  (AFRICAN AMERICAN): >60 ML/MIN/1.73 M^2
EST. GFR  (NON AFRICAN AMERICAN): >60 ML/MIN/1.73 M^2
GLUCOSE SERPL-MCNC: 91 MG/DL (ref 70–110)
HCT VFR BLD AUTO: 47 % (ref 40–54)
HDLC SERPL-MCNC: 34 MG/DL (ref 40–75)
HDLC SERPL: 21.1 % (ref 20–50)
HGB BLD-MCNC: 15.3 G/DL (ref 14–18)
IMM GRANULOCYTES # BLD AUTO: 0.02 K/UL (ref 0–0.04)
IMM GRANULOCYTES NFR BLD AUTO: 0.3 % (ref 0–0.5)
LDLC SERPL CALC-MCNC: 104.8 MG/DL (ref 63–159)
LYMPHOCYTES # BLD AUTO: 1.6 K/UL (ref 1–4.8)
LYMPHOCYTES NFR BLD: 24.2 % (ref 18–48)
MCH RBC QN AUTO: 30.2 PG (ref 27–31)
MCHC RBC AUTO-ENTMCNC: 32.6 G/DL (ref 32–36)
MCV RBC AUTO: 93 FL (ref 82–98)
MONOCYTES # BLD AUTO: 0.7 K/UL (ref 0.3–1)
MONOCYTES NFR BLD: 10.6 % (ref 4–15)
NEUTROPHILS # BLD AUTO: 4 K/UL (ref 1.8–7.7)
NEUTROPHILS NFR BLD: 62.3 % (ref 38–73)
NONHDLC SERPL-MCNC: 127 MG/DL
NRBC BLD-RTO: 0 /100 WBC
PLATELET # BLD AUTO: 273 K/UL (ref 150–350)
PMV BLD AUTO: 10.1 FL (ref 9.2–12.9)
POTASSIUM SERPL-SCNC: 3.9 MMOL/L (ref 3.5–5.1)
PROT SERPL-MCNC: 7 G/DL (ref 6–8.4)
RBC # BLD AUTO: 5.07 M/UL (ref 4.6–6.2)
SODIUM SERPL-SCNC: 141 MMOL/L (ref 136–145)
TRIGL SERPL-MCNC: 111 MG/DL (ref 30–150)
WBC # BLD AUTO: 6.49 K/UL (ref 3.9–12.7)

## 2020-08-19 PROCEDURE — 85025 COMPLETE CBC W/AUTO DIFF WBC: CPT

## 2020-08-19 PROCEDURE — 80053 COMPREHEN METABOLIC PANEL: CPT

## 2020-08-19 PROCEDURE — 36415 COLL VENOUS BLD VENIPUNCTURE: CPT | Mod: PO

## 2020-08-19 PROCEDURE — 80061 LIPID PANEL: CPT

## 2020-08-24 ENCOUNTER — IMMUNIZATION (OUTPATIENT)
Dept: PHARMACY | Facility: CLINIC | Age: 75
End: 2020-08-24
Payer: MEDICARE

## 2020-08-24 ENCOUNTER — OFFICE VISIT (OUTPATIENT)
Dept: INTERNAL MEDICINE | Facility: CLINIC | Age: 75
End: 2020-08-24
Payer: MEDICARE

## 2020-08-24 VITALS
BODY MASS INDEX: 32.96 KG/M2 | DIASTOLIC BLOOD PRESSURE: 78 MMHG | HEIGHT: 74 IN | HEART RATE: 82 BPM | WEIGHT: 256.81 LBS | OXYGEN SATURATION: 97 % | SYSTOLIC BLOOD PRESSURE: 114 MMHG

## 2020-08-24 DIAGNOSIS — Z91.89 MULTIPLE RISK FACTORS FOR CORONARY ARTERY DISEASE: ICD-10-CM

## 2020-08-24 DIAGNOSIS — I10 ESSENTIAL HYPERTENSION: ICD-10-CM

## 2020-08-24 DIAGNOSIS — R13.19 ESOPHAGEAL DYSPHAGIA: ICD-10-CM

## 2020-08-24 DIAGNOSIS — K21.9 GASTROESOPHAGEAL REFLUX DISEASE WITHOUT ESOPHAGITIS: Primary | ICD-10-CM

## 2020-08-24 PROCEDURE — 99215 OFFICE O/P EST HI 40 MIN: CPT | Mod: PBBFAC | Performed by: INTERNAL MEDICINE

## 2020-08-24 PROCEDURE — 99214 PR OFFICE/OUTPT VISIT, EST, LEVL IV, 30-39 MIN: ICD-10-PCS | Mod: S$PBB,,, | Performed by: INTERNAL MEDICINE

## 2020-08-24 PROCEDURE — 99999 PR PBB SHADOW E&M-EST. PATIENT-LVL V: CPT | Mod: PBBFAC,,, | Performed by: INTERNAL MEDICINE

## 2020-08-24 PROCEDURE — 99999 PR PBB SHADOW E&M-EST. PATIENT-LVL V: ICD-10-PCS | Mod: PBBFAC,,, | Performed by: INTERNAL MEDICINE

## 2020-08-24 PROCEDURE — 99214 OFFICE O/P EST MOD 30 MIN: CPT | Mod: S$PBB,,, | Performed by: INTERNAL MEDICINE

## 2020-08-24 RX ORDER — LISINOPRIL 20 MG/1
20 TABLET ORAL DAILY
Qty: 90 TABLET | Refills: 3 | Status: SHIPPED | OUTPATIENT
Start: 2020-08-24 | End: 2021-07-08

## 2020-08-24 RX ORDER — ATORVASTATIN CALCIUM 10 MG/1
10 TABLET, FILM COATED ORAL DAILY
Qty: 90 TABLET | Refills: 3 | Status: SHIPPED | OUTPATIENT
Start: 2020-08-24 | End: 2021-07-08

## 2020-08-24 NOTE — PROGRESS NOTES
Subjective:       Patient ID: Sreekanth Pitts Jr. is a 75 y.o. male.    Chief Complaint: Annual Exam    Patient is here for followup for chronic conditions.    Back pain improved with new mattress.    Does get AUREA symptoms on occ. occ food even sticks, possibly every 2-3 weeks.  Denies pther warning signs for GERD -- no odynophagia, no wt loss, no blood in stool or melena, no fam hx of esophageal cancer.        Review of Systems   Constitutional: Negative for appetite change, diaphoresis, fever and unexpected weight change.   Respiratory: Negative for chest tightness and shortness of breath.    Cardiovascular: Negative for chest pain.   Gastrointestinal: Negative for abdominal distention, abdominal pain, nausea and vomiting.   Genitourinary: Negative for difficulty urinating, scrotal swelling and testicular pain.   Skin: Negative for rash.        No lesions   Psychiatric/Behavioral: Negative for sleep disturbance.           Objective:      Physical Exam  Constitutional:       General: He is not in acute distress.     Appearance: He is well-developed.   HENT:      Head: Normocephalic and atraumatic.   Eyes:      General: No scleral icterus.  Neck:      Musculoskeletal: Normal range of motion.      Thyroid: No thyromegaly.      Comments: Pea sized nodule L lateral neck,  moveable, nontender, pea sized, not hard (stable)    Cardiovascular:      Rate and Rhythm: Normal rate and regular rhythm.      Heart sounds: Normal heart sounds. No murmur. No friction rub. No gallop.    Pulmonary:      Effort: Pulmonary effort is normal. No respiratory distress.      Breath sounds: Normal breath sounds. No wheezing or rales.   Abdominal:      General: Bowel sounds are normal. There is no distension.      Palpations: Abdomen is soft. There is no mass.      Tenderness: There is no abdominal tenderness. There is no guarding or rebound.   Musculoskeletal: Normal range of motion.   Lymphadenopathy:      Cervical: No cervical adenopathy.    Skin:     Findings: No lesion.   Neurological:      Mental Status: He is alert and oriented to person, place, and time.   Psychiatric:         Thought Content: Thought content normal.         Assessment:       1. Gastroesophageal reflux disease without esophagitis    2. Essential hypertension    3. Esophageal dysphagia    4. Multiple risk factors for coronary artery disease        Plan:       Sreekanth was seen today for annual exam.    Diagnoses and all orders for this visit:    Gastroesophageal reflux disease without esophagitis  -     Ambulatory referral/consult to Gastroenterology; Future    Essential hypertension  -     lisinopriL (PRINIVIL,ZESTRIL) 20 MG tablet; Take 1 tablet (20 mg total) by mouth once daily.  controlled    Esophageal dysphagia  -     Ambulatory referral/consult to Gastroenterology; Future  He requests to see Dr Mckinney    Multiple risk factors for coronary artery disease  -     atorvastatin (LIPITOR) 10 MG tablet; Take 1 tablet (10 mg total) by mouth once daily.  The 10-year ASCVD risk score (Juan F CRUZ Jr., et al., 2013) is: 22.2%    Values used to calculate the score:      Age: 75 years      Sex: Male      Is Non- : No      Diabetic: No      Tobacco smoker: No      Systolic Blood Pressure: 114 mmHg      Is BP treated: Yes      HDL Cholesterol: 34 mg/dL      Total Cholesterol: 161 mg/dL      Health Maintenance       Date Due Completion Date    TETANUS VACCINE 05/06/1965 ---    Shingles Vaccine (2 of 3) 09/27/2012 8/2/2012    Pneumococcal Vaccine (65+ Low/Medium Risk) (2 of 2 - PPSV23) 10/10/2019 10/10/2018    Influenza Vaccine (1) 09/01/2020 9/30/2019    PROSTATE-SPECIFIC ANTIGEN 01/27/2021 1/27/2020    Colorectal Cancer Screening 10/19/2022 10/19/2012    Lipid Panel 08/19/2025 8/19/2020          Follow up in about 1 year (around 8/24/2021) for Shingles vaccine please.    No future appointments.

## 2020-08-27 ENCOUNTER — TELEPHONE (OUTPATIENT)
Dept: GASTROENTEROLOGY | Facility: CLINIC | Age: 75
End: 2020-08-27

## 2020-09-29 ENCOUNTER — PATIENT MESSAGE (OUTPATIENT)
Dept: OTHER | Facility: OTHER | Age: 75
End: 2020-09-29

## 2020-10-12 ENCOUNTER — PATIENT OUTREACH (OUTPATIENT)
Dept: ADMINISTRATIVE | Facility: OTHER | Age: 75
End: 2020-10-12

## 2020-10-12 NOTE — PROGRESS NOTES
LINKS immunization registry not responding  Care Everywhere updated  Health Maintenance updated  Chart reviewed for overdue Proactive [facility] Encounters (SOTERO) health maintenance testing (CRS, Breast Ca, Diabetic Eye Exam)   Orders entered:N/A   Trilobed Flap Text: The defect edges were debeveled with a #15 scalpel blade.  Given the location of the defect and the proximity to free margins a trilobed flap was deemed most appropriate.  Using a sterile surgical marker, an appropriate trilobed flap drawn around the defect.    The area thus outlined was incised deep to adipose tissue with a #15 scalpel blade.  The skin margins were undermined to an appropriate distance in all directions utilizing iris scissors.

## 2020-10-13 ENCOUNTER — OFFICE VISIT (OUTPATIENT)
Dept: GASTROENTEROLOGY | Facility: CLINIC | Age: 75
End: 2020-10-13
Payer: MEDICARE

## 2020-10-13 ENCOUNTER — TELEPHONE (OUTPATIENT)
Dept: ENDOSCOPY | Facility: HOSPITAL | Age: 75
End: 2020-10-13

## 2020-10-13 VITALS
DIASTOLIC BLOOD PRESSURE: 71 MMHG | WEIGHT: 261.25 LBS | BODY MASS INDEX: 33.53 KG/M2 | HEIGHT: 74 IN | HEART RATE: 81 BPM | SYSTOLIC BLOOD PRESSURE: 110 MMHG

## 2020-10-13 DIAGNOSIS — K21.9 GASTROESOPHAGEAL REFLUX DISEASE WITHOUT ESOPHAGITIS: ICD-10-CM

## 2020-10-13 DIAGNOSIS — R13.19 ESOPHAGEAL DYSPHAGIA: ICD-10-CM

## 2020-10-13 DIAGNOSIS — Z79.899 ENCOUNTER FOR MONITORING LONG-TERM PROTON PUMP INHIBITOR THERAPY: ICD-10-CM

## 2020-10-13 DIAGNOSIS — K21.9 GASTROESOPHAGEAL REFLUX DISEASE, UNSPECIFIED WHETHER ESOPHAGITIS PRESENT: Primary | ICD-10-CM

## 2020-10-13 DIAGNOSIS — Z51.81 ENCOUNTER FOR MONITORING LONG-TERM PROTON PUMP INHIBITOR THERAPY: ICD-10-CM

## 2020-10-13 DIAGNOSIS — Z01.812 PRE-PROCEDURE LAB EXAM: Primary | ICD-10-CM

## 2020-10-13 DIAGNOSIS — G47.30 SLEEP APNEA, UNSPECIFIED TYPE: ICD-10-CM

## 2020-10-13 DIAGNOSIS — E66.9 OBESITY (BMI 30.0-34.9): ICD-10-CM

## 2020-10-13 PROCEDURE — 99999 PR PBB SHADOW E&M-EST. PATIENT-LVL IV: ICD-10-PCS | Mod: PBBFAC,,, | Performed by: INTERNAL MEDICINE

## 2020-10-13 PROCEDURE — 99204 OFFICE O/P NEW MOD 45 MIN: CPT | Mod: S$PBB,,, | Performed by: INTERNAL MEDICINE

## 2020-10-13 PROCEDURE — 99204 PR OFFICE/OUTPT VISIT, NEW, LEVL IV, 45-59 MIN: ICD-10-PCS | Mod: S$PBB,,, | Performed by: INTERNAL MEDICINE

## 2020-10-13 PROCEDURE — 99214 OFFICE O/P EST MOD 30 MIN: CPT | Mod: PBBFAC | Performed by: INTERNAL MEDICINE

## 2020-10-13 PROCEDURE — 99999 PR PBB SHADOW E&M-EST. PATIENT-LVL IV: CPT | Mod: PBBFAC,,, | Performed by: INTERNAL MEDICINE

## 2020-10-13 RX ORDER — PANTOPRAZOLE SODIUM 40 MG/1
40 TABLET, DELAYED RELEASE ORAL
Qty: 90 TABLET | Refills: 3 | Status: SHIPPED | OUTPATIENT
Start: 2020-10-13 | End: 2021-07-08

## 2020-10-13 NOTE — LETTER
October 13, 2020      Torsten Bejarano MD  [address]  [facility] [address]           [facility] [address]th [address]  [address]  [facility] [address]  Phone: [phone]  Fax: [phone]          Patient: Sreekanth Pitts Jr.   MR Number: [id number]   YOB: 1945   Date of Visit: 10/13/2020       Dear Dr. Torsten Bejaarno:    Thank you for referring Sreekanth Pitts to me for evaluation. Attached you will find relevant portions of my assessment and plan of care.    If you have questions, please do not hesitate to call me. I look forward to following Sreekanth Pitts along with you.    Sincerely,    Dion Mckinney MD    Enclosure  CC:  No Recipients    If you would like to receive this communication electronically, please contact [email or web address] or [phone] to request more information on [employer] Link access.    For providers and/or their staff who would like to refer a patient to [facility], please contact us through our one-stop-shop provider referral line, [facility], at [phone].    If you feel you have received this communication in error or would no longer like to receive these types of communications, please e-mail [email or web address]

## 2020-10-13 NOTE — Clinical Note
Kym please schedule patient for 10 hr fasting labs tomorrow Wednesday October 14, 2020 at 8[facility]00 [facility].m. [facility].  Orders placed    Orders for EGD placed    Return GI clinic in 3 months for follow-up of GERD and dysphagia

## 2020-10-13 NOTE — PROGRESS NOTES
[facility] Consultation Note    Reason for Consult:  The primary encounter diagnosis was Gastroesophageal reflux disease, unspecified whether esophagitis present. Diagnoses of Gastroesophageal reflux disease without esophagitis, Esophageal dysphagia, Encounter for monitoring long-term proton pump inhibitor therapy, Sleep apnea, unspecified type, and Obesity (BMI 30.0-34.9) were also pertinent to this visit.    PCP:   Torsten Bejarano   [address] [facility] [address]    Referring MD:  Torsten Bejarano Md  [address] [place] [address]    Initial History of Present Illness (HPI):  This is a 75 y.o. male here for evaluation of longstanding GERD and occasional intermittent solid food dysphagia but no food impactions.  Patient states he was given a prescription for a PPI maybe 2 years ago but really has not been taking at all he took it for 90 days maybe 2 years ago but has not really refilled it.  No food impactions no weight loss no odynophagia he does have sleep apnea he has GERD several times a week.  He drinks 2 alcoholic beverages a week usually a glass of wine he says but never in excess.  No family history of esophageal cancer or Loaiza's esophagus no family history of stomach or small bowel cancer no family history of colon cancer or advanced colon adenomas polyps no family history of pancreatitis or pancreatic cancer no family history of liver disease or liver cancer or viral hepatitis is 1 time screen for hep C was negative.  He does not know if he has immunity to hepatitis A or B.  Not having any diarrhea no change in bowel habits not anemic no blood in his stool.  No change in bowel habits.  His last colonoscopy was normal in up-to-date.    Abdominal pain - no  Reflux - as a  Dysphagia - occasion  Bowel habits - normal  GI bleeding - none  NSAID usage - occasion    Interval HPI 10/13/2020:  The patient's last visit with me was on Visit date not  found.      ROS:  Constitutional: No fevers, chills, No weight loss  ENT:  As above heartburn as a dysphagia no odynophagia no hoarseness  CV: No chest pain, no palpitation  Pulm: No cough, No shortness of breath, no wheezing, sleep apnea  Ophtho: No vision changes  GI: see HPI  Derm: No rash, no itching  Heme: No lymphadenopathy, No easy bruising  MSK: No significant arthritis  : No dysuria, No hematuria  Endo: No hot or cold intolerance  Neuro: No syncope, No seizure, no strokes  Psych: No uncontrolled anxiety, No uncontrolled depression    Medical History:  has a past medical history of Allergy (12/3/2015), Arthritis, BPH without obstruction/lower urinary tract symptoms (01/04/2018), Degenerative disc disease, Hypertension, and Nuclear sclerosis - Both Eyes (7/30/2012).    Surgical History:  has a past surgical history that includes Eye foreign body removal; Back surgery (2002); Eye surgery; Transurethral resection of prostate; and Transurethral resection of prostate (TURP) without use of laser (N/A, 9/11/2018).    Family History: family history includes Cancer in his father; Cataracts in his mother; Heart disease in his brother, father, and paternal grandfather; Hypertension in his mother; Stroke in his paternal grandfather..     Social History:  reports that he quit smoking about 47 years ago. His smoking use included cigarettes and cigars. He has a 8.00 pack-year smoking history. He has never used smokeless tobacco. He reports current alcohol use of about 1.0 standard drinks of alcohol per week. He reports that he does not use drugs.  He quit smoking in 1973.  He drinks about 2 glasses of wine a week.    Review of patient's allergies indicates:  No Known Allergies    Medication List with Changes/Refills   New Medications    PANTOPRAZOLE (PROTONIX) 40 MG TABLET    Take 1 tablet (40 mg total) by mouth before breakfast. Take one pill every morning 45 minutes before breakfast in the morning.   Current  "Medications    ACETAMINOPHEN (TYLENOL) 325 MG TABLET    Take 650 mg by mouth every 6 (six) hours as needed for Pain.    ATORVASTATIN (LIPITOR) 10 MG TABLET    Take 1 tablet (10 mg total) by mouth once daily.    AZELASTINE (ASTELIN) 137 MCG (0.1 %) NASAL SPRAY    1 spray (137 mcg total) by Nasal route 2 (two) times daily.    DUTASTERIDE (AVODART) 0.5 MG CAPSULE    TAKE ONE DAILY    IBUPROFEN (ADVIL,MOTRIN) 200 MG TABLET    Take 200 mg by mouth every 6 (six) hours as needed for Pain.    IBUPROFEN (ADVIL,MOTRIN) 200 MG TABLET    Take 800 mg by mouth once daily.    LISINOPRIL (PRINIVIL,ZESTRIL) 20 MG TABLET    Take 1 tablet (20 mg total) by mouth once daily.    MULTIVITAMIN W-MINERALS/LUTEIN (CENTRUM SILVER ORAL)    Take 1 tablet by mouth once daily.     PNEUMOC 13-ARIANA CONJ-DIP CR,PF, (PREVNAR 13, PF,) 0.5 ML SYRG    Inject into the muscle.    POLYETHYLENE GLYCOL (GLYCOLAX) 17 GRAM PWPK    Take 17 g by mouth once daily.    TAMSULOSIN (FLOMAX) 0.4 MG CAP    TAKE ONE BY MOUTH EVERY EVENING   Discontinued Medications    ESOMEPRAZOLE (NEXIUM) 20 MG CAPSULE    Take 20 mg by mouth as needed.    OMEPRAZOLE (PRILOSEC) 40 MG CAPSULE    TAKE ONE BY MOUTH EVERY MORNING TAKE 30 MINUTE BEFORE EATING ON EMPTYSTOMACH         Objective Findings:    Vital Signs:  /71   Pulse 81   Ht 6' 1.5" (1.867 m)   Wt 118.5 kg (261 lb 3.9 oz)   BMI 34.00 kg/m²   Body mass index is 34 kg/m².    Physical Exam:  General Appearance: Well appearing in no acute distress  Eyes:    No scleral icterus  ENT:  No lesions or masses   Lungs: CTA bilaterally, no wheezes, no rhonchi, no rales  Heart:  S1, S2 normal, no murmurs heard  Abdomen:  Obese, Non distended, soft, no guarding, no rebound, no tenderness, no appreciated ascites, no bruits, no hepatosplenomegaly,  No CVA tenderness, no appreciated hernias  Musculoskeletal:  No major joint deformities  Skin: No petechiae or rash on exposed skin areas  Neurologic:  Alert and oriented x4  Psychiatric:  " Normal speech mentation and affect    Labs:  Lab Results   Component Value Date    WBC 6.49 08/19/2020    HGB 15.3 08/19/2020    HCT 47.0 08/19/2020     08/19/2020    CHOL 161 08/19/2020    TRIG 111 08/19/2020    HDL 34 (L) 08/19/2020    ALT 25 08/19/2020    AST 25 08/19/2020     08/19/2020    K 3.9 08/19/2020     08/19/2020    CREATININE 0.9 08/19/2020    BUN 17 08/19/2020    CO2 25 08/19/2020    TSH 1.087 07/10/2018    PSA 1.5 01/27/2020    HGBA1C 5.4 07/10/2018           Medical Decision Making:  Prior colonoscopy images personally reviewed by myself norm  Lab work reviewed creatinine normal LFTs normal not anemic.  Ppi talk given  EGD talk given  Primary care note reviewed  GERD precautions discussed with patient today      Assessment:  1. Gastroesophageal reflux disease, unspecified whether esophagitis present    2. Gastroesophageal reflux disease without esophagitis    3. Esophageal dysphagia    4. Encounter for monitoring long-term proton pump inhibitor therapy    5. Sleep apnea, unspecified type    6. Obesity (BMI 30.0-34.9)         Recommendations:  1.  Recommend EGD for evaluation of longstanding GERD orders placed.  2.  Fasting PPI labs tomorrow.  3.  Obesity BMI 34.  Recommend gradual weight loss.  Will check immunity to hepatitis A and B if not immune recommend vaccinations.  4.  Average risk for colon rectal cancer patients in screening program colonoscopy up-to-date currently recommend next  Screening colonoscopy 10 years from his last are October 2022 assuming patient stays asymptomatic.  5.  Return to GI clinic 3 months for follow-up okay for telemedicine video    Follow up in about 3 months (around 1/13/2021).      Order summary:  Orders Placed This Encounter    Hepatitis B Surface Antibody, Qual/Quant    Hepatitis B Surface Antigen    HEPATITIS A ANTIBODY, IGG    Magnesium    Vitamin D    Vitamin B12    pantoprazole (PROTONIX) 40 MG tablet    Case request GI: EGD  (ESOPHAGOGASTRODUODENOSCOPY)         Thank you so much for allowing me to participate in the care of Sreekanth Mckinney MD

## 2020-10-14 ENCOUNTER — LAB VISIT (OUTPATIENT)
Dept: LAB | Facility: HOSPITAL | Age: 75
End: 2020-10-14
Attending: INTERNAL MEDICINE
Payer: MEDICARE

## 2020-10-14 DIAGNOSIS — Z51.81 ENCOUNTER FOR MONITORING LONG-TERM PROTON PUMP INHIBITOR THERAPY: ICD-10-CM

## 2020-10-14 DIAGNOSIS — Z79.899 ENCOUNTER FOR MONITORING LONG-TERM PROTON PUMP INHIBITOR THERAPY: ICD-10-CM

## 2020-10-14 DIAGNOSIS — K21.9 GASTROESOPHAGEAL REFLUX DISEASE, UNSPECIFIED WHETHER ESOPHAGITIS PRESENT: ICD-10-CM

## 2020-10-14 DIAGNOSIS — R13.19 ESOPHAGEAL DYSPHAGIA: ICD-10-CM

## 2020-10-14 LAB
25(OH)D3+25(OH)D2 SERPL-MCNC: 20 NG/ML (ref 30–96)
MAGNESIUM SERPL-MCNC: 2 MG/DL (ref 1.6–2.6)
VIT B12 SERPL-MCNC: 186 PG/ML (ref 210–950)

## 2020-10-14 PROCEDURE — 87340 HEPATITIS B SURFACE AG IA: CPT

## 2020-10-14 PROCEDURE — 86790 VIRUS ANTIBODY NOS: CPT

## 2020-10-14 PROCEDURE — 83735 ASSAY OF MAGNESIUM: CPT

## 2020-10-14 PROCEDURE — 82306 VITAMIN D 25 HYDROXY: CPT

## 2020-10-14 PROCEDURE — 82607 VITAMIN B-12: CPT

## 2020-10-14 PROCEDURE — 86706 HEP B SURFACE ANTIBODY: CPT

## 2020-10-14 PROCEDURE — 36415 COLL VENOUS BLD VENIPUNCTURE: CPT | Mod: PO

## 2020-10-15 LAB
HBV SURFACE AG SERPL QL IA: NEGATIVE
HEPATITIS A ANTIBODY, IGG: NEGATIVE

## 2020-10-16 LAB
HBV SURFACE AB SER QL IA: NEGATIVE
HBV SURFACE AB SERPL IA-ACNC: <3 MIU/ML

## 2020-10-25 DIAGNOSIS — E53.8 VITAMIN B12 DEFICIENCY: ICD-10-CM

## 2020-10-25 DIAGNOSIS — E55.9 VITAMIN D INSUFFICIENCY: Primary | ICD-10-CM

## 2020-10-25 RX ORDER — ACETAMINOPHEN 500 MG
1 TABLET ORAL DAILY
Qty: 90 CAPSULE | Refills: 3 | Status: SHIPPED | OUTPATIENT
Start: 2020-10-25 | End: 2021-10-25

## 2020-10-25 RX ORDER — ERGOCALCIFEROL 1.25 MG/1
50000 CAPSULE ORAL
Qty: 12 CAPSULE | Refills: 0 | Status: SHIPPED | OUTPATIENT
Start: 2020-10-25 | End: 2021-01-17

## 2020-10-26 ENCOUNTER — TELEPHONE (OUTPATIENT)
Dept: INTERNAL MEDICINE | Facility: CLINIC | Age: 75
End: 2020-10-26

## 2020-10-26 ENCOUNTER — TELEPHONE (OUTPATIENT)
Dept: ENDOSCOPY | Facility: HOSPITAL | Age: 75
End: 2020-10-26

## 2020-10-26 DIAGNOSIS — E53.8 LOW SERUM VITAMIN B12: Primary | ICD-10-CM

## 2020-10-26 RX ORDER — PNV NO.95/FERROUS FUM/FOLIC AC 28MG-0.8MG
100 TABLET ORAL DAILY
Qty: 90 TABLET | Refills: 11 | Status: SHIPPED | OUTPATIENT
Start: 2020-10-26 | End: 2022-01-12

## 2020-10-26 NOTE — TELEPHONE ENCOUNTER
Pt states that Dr. Mckinney advised him to f/u with you about his B12 deficiency. B12- 186 as of 10/14/2020.

## 2020-10-26 NOTE — TELEPHONE ENCOUNTER
"----- Message from Dion Mckinney MD sent at 10/25/2020 11:34 AM CDT -----  VERY IMPORTANT:    Vanessa please tell Sreekanth that I recommend he follow-up with his primary care doctor for evaluation and treatment of his vitamin D deficiency.    Vanessa - Please tell patient that their Vitamin D level is low and recommend that they take Vitamin D 50,000 units ONCE A WEEK (every 7-days) for 12 weeks AND THEN start Vitamin D3 (2,000 units) over-the-counter ONCE DAILY.     Vanessa- please recheck their vitamin D level in 6 months - Orders placed.    Kym  please tell patient that their Hepatitis A, and B labs are negative but they have "No" immunity to them either.      There is currently No vaccination yet for Hepatitis C.    There is vaccinations for Hepatitis A and B,  and Recommend the Hepatitis A and B vaccination series.        Hepatitis A vaccination series is a 2 part vaccine series - Day 1, and then again in 6-months from first one.    Hepatitis B vaccination series is a 2 part vaccine series - Day 1, and then again in 1-month from the first one.      Orders were  placed.    "

## 2020-10-26 NOTE — TELEPHONE ENCOUNTER
"----- Message from Dion Mckinney MD sent at 10/25/2020 11:35 AM CDT -----  Correction;    VERY IMPORTANT:    Vanessa please tell Sreekanth that I recommend he follow-up with his primary care doctor for evaluation and treatment of his vitamin B12 deficiency.    Vanessa - Please tell patient that their Vitamin D level is low and recommend that they take Vitamin D 50,000 units ONCE A WEEK (every 7-days) for 12 weeks AND THEN start Vitamin D3 (2,000 units) over-the-counter ONCE DAILY.     Vanessa- please recheck their vitamin D level in 6 months - Orders placed.    Kym - please tell patient that their Hepatitis A, and B labs are negative but they have "No" immunity to them either.      There is currently No vaccination yet for Hepatitis C.    There is vaccinations for Hepatitis A and B,  and Recommend the Hepatitis A and B vaccination series.  "

## 2020-10-26 NOTE — TELEPHONE ENCOUNTER
----- Message from Marlee Busby sent at 10/26/2020  3:19 PM CDT -----  Contact: self/[phone]  Patient called regards about vitamin b12 deficiency. Please call and advise. Thank you

## 2020-10-26 NOTE — TELEPHONE ENCOUNTER
Hi, I recommend that he start oral Vitamin B12 --  Orders Placed This Encounter    cyanocobalamin (VITAMIN B-12) 100 MCG tablet     I sent it in.    Also, I recommend that we recheck his B12 level in April when he has his next blood test please link these tests to Aprils lab draw:  Orders Placed This Encounter    Vitamin B12    Methylmalonic Acid, Serum         Let me know if patient has any questions.  Thank you, Torsten Bejarano

## 2020-11-13 ENCOUNTER — IMMUNIZATION (OUTPATIENT)
Dept: PHARMACY | Facility: CLINIC | Age: 75
End: 2020-11-13
Payer: MEDICARE

## 2020-11-29 ENCOUNTER — LAB VISIT (OUTPATIENT)
Dept: URGENT CARE | Facility: CLINIC | Age: 75
End: 2020-11-29
Payer: MEDICARE

## 2020-11-29 DIAGNOSIS — Z01.812 PRE-PROCEDURE LAB EXAM: ICD-10-CM

## 2020-11-29 PROCEDURE — 99211 OFF/OP EST MAY X REQ PHY/QHP: CPT | Mod: S$GLB,,, | Performed by: NURSE PRACTITIONER

## 2020-11-29 PROCEDURE — U0003 INFECTIOUS AGENT DETECTION BY NUCLEIC ACID (DNA OR RNA); SEVERE ACUTE RESPIRATORY SYNDROME CORONAVIRUS 2 (SARS-COV-2) (CORONAVIRUS DISEASE [COVID-19]), AMPLIFIED PROBE TECHNIQUE, MAKING USE OF HIGH THROUGHPUT TECHNOLOGIES AS DESCRIBED BY CMS-2020-01-R: HCPCS

## 2020-11-29 PROCEDURE — 99211 PR OFFICE/OUTPT VISIT, EST, LEVL I: ICD-10-PCS | Mod: S$GLB,,, | Performed by: NURSE PRACTITIONER

## 2020-11-30 LAB — SARS-COV-2 RNA RESP QL NAA+PROBE: NOT DETECTED

## 2020-12-02 ENCOUNTER — ANESTHESIA (OUTPATIENT)
Dept: ENDOSCOPY | Facility: HOSPITAL | Age: 75
End: 2020-12-02
Payer: MEDICARE

## 2020-12-02 ENCOUNTER — ANESTHESIA EVENT (OUTPATIENT)
Dept: ENDOSCOPY | Facility: HOSPITAL | Age: 75
End: 2020-12-02
Payer: MEDICARE

## 2020-12-02 ENCOUNTER — HOSPITAL ENCOUNTER (OUTPATIENT)
Facility: HOSPITAL | Age: 75
Discharge: HOME | End: 2020-12-02
Attending: INTERNAL MEDICINE | Admitting: INTERNAL MEDICINE
Payer: MEDICARE

## 2020-12-02 VITALS
RESPIRATION RATE: 16 BRPM | DIASTOLIC BLOOD PRESSURE: 71 MMHG | BODY MASS INDEX: 33.37 KG/M2 | WEIGHT: 260 LBS | HEART RATE: 74 BPM | SYSTOLIC BLOOD PRESSURE: 115 MMHG | OXYGEN SATURATION: 97 % | HEIGHT: 74 IN | TEMPERATURE: 98 F

## 2020-12-02 DIAGNOSIS — K21.9 GERD (GASTROESOPHAGEAL REFLUX DISEASE): ICD-10-CM

## 2020-12-02 PROCEDURE — 43239 EGD BIOPSY SINGLE/MULTIPLE: CPT | Mod: ,,, | Performed by: INTERNAL MEDICINE

## 2020-12-02 PROCEDURE — E9220 PRA ENDO ANESTHESIA: HCPCS | Mod: ,,, | Performed by: NURSE ANESTHETIST, CERTIFIED REGISTERED

## 2020-12-02 PROCEDURE — 43239 PR EGD, FLEX, W/BIOPSY, SGL/MULTI: ICD-10-PCS | Mod: ,,, | Performed by: INTERNAL MEDICINE

## 2020-12-02 PROCEDURE — E9220 PRA ENDO ANESTHESIA: ICD-10-PCS | Mod: ,,, | Performed by: NURSE ANESTHETIST, CERTIFIED REGISTERED

## 2020-12-02 PROCEDURE — 25000003 PHARM REV CODE 250: Performed by: INTERNAL MEDICINE

## 2020-12-02 PROCEDURE — 88305 TISSUE EXAM BY PATHOLOGIST: CPT | Mod: 26,,, | Performed by: PATHOLOGY

## 2020-12-02 PROCEDURE — 37000009 HC ANESTHESIA EA ADD 15 MINS: Performed by: INTERNAL MEDICINE

## 2020-12-02 PROCEDURE — 63600175 PHARM REV CODE 636 W HCPCS: Performed by: NURSE ANESTHETIST, CERTIFIED REGISTERED

## 2020-12-02 PROCEDURE — 25000003 PHARM REV CODE 250: Performed by: NURSE ANESTHETIST, CERTIFIED REGISTERED

## 2020-12-02 PROCEDURE — 43239 EGD BIOPSY SINGLE/MULTIPLE: CPT | Performed by: INTERNAL MEDICINE

## 2020-12-02 PROCEDURE — 27201012 HC FORCEPS, HOT/COLD, DISP: Performed by: INTERNAL MEDICINE

## 2020-12-02 PROCEDURE — 88305 TISSUE EXAM BY PATHOLOGIST: ICD-10-PCS | Mod: 26,,, | Performed by: PATHOLOGY

## 2020-12-02 PROCEDURE — 88305 TISSUE EXAM BY PATHOLOGIST: CPT | Mod: 59 | Performed by: PATHOLOGY

## 2020-12-02 PROCEDURE — 37000008 HC ANESTHESIA 1ST 15 MINUTES: Performed by: INTERNAL MEDICINE

## 2020-12-02 RX ORDER — PROPOFOL 10 MG/ML
VIAL (ML) INTRAVENOUS
Status: DISCONTINUED | OUTPATIENT
Start: 2020-12-02 | End: 2020-12-02

## 2020-12-02 RX ORDER — SODIUM CHLORIDE 9 MG/ML
INJECTION, SOLUTION INTRAVENOUS CONTINUOUS
Status: DISCONTINUED | OUTPATIENT
Start: 2020-12-02 | End: 2020-12-02 | Stop reason: HOSPADM

## 2020-12-02 RX ADMIN — PROPOFOL 30 MG: 10 INJECTION, EMULSION INTRAVENOUS at 10:12

## 2020-12-02 RX ADMIN — PROPOFOL 20 MG: 10 INJECTION, EMULSION INTRAVENOUS at 10:12

## 2020-12-02 RX ADMIN — SODIUM CHLORIDE: 0.9 INJECTION, SOLUTION INTRAVENOUS at 09:12

## 2020-12-02 RX ADMIN — PROPOFOL 50 MG: 10 INJECTION, EMULSION INTRAVENOUS at 09:12

## 2020-12-02 RX ADMIN — GLYCOPYRROLATE 0.2 MG: 0.2 INJECTION INTRAMUSCULAR; INTRAVENOUS at 09:12

## 2020-12-02 RX ADMIN — SODIUM CHLORIDE 10 ML/HR: 0.9 INJECTION, SOLUTION INTRAVENOUS at 09:12

## 2020-12-02 RX ADMIN — PROPOFOL 40 MG: 10 INJECTION, EMULSION INTRAVENOUS at 10:12

## 2020-12-02 NOTE — ANESTHESIA PREPROCEDURE EVALUATION
[facility]  Anesthesia Pre-Operative Evaluation       Patient Name: Sreekanth Pitts Jr.  YOB: 1945  MRN: [id number]  [facility]: [id number]      Code Status: Prior   Date of Procedure: 12/2/2020  Anesthesia: General Procedure: Procedure(s) (LRB):  EGD (ESOPHAGOGASTRODUODENOSCOPY) (N/A)  Pre-Operative Diagnosis: Esophageal dysphagia [R13.10]  Gastroesophageal reflux disease, unspecified whether esophagitis present [K21.9]  Sleep apnea, unspecified type [G47.30]  Obesity (BMI 30.0-34.9) [E66.9]  Proceduralist: Surgeon(s) and Role:     * Dion Mckinney MD - Primary        SUBJECTIVE:   Sreekanth Pitts Jr. is a 75 y.o. male who  has a past medical history of Allergy (12/3/2015), Arthritis, BPH without obstruction/lower urinary tract symptoms (01/04/2018), Degenerative disc disease, Hypertension, and Nuclear sclerosis - Both Eyes (7/30/2012). No notes on file  Revised cardiac risk index (RCRI) score is 0.    he has a current medication list which includes the following long-term medication(s): atorvastatin, azelastine, dutasteride, lisinopril, pantoprazole, and tamsulosin.   ALLERGIES:   Review of patient's allergies indicates:  No Known Allergies  LDA:      Lines/Drains/Airways     None                Anesthesia Evaluation      Airway   Mallampati: I  TM distance: Normal, at least 6 cm  Dental    (+) In tact    Pulmonary    (+) sleep apnea on CPAP,   (-) shortness of breath, recent URI  Cardiovascular   Exercise tolerance: good  (+) hypertension,   (-) past MI, angina    Rate: Normal    Neuro/Psych      GI/Hepatic/Renal      Endo/Other    (+) arthritis  Abdominal                   MEDICATIONS:     Antibiotics (From admission, onward)    None        VTE Risk Mitigation (From admission, onward)    None        No current facility-administered medications for this encounter.      Current Outpatient Medications   Medication Sig Dispense Refill    acetaminophen (TYLENOL) 325 MG tablet Take 650 mg  by mouth every 6 (six) hours as needed for Pain.      atorvastatin (LIPITOR) 10 MG tablet Take 1 tablet (10 mg total) by mouth once daily. 90 tablet 3    azelastine (ASTELIN) 137 mcg (0.1 %) nasal spray 1 spray (137 mcg total) by Nasal route 2 (two) times daily. (Patient not taking: Reported on 1/29/2020) 30 mL 0    cholecalciferol, vitamin D3, (VITAMIN D3) 50 mcg (2,000 unit) Cap Take 1 capsule (2,000 Units total) by mouth once daily. 90 capsule 3    cyanocobalamin (VITAMIN B-12) 100 MCG tablet Take 1 tablet (100 mcg total) by mouth once daily. 90 tablet 11    dutasteride (AVODART) 0.5 mg capsule TAKE ONE DAILY 30 capsule 11    ergocalciferol (ERGOCALCIFEROL) 50,000 unit Cap Take 1 capsule (50,000 Units total) by mouth every 7 days. for 12 doses 12 capsule 0    ibuprofen (ADVIL,MOTRIN) 200 MG tablet Take 200 mg by mouth every 6 (six) hours as needed for Pain.      ibuprofen (ADVIL,MOTRIN) 200 MG tablet Take 800 mg by mouth once daily.      lisinopriL (PRINIVIL,ZESTRIL) 20 MG tablet Take 1 tablet (20 mg total) by mouth once daily. 90 tablet 3    MULTIVITAMIN W-MINERALS/LUTEIN (CENTRUM SILVER ORAL) Take 1 tablet by mouth once daily.       pantoprazole (PROTONIX) 40 MG tablet Take 1 tablet (40 mg total) by mouth before breakfast. Take one pill every morning 45 minutes before breakfast in the morning. 90 tablet 3    pneumoc 13-terese conj-dip cr,PF, (PREVNAR 13, PF,) 0.5 mL Syrg Inject into the muscle. (Patient not taking: Reported on 10/13/2020) 0.5 mL 0    polyethylene glycol (GLYCOLAX) 17 gram PwPk Take 17 g by mouth once daily. (Patient not taking: Reported on 10/13/2020) 30 packet 0    tamsulosin (FLOMAX) 0.4 mg Cap TAKE ONE BY MOUTH EVERY EVENING 90 capsule 3          History:   There are no hospital problems to display for this patient.    Surgical History:    has a past surgical history that includes Eye foreign body removal; Back surgery (2002); Eye surgery; Transurethral resection of prostate; and  Transurethral resection of prostate (TURP) without use of laser (N/A, 9/11/2018).   Social History:    has no history on file for sexual activity.  reports that he quit smoking about 47 years ago. His smoking use included cigarettes and cigars. He has a 8.00 pack-year smoking history. He has never used smokeless tobacco. He reports current alcohol use of about 1.0 standard drinks of alcohol per week. He reports that he does not use drugs.     OBJECTIVE:     Vital Signs (Most Recent):    Vital Signs Range (Last 24H):          There is no height or weight on file to calculate BMI.   Wt Readings from Last 4 Encounters:   10/13/20 118.5 kg (261 lb 3.9 oz)   08/24/20 116.5 kg (256 lb 13.4 oz)   07/22/20 117.9 kg (260 lb)   01/29/20 117.3 kg (258 lb 9.6 oz)     Significant Labs:  Lab Results   Component Value Date    WBC 6.49 08/19/2020    HGB 15.3 08/19/2020    HCT 47.0 08/19/2020     08/19/2020     08/19/2020    K 3.9 08/19/2020     08/19/2020    CREATININE 0.9 08/19/2020    BUN 17 08/19/2020    CO2 25 08/19/2020    GLU 91 08/19/2020    CALCIUM 9.2 08/19/2020    MG 2.0 10/14/2020    PHOS 3.8 04/18/2017    ALKPHOS 62 08/19/2020    ALT 25 08/19/2020    AST 25 08/19/2020    ALBUMIN 4.1 08/19/2020    HGBA1C 5.4 07/10/2018     No LMP for male patient.  Recent Results (from the past 72 hour(s))   COVID-19 Routine Screening    Collection Time: 11/29/20 10:14 AM   Result Value Ref Range    SARS-CoV2 (COVID-19) Qualitative PCR Not Detected Not Detected       EKG:   Results for orders placed or performed during the hospital encounter of 04/18/17   EKG 12-lead    Collection Time: 04/18/17 12:19 PM    Narrative    Test Reason : Z01.818  Blood Pressure : * mmHG  Vent. Rate : 075 BPM     Atrial Rate : 075 BPM     P-R Int : 236 ms          QRS Dur : 082 ms      QT Int : 388 ms       P-R-T Axes : 041 065 056 degrees     QTc Int : 433 ms    Sinus rhythm with 1st degree A-V block  Otherwise normal ECG  When compared  "with ECG of 29-OCT-2001 07:55,  No significant change was found  Confirmed by GRICELDA COX MD ([id number]) on 4/19/2017 2:33:38 PM    Referred By: YURI YANES           Confirmed By:GRICELDA COX MD     ASSESSMENT/PLAN:     Pre-op Assessment    I have reviewed the Patient Summary Reports.    I have reviewed the Nursing Notes.    I have reviewed the Medications.     Review of Systems  Anesthesia Hx:  No problems with previous Anesthesia  History of prior surgery of interest to airway management or planning: Previous anesthesia: General 4/2017 TURP with general anesthesia.  Procedure performed at an [facility]. Denies Family Hx of Anesthesia complications.   Denies Personal Hx of Anesthesia complications.   Hematology/Oncology:  Hematology Normal   Oncology Normal     EENT/Dental:EENT/Dental Normal   Cardiovascular:   Exercise tolerance: good Hypertension Denies MI.    Denies Angina.  Functional Capacity 4 METS  Hypertension , Well Controlled on Rx , Recent typical home B/P of 120/75   Pulmonary:   Denies Shortness of breath.  Denies Recent URI. Sleep Apnea, CPAP  Obstructive Sleep Apnea (STEPHIE), CPAP used.   Renal/:  Renal/ Normal  Pt has a "spot" on his right kidney -evaluated q6 months-asymptomatic and no change in last 2 years.    S/p TURP Renal Symptoms/Infections/Stones: retention.  Other Renal / Gu Conditions: Benign Prostatic Hypertrophy (BPH)  Hepatic/GI:  Hepatic/GI Normal    Musculoskeletal:  Musculoskeletal Normal Arthritis      Neurological:  Neurology Normal    Endocrine:  Endocrine Normal  Metabolic Disorders, Obesity / BMI > 30  Dermatological:  Skin Normal    Psych:  Psychiatric Normal           Physical Exam  General:  Well nourished    Airway/Jaw/Neck:  Airway Findings: Mouth Opening: Normal Tongue: Normal  General Airway Assessment: Adult  Mallampati: I  TM Distance: Normal, at least 6 cm      Dental:  Dental Findings: In tact   Chest/Lungs:  Chest/Lungs Findings: Normal Respiratory Rate   "   Heart/Vascular:  Heart Findings: Rate: Normal        Mental Status:  Mental Status Findings:  Alert and Oriented         Anesthesia Plan  Type of Anesthesia, risks & benefits discussed:  Anesthesia Type:  general, MAC  Patient's Preference:   Intra-op Monitoring Plan: standard ASA monitors  Intra-op Monitoring Plan Comments:   Post Op Pain Control Plan: multimodal analgesia, IV/PO Opioids PRN and per primary service following discharge from PACU  Post Op Pain Control Plan Comments:   Induction:   IV  Beta Blocker:  Patient is not currently on a Beta-Blocker (No further documentation required).       Informed Consent: Patient understands risks and agrees with Anesthesia plan.  Questions answered. Anesthesia consent signed with patient.  ASA Score: 2     Day of Surgery Review of History & Physical:    H&P update referred to the surgeon.         Ready For Surgery From Anesthesia Perspective.

## 2020-12-02 NOTE — ANESTHESIA POSTPROCEDURE EVALUATION
Anesthesia Post Evaluation    Patient: Sreekanth Pitts JrLisa    Procedure(s) Performed: Procedure(s) (LRB):  EGD (ESOPHAGOGASTRODUODENOSCOPY) (N/A)    Final Anesthesia Type: general    Patient location during evaluation: GI PACU  Patient participation: Yes- Able to Participate  Level of consciousness: awake and alert  Post-procedure vital signs: reviewed and stable  Pain management: adequate  Airway patency: patent    PONV status at discharge: No PONV  Anesthetic complications: no      Cardiovascular status: blood pressure returned to baseline  Respiratory status: unassisted  Hydration status: euvolemic  Follow-up not needed.          Vitals Value Taken Time   /71 12/02/20 1041   Temp 36.8 °C (98.2 °F) 12/02/20 1015   Pulse 74 12/02/20 1041   Resp 16 12/02/20 1041   SpO2 97 % 12/02/20 1041         Event Time   Out of Recovery 10:46:00         Pain/Darcy Score: Darcy Score: 10 (12/2/2020 10:37 AM)

## 2020-12-02 NOTE — [employer] PATIENT INSTRUCTIONS
Discharge Summary/Instructions after an Endoscopic Procedure  Patient Name: Sreekanth Pitts  Patient MRN: [id number]  Patient YOB: 1945 Wednesday, December 2, 2020  Dion Mckinney MD  RESTRICTIONS:  During your procedure today, you received medications for sedation.  These   medications may affect your judgment, balance and coordination.  Therefore,   for 24 hours, you have the following restrictions:   - DO NOT drive a car, operate machinery, make legal/financial decisions,   sign important papers or drink alcohol.    ACTIVITY:  Today: no heavy lifting, straining or running due to procedural   sedation/anesthesia.  The following day: return to full activity including work.  DIET:  Eat and drink normally unless instructed otherwise.     TREATMENT FOR COMMON SIDE EFFECTS:  - Mild abdominal pain, nausea, belching, bloating or excessive gas:  rest,   eat lightly and use a heating pad.  - Sore Throat: treat with throat lozenges and/or gargle with warm salt   water.  - Because air was used during the procedure, expelling large amounts of air   from your rectum or belching is normal.  - If a bowel prep was taken, you may not have a bowel movement for 1-3 days.    This is normal.  SYMPTOMS TO WATCH FOR AND REPORT TO YOUR PHYSICIAN:  1. Abdominal pain or bloating, other than gas cramps.  2. Chest pain.  3. Back pain.  4. Signs of infection such as: chills or fever occurring within 24 hours   after the procedure.  5. Rectal bleeding, which would show as bright red, maroon, or black stools.   (A tablespoon of blood from the rectum is not serious, especially if   hemorrhoids are present.)  6. Vomiting.  7. Weakness or dizziness.  GO DIRECTLY TO THE NEAREST EMERGENCY ROOM IF YOU HAVE ANY OF THE FOLLOWING:      Difficulty breathing              Chills and/or fever over 101 F   Persistent vomiting and/or vomiting blood   Severe abdominal pain   Severe chest pain   Black, tarry stools   Bleeding- more than one  tablespoon   Any other symptom or condition that you feel may need urgent attention  Your doctor recommends these additional instructions:  If any biopsies were taken, your doctors clinic will contact you in 1 to 2   weeks with any results.  - Discharge patient to home.   - Follow an antireflux regimen indefinitely.   - Await pathology results.   - Telephone endoscopist for pathology results in 2 weeks.   - Return to GI clinic at the next available appointment.   - Use Protonix (pantoprazole) 40 mg by mouth once daily indefinitely. Use   Protonix 40 mg once daily (or any other full strength proton pump   inhibitor) - best taken 45 minutes before your first protein containing   meal.   - Repeat upper endoscopy in 3 years for surveillance based on pathology   results.   - The findings and recommendations were discussed with the patient.  For questions, problems or results please call your physician - Dion Mckinney MD at Work:  [phone].  [facility], EMERGENCY ROOM PHONE NUMBER: [phone]  IF A COMPLICATION OR EMERGENCY SITUATION ARISES AND YOU ARE UNABLE TO REACH   YOUR PHYSICIAN - GO DIRECTLY TO THE EMERGENCY ROOM.  Dion Mckinney MD  12/2/2020 10:15:44 AM  This report has been verified and signed electronically.  [employer]

## 2020-12-02 NOTE — TRANSFER OF CARE
"Anesthesia Transfer of Care Note    Patient: Sreekanth Pitts Jr.    Procedure(s) Performed: Procedure(s) (LRB):  EGD (ESOPHAGOGASTRODUODENOSCOPY) (N/A)    Patient location: PACU    Anesthesia Type: general    Transport from OR: Transported from OR on room air with adequate spontaneous ventilation    Post pain: adequate analgesia    Post assessment: no apparent anesthetic complications and tolerated procedure well    Post vital signs: stable    Level of consciousness: awake    Nausea/Vomiting: no nausea/vomiting    Complications: none    Transfer of care protocol was followed      Last vitals:   Visit Vitals  BP (!) 100/57 (BP Location: Left arm, Patient Position: Lying)   Pulse 87   Temp 36.8 °C (98.2 °F) (Temporal)   Resp 18   Ht 6' 1.5" (1.867 m)   Wt 117.9 kg (260 lb)   SpO2 (!) 94%   BMI 33.84 kg/m²     "

## 2020-12-02 NOTE — H&P
[facility]  History & Physical    Subjective:      Chief Complaint/Reason for Admission:     EGD    Sreekanth Pitts Jr. is a 75 y.o. male.    Past Medical History:   Diagnosis Date    Allergy 12/3/2015    Arthritis     BPH without obstruction/lower urinary tract symptoms 2018    Degenerative disc disease     GERD (gastroesophageal reflux disease) 2020    Hypertension     Nuclear sclerosis - Both Eyes 2012     Past Surgical History:   Procedure Laterality Date    BACK SURGERY      EYE FOREIGN BODY REMOVAL      childhood    EYE SURGERY      TRANSURETHRAL RESECTION OF PROSTATE      2017    TRANSURETHRAL RESECTION OF PROSTATE (TURP) WITHOUT USE OF LASER N/A 2018    Procedure: TURP, WITHOUT USING LASER;  Surgeon: Uma Gaona MD;  Location: [facility] OR [address] [place] [address];  Service: Urology;  Laterality: N/A;  1.5 hours     Family History   Problem Relation Age of Onset    Cataracts Mother     Hypertension Mother     Cancer Father     Heart disease Father     Heart disease Brother     Stroke Paternal Grandfather     Heart disease Paternal Grandfather      Social History     Tobacco Use    Smoking status: Former Smoker     Packs/day: 2.00     Years: 4.00     Pack years: 8.00     Types: Cigarettes, Cigars     Quit date: 1973     Years since quittin.9    Smokeless tobacco: Never Used   Substance Use Topics    Alcohol use: Yes     Alcohol/week: 1.0 standard drinks     Types: 1 Cans of beer per week     Comment: rarely    Drug use: No       PTA Medications   Medication Sig    acetaminophen (TYLENOL) 325 MG tablet Take 650 mg by mouth every 6 (six) hours as needed for Pain.    atorvastatin (LIPITOR) 10 MG tablet Take 1 tablet (10 mg total) by mouth once daily.    cholecalciferol, vitamin D3, (VITAMIN D3) 50 mcg (2,000 unit) Cap Take 1 capsule (2,000 Units total) by mouth once daily.    cyanocobalamin (VITAMIN B-12) 100 MCG tablet Take 1 tablet (100  mcg total) by mouth once daily.    dutasteride (AVODART) 0.5 mg capsule TAKE ONE DAILY    ergocalciferol (ERGOCALCIFEROL) 50,000 unit Cap Take 1 capsule (50,000 Units total) by mouth every 7 days. for 12 doses    ibuprofen (ADVIL,MOTRIN) 200 MG tablet Take 200 mg by mouth every 6 (six) hours as needed for Pain.    ibuprofen (ADVIL,MOTRIN) 200 MG tablet Take 800 mg by mouth once daily.    lisinopriL (PRINIVIL,ZESTRIL) 20 MG tablet Take 1 tablet (20 mg total) by mouth once daily.    pantoprazole (PROTONIX) 40 MG tablet Take 1 tablet (40 mg total) by mouth before breakfast. Take one pill every morning 45 minutes before breakfast in the morning.    tamsulosin (FLOMAX) 0.4 mg Cap TAKE ONE BY MOUTH EVERY EVENING    azelastine (ASTELIN) 137 mcg (0.1 %) nasal spray 1 spray (137 mcg total) by Nasal route 2 (two) times daily. (Patient not taking: Reported on 1/29/2020)    MULTIVITAMIN W-MINERALS/LUTEIN (CENTRUM SILVER ORAL) Take 1 tablet by mouth once daily.     pneumoc 13-terese conj-dip cr,PF, (PREVNAR 13, PF,) 0.5 mL Syrg Inject into the muscle. (Patient not taking: Reported on 10/13/2020)    polyethylene glycol (GLYCOLAX) 17 gram PwPk Take 17 g by mouth once daily. (Patient not taking: Reported on 10/13/2020)     Review of patient's allergies indicates:  No Known Allergies     Review of Systems   Constitutional: Negative for chills, fever and weight loss.   Respiratory: Negative for shortness of breath and wheezing.    Cardiovascular: Negative for chest pain.   Gastrointestinal: Positive for heartburn. Negative for abdominal pain.       Objective:      Vital Signs (Most Recent)  Temp: 98.1 °F (36.7 °C) (12/02/20 0911)  Pulse: 93 (12/02/20 0911)  Resp: 15 (12/02/20 0911)  BP: 132/76 (12/02/20 0911)  SpO2: 96 % (12/02/20 0911)    Vital Signs Range (Last 24H):  Temp:  [98.1 °F (36.7 °C)]   Pulse:  [93]   Resp:  [15]   BP: (132)/(76)   SpO2:  [96 %]     Physical Exam  Constitutional:       Appearance: Normal  appearance.   Cardiovascular:      Rate and Rhythm: Normal rate.   Pulmonary:      Effort: Pulmonary effort is normal.   Abdominal:      General: Abdomen is flat.   Neurological:      Mental Status: He is alert and oriented to person, place, and time.   Psychiatric:         Mood and Affect: Mood normal.         Behavior: Behavior normal.         Thought Content: Thought content normal.         Judgment: Judgment normal.             Assessment:      Active Hospital Problems    Diagnosis  POA    GERD (gastroesophageal reflux disease) [K21.9]  Yes      Resolved Hospital Problems   No resolved problems to display.       Plan:    EGD  For GERD and dysphagia.     S/P total hip arthroplasty

## 2020-12-09 LAB
FINAL PATHOLOGIC DIAGNOSIS: NORMAL
GROSS: NORMAL
Lab: NORMAL

## 2020-12-11 ENCOUNTER — PATIENT MESSAGE (OUTPATIENT)
Dept: OTHER | Facility: OTHER | Age: 75
End: 2020-12-11

## 2020-12-13 ENCOUNTER — PATIENT MESSAGE (OUTPATIENT)
Dept: GASTROENTEROLOGY | Facility: CLINIC | Age: 75
End: 2020-12-13

## 2020-12-14 ENCOUNTER — TELEPHONE (OUTPATIENT)
Dept: GASTROENTEROLOGY | Facility: CLINIC | Age: 75
End: 2020-12-14

## 2020-12-14 NOTE — TELEPHONE ENCOUNTER
----- Message from Tere Galo MA sent at 12/14/2020 10:24 AM CST -----    ----- Message -----  From: Dion Mckinney MD  Sent: 12/13/2020   8:37 AM CST  To: Tere Galo MA, Torsten Bejarano MD    VERY IMPORTANT:    Vanessa please tell Sreekanth that his EGD pathology was benign no dysplasia no cancer but shows short-segment Loaiza's esophagus.    Please mail Sreekanth up-to-date patient information on GERD and Loaiza's esophagus for him to read.    Please schedule Sreekanth for telemedicine video visit with me for Loaiza's talk.    Please remind him to take his pantoprazole 40 mg pills once daily best taken 45 min for his 1st protein meal of the day breakfast.    1. Stomach, biopsy:  Gastric antral and oxyntic mucosa with no diagnostic histopathologic alterations  No morphologic evidence Helicobacter pylori on routine stain  Negative for intestinal metaplasia and dysplasia  2. Esophagus, distal, at 39 cm, biopsy:  Squamocolumnar mucosa with intestinal metaplasia  Negative for dysplasia  See comment  COMMENT (Part 2): These findings are compatible with Loaiza's esophagus in the appropriate clinical  setting.

## 2020-12-14 NOTE — TELEPHONE ENCOUNTER
Vanessa,  Pt states hes not familiar with my chart he states he did a zoom call before   Dr jorgensen wants pt to follow up to discuss barretts  Please advise

## 2020-12-14 NOTE — TELEPHONE ENCOUNTER
Spoke with patient , results given as written by    Pt verbalizes understadning and appreciates the call.  Thanks MA

## 2021-01-09 ENCOUNTER — IMMUNIZATION (OUTPATIENT)
Dept: FAMILY MEDICINE | Facility: CLINIC | Age: 76
End: 2021-01-09
Payer: MEDICARE

## 2021-01-09 DIAGNOSIS — Z23 NEED FOR VACCINATION: ICD-10-CM

## 2021-01-09 PROCEDURE — 99999 COVID-19, MRNA, LNP-S, PF, 30 MCG/0.3 ML DOSE VACCINE: ICD-10-PCS | Mod: PBBFAC,,,

## 2021-01-09 PROCEDURE — 99999 COVID-19, MRNA, LNP-S, PF, 30 MCG/0.3 ML DOSE VACCINE: CPT | Mod: PBBFAC,,,

## 2021-01-09 PROCEDURE — 91300 COVID-19, MRNA, LNP-S, PF, 30 MCG/0.3 ML DOSE VACCINE: CPT | Mod: PBBFAC

## 2021-01-09 PROCEDURE — 91300 COVID-19, MRNA, LNP-S, PF, 30 MCG/0.3 ML DOSE VACCINE: CPT | Performed by: INTERNAL MEDICINE

## 2021-01-30 ENCOUNTER — IMMUNIZATION (OUTPATIENT)
Dept: FAMILY MEDICINE | Facility: CLINIC | Age: 76
End: 2021-01-30
Payer: MEDICARE

## 2021-01-30 DIAGNOSIS — Z23 NEED FOR VACCINATION: Primary | ICD-10-CM

## 2021-01-30 PROCEDURE — 91300 COVID-19, MRNA, LNP-S, PF, 30 MCG/0.3 ML DOSE VACCINE: CPT | Mod: PBBFAC

## 2021-01-30 PROCEDURE — 99999 COVID-19, MRNA, LNP-S, PF, 30 MCG/0.3 ML DOSE VACCINE: CPT | Mod: PBBFAC,,,

## 2021-01-30 PROCEDURE — 99999 COVID-19, MRNA, LNP-S, PF, 30 MCG/0.3 ML DOSE VACCINE: ICD-10-PCS | Mod: PBBFAC,,,

## 2021-01-30 PROCEDURE — 0002A COVID-19, MRNA, LNP-S, PF, 30 MCG/0.3 ML DOSE VACCINE: CPT | Mod: CV19 | Performed by: INTERNAL MEDICINE

## 2021-01-30 PROCEDURE — 91300 COVID-19, MRNA, LNP-S, PF, 30 MCG/0.3 ML DOSE VACCINE: CPT | Performed by: INTERNAL MEDICINE

## 2021-01-30 PROCEDURE — 0002A COVID-19, MRNA, LNP-S, PF, 30 MCG/0.3 ML DOSE VACCINE: CPT | Mod: PBBFAC

## 2021-02-10 ENCOUNTER — PATIENT OUTREACH (OUTPATIENT)
Dept: ADMINISTRATIVE | Facility: OTHER | Age: 76
End: 2021-02-10

## 2021-02-12 ENCOUNTER — OFFICE VISIT (OUTPATIENT)
Dept: GASTROENTEROLOGY | Facility: CLINIC | Age: 76
End: 2021-02-12
Payer: MEDICARE

## 2021-02-12 VITALS
BODY MASS INDEX: 35.09 KG/M2 | WEIGHT: 264.75 LBS | HEART RATE: 83 BPM | HEIGHT: 73 IN | DIASTOLIC BLOOD PRESSURE: 75 MMHG | SYSTOLIC BLOOD PRESSURE: 122 MMHG

## 2021-02-12 DIAGNOSIS — Z79.899 ENCOUNTER FOR MONITORING LONG-TERM PROTON PUMP INHIBITOR THERAPY: ICD-10-CM

## 2021-02-12 DIAGNOSIS — E55.9 VITAMIN D INSUFFICIENCY: ICD-10-CM

## 2021-02-12 DIAGNOSIS — K21.9 GASTROESOPHAGEAL REFLUX DISEASE, UNSPECIFIED WHETHER ESOPHAGITIS PRESENT: ICD-10-CM

## 2021-02-12 DIAGNOSIS — Z12.11 ENCOUNTER FOR SCREENING COLONOSCOPY: ICD-10-CM

## 2021-02-12 DIAGNOSIS — E53.8 VITAMIN B12 DEFICIENCY: ICD-10-CM

## 2021-02-12 DIAGNOSIS — R22.1 NODULE OF NECK: ICD-10-CM

## 2021-02-12 DIAGNOSIS — K22.70 BARRETT'S ESOPHAGUS WITHOUT DYSPLASIA: Primary | ICD-10-CM

## 2021-02-12 DIAGNOSIS — K44.9 HIATAL HERNIA: ICD-10-CM

## 2021-02-12 DIAGNOSIS — Z51.81 ENCOUNTER FOR MONITORING LONG-TERM PROTON PUMP INHIBITOR THERAPY: ICD-10-CM

## 2021-02-12 PROCEDURE — 99999 PR PBB SHADOW E&M-EST. PATIENT-LVL IV: ICD-10-PCS | Mod: PBBFAC,,, | Performed by: INTERNAL MEDICINE

## 2021-02-12 PROCEDURE — 99215 PR OFFICE/OUTPT VISIT, EST, LEVL V, 40-54 MIN: ICD-10-PCS | Mod: S$PBB,,, | Performed by: INTERNAL MEDICINE

## 2021-02-12 PROCEDURE — 99999 PR PBB SHADOW E&M-EST. PATIENT-LVL IV: CPT | Mod: PBBFAC,,, | Performed by: INTERNAL MEDICINE

## 2021-02-12 PROCEDURE — 99215 OFFICE O/P EST HI 40 MIN: CPT | Mod: S$PBB,,, | Performed by: INTERNAL MEDICINE

## 2021-02-12 PROCEDURE — 99214 OFFICE O/P EST MOD 30 MIN: CPT | Mod: PBBFAC | Performed by: INTERNAL MEDICINE

## 2021-02-15 ENCOUNTER — TELEPHONE (OUTPATIENT)
Dept: GASTROENTEROLOGY | Facility: CLINIC | Age: 76
End: 2021-02-15

## 2021-02-18 ENCOUNTER — OFFICE VISIT (OUTPATIENT)
Dept: OTOLARYNGOLOGY | Facility: CLINIC | Age: 76
End: 2021-02-18
Payer: MEDICARE

## 2021-02-18 VITALS
BODY MASS INDEX: 34.93 KG/M2 | HEART RATE: 80 BPM | DIASTOLIC BLOOD PRESSURE: 67 MMHG | WEIGHT: 264.75 LBS | SYSTOLIC BLOOD PRESSURE: 132 MMHG

## 2021-02-18 DIAGNOSIS — R22.1 NECK MASS: Primary | ICD-10-CM

## 2021-02-18 PROCEDURE — 99213 OFFICE O/P EST LOW 20 MIN: CPT | Mod: S$PBB,,, | Performed by: OTOLARYNGOLOGY

## 2021-02-18 PROCEDURE — 99213 OFFICE O/P EST LOW 20 MIN: CPT | Mod: PBBFAC | Performed by: OTOLARYNGOLOGY

## 2021-02-18 PROCEDURE — 99999 PR PBB SHADOW E&M-EST. PATIENT-LVL III: ICD-10-PCS | Mod: PBBFAC,,, | Performed by: OTOLARYNGOLOGY

## 2021-02-18 PROCEDURE — 99999 PR PBB SHADOW E&M-EST. PATIENT-LVL III: CPT | Mod: PBBFAC,,, | Performed by: OTOLARYNGOLOGY

## 2021-02-18 PROCEDURE — 99213 PR OFFICE/OUTPT VISIT, EST, LEVL III, 20-29 MIN: ICD-10-PCS | Mod: S$PBB,,, | Performed by: OTOLARYNGOLOGY

## 2021-03-02 ENCOUNTER — TELEPHONE (OUTPATIENT)
Dept: UROLOGY | Facility: HOSPITAL | Age: 76
End: 2021-03-02

## 2021-03-02 DIAGNOSIS — N40.0 BENIGN PROSTATIC HYPERPLASIA WITHOUT LOWER URINARY TRACT SYMPTOMS: ICD-10-CM

## 2021-03-02 DIAGNOSIS — Z12.5 PROSTATE CANCER SCREENING: ICD-10-CM

## 2021-03-02 DIAGNOSIS — Q61.02 MULTIPLE RENAL CYSTS: Primary | ICD-10-CM

## 2021-03-03 ENCOUNTER — HOSPITAL ENCOUNTER (OUTPATIENT)
Dept: RADIOLOGY | Facility: HOSPITAL | Age: 76
Discharge: HOME | End: 2021-03-03
Attending: OTOLARYNGOLOGY
Payer: MEDICARE

## 2021-03-03 DIAGNOSIS — R22.1 NECK MASS: ICD-10-CM

## 2021-03-03 PROCEDURE — 76536 US SOFT TISSUE HEAD NECK THYROID: ICD-10-PCS | Mod: 26,,, | Performed by: RADIOLOGY

## 2021-03-03 PROCEDURE — 76536 US EXAM OF HEAD AND NECK: CPT | Mod: 26,,, | Performed by: RADIOLOGY

## 2021-03-03 PROCEDURE — 76536 US EXAM OF HEAD AND NECK: CPT | Mod: TC

## 2021-03-10 ENCOUNTER — HOSPITAL ENCOUNTER (OUTPATIENT)
Dept: RADIOLOGY | Facility: HOSPITAL | Age: 76
Discharge: HOME | End: 2021-03-10
Attending: UROLOGY
Payer: MEDICARE

## 2021-03-10 DIAGNOSIS — Q61.02 MULTIPLE RENAL CYSTS: ICD-10-CM

## 2021-03-10 LAB
CREAT SERPL-MCNC: 0.9 MG/DL (ref 0.5–1.4)
SAMPLE: NORMAL

## 2021-03-10 PROCEDURE — 25500020 PHARM REV CODE 255: Performed by: UROLOGY

## 2021-03-10 PROCEDURE — 74170 CT ABDOMEN W WO CONTRAST: ICD-10-PCS | Mod: 26,,, | Performed by: RADIOLOGY

## 2021-03-10 PROCEDURE — 74170 CT ABD WO CNTRST FLWD CNTRST: CPT | Mod: TC

## 2021-03-10 PROCEDURE — 74170 CT ABD WO CNTRST FLWD CNTRST: CPT | Mod: 26,,, | Performed by: RADIOLOGY

## 2021-03-10 RX ADMIN — IOHEXOL 100 ML: 350 INJECTION, SOLUTION INTRAVENOUS at 12:03

## 2021-03-15 ENCOUNTER — OFFICE VISIT (OUTPATIENT)
Dept: UROLOGY | Facility: CLINIC | Age: 76
End: 2021-03-15
Payer: MEDICARE

## 2021-03-15 VITALS
BODY MASS INDEX: 34.51 KG/M2 | SYSTOLIC BLOOD PRESSURE: 131 MMHG | WEIGHT: 260.38 LBS | DIASTOLIC BLOOD PRESSURE: 80 MMHG | HEART RATE: 70 BPM | HEIGHT: 73 IN

## 2021-03-15 DIAGNOSIS — R33.9 URINARY RETENTION: ICD-10-CM

## 2021-03-15 DIAGNOSIS — Q61.02 MULTIPLE RENAL CYSTS: Primary | ICD-10-CM

## 2021-03-15 DIAGNOSIS — N40.0 BENIGN PROSTATIC HYPERPLASIA WITHOUT LOWER URINARY TRACT SYMPTOMS: ICD-10-CM

## 2021-03-15 PROCEDURE — 99213 OFFICE O/P EST LOW 20 MIN: CPT | Mod: PBBFAC | Performed by: UROLOGY

## 2021-03-15 PROCEDURE — 99999 PR PBB SHADOW E&M-EST. PATIENT-LVL III: ICD-10-PCS | Mod: PBBFAC,,, | Performed by: UROLOGY

## 2021-03-15 PROCEDURE — 99999 PR PBB SHADOW E&M-EST. PATIENT-LVL III: CPT | Mod: PBBFAC,,, | Performed by: UROLOGY

## 2021-03-15 PROCEDURE — 99213 PR OFFICE/OUTPT VISIT, EST, LEVL III, 20-29 MIN: ICD-10-PCS | Mod: S$PBB,,, | Performed by: UROLOGY

## 2021-03-15 PROCEDURE — 99213 OFFICE O/P EST LOW 20 MIN: CPT | Mod: S$PBB,,, | Performed by: UROLOGY

## 2021-03-15 RX ORDER — TAMSULOSIN HYDROCHLORIDE 0.4 MG/1
CAPSULE ORAL
Qty: 90 CAPSULE | Refills: 3 | Status: SHIPPED | OUTPATIENT
Start: 2021-03-15 | End: 2022-03-21

## 2021-03-18 ENCOUNTER — PATIENT MESSAGE (OUTPATIENT)
Dept: RESEARCH | Facility: HOSPITAL | Age: 76
End: 2021-03-18

## 2021-03-26 ENCOUNTER — PATIENT MESSAGE (OUTPATIENT)
Dept: RESEARCH | Facility: HOSPITAL | Age: 76
End: 2021-03-26

## 2021-03-29 ENCOUNTER — TELEPHONE (OUTPATIENT)
Dept: SLEEP MEDICINE | Facility: CLINIC | Age: 76
End: 2021-03-29

## 2021-03-30 DIAGNOSIS — G47.33 OBSTRUCTIVE SLEEP APNEA: Primary | ICD-10-CM

## 2021-04-26 ENCOUNTER — LAB VISIT (OUTPATIENT)
Dept: LAB | Facility: HOSPITAL | Age: 76
End: 2021-04-26
Attending: INTERNAL MEDICINE
Payer: MEDICARE

## 2021-04-26 DIAGNOSIS — E53.8 LOW SERUM VITAMIN B12: ICD-10-CM

## 2021-04-26 DIAGNOSIS — E55.9 VITAMIN D INSUFFICIENCY: ICD-10-CM

## 2021-04-26 DIAGNOSIS — E53.8 VITAMIN B12 DEFICIENCY: ICD-10-CM

## 2021-04-26 LAB
25(OH)D3+25(OH)D2 SERPL-MCNC: 33 NG/ML (ref 30–96)
VIT B12 SERPL-MCNC: 444 PG/ML (ref 210–950)

## 2021-04-26 PROCEDURE — 82306 VITAMIN D 25 HYDROXY: CPT | Performed by: INTERNAL MEDICINE

## 2021-04-26 PROCEDURE — 36415 COLL VENOUS BLD VENIPUNCTURE: CPT | Performed by: INTERNAL MEDICINE

## 2021-04-26 PROCEDURE — 82607 VITAMIN B-12: CPT | Performed by: INTERNAL MEDICINE

## 2021-04-26 PROCEDURE — 83921 ORGANIC ACID SINGLE QUANT: CPT | Performed by: INTERNAL MEDICINE

## 2021-04-29 LAB — METHYLMALONATE SERPL-SCNC: 0.15 UMOL/L

## 2021-07-14 ENCOUNTER — TELEPHONE (OUTPATIENT)
Dept: INTERNAL MEDICINE | Facility: CLINIC | Age: 76
End: 2021-07-14

## 2021-07-14 DIAGNOSIS — E78.1 HYPERTRIGLYCERIDEMIA: ICD-10-CM

## 2021-07-14 DIAGNOSIS — K21.9 GASTROESOPHAGEAL REFLUX DISEASE WITHOUT ESOPHAGITIS: ICD-10-CM

## 2021-07-14 DIAGNOSIS — I10 ESSENTIAL HYPERTENSION: Primary | ICD-10-CM

## 2021-09-15 ENCOUNTER — OFFICE VISIT (OUTPATIENT)
Dept: SPINE | Facility: CLINIC | Age: 76
End: 2021-09-15
Attending: PHYSICAL MEDICINE & REHABILITATION
Payer: MEDICARE

## 2021-09-15 ENCOUNTER — HOSPITAL ENCOUNTER (OUTPATIENT)
Dept: RADIOLOGY | Facility: OTHER | Age: 76
Discharge: HOME | End: 2021-09-15
Attending: PHYSICAL MEDICINE & REHABILITATION
Payer: MEDICARE

## 2021-09-15 VITALS
HEIGHT: 73 IN | DIASTOLIC BLOOD PRESSURE: 71 MMHG | SYSTOLIC BLOOD PRESSURE: 118 MMHG | WEIGHT: 260.38 LBS | HEART RATE: 95 BPM | BODY MASS INDEX: 34.51 KG/M2

## 2021-09-15 DIAGNOSIS — M54.50 CHRONIC BILATERAL LOW BACK PAIN WITHOUT SCIATICA: ICD-10-CM

## 2021-09-15 DIAGNOSIS — G89.29 CHRONIC BILATERAL LOW BACK PAIN WITHOUT SCIATICA: Primary | ICD-10-CM

## 2021-09-15 DIAGNOSIS — M54.50 CHRONIC BILATERAL LOW BACK PAIN WITHOUT SCIATICA: Primary | ICD-10-CM

## 2021-09-15 DIAGNOSIS — M47.816 SPONDYLOSIS OF LUMBAR REGION WITHOUT MYELOPATHY OR RADICULOPATHY: ICD-10-CM

## 2021-09-15 DIAGNOSIS — G89.29 CHRONIC BILATERAL LOW BACK PAIN WITHOUT SCIATICA: ICD-10-CM

## 2021-09-15 PROCEDURE — 72110 X-RAY EXAM L-2 SPINE 4/>VWS: CPT | Mod: TC,FY

## 2021-09-15 PROCEDURE — 99214 OFFICE O/P EST MOD 30 MIN: CPT | Mod: PBBFAC | Performed by: PHYSICAL MEDICINE & REHABILITATION

## 2021-09-15 PROCEDURE — 99204 PR OFFICE/OUTPT VISIT, NEW, LEVL IV, 45-59 MIN: ICD-10-PCS | Mod: S$PBB,,, | Performed by: PHYSICAL MEDICINE & REHABILITATION

## 2021-09-15 PROCEDURE — 99999 PR PBB SHADOW E&M-EST. PATIENT-LVL IV: ICD-10-PCS | Mod: PBBFAC,,, | Performed by: PHYSICAL MEDICINE & REHABILITATION

## 2021-09-15 PROCEDURE — 72110 XR LUMBAR SPINE AP AND LAT WITH FLEX/EXT: ICD-10-PCS | Mod: 26,,, | Performed by: RADIOLOGY

## 2021-09-15 PROCEDURE — 72110 X-RAY EXAM L-2 SPINE 4/>VWS: CPT | Mod: 26,,, | Performed by: RADIOLOGY

## 2021-09-15 PROCEDURE — 99204 OFFICE O/P NEW MOD 45 MIN: CPT | Mod: S$PBB,,, | Performed by: PHYSICAL MEDICINE & REHABILITATION

## 2021-09-15 PROCEDURE — 99999 PR PBB SHADOW E&M-EST. PATIENT-LVL IV: CPT | Mod: PBBFAC,,, | Performed by: PHYSICAL MEDICINE & REHABILITATION

## 2021-09-15 RX ORDER — DICLOFENAC SODIUM 75 MG/1
75 TABLET, DELAYED RELEASE ORAL 2 TIMES DAILY
Qty: 60 TABLET | Refills: 2 | Status: SHIPPED | OUTPATIENT
Start: 2021-09-15 | End: 2022-01-12

## 2021-09-24 ENCOUNTER — LAB VISIT (OUTPATIENT)
Dept: LAB | Facility: HOSPITAL | Age: 76
End: 2021-09-24
Attending: INTERNAL MEDICINE
Payer: MEDICARE

## 2021-09-24 DIAGNOSIS — I10 ESSENTIAL HYPERTENSION: ICD-10-CM

## 2021-09-24 DIAGNOSIS — K21.9 GASTROESOPHAGEAL REFLUX DISEASE WITHOUT ESOPHAGITIS: ICD-10-CM

## 2021-09-24 DIAGNOSIS — E78.1 HYPERTRIGLYCERIDEMIA: ICD-10-CM

## 2021-09-24 LAB
ALBUMIN SERPL BCP-MCNC: 4 G/DL (ref 3.5–5.2)
ALP SERPL-CCNC: 68 U/L (ref 55–135)
ALT SERPL W/O P-5'-P-CCNC: 31 U/L (ref 10–44)
ANION GAP SERPL CALC-SCNC: 12 MMOL/L (ref 8–16)
AST SERPL-CCNC: 28 U/L (ref 10–40)
BASOPHILS # BLD AUTO: 0.05 K/UL (ref 0–0.2)
BASOPHILS NFR BLD: 0.7 % (ref 0–1.9)
BILIRUB SERPL-MCNC: 1.1 MG/DL (ref 0.1–1)
BUN SERPL-MCNC: 14 MG/DL (ref 8–23)
CALCIUM SERPL-MCNC: 9.2 MG/DL (ref 8.7–10.5)
CHLORIDE SERPL-SCNC: 104 MMOL/L (ref 95–110)
CHOLEST SERPL-MCNC: 108 MG/DL (ref 120–199)
CHOLEST/HDLC SERPL: 3.7 {RATIO} (ref 2–5)
CO2 SERPL-SCNC: 21 MMOL/L (ref 23–29)
CREAT SERPL-MCNC: 0.8 MG/DL (ref 0.5–1.4)
DIFFERENTIAL METHOD: NORMAL
EOSINOPHIL # BLD AUTO: 0.2 K/UL (ref 0–0.5)
EOSINOPHIL NFR BLD: 2.3 % (ref 0–8)
ERYTHROCYTE [DISTWIDTH] IN BLOOD BY AUTOMATED COUNT: 13 % (ref 11.5–14.5)
EST. GFR  (AFRICAN AMERICAN): >60 ML/MIN/1.73 M^2
EST. GFR  (NON AFRICAN AMERICAN): >60 ML/MIN/1.73 M^2
GLUCOSE SERPL-MCNC: 97 MG/DL (ref 70–110)
HCT VFR BLD AUTO: 45 % (ref 40–54)
HDLC SERPL-MCNC: 29 MG/DL (ref 40–75)
HDLC SERPL: 26.9 % (ref 20–50)
HGB BLD-MCNC: 15.1 G/DL (ref 14–18)
IMM GRANULOCYTES # BLD AUTO: 0.02 K/UL (ref 0–0.04)
IMM GRANULOCYTES NFR BLD AUTO: 0.3 % (ref 0–0.5)
LDLC SERPL CALC-MCNC: 53.6 MG/DL (ref 63–159)
LYMPHOCYTES # BLD AUTO: 1.5 K/UL (ref 1–4.8)
LYMPHOCYTES NFR BLD: 19.7 % (ref 18–48)
MCH RBC QN AUTO: 30.6 PG (ref 27–31)
MCHC RBC AUTO-ENTMCNC: 33.6 G/DL (ref 32–36)
MCV RBC AUTO: 91 FL (ref 82–98)
MONOCYTES # BLD AUTO: 0.8 K/UL (ref 0.3–1)
MONOCYTES NFR BLD: 10.7 % (ref 4–15)
NEUTROPHILS # BLD AUTO: 5 K/UL (ref 1.8–7.7)
NEUTROPHILS NFR BLD: 66.3 % (ref 38–73)
NONHDLC SERPL-MCNC: 79 MG/DL
NRBC BLD-RTO: 0 /100 WBC
PLATELET # BLD AUTO: 232 K/UL (ref 150–450)
PMV BLD AUTO: 9.7 FL (ref 9.2–12.9)
POTASSIUM SERPL-SCNC: 3.9 MMOL/L (ref 3.5–5.1)
PROT SERPL-MCNC: 6.8 G/DL (ref 6–8.4)
RBC # BLD AUTO: 4.94 M/UL (ref 4.6–6.2)
SODIUM SERPL-SCNC: 137 MMOL/L (ref 136–145)
TRIGL SERPL-MCNC: 127 MG/DL (ref 30–150)
WBC # BLD AUTO: 7.48 K/UL (ref 3.9–12.7)

## 2021-09-24 PROCEDURE — 36415 COLL VENOUS BLD VENIPUNCTURE: CPT | Performed by: INTERNAL MEDICINE

## 2021-09-24 PROCEDURE — 80061 LIPID PANEL: CPT | Performed by: INTERNAL MEDICINE

## 2021-09-24 PROCEDURE — 80053 COMPREHEN METABOLIC PANEL: CPT | Performed by: INTERNAL MEDICINE

## 2021-09-24 PROCEDURE — 85025 COMPLETE CBC W/AUTO DIFF WBC: CPT | Performed by: INTERNAL MEDICINE

## 2021-09-29 ENCOUNTER — CLINICAL SUPPORT (OUTPATIENT)
Dept: REHABILITATION | Facility: OTHER | Age: 76
End: 2021-09-29
Attending: PHYSICAL MEDICINE & REHABILITATION
Payer: MEDICARE

## 2021-09-29 DIAGNOSIS — G89.29 CHRONIC BILATERAL LOW BACK PAIN WITHOUT SCIATICA: ICD-10-CM

## 2021-09-29 DIAGNOSIS — R29.898 DECREASED STRENGTH OF TRUNK AND BACK: ICD-10-CM

## 2021-09-29 DIAGNOSIS — M47.816 SPONDYLOSIS OF LUMBAR REGION WITHOUT MYELOPATHY OR RADICULOPATHY: ICD-10-CM

## 2021-09-29 DIAGNOSIS — R26.89 DECREASED BACK MOBILITY: ICD-10-CM

## 2021-09-29 DIAGNOSIS — M54.50 CHRONIC BILATERAL LOW BACK PAIN WITHOUT SCIATICA: ICD-10-CM

## 2021-09-29 PROCEDURE — 97110 THERAPEUTIC EXERCISES: CPT

## 2021-09-29 PROCEDURE — 97161 PT EVAL LOW COMPLEX 20 MIN: CPT

## 2021-09-30 ENCOUNTER — IMMUNIZATION (OUTPATIENT)
Dept: INTERNAL MEDICINE | Facility: CLINIC | Age: 76
End: 2021-09-30
Payer: MEDICARE

## 2021-09-30 DIAGNOSIS — Z23 NEED FOR VACCINATION: Primary | ICD-10-CM

## 2021-09-30 PROCEDURE — 0003A COVID-19, MRNA, LNP-S, PF, 30 MCG/0.3 ML DOSE VACCINE: CPT | Mod: PBBFAC,CV19

## 2021-09-30 PROCEDURE — 91300 COVID-19, MRNA, LNP-S, PF, 30 MCG/0.3 ML DOSE VACCINE: CPT | Mod: PBBFAC

## 2021-10-01 ENCOUNTER — LAB VISIT (OUTPATIENT)
Dept: LAB | Facility: HOSPITAL | Age: 76
End: 2021-10-01
Attending: INTERNAL MEDICINE
Payer: MEDICARE

## 2021-10-01 ENCOUNTER — OFFICE VISIT (OUTPATIENT)
Dept: INTERNAL MEDICINE | Facility: CLINIC | Age: 76
End: 2021-10-01
Payer: MEDICARE

## 2021-10-01 VITALS
OXYGEN SATURATION: 98 % | SYSTOLIC BLOOD PRESSURE: 122 MMHG | DIASTOLIC BLOOD PRESSURE: 70 MMHG | WEIGHT: 255.75 LBS | HEART RATE: 65 BPM | RESPIRATION RATE: 18 BRPM | BODY MASS INDEX: 33.9 KG/M2 | HEIGHT: 73 IN

## 2021-10-01 DIAGNOSIS — R59.1 LAD (LYMPHADENOPATHY): ICD-10-CM

## 2021-10-01 DIAGNOSIS — R53.83 OTHER FATIGUE: ICD-10-CM

## 2021-10-01 DIAGNOSIS — Z91.89 MULTIPLE RISK FACTORS FOR CORONARY ARTERY DISEASE: Primary | ICD-10-CM

## 2021-10-01 LAB — TSH SERPL DL<=0.005 MIU/L-ACNC: 1.2 UIU/ML (ref 0.4–4)

## 2021-10-01 PROCEDURE — 36415 COLL VENOUS BLD VENIPUNCTURE: CPT | Performed by: INTERNAL MEDICINE

## 2021-10-01 PROCEDURE — 99214 OFFICE O/P EST MOD 30 MIN: CPT | Mod: PBBFAC | Performed by: INTERNAL MEDICINE

## 2021-10-01 PROCEDURE — 99214 PR OFFICE/OUTPT VISIT, EST, LEVL IV, 30-39 MIN: ICD-10-PCS | Mod: S$PBB,,, | Performed by: INTERNAL MEDICINE

## 2021-10-01 PROCEDURE — 99214 OFFICE O/P EST MOD 30 MIN: CPT | Mod: S$PBB,,, | Performed by: INTERNAL MEDICINE

## 2021-10-01 PROCEDURE — 99999 PR PBB SHADOW E&M-EST. PATIENT-LVL IV: CPT | Mod: PBBFAC,,, | Performed by: INTERNAL MEDICINE

## 2021-10-01 PROCEDURE — 99999 PR PBB SHADOW E&M-EST. PATIENT-LVL IV: ICD-10-PCS | Mod: PBBFAC,,, | Performed by: INTERNAL MEDICINE

## 2021-10-01 PROCEDURE — 84443 ASSAY THYROID STIM HORMONE: CPT | Performed by: INTERNAL MEDICINE

## 2021-10-04 ENCOUNTER — CLINICAL SUPPORT (OUTPATIENT)
Dept: REHABILITATION | Facility: OTHER | Age: 76
End: 2021-10-04
Attending: PHYSICAL MEDICINE & REHABILITATION
Payer: MEDICARE

## 2021-10-04 DIAGNOSIS — R26.89 DECREASED BACK MOBILITY: ICD-10-CM

## 2021-10-04 DIAGNOSIS — R29.898 DECREASED STRENGTH OF TRUNK AND BACK: Primary | ICD-10-CM

## 2021-10-04 PROCEDURE — 97110 THERAPEUTIC EXERCISES: CPT

## 2021-10-06 DIAGNOSIS — E53.8 VITAMIN B12 DEFICIENCY: ICD-10-CM

## 2021-10-06 DIAGNOSIS — E55.9 VITAMIN D INSUFFICIENCY: ICD-10-CM

## 2021-10-06 RX ORDER — ERGOCALCIFEROL 1.25 MG/1
50000 CAPSULE ORAL WEEKLY
Qty: 12 CAPSULE | Refills: 0 | OUTPATIENT
Start: 2021-10-06

## 2021-10-07 ENCOUNTER — CLINICAL SUPPORT (OUTPATIENT)
Dept: REHABILITATION | Facility: OTHER | Age: 76
End: 2021-10-07
Attending: PHYSICAL MEDICINE & REHABILITATION
Payer: MEDICARE

## 2021-10-07 DIAGNOSIS — R29.898 DECREASED STRENGTH OF TRUNK AND BACK: Primary | ICD-10-CM

## 2021-10-07 DIAGNOSIS — R26.89 DECREASED BACK MOBILITY: ICD-10-CM

## 2021-10-07 PROCEDURE — 97110 THERAPEUTIC EXERCISES: CPT

## 2021-10-14 ENCOUNTER — CLINICAL SUPPORT (OUTPATIENT)
Dept: REHABILITATION | Facility: OTHER | Age: 76
End: 2021-10-14
Attending: PHYSICAL MEDICINE & REHABILITATION
Payer: MEDICARE

## 2021-10-14 DIAGNOSIS — R26.89 DECREASED BACK MOBILITY: ICD-10-CM

## 2021-10-14 DIAGNOSIS — R29.898 DECREASED STRENGTH OF TRUNK AND BACK: Primary | ICD-10-CM

## 2021-10-14 PROCEDURE — 97110 THERAPEUTIC EXERCISES: CPT | Mod: CQ

## 2021-10-18 ENCOUNTER — CLINICAL SUPPORT (OUTPATIENT)
Dept: REHABILITATION | Facility: OTHER | Age: 76
End: 2021-10-18
Attending: PHYSICAL MEDICINE & REHABILITATION
Payer: MEDICARE

## 2021-10-18 DIAGNOSIS — R29.898 DECREASED STRENGTH OF TRUNK AND BACK: Primary | ICD-10-CM

## 2021-10-18 DIAGNOSIS — R26.89 DECREASED BACK MOBILITY: ICD-10-CM

## 2021-10-18 PROCEDURE — 97110 THERAPEUTIC EXERCISES: CPT | Mod: CQ

## 2021-10-20 ENCOUNTER — CLINICAL SUPPORT (OUTPATIENT)
Dept: REHABILITATION | Facility: OTHER | Age: 76
End: 2021-10-20
Attending: PHYSICAL MEDICINE & REHABILITATION
Payer: MEDICARE

## 2021-10-20 DIAGNOSIS — R29.898 DECREASED STRENGTH OF TRUNK AND BACK: Primary | ICD-10-CM

## 2021-10-20 DIAGNOSIS — R26.89 DECREASED BACK MOBILITY: ICD-10-CM

## 2021-10-20 PROCEDURE — 97110 THERAPEUTIC EXERCISES: CPT

## 2021-10-25 ENCOUNTER — CLINICAL SUPPORT (OUTPATIENT)
Dept: REHABILITATION | Facility: OTHER | Age: 76
End: 2021-10-25
Attending: PHYSICAL MEDICINE & REHABILITATION
Payer: MEDICARE

## 2021-10-25 DIAGNOSIS — R26.89 DECREASED BACK MOBILITY: ICD-10-CM

## 2021-10-25 DIAGNOSIS — R29.898 DECREASED STRENGTH OF TRUNK AND BACK: Primary | ICD-10-CM

## 2021-10-25 PROCEDURE — 97110 THERAPEUTIC EXERCISES: CPT | Mod: CQ

## 2021-10-29 ENCOUNTER — CLINICAL SUPPORT (OUTPATIENT)
Dept: REHABILITATION | Facility: OTHER | Age: 76
End: 2021-10-29
Attending: PHYSICAL MEDICINE & REHABILITATION
Payer: MEDICARE

## 2021-10-29 DIAGNOSIS — R29.898 DECREASED STRENGTH OF TRUNK AND BACK: Primary | ICD-10-CM

## 2021-10-29 DIAGNOSIS — R26.89 DECREASED BACK MOBILITY: ICD-10-CM

## 2021-10-29 PROCEDURE — 97110 THERAPEUTIC EXERCISES: CPT

## 2021-11-03 ENCOUNTER — CLINICAL SUPPORT (OUTPATIENT)
Dept: REHABILITATION | Facility: OTHER | Age: 76
End: 2021-11-03
Attending: PHYSICAL MEDICINE & REHABILITATION
Payer: MEDICARE

## 2021-11-03 DIAGNOSIS — R26.89 DECREASED BACK MOBILITY: ICD-10-CM

## 2021-11-03 DIAGNOSIS — R29.898 DECREASED STRENGTH OF TRUNK AND BACK: Primary | ICD-10-CM

## 2021-11-03 PROCEDURE — 97110 THERAPEUTIC EXERCISES: CPT

## 2021-11-08 ENCOUNTER — CLINICAL SUPPORT (OUTPATIENT)
Dept: REHABILITATION | Facility: OTHER | Age: 76
End: 2021-11-08
Attending: PHYSICAL MEDICINE & REHABILITATION
Payer: MEDICARE

## 2021-11-08 DIAGNOSIS — R29.898 DECREASED STRENGTH OF TRUNK AND BACK: Primary | ICD-10-CM

## 2021-11-08 DIAGNOSIS — R26.89 DECREASED BACK MOBILITY: ICD-10-CM

## 2021-11-08 PROCEDURE — 97110 THERAPEUTIC EXERCISES: CPT

## 2021-11-16 ENCOUNTER — TELEPHONE (OUTPATIENT)
Dept: SPINE | Facility: CLINIC | Age: 76
End: 2021-11-16
Payer: MEDICARE

## 2021-11-16 ENCOUNTER — PATIENT OUTREACH (OUTPATIENT)
Dept: ADMINISTRATIVE | Facility: OTHER | Age: 76
End: 2021-11-16
Payer: MEDICARE

## 2021-11-17 ENCOUNTER — OFFICE VISIT (OUTPATIENT)
Dept: SPINE | Facility: CLINIC | Age: 76
End: 2021-11-17
Attending: PHYSICAL MEDICINE & REHABILITATION
Payer: MEDICARE

## 2021-11-17 VITALS
WEIGHT: 255.75 LBS | BODY MASS INDEX: 33.9 KG/M2 | DIASTOLIC BLOOD PRESSURE: 75 MMHG | SYSTOLIC BLOOD PRESSURE: 117 MMHG | HEIGHT: 73 IN | HEART RATE: 80 BPM

## 2021-11-17 DIAGNOSIS — M54.50 CHRONIC BILATERAL LOW BACK PAIN WITHOUT SCIATICA: Primary | ICD-10-CM

## 2021-11-17 DIAGNOSIS — G89.29 CHRONIC BILATERAL LOW BACK PAIN WITHOUT SCIATICA: Primary | ICD-10-CM

## 2021-11-17 DIAGNOSIS — M47.816 SPONDYLOSIS OF LUMBAR REGION WITHOUT MYELOPATHY OR RADICULOPATHY: ICD-10-CM

## 2021-11-17 PROCEDURE — 99213 OFFICE O/P EST LOW 20 MIN: CPT | Mod: PBBFAC | Performed by: PHYSICAL MEDICINE & REHABILITATION

## 2021-11-17 PROCEDURE — 99999 PR PBB SHADOW E&M-EST. PATIENT-LVL III: CPT | Mod: PBBFAC,,, | Performed by: PHYSICAL MEDICINE & REHABILITATION

## 2021-11-17 PROCEDURE — 99214 OFFICE O/P EST MOD 30 MIN: CPT | Mod: S$PBB,,, | Performed by: PHYSICAL MEDICINE & REHABILITATION

## 2021-11-17 PROCEDURE — 99999 PR PBB SHADOW E&M-EST. PATIENT-LVL III: ICD-10-PCS | Mod: PBBFAC,,, | Performed by: PHYSICAL MEDICINE & REHABILITATION

## 2021-11-17 PROCEDURE — 99214 PR OFFICE/OUTPT VISIT, EST, LEVL IV, 30-39 MIN: ICD-10-PCS | Mod: S$PBB,,, | Performed by: PHYSICAL MEDICINE & REHABILITATION

## 2021-11-17 RX ORDER — METHYLPREDNISOLONE 4 MG/1
TABLET ORAL
Qty: 21 EACH | Refills: 0 | Status: SHIPPED | OUTPATIENT
Start: 2021-11-17 | End: 2021-12-08

## 2021-11-23 ENCOUNTER — CLINICAL SUPPORT (OUTPATIENT)
Dept: REHABILITATION | Facility: OTHER | Age: 76
End: 2021-11-23
Attending: PHYSICAL MEDICINE & REHABILITATION
Payer: MEDICARE

## 2021-11-23 DIAGNOSIS — R26.89 DECREASED BACK MOBILITY: ICD-10-CM

## 2021-11-23 DIAGNOSIS — R29.898 DECREASED STRENGTH OF TRUNK AND BACK: Primary | ICD-10-CM

## 2021-11-23 PROCEDURE — 97110 THERAPEUTIC EXERCISES: CPT

## 2021-11-30 ENCOUNTER — CLINICAL SUPPORT (OUTPATIENT)
Dept: REHABILITATION | Facility: OTHER | Age: 76
End: 2021-11-30
Attending: PHYSICAL MEDICINE & REHABILITATION
Payer: MEDICARE

## 2021-11-30 DIAGNOSIS — R29.898 DECREASED STRENGTH OF TRUNK AND BACK: Primary | ICD-10-CM

## 2021-11-30 DIAGNOSIS — R26.89 DECREASED BACK MOBILITY: ICD-10-CM

## 2021-11-30 PROCEDURE — 97110 THERAPEUTIC EXERCISES: CPT | Mod: CQ

## 2021-12-03 ENCOUNTER — CLINICAL SUPPORT (OUTPATIENT)
Dept: REHABILITATION | Facility: OTHER | Age: 76
End: 2021-12-03
Attending: PHYSICAL MEDICINE & REHABILITATION
Payer: MEDICARE

## 2021-12-03 DIAGNOSIS — R29.898 DECREASED STRENGTH OF TRUNK AND BACK: Primary | ICD-10-CM

## 2021-12-03 DIAGNOSIS — R26.89 DECREASED BACK MOBILITY: ICD-10-CM

## 2021-12-03 PROCEDURE — 97110 THERAPEUTIC EXERCISES: CPT

## 2021-12-06 ENCOUNTER — PATIENT MESSAGE (OUTPATIENT)
Dept: SPINE | Facility: CLINIC | Age: 76
End: 2021-12-06
Payer: MEDICARE

## 2021-12-06 DIAGNOSIS — M47.816 SPONDYLOSIS OF LUMBAR REGION WITHOUT MYELOPATHY OR RADICULOPATHY: Primary | ICD-10-CM

## 2021-12-06 DIAGNOSIS — G89.29 CHRONIC BILATERAL LOW BACK PAIN WITHOUT SCIATICA: ICD-10-CM

## 2021-12-06 DIAGNOSIS — M54.50 CHRONIC BILATERAL LOW BACK PAIN WITHOUT SCIATICA: ICD-10-CM

## 2021-12-08 ENCOUNTER — PATIENT MESSAGE (OUTPATIENT)
Dept: SPINE | Facility: CLINIC | Age: 76
End: 2021-12-08
Payer: MEDICARE

## 2021-12-08 RX ORDER — DIAZEPAM 2 MG/1
2 TABLET ORAL
Qty: 2 TABLET | Refills: 0 | Status: SHIPPED | OUTPATIENT
Start: 2021-12-08 | End: 2022-01-10 | Stop reason: CLARIF

## 2021-12-09 ENCOUNTER — PATIENT MESSAGE (OUTPATIENT)
Dept: SPINE | Facility: CLINIC | Age: 76
End: 2021-12-09
Payer: MEDICARE

## 2021-12-14 ENCOUNTER — HOSPITAL ENCOUNTER (OUTPATIENT)
Dept: RADIOLOGY | Facility: HOSPITAL | Age: 76
Discharge: HOME | End: 2021-12-14
Attending: PHYSICAL MEDICINE & REHABILITATION
Payer: MEDICARE

## 2021-12-14 DIAGNOSIS — M47.816 SPONDYLOSIS OF LUMBAR REGION WITHOUT MYELOPATHY OR RADICULOPATHY: ICD-10-CM

## 2021-12-14 DIAGNOSIS — G89.29 CHRONIC BILATERAL LOW BACK PAIN WITHOUT SCIATICA: ICD-10-CM

## 2021-12-14 DIAGNOSIS — M54.50 CHRONIC BILATERAL LOW BACK PAIN WITHOUT SCIATICA: ICD-10-CM

## 2021-12-14 PROCEDURE — 72148 MRI LUMBAR SPINE WITHOUT CONTRAST: ICD-10-PCS | Mod: 26,,, | Performed by: RADIOLOGY

## 2021-12-14 PROCEDURE — 72148 MRI LUMBAR SPINE W/O DYE: CPT | Mod: 26,,, | Performed by: RADIOLOGY

## 2021-12-14 PROCEDURE — 72148 MRI LUMBAR SPINE W/O DYE: CPT | Mod: TC

## 2021-12-15 ENCOUNTER — PATIENT MESSAGE (OUTPATIENT)
Dept: SPINE | Facility: CLINIC | Age: 76
End: 2021-12-15
Payer: MEDICARE

## 2021-12-15 DIAGNOSIS — M47.816 SPONDYLOSIS OF LUMBAR REGION WITHOUT MYELOPATHY OR RADICULOPATHY: Primary | ICD-10-CM

## 2021-12-15 DIAGNOSIS — M48.061 SPINAL STENOSIS AT L4-L5 LEVEL: ICD-10-CM

## 2021-12-16 ENCOUNTER — OFFICE VISIT (OUTPATIENT)
Dept: SPINE | Facility: CLINIC | Age: 76
End: 2021-12-16
Payer: MEDICARE

## 2021-12-16 ENCOUNTER — HOSPITAL ENCOUNTER (OUTPATIENT)
Dept: RADIOLOGY | Facility: OTHER | Age: 76
Discharge: HOME | End: 2021-12-16
Attending: STUDENT IN AN ORGANIZED HEALTH CARE EDUCATION/TRAINING PROGRAM
Payer: MEDICARE

## 2021-12-16 VITALS — SYSTOLIC BLOOD PRESSURE: 129 MMHG | HEART RATE: 87 BPM | DIASTOLIC BLOOD PRESSURE: 75 MMHG

## 2021-12-16 DIAGNOSIS — M47.816 SPONDYLOSIS OF LUMBAR REGION WITHOUT MYELOPATHY OR RADICULOPATHY: ICD-10-CM

## 2021-12-16 DIAGNOSIS — M48.061 SPINAL STENOSIS AT L4-L5 LEVEL: ICD-10-CM

## 2021-12-16 DIAGNOSIS — M47.816 SPONDYLOSIS OF LUMBAR REGION WITHOUT MYELOPATHY OR RADICULOPATHY: Primary | ICD-10-CM

## 2021-12-16 PROCEDURE — 99203 PR OFFICE/OUTPT VISIT, NEW, LEVL III, 30-44 MIN: ICD-10-PCS | Mod: S$PBB,,, | Performed by: STUDENT IN AN ORGANIZED HEALTH CARE EDUCATION/TRAINING PROGRAM

## 2021-12-16 PROCEDURE — 99203 OFFICE O/P NEW LOW 30 MIN: CPT | Mod: S$PBB,,, | Performed by: STUDENT IN AN ORGANIZED HEALTH CARE EDUCATION/TRAINING PROGRAM

## 2021-12-16 PROCEDURE — 72082 X-RAY EXAM ENTIRE SPI 2/3 VW: CPT | Mod: TC,FY

## 2021-12-16 PROCEDURE — 99999 PR PBB SHADOW E&M-EST. PATIENT-LVL III: ICD-10-PCS | Mod: PBBFAC,,, | Performed by: STUDENT IN AN ORGANIZED HEALTH CARE EDUCATION/TRAINING PROGRAM

## 2021-12-16 PROCEDURE — 72082 XR SCOLIOSIS COMPLETE: ICD-10-PCS | Mod: 26,,, | Performed by: STUDENT IN AN ORGANIZED HEALTH CARE EDUCATION/TRAINING PROGRAM

## 2021-12-16 PROCEDURE — 99213 OFFICE O/P EST LOW 20 MIN: CPT | Mod: PBBFAC | Performed by: STUDENT IN AN ORGANIZED HEALTH CARE EDUCATION/TRAINING PROGRAM

## 2021-12-16 PROCEDURE — 72082 X-RAY EXAM ENTIRE SPI 2/3 VW: CPT | Mod: 26,,, | Performed by: STUDENT IN AN ORGANIZED HEALTH CARE EDUCATION/TRAINING PROGRAM

## 2021-12-16 PROCEDURE — 99999 PR PBB SHADOW E&M-EST. PATIENT-LVL III: CPT | Mod: PBBFAC,,, | Performed by: STUDENT IN AN ORGANIZED HEALTH CARE EDUCATION/TRAINING PROGRAM

## 2021-12-28 ENCOUNTER — HOSPITAL ENCOUNTER (OUTPATIENT)
Dept: RADIOLOGY | Facility: HOSPITAL | Age: 76
Discharge: HOME | End: 2021-12-28
Attending: STUDENT IN AN ORGANIZED HEALTH CARE EDUCATION/TRAINING PROGRAM
Payer: MEDICARE

## 2021-12-28 DIAGNOSIS — M47.816 SPONDYLOSIS OF LUMBAR REGION WITHOUT MYELOPATHY OR RADICULOPATHY: ICD-10-CM

## 2021-12-28 PROCEDURE — 72131 CT LUMBAR SPINE W/O DYE: CPT | Mod: TC

## 2021-12-28 PROCEDURE — 72131 CT LUMBAR SPINE WITHOUT CONTRAST: ICD-10-PCS | Mod: 26,,, | Performed by: RADIOLOGY

## 2021-12-28 PROCEDURE — 72131 CT LUMBAR SPINE W/O DYE: CPT | Mod: 26,,, | Performed by: RADIOLOGY

## 2022-01-05 ENCOUNTER — PATIENT OUTREACH (OUTPATIENT)
Dept: ADMINISTRATIVE | Facility: OTHER | Age: 77
End: 2022-01-05
Payer: MEDICARE

## 2022-01-06 ENCOUNTER — OFFICE VISIT (OUTPATIENT)
Dept: SPINE | Facility: CLINIC | Age: 77
End: 2022-01-06
Payer: MEDICARE

## 2022-01-06 ENCOUNTER — TELEPHONE (OUTPATIENT)
Dept: SPINE | Facility: CLINIC | Age: 77
End: 2022-01-06

## 2022-01-06 ENCOUNTER — TELEPHONE (OUTPATIENT)
Dept: NEUROSURGERY | Facility: CLINIC | Age: 77
End: 2022-01-06
Payer: MEDICARE

## 2022-01-06 VITALS — TEMPERATURE: 98 F | HEART RATE: 92 BPM | SYSTOLIC BLOOD PRESSURE: 126 MMHG | DIASTOLIC BLOOD PRESSURE: 77 MMHG

## 2022-01-06 DIAGNOSIS — M43.8X9 SAGITTAL PLANE IMBALANCE: ICD-10-CM

## 2022-01-06 DIAGNOSIS — M43.10 SPONDYLOLYSIS WITH SPONDYLOLISTHESIS: Primary | ICD-10-CM

## 2022-01-06 DIAGNOSIS — M41.9 SCOLIOSIS, UNSPECIFIED SCOLIOSIS TYPE, UNSPECIFIED SPINAL REGION: ICD-10-CM

## 2022-01-06 DIAGNOSIS — M43.16 SPONDYLOLISTHESIS OF LUMBAR REGION: Primary | ICD-10-CM

## 2022-01-06 DIAGNOSIS — M48.061 SPINAL STENOSIS OF LUMBAR REGION WITHOUT NEUROGENIC CLAUDICATION: ICD-10-CM

## 2022-01-06 PROCEDURE — 99214 PR OFFICE/OUTPT VISIT, EST, LEVL IV, 30-39 MIN: ICD-10-PCS | Mod: S$PBB,,, | Performed by: STUDENT IN AN ORGANIZED HEALTH CARE EDUCATION/TRAINING PROGRAM

## 2022-01-06 PROCEDURE — 99214 OFFICE O/P EST MOD 30 MIN: CPT | Mod: S$PBB,,, | Performed by: STUDENT IN AN ORGANIZED HEALTH CARE EDUCATION/TRAINING PROGRAM

## 2022-01-06 PROCEDURE — 99999 PR PBB SHADOW E&M-EST. PATIENT-LVL III: CPT | Mod: PBBFAC,,, | Performed by: STUDENT IN AN ORGANIZED HEALTH CARE EDUCATION/TRAINING PROGRAM

## 2022-01-06 PROCEDURE — 99213 OFFICE O/P EST LOW 20 MIN: CPT | Mod: PBBFAC | Performed by: STUDENT IN AN ORGANIZED HEALTH CARE EDUCATION/TRAINING PROGRAM

## 2022-01-06 PROCEDURE — 99999 PR PBB SHADOW E&M-EST. PATIENT-LVL III: ICD-10-PCS | Mod: PBBFAC,,, | Performed by: STUDENT IN AN ORGANIZED HEALTH CARE EDUCATION/TRAINING PROGRAM

## 2022-01-06 NOTE — TELEPHONE ENCOUNTER
Offered patient 2/4/21 for surgery date per Dr. Pretty. Patient agreeable, signed surgical and blood consents and gave patient written copy. Gave patient written preop instructions and discussed preop requirements, patient verbalized understanding. Patient to contact his PCP to set up preop clearance. Sent message to Dr. jacki ryan to assist with surgery and Dr. Ryan agreed.    Marce العراقي, RN, CCM, CMSRN  RN Navigator  [facility]

## 2022-01-06 NOTE — PROGRESS NOTES
Neurosurgery  Established Patient    SUBJECTIVE:     History of Present Illness:  76 M with prior hx of L4/5 lami 20 yrs ago presents for eval of worsening of his BLE radicular pain that started this fall.  He has had mild chronic back pain that is worse when standing.  BLE radicular pain starts in his buttocks and radiates down posterior thighs to his knees.  Right leg slightly worse than left leg.  Pain is 7/10 when he is upright and walking.  He used to be able to walk 2 miles but now only able to walk 2 blocks prior to legs stopping him.  Sitting up/laying down improves his leg symptoms.  He has no saddle anesthesia or bowel/bladder incontinence.  He has tried PT w/o much relief and isn't interested in pursuing pain management.  Not a current smoker.    1/6/2022  76 M who is s/p L4/5 lami 20 years ago presents in fu after updated imaging.  He continues to endorse back pain and BLE radicular pain right greater than left that radiates down posterior thighs into his knees.  His back pain increases to 8/9-10 when walking and is significantly affecting his life.  He is disinterested in continuing pain management and PT.    Review of patient's allergies indicates:  No Known Allergies     Current Outpatient Medications   Medication Sig Dispense Refill    acetaminophen (TYLENOL) 325 MG tablet Take 650 mg by mouth every 6 (six) hours as needed for Pain.      atorvastatin (LIPITOR) 10 MG tablet TAKE ONE DAILY 90 tablet 2    cyanocobalamin (VITAMIN B-12) 100 MCG tablet Take 1 tablet (100 mcg total) by mouth once daily. 90 tablet 11    diazePAM (VALIUM) 2 MG tablet Take 1 tablet (2 mg total) by mouth On call Procedure for Anxiety. (Patient not taking: Reported on 12/16/2021) 2 tablet 0    diclofenac (VOLTAREN) 75 MG EC tablet Take 1 tablet (75 mg total) by mouth 2 (two) times daily. 60 tablet 2    dutasteride (AVODART) 0.5 mg capsule TAKE ONE DAILY 30 capsule 11    ibuprofen (ADVIL,MOTRIN) 200 MG tablet Take 200 mg  by mouth every 6 (six) hours as needed for Pain.      lisinopriL (PRINIVIL,ZESTRIL) 20 MG tablet TAKE ONE DAILY 90 tablet 2    MULTIVITAMIN W-MINERALS/LUTEIN (CENTRUM SILVER ORAL) Take 1 tablet by mouth once daily.      pantoprazole (PROTONIX) 40 MG tablet TAKE ONE BY MOUTH EVERY MORNING 45 MINUTES BEFORE BREAKFAST. 90 tablet 3    tamsulosin (FLOMAX) 0.4 mg Cap TAKE ONE BY MOUTH EVERY EVENING 90 capsule 3    VITAMIN D2 1,250 mcg (50,000 unit) capsule TAKE 1 CAPSULE (50,000 UNITS TOTAL) BY MOUTH EVERY 7 DAYS. (Patient not taking: Reported on 12/16/2021) 12 capsule 0     No current facility-administered medications for this visit.       Past Medical History:   Diagnosis Date    Allergy 12/3/2015    Arthritis     BPH without obstruction/lower urinary tract symptoms 01/04/2018    Degenerative disc disease     GERD (gastroesophageal reflux disease) 12/2/2020    Hypertension     Nuclear sclerosis - Both Eyes 7/30/2012     Past Surgical History:   Procedure Laterality Date    BACK SURGERY  2002    ESOPHAGOGASTRODUODENOSCOPY N/A 12/2/2020    Procedure: EGD (ESOPHAGOGASTRODUODENOSCOPY);  Surgeon: Dion Mckinney MD;  Location: [facility] (4TH FLR);  Service: Endoscopy;  Laterality: N/A;  covid-11/29-[place] [facility]    EYE FOREIGN BODY REMOVAL      childhood    EYE SURGERY      TRANSURETHRAL RESECTION OF PROSTATE      April 2017    TRANSURETHRAL RESECTION OF PROSTATE (TURP) WITHOUT USE OF LASER N/A 9/11/2018    Procedure: TURP, WITHOUT USING LASER;  Surgeon: Uma Gaona MD;  [address];  Service: Urology;  Laterality: N/A;  1.5 hours     Family History     Problem Relation (Age of Onset)    Cancer Father    Cataracts Mother    Heart disease Father, Brother, Paternal Grandfather    Hypertension Mother    Stroke Paternal Grandfather        Social History     Socioeconomic History    Marital status:    Occupational History     Employer: [employer]   Tobacco Use     Smoking status: Former Smoker     Packs/day: 2.00     Years: 4.00     Pack years: 8.00     Types: Cigarettes, Cigars     Quit date: 1973     Years since quittin.0    Smokeless tobacco: Never Used   Substance and Sexual Activity    Alcohol use: Yes     Alcohol/week: 1.0 standard drink     Types: 1 Cans of beer per week     Comment: rarely    Drug use: No   Social History Narrative    , consulting firm , 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 wife (healthy) lives at home, 2 dtrs, 43, 41. frequ walking each day.     Social Determinants of Health     Financial Resource Strain: Unknown    Difficulty of Paying Living Expenses: Patient refused   Food Insecurity: Unknown    Worried About Running Out of Food in the Last Year: Patient refused    Ran Out of Food in the Last Year: Patient refused   Transportation Needs: Unknown    Lack of Transportation (Medical): Patient refused    Lack of Transportation (Non-Medical): Patient refused   Physical Activity: Sufficiently Active    Days of Exercise per Week: 7 days    Minutes of Exercise per Session: 70 min   Stress: Stress Concern Present    Feeling of Stress : To some extent   Social Connections: Unknown    Frequency of Communication with Friends and Family: Patient refused    Frequency of Social Gatherings with Friends and Family: Twice a week    Active Member of Clubs or Organizations: Patient refused    Attends Club or Organization Meetings: Patient refused    Marital Status:        Review of Systems   14 point ROS was negative    OBJECTIVE:     Vital Signs  Temp: 98.4 °F (36.9 °C)  Pulse: 92  BP: 126/77  Pain Score:   5  There is no height or weight on file to calculate BMI.    Neurosurgery Physical Exam  Constitutional: He appears well-developed and well-nourished.      Eyes: Pupils are equal, round, and reactive to light.      Cardiovascular: Normal rate and regular rhythm.      Abdominal: Soft.     Psych/Behavior: He is alert. He is oriented  to person, place, and time. He has a normal mood and affect.     Musculoskeletal: Gait is normal.        Neck: Range of motion is full.        Back: Range of motion is full.        Right Upper Extremities: Muscle strength is 5/5.        Left Upper Extremities: Muscle strength is 5/5.       Right Lower Extremities: Muscle strength is 5/5.        Left Lower Extremities: Muscle strength is 5/5.     Neurological:        Coordination: He has a normal Romberg Test and normal tandem walking coordination.        Sensory: There is no sensory deficit in the trunk. There is no sensory deficit in the extremities.        DTRs: DTRs are DTRS NORMAL AND SYMMETRICnormal and symmetric.        Cranial nerves: Cranial nerve(s) II, III, IV, V, VI, VII, VIII, IX, X, XI and XII are intact.      No peter's     Normal posture     No posterior spinal tenderness    Diagnostic Results:  Scoliosis xrays: no global coronal deformity.  Primary degenerative lumbosacral curve to the left with compensatory upper lumbar dextroscoliosis.  SVA of 10cm, PI 71 deg, LL 50 deg, PT 34 deg.  CT L spine: multilevel degenerative changes with multilevel facet arthropathy.  Right L4 pars defect and grade I L4/5 spondylolysis.  Reviewed    ASSESSMENT/PLAN:     76 M s/p L4/5 lami 20 years ago with chronic back and BLE radicular pain.  Imaging demonstrates spinopelvic mismatch and dynamic instability at L4/5.  -Will plan for [id number] fusion with L4/5, L5/S1 TLIF.

## 2022-01-07 ENCOUNTER — TELEPHONE (OUTPATIENT)
Dept: NEUROSURGERY | Facility: CLINIC | Age: 77
End: 2022-01-07
Payer: MEDICARE

## 2022-01-07 ENCOUNTER — TELEPHONE (OUTPATIENT)
Dept: INTERNAL MEDICINE | Facility: CLINIC | Age: 77
End: 2022-01-07
Payer: MEDICARE

## 2022-01-07 NOTE — TELEPHONE ENCOUNTER
----- Message from Cher Kevyn sent at 1/6/2022  3:53 PM CST -----  Regarding: reschedule his appt to something later on the 12th  Contact: pt  Caller is requesting a call back regarding rescheduling his appt to something later on the 12th in the afternoon.  Please call back at [phone].  Thanks

## 2022-01-07 NOTE — TELEPHONE ENCOUNTER
All circuits busy   Had CT 12/26 and 12/10 and wanted Dr. Wilcox to review the results of both to make sure nothing has changed. She is in the hospital and they are telling her she has fluid around the lung and she wants  To take another look.    [phone]

## 2022-01-10 ENCOUNTER — TELEPHONE (OUTPATIENT)
Dept: INTERNAL MEDICINE | Facility: CLINIC | Age: 77
End: 2022-01-10
Payer: MEDICARE

## 2022-01-10 DIAGNOSIS — M79.605 PAIN IN BOTH LOWER EXTREMITIES: ICD-10-CM

## 2022-01-10 DIAGNOSIS — Z01.818 PREOPERATIVE TESTING: Primary | ICD-10-CM

## 2022-01-10 DIAGNOSIS — M79.604 PAIN IN BOTH LOWER EXTREMITIES: ICD-10-CM

## 2022-01-10 NOTE — TELEPHONE ENCOUNTER
----- Message from Nichole Olvera RN sent at 1/10/2022  1:34 PM CST -----  Patient is scheduled for [id number] fusion and TLIF L4-5, and L5-S1 on 2/4 with .( approximately 480 minutes of general anesthesia). He will need medical clearance and is scheduled to see you on 1/12.  Thanks!

## 2022-01-11 ENCOUNTER — TELEPHONE (OUTPATIENT)
Dept: NEUROSURGERY | Facility: CLINIC | Age: 77
End: 2022-01-11
Payer: MEDICARE

## 2022-01-11 ENCOUNTER — TELEPHONE (OUTPATIENT)
Dept: PREADMISSION TESTING | Facility: HOSPITAL | Age: 77
End: 2022-01-11
Payer: MEDICARE

## 2022-01-11 NOTE — TELEPHONE ENCOUNTER
----- Message from Nichole Olvera RN sent at 1/10/2022  1:32 PM CST -----  Surgery 2/4      other appts 1/12  Please schedule poc appt, labs, ua and ekg.  Thanks!

## 2022-01-12 ENCOUNTER — TELEPHONE (OUTPATIENT)
Dept: NEUROSURGERY | Facility: CLINIC | Age: 77
End: 2022-01-12
Payer: MEDICARE

## 2022-01-12 ENCOUNTER — OFFICE VISIT (OUTPATIENT)
Dept: INTERNAL MEDICINE | Facility: CLINIC | Age: 77
End: 2022-01-12
Payer: MEDICARE

## 2022-01-12 VITALS
WEIGHT: 261.25 LBS | HEIGHT: 73 IN | HEART RATE: 76 BPM | DIASTOLIC BLOOD PRESSURE: 72 MMHG | BODY MASS INDEX: 34.62 KG/M2 | SYSTOLIC BLOOD PRESSURE: 110 MMHG

## 2022-01-12 DIAGNOSIS — I10 ESSENTIAL HYPERTENSION: ICD-10-CM

## 2022-01-12 DIAGNOSIS — E78.5 DYSLIPIDEMIA: ICD-10-CM

## 2022-01-12 DIAGNOSIS — G47.33 OBSTRUCTIVE SLEEP APNEA: ICD-10-CM

## 2022-01-12 DIAGNOSIS — E66.9 CLASS 1 OBESITY WITH SERIOUS COMORBIDITY AND BODY MASS INDEX (BMI) OF 34.0 TO 34.9 IN ADULT, UNSPECIFIED OBESITY TYPE: ICD-10-CM

## 2022-01-12 DIAGNOSIS — M43.16 SPONDYLOLISTHESIS OF LUMBAR REGION: ICD-10-CM

## 2022-01-12 DIAGNOSIS — K21.9 GASTROESOPHAGEAL REFLUX DISEASE, UNSPECIFIED WHETHER ESOPHAGITIS PRESENT: ICD-10-CM

## 2022-01-12 DIAGNOSIS — Z01.818 PREOP EXAM FOR INTERNAL MEDICINE: Primary | ICD-10-CM

## 2022-01-12 DIAGNOSIS — N40.1 BENIGN PROSTATIC HYPERPLASIA WITH LOWER URINARY TRACT SYMPTOMS, SYMPTOM DETAILS UNSPECIFIED: ICD-10-CM

## 2022-01-12 DIAGNOSIS — Z98.1 S/P LUMBAR FUSION: Primary | ICD-10-CM

## 2022-01-12 PROBLEM — E78.49 OTHER HYPERLIPIDEMIA: Chronic | Status: ACTIVE | Noted: 2022-01-12

## 2022-01-12 PROBLEM — N28.1 KIDNEY CYSTS: Status: ACTIVE | Noted: 2017-02-08

## 2022-01-12 PROBLEM — E78.49 OTHER HYPERLIPIDEMIA: Status: ACTIVE | Noted: 2022-01-12

## 2022-01-12 PROBLEM — R53.82 CHRONIC FATIGUE: Status: RESOLVED | Noted: 2018-07-10 | Resolved: 2022-01-12

## 2022-01-12 PROCEDURE — 99999 PR PBB SHADOW E&M-EST. PATIENT-LVL III: CPT | Mod: PBBFAC,,, | Performed by: INTERNAL MEDICINE

## 2022-01-12 PROCEDURE — 99214 OFFICE O/P EST MOD 30 MIN: CPT | Mod: S$PBB,,, | Performed by: INTERNAL MEDICINE

## 2022-01-12 PROCEDURE — 99999 PR PBB SHADOW E&M-EST. PATIENT-LVL III: ICD-10-PCS | Mod: PBBFAC,,, | Performed by: INTERNAL MEDICINE

## 2022-01-12 PROCEDURE — 99213 OFFICE O/P EST LOW 20 MIN: CPT | Mod: PBBFAC | Performed by: INTERNAL MEDICINE

## 2022-01-12 PROCEDURE — 99214 PR OFFICE/OUTPT VISIT, EST, LEVL IV, 30-39 MIN: ICD-10-PCS | Mod: S$PBB,,, | Performed by: INTERNAL MEDICINE

## 2022-01-12 RX ORDER — CHLORHEXIDINE GLUCONATE ORAL RINSE 1.2 MG/ML
SOLUTION DENTAL
COMMUNITY
Start: 2021-11-09 | End: 2022-01-12

## 2022-01-12 RX ORDER — HYDROCODONE BITARTRATE AND ACETAMINOPHEN 5; 325 MG/1; MG/1
TABLET ORAL
COMMUNITY
Start: 2021-11-09 | End: 2022-01-12 | Stop reason: ALTCHOICE

## 2022-01-12 NOTE — Clinical Note
Pt. Seen for preop. He is low risk and may proceed as planned.  His labs and ekg are ordered for next week and please feel free to contact me if there are any questions on those results.  I couldn't add myself since I didn't order them.

## 2022-01-12 NOTE — PROGRESS NOTES
Patient presents for pre-operative examination.   The patient will be having surgery for diagnosis #1 below.  He failed more conservative therapy and is having a significant issue just walking even 1 block whereas he used to be able to walk 2 miles with his dog.      Other complaints today: None. He is in his normal state of health and has no particular complaints today.     This patient's active chronic problem list was reviewed and updated as indicated here:  Patient Active Problem List   Diagnosis    Primary hypertension    Obstructive sleep apnea    Kidney cysts    GERD (gastroesophageal reflux disease)    Decreased strength of trunk and back    Decreased back mobility    Other hyperlipidemia    Benign prostatic hyperplasia with lower urinary tract symptoms     Review of patient's allergies indicates:  No Known Allergies  Current Outpatient Medications   Medication Sig Dispense Refill    acetaminophen (TYLENOL) 325 MG tablet Take 650 mg by mouth every 6 (six) hours as needed for Pain.      atorvastatin (LIPITOR) 10 MG tablet TAKE ONE DAILY 90 tablet 2    diclofenac (VOLTAREN) 75 MG EC tablet TAKE 1 TABLET (75 MG TOTAL) BY MOUTH 2 (TWO) TIMES DAILY. 60 tablet 2    dutasteride (AVODART) 0.5 mg capsule TAKE ONE DAILY 30 capsule 11    lisinopriL (PRINIVIL,ZESTRIL) 20 MG tablet TAKE ONE DAILY 90 tablet 2    pantoprazole (PROTONIX) 40 MG tablet TAKE ONE BY MOUTH EVERY MORNING 45 MINUTES BEFORE BREAKFAST. 90 tablet 3    tamsulosin (FLOMAX) 0.4 mg Cap TAKE ONE BY MOUTH EVERY EVENING 90 capsule 3    ibuprofen (ADVIL,MOTRIN) 200 MG tablet Take 200 mg by mouth every 6 (six) hours as needed for Pain.      MULTIVITAMIN W-MINERALS/LUTEIN (CENTRUM SILVER ORAL) Take 1 tablet by mouth once daily.       No current facility-administered medications for this visit.        Lab Results   Component Value Date    WBC 7.48 09/24/2021    HGB 15.1 09/24/2021    HCT 45.0 09/24/2021     09/24/2021    CHOL 108 (L)  09/24/2021    TRIG 127 09/24/2021    HDL 29 (L) 09/24/2021    ALT 31 09/24/2021    AST 28 09/24/2021     09/24/2021    K 3.9 09/24/2021     09/24/2021    CREATININE 0.8 09/24/2021    BUN 14 09/24/2021    CO2 21 (L) 09/24/2021    TSH 1.195 10/01/2021    PSA 1.3 03/10/2021    HGBA1C 5.4 07/10/2018        Revised [place] cardiac risk index (RCRI)  Six independent predictors of major cardiac complications:  1. High-risk type of surgery (examples include vascular surgery and any open intraperitoneal or intrathoracic procedures):no  2. History of ischemic heart disease (history of MI or a positive exercise test, current complaint of chest pain considered to be secondary to myocardial ischemia, use of nitrate therapy, or ECG with pathological Q waves; do not count prior coronary revascularization procedure unless one of the other criteria for ischemic heart disease is present):no  3. History of HF:no  4. History of cerebrovascular disease:no  5. Diabetes mellitus requiring treatment with insulin:no  6. Preoperative serum creatinine >2.0 mg/dL:  Lab Results   Component Value Date    CREATININE 0.8 09/24/2021      Risk of cardiac death, nonfatal myocardial infarction, and nonfatal cardiac arrest according to the number of predictors:  No risk factors - 0.4%    Review of Systems   Genitourinary:        BPH symptoms which are controlled with medications.    Musculoskeletal: Positive for back pain.   All other systems reviewed and are negative.    Physical Exam  Vitals reviewed.   Constitutional:       General: He is not in acute distress.     Appearance: Normal appearance. He is well-developed and well-nourished. He is obese. He is not ill-appearing or toxic-appearing.   HENT:      Head: Normocephalic and atraumatic.      Mouth/Throat:      Mouth: Oropharynx is clear and moist.   Eyes:      Extraocular Movements: EOM normal.      Conjunctiva/sclera: Conjunctivae normal.   Neck:      Thyroid: No thyromegaly.       Vascular: No JVD.   Cardiovascular:      Rate and Rhythm: Normal rate and regular rhythm.      Heart sounds: Normal heart sounds.   Pulmonary:      Effort: Pulmonary effort is normal.      Breath sounds: Normal breath sounds. No wheezing.   Abdominal:      General: Bowel sounds are normal. There is no distension.      Palpations: Abdomen is soft. There is no mass.      Tenderness: There is no abdominal tenderness.   Musculoskeletal:         General: No edema.      Cervical back: Normal range of motion and neck supple.   Lymphadenopathy:      Cervical: No cervical adenopathy.   Skin:     General: Skin is warm and dry.   Neurological:      General: No focal deficit present.      Mental Status: He is alert and oriented to person, place, and time.      Deep Tendon Reflexes: Reflexes are normal and symmetric.   Psychiatric:         Mood and Affect: Mood and affect and mood normal.         Behavior: Behavior normal.          ASSESSMENT:  1. Preop exam for internal medicine  PLAN:   Medications:  - continue all medicines as previously prescribed.  According to the ACC/AHA and ACP guidelines for preoperative cardiovascular risk assesment this patient is low risk for this scheduled elective procedure/surgery.    I do not recommend any additional work up at this time.  All chronic medical probelms are currently stable and well controlled.     2. Spondylolisthesis of lumbar region  Same Plan as #1    3. Essential hypertension  This problem is currently stable and controlled.   The patient has been instructed to continue all currently prescribed medications without changes.      4. Dyslipidemia  Stable and controlled. No changes to current therapeutic plan. Continue all previously prescribed medications.    5. Gastroesophageal reflux disease, unspecified whether esophagitis present  Stable and controlled. No changes to current therapeutic plan. Continue all previously prescribed medications.     6. Obstructive sleep  apnea  Stable and controlled. No changes to current therapeutic plan. Continue all previously prescribed medications.    7. Benign prostatic hyperplasia with lower urinary tract symptoms, symptom details unspecified  Stable and controlled. No changes to current therapeutic plan. Continue all previously prescribed medications.

## 2022-01-14 NOTE — PROGRESS NOTES
Subjective:      Patient ID: Sreekanth Pitts Jr. is a 76 y.o. male.    Chief Complaint: Low-back Pain and Knee Pain    Mr Metcalf is a 77 yo male here for follow up of low back pain.  He was last seen by me on 11/17/2021 and has a history of back surgery 20 years ago.  He has had pain off and on since then.  The pain has been worse the last 3 months with an ache across the back.  Then started having pain down the back of the legs to the knee. He was started in therapy but pain increased.  We did an MRI and he went to Dr. Pretty and is scheduled for surgery.  He is going to get scheduled for fusion.  He felt like therapy was helpful for all of his joints.  He feels like the last therapy session made the pain worse.  He did not go back to therapy.  He sits in recliner, and wonders if there is a better chair.  He is going to see Dr. Beebe tomorrow for a second opinion tomorrow.      Ct lumbar 12/28/2021  Vertebra labeled T12 as a transitional non-rib-bearing morphology. Labeling paradigm will match that of prior MRI 12/14/2021.     Grade 1 retrolisthesis of L1 on L2.  Grade 1 anterolisthesis of L4 on L5 with unilateral right-sided pars defect (series 605, image 116).  Dextroscoliotic curvature of the lumbar spine.        The vertebral body heights appear well-maintained.  There is no evidence of osseous lytic or blastic process.  Multilevel intervertebral disk space height loss.  Focal degenerative changes are described below.     T12-L1: There is no posterior disc bulge.  There is no neural foraminal narrowing. There is no central canal narrowing     L1-L2: There is no posterior disc bulge.  Moderate left and mild right neural foraminal narrowing. There is no central canal narrowing     L2-L3: There is no posterior disc bulge.  Moderate bilateral neural foraminal narrowing.  Moderate central canal narrowing     L3-L4: There is no posterior disc bulge.  Moderate left and severe right neural foraminal narrowing.  Mild  central canal narrowing     L4-L5: There is no posterior disc bulge.  Severe bilateral neural foraminal narrowing.  Severe central canal narrowing     L5-S1: There is no posterior disc bulge.  Mild left neural foraminal narrowing. There is no central canal narrowing     No intradural abnormalities are identified.     Punctate calcification within the right kidney, possibly reflecting nephrolith.  Multiple bilateral renal hypodensities, characterized as cysts on recent MRI.     Moderate calcific atherosclerosis at the aorta iliac bifurcation.  Evaluation of the surrounding soft tissues reveals no additional abnormality.     Impression:     1. Grade 1 anterolisthesis of L4 on L5, with unilateral right-sided pars defect.  Grade 1 retrolisthesis of L1 on L2.  2. Multilevel neural foraminal narrowing, most prominently at L3-4 and L4-5.  Multilevel central canal narrowing, most prominently at L4-5.  Additional degenerative changes as above.  3. Punctate calcification in the right kidney, possible nephrolith.  4. Additional findings as above.    MRI lumbar 12/14/2021  Alignment: Grade 1 retrolisthesis L1 on L2.  Grade 1 anterolisthesis L4 on L5.     Vertebrae: Well-circumscribed T1/T2 hyperintense lesion in the left lateral aspect of L4, in keeping with hemangioma.  No infiltrative marrow process.  Unilateral right L4 spondylolysis.  Left-sided pars is intact.  No acute fracture.     Discs: Multilevel disc height loss and desiccation with moderate disc height loss L1-L2 and L2-L3.     Cord: Normal.  Conus terminates at L1.  There is clumping of the nerve roots at the level of L3 likely secondary to multilevel spinal canal stenosis.     Soft tissue structures are unremarkable.  Limited evaluation of the retroperitoneal organs demonstrates bilateral renal cysts.  Paraspinal musculature demonstrates moderate atrophy.     Degenerative findings:     T12-L1: No spinal canal stenosis or neural foraminal narrowing.     L1-L2:  Circumferential disc bulge and mild facet arthropathy result in moderate left, mild right neural foraminal narrowing.     L2-L3: Circumferential disc bulge and mild facet arthropathy result in moderate spinal canal stenosis and moderate bilateral neural foraminal narrowing.     L3-L4: Circumferential disc bulge and moderate facet arthropathy result in mild spinal canal stenosis and severe right, moderate left neural foraminal narrowing.     L4-L5: Anterolisthesis with uncovering of the intervertebral disc, along with circumferential bulge and severe facet arthropathy result in severe spinal canal stenosis and severe bilateral neural foraminal narrowing.     L5-S1: Mild facet arthropathy results in mild left neural foraminal narrowing.     Impression:     1. Multilevel degenerative changes of the lumbar spine detailed above.  Severe spinal canal stenosis noted at L4-L5.  Moderate to severe neural foraminal narrowing noted at L2-L5.  2. Right-sided L4 spondylolysis with grade 1 anterolisthesis of L4-L5.    X-ray lumbar 9/15/2021  Alignment: Scoliosis convex RIGHT.  Grade 1 anterolisthesis L4 on L5.     Vertebrae: Vertebral body heights are maintained.  No suspicious appearing lytic or blastic lesions.     Discs and facets: Multilevel disc space narrowing identified predominantly L1-L2 and L2-L3. Sclerosis of facet joints L4-L5 and L5-S1.     Miscellaneous: No additional findings.  Flexion-extension views demonstrate no additional abnormalities.     Impression:     As above.    Past Medical History:  12/3/2015: Allergy  No date: Arthritis  01/04/2018: BPH without obstruction/lower urinary tract symptoms  No date: Degenerative disc disease  12/2/2020: GERD (gastroesophageal reflux disease)  No date: Hypertension  7/30/2012: Nuclear sclerosis - Both Eyes    Past Surgical History:  2002: BACK SURGERY  12/2/2020: ESOPHAGOGASTRODUODENOSCOPY; N/A      Comment:  Procedure: EGD (ESOPHAGOGASTRODUODENOSCOPY);  Surgeon:                 Dion Mckinney MD;  Location: [facility] ENDO (4TH FLR);                 Service: Endoscopy;  Laterality: N/A;                 covid--[place] [facility]  No date: EYE FOREIGN BODY REMOVAL      Comment:  childhood  No date: EYE SURGERY  No date: TRANSURETHRAL RESECTION OF PROSTATE      Comment:  2018: TRANSURETHRAL RESECTION OF PROSTATE (TURP) WITHOUT USE OF   LASER; N/A      Comment:  Procedure: TURP, WITHOUT USING LASER;  Surgeon: Uma Gaona MD;  Location: [facility] OR 1ST FLR;  Service:                Urology;  Laterality: N/A;  1.5 hours    Review of patient's family history indicates:  Problem: Cataracts      Relation: Mother          Age of Onset: (Not Specified)  Problem: Hypertension      Relation: Mother          Age of Onset: (Not Specified)  Problem: Cancer      Relation: Father          Age of Onset: (Not Specified)  Problem: Heart disease      Relation: Father          Age of Onset: (Not Specified)  Problem: Heart disease      Relation: Brother          Age of Onset: (Not Specified)  Problem: Stroke      Relation: Paternal Grandfather          Age of Onset: (Not Specified)  Problem: Heart disease      Relation: Paternal Grandfather          Age of Onset: (Not Specified)  Problem: Colon cancer      Relation: Neg Hx          Age of Onset: (Not Specified)  Problem: Esophageal cancer      Relation: Neg Hx          Age of Onset: (Not Specified)      Social History    Socioeconomic History      Marital status:       Spouse name: Not on file      Number of children: Not on file      Years of education: Not on file      Highest education level: Not on file    Occupational History        Employer: [employer]    Tobacco Use      Smoking status: Former Smoker        Packs/day: 2.00        Years: 4.00        Pack years: 8        Types: Cigarettes, Cigars        Quit date: 1973        Years since quittin.7      Smokeless tobacco: Never Used     Substance and Sexual Activity      Alcohol use: Yes        Alcohol/week: 1.0 standard drink        Types: 1 Cans of beer per week        Comment: rarely      Drug use: No      Sexual activity: Not on file    Other Topics      Concerns:        Not on file    Social History Narrative      , consulting firm , 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 wife (healthy) lives at home, 2 dtrs, 43, 41. frequ walking each day.    Social Determinants of Health  Financial Resource Strain: Unknown      Difficulty of Paying Living Expenses: Patient refused  Food Insecurity: Unknown      Worried About Running Out of Food in the Last Year: Patient refused      Ran Out of Food in the Last Year: Patient refused  Transportation Needs: Unknown      Lack of Transportation (Medical): Patient refused      Lack of Transportation (Non-Medical): Patient refused  Physical Activity: Sufficiently Active      Days of Exercise per Week: 7 days      Minutes of Exercise per Session: 70 min  Stress: Stress Concern Present      Feeling of Stress : To some extent  Social Connections: Unknown      Frequency of Communication with Friends and Family: Patient refused      Frequency of Social Gatherings with Friends and Family: Twice a week      Attends Yazidism Services: Not on file      Active Member of Clubs or Organizations: Patient refused      Attends Club or Organization Meetings: Patient refused      Marital Status:     Current Outpatient Medications:  acetaminophen (TYLENOL) 325 MG tablet, Take 650 mg by mouth every 6 (six) hours as needed for Pain., Disp: , Rfl:   atorvastatin (LIPITOR) 10 MG tablet, TAKE ONE DAILY, Disp: 90 tablet, Rfl: 0   cholecalciferol, vitamin D3, (VITAMIN D3) 50 mcg (2,000 unit) Cap, Take 1 capsule (2,000 Units total) by mouth once daily., Disp: 90 capsule, Rfl: 3  cyanocobalamin (VITAMIN B-12) 100 MCG tablet, Take 1 tablet (100 mcg total) by mouth once daily., Disp: 90 tablet, Rfl: 11  dutasteride (AVODART) 0.5 mg capsule,  TAKE ONE DAILY, Disp: 30 capsule, Rfl: 11  ibuprofen (ADVIL,MOTRIN) 200 MG tablet, Take 200 mg by mouth every 6 (six) hours as needed for Pain., Disp: , Rfl:   lisinopriL (PRINIVIL,ZESTRIL) 20 MG tablet, TAKE ONE DAILY, Disp: 90 tablet, Rfl: 0  MULTIVITAMIN W-MINERALS/LUTEIN (CENTRUM SILVER ORAL), Take 1 tablet by mouth once daily. , Disp: , Rfl:   pantoprazole (PROTONIX) 40 MG tablet, TAKE ONE BY MOUTH EVERY MORNING 45 MINUTES BEFORE BREAKFAST., Disp: 90 tablet, Rfl: 3  tamsulosin (FLOMAX) 0.4 mg Cap, TAKE ONE BY MOUTH EVERY EVENING, Disp: 90 capsule, Rfl: 3  VITAMIN D2 1,250 mcg (50,000 unit) capsule, TAKE 1 CAPSULE (50,000 UNITS TOTAL) BY MOUTH EVERY 7 DAYS., Disp: 12 capsule, Rfl: 0    No current facility-administered medications for this visit.      Review of patient's allergies indicates:  No Known Allergies        Review of Systems   Constitutional: Negative for weight gain and weight loss.   Cardiovascular: Negative for chest pain.   Respiratory: Negative for shortness of breath.    Musculoskeletal: Positive for back pain (back of both legs). Negative for joint pain and joint swelling.   Gastrointestinal: Negative for abdominal pain, bowel incontinence, nausea and vomiting.   Genitourinary: Negative for bladder incontinence.   Neurological: Negative for numbness and paresthesias.         Objective:        General: Sreekanth is well-developed, well-nourished, appears stated age, in no acute distress, alert and oriented to time, place and person.     General    Constitutional: He is oriented to person, place, and time. He appears well-developed and well-nourished.   HENT:   Head: Normocephalic and atraumatic.   Pulmonary/Chest: Effort normal.   Neurological: He is alert and oriented to person, place, and time.   Psychiatric: He has a normal mood and affect. His behavior is normal. Judgment and thought content normal.             No PE done because time spent discussing questions and concerns          Assessment:        1. Spondylolisthesis of lumbar region    2. Spinal stenosis of lumbar region without neurogenic claudication    3. Sagittal plane imbalance    4. Scoliosis, unspecified scoliosis type, unspecified spinal region           Plan:          1. We discussed back pain, he is scheduled for surgery.  We discussed some of his anxiety and fear around surgery.  He is scheduled for [id number] fusion.  He wonders about a smaller surgery and is very nervous about the process  2. We discussed after surgery PT and OT will come and will work with him and if needs more help they will consider SNF or rehab.  He worries because his wife cannot help  3. He did not feel like therapy helped, we discussed that after surgery it is different therapy and healthy back would be an option only after 6 months of surgery  4. We discussed sending a message to Dr. Pretty with questions.  He is getting a second opinion.    5. RTC PRN, he can call with questions and concerns and can return as needed    More than 50% of the total time of 30 minutes was spent face to face and in counseling on diagnosis and treatment options.      Follow-up: Follow up if symptoms worsen or fail to improve. If there are any questions prior to this, the patient was instructed to contact the office.

## 2022-01-18 ENCOUNTER — TELEPHONE (OUTPATIENT)
Dept: NEUROSURGERY | Facility: CLINIC | Age: 77
End: 2022-01-18
Payer: MEDICARE

## 2022-01-18 ENCOUNTER — OFFICE VISIT (OUTPATIENT)
Dept: SPINE | Facility: CLINIC | Age: 77
End: 2022-01-18
Attending: PHYSICAL MEDICINE & REHABILITATION
Payer: MEDICARE

## 2022-01-18 VITALS
DIASTOLIC BLOOD PRESSURE: 73 MMHG | HEART RATE: 65 BPM | BODY MASS INDEX: 34.62 KG/M2 | WEIGHT: 261.25 LBS | HEIGHT: 73 IN | SYSTOLIC BLOOD PRESSURE: 114 MMHG

## 2022-01-18 DIAGNOSIS — M41.9 SCOLIOSIS, UNSPECIFIED SCOLIOSIS TYPE, UNSPECIFIED SPINAL REGION: ICD-10-CM

## 2022-01-18 DIAGNOSIS — M48.061 SPINAL STENOSIS OF LUMBAR REGION WITHOUT NEUROGENIC CLAUDICATION: ICD-10-CM

## 2022-01-18 DIAGNOSIS — M43.8X9 SAGITTAL PLANE IMBALANCE: ICD-10-CM

## 2022-01-18 DIAGNOSIS — M43.16 SPONDYLOLISTHESIS OF LUMBAR REGION: Primary | ICD-10-CM

## 2022-01-18 PROCEDURE — 99214 OFFICE O/P EST MOD 30 MIN: CPT | Mod: S$PBB,,, | Performed by: PHYSICAL MEDICINE & REHABILITATION

## 2022-01-18 PROCEDURE — 99999 PR PBB SHADOW E&M-EST. PATIENT-LVL III: ICD-10-PCS | Mod: PBBFAC,,, | Performed by: PHYSICAL MEDICINE & REHABILITATION

## 2022-01-18 PROCEDURE — 99999 PR PBB SHADOW E&M-EST. PATIENT-LVL III: CPT | Mod: PBBFAC,,, | Performed by: PHYSICAL MEDICINE & REHABILITATION

## 2022-01-18 PROCEDURE — 99214 PR OFFICE/OUTPT VISIT, EST, LEVL IV, 30-39 MIN: ICD-10-PCS | Mod: S$PBB,,, | Performed by: PHYSICAL MEDICINE & REHABILITATION

## 2022-01-18 PROCEDURE — 99213 OFFICE O/P EST LOW 20 MIN: CPT | Mod: PBBFAC | Performed by: PHYSICAL MEDICINE & REHABILITATION

## 2022-01-18 NOTE — TELEPHONE ENCOUNTER
----- Message from Jaida Puentes sent at 1/18/2022 11:43 AM CST -----  Regarding: Surgery Inquiry  Regarding:Pt asked to speak to doctor about questions he has about his surgery.        Requesting Call back number:[phone] [place]/[phone] home

## 2022-01-18 NOTE — TELEPHONE ENCOUNTER
Called patient in regards to message that was sent. Informed patient that his surgery is scheduled for 02/04/2022 patient verbally understood.

## 2022-01-19 ENCOUNTER — OFFICE VISIT (OUTPATIENT)
Dept: NEUROSURGERY | Facility: CLINIC | Age: 77
End: 2022-01-19
Payer: MEDICARE

## 2022-01-19 VITALS
SYSTOLIC BLOOD PRESSURE: 134 MMHG | WEIGHT: 260.38 LBS | HEART RATE: 77 BPM | BODY MASS INDEX: 34.35 KG/M2 | DIASTOLIC BLOOD PRESSURE: 81 MMHG

## 2022-01-19 DIAGNOSIS — M47.816 LUMBAR SPONDYLOSIS: Primary | ICD-10-CM

## 2022-01-19 DIAGNOSIS — M43.16 SPONDYLOLISTHESIS OF LUMBAR REGION: ICD-10-CM

## 2022-01-19 PROCEDURE — 99999 PR PBB SHADOW E&M-EST. PATIENT-LVL III: CPT | Mod: PBBFAC,,, | Performed by: NEUROLOGICAL SURGERY

## 2022-01-19 PROCEDURE — 99214 OFFICE O/P EST MOD 30 MIN: CPT | Mod: S$PBB,,, | Performed by: NEUROLOGICAL SURGERY

## 2022-01-19 PROCEDURE — 99214 PR OFFICE/OUTPT VISIT, EST, LEVL IV, 30-39 MIN: ICD-10-PCS | Mod: S$PBB,,, | Performed by: NEUROLOGICAL SURGERY

## 2022-01-19 PROCEDURE — 99999 PR PBB SHADOW E&M-EST. PATIENT-LVL III: ICD-10-PCS | Mod: PBBFAC,,, | Performed by: NEUROLOGICAL SURGERY

## 2022-01-19 PROCEDURE — 99213 OFFICE O/P EST LOW 20 MIN: CPT | Mod: PBBFAC | Performed by: NEUROLOGICAL SURGERY

## 2022-01-19 NOTE — H&P (VIEW-ONLY)
Subjective:   I, Kellie Hurtado, attest that this documentation has been prepared under the direction and in the presence of Anibal Beebe MD.     Patient ID: Sreekanth Pitts Jr. is a 76 y.o. male     Chief Complaint: No chief complaint on file.        HPI  Mr. Sreekanth Pitts Jr. is a 76 y.o. gentleman with DDD, sciatica, who presents today for spine surgical consultation.   This is a pt with a history of low back pain and in 2002 he underwent back surgery. Since then the pain has been intermittent and very minimal for the past 20 years, but now has worsened in the last few months. He states in late September he had a gradual onset of bilateral leg pain. The pain is from his lower back into the hips and radiates down to the knees. Describes the pain as a shooting pain that is constant. He used to walk his dog every morning for 2 miles and now can only walk up to 1 block secondary to the pain. He has a history of right leg sciatica. Rates the pain a 7/10 in severity when ambulating and 5/10 while at rest. He is in physical therapy which he states is not alleviating his pain.   Per chart review, the pt was seen by Dr. Pretty and is scheduled for T10- iliac fusion and multilevel TLIF on 2/4/22. Here for a second opinion.     Review of Systems   Constitutional: Negative for activity change, appetite change, fatigue, fever and unexpected weight change.   HENT: Negative for facial swelling.    Eyes: Negative.    Respiratory: Negative.    Cardiovascular: Negative.    Gastrointestinal: Negative for diarrhea, nausea and vomiting.   Endocrine: Negative.    Genitourinary: Negative.    Musculoskeletal: Positive for arthralgias, back pain and myalgias. Negative for joint swelling and neck pain.   Neurological: Negative for dizziness, seizures, weakness, numbness and headaches.   Psychiatric/Behavioral: Negative.       Past Medical History:   Diagnosis Date    Achilles bursitis or tendinitis 9/25/2014    Allergy 12/3/2015     Arthritis     BPH without obstruction/lower urinary tract symptoms 01/04/2018    Chronic fatigue 7/10/2018    Degenerative disc disease     GERD (gastroesophageal reflux disease) 12/2/2020    Hypertension     Nuclear sclerosis - Both Eyes 7/30/2012       Objective:      Vitals:    01/19/22 0950   BP: 134/81   Pulse: 77      Physical Exam  Constitutional:       General: He is not in acute distress.     Appearance: Normal appearance.   HENT:      Head: Normocephalic and atraumatic.   Pulmonary:      Effort: Pulmonary effort is normal.   Musculoskeletal:      Cervical back: Neck supple.   Neurological:      Mental Status: He is alert and oriented to person, place, and time.      GCS: GCS eye subscore is 4. GCS verbal subscore is 5. GCS motor subscore is 6.      Cranial Nerves: No cranial nerve deficit.      Sensory: No sensory deficit.      Comments: Strong hip flexors bilaterally. Pt can rise from a sitting position without pushing off. Intact dorsiflexion strength bilaterally. Intact plantar flexion strength. Sensation intact to light touch.             IMAGING:  CT Lumbar Spine WO Contrast (12/28/2021):  1. Grade 1 anterolisthesis of L4 on L5, with unilateral right-sided pars defect.  Grade 1 retrolisthesis of L1 on L2.  2. Multilevel neural foraminal narrowing, most prominently at L3-4 and L4-5.  Multilevel central canal narrowing, most prominently at L4-5. Additional degenerative changes as above.  3. Punctate calcification in the right kidney, possible nephrolith.  4. Additional findings as above.    MRI Lumbar Spine WO Contrast (12/14/2021):  1. Multilevel degenerative changes of the lumbar spine detailed above. Severe spinal canal stenosis noted at L4-L5. Moderate to severe neural foraminal narrowing noted at L2-L5.  2. Right-sided L4 spondylolysis with grade 1 anterolisthesis of L4-L5.    X-Ray Lumbar Spine Ap Lateral w/Flex Ext (9/15/2021):  Alignment: Scoliosis convex RIGHT. Grade 1 anterolisthesis  L4 on L5.   Vertebrae: Vertebral body heights are maintained. No suspicious appearing lytic or blastic lesions.  Discs and facets: Multilevel disc space narrowing identified predominantly L1-L2 and L2-L3. Sclerosis of facet joints L4-L5 and L5-S1.  Miscellaneous: No additional findings. Flexion-extension views demonstrate no additional abnormalities.          I have personally reviewed the images with the pt.      I, Dr. Anibal Bebee, personally performed the services described in this documentation. All medical record entries made by the scribe, Kellie Hurtado, were at my direction and in my presence.  I have reviewed the chart and agree that the record reflects my personal performance and is accurate and complete. Anibal Beebe MD. 01/19/2022    Assessment:       Lumbar spondylolisthesis     Plan:   I have personally reviewed the CT lumbar with the pt which shows:  1. Grade 1 anterolisthesis of L4 on L5, with unilateral right-sided pars defect.  Grade 1 retrolisthesis of L1 on L2.  2. Multilevel neural foraminal narrowing, most prominently at L3-4 and L4-5.  Multilevel central canal narrowing, most prominently at L4-5. Additional degenerative changes as above.  3. Punctate calcification in the right kidney, possible nephrolith.  4. Additional findings as above.    I have personally reviewed the MRI lumbar with the pt which shows:  1. Multilevel degenerative changes of the lumbar spine detailed above. Severe spinal canal stenosis noted at L4-L5. Moderate to severe neural foraminal narrowing noted at L2-L5.  2. Right-sided L4 spondylolysis with grade 1 anterolisthesis of L4-L5.    I have personally reviewed the XR lumbar with the pt which shows:   Alignment: Scoliosis convex RIGHT. Grade 1 anterolisthesis L4 on L5.   Vertebrae: Vertebral body heights are maintained. No suspicious appearing lytic or blastic lesions.  Discs and facets: Multilevel disc space narrowing identified predominantly L1-L2 and L2-L3. Sclerosis  of facet joints L4-L5 and L5-S1.  Miscellaneous: No additional findings. Flexion-extension views demonstrate no additional abnormalities.    I recommend MIS L4-5 Transforaminal lumbar interbody fusion (TLIF). I have discussed the risks/benefits, indications, and alternatives for the proposed procedure in detail. I have answered all of their questions and the pt wishes to proceed with surgery. We will schedule the pt on the next available date.

## 2022-01-19 NOTE — PATIENT INSTRUCTIONS
I have personally reviewed the CT lumbar with the pt which shows:  1. Grade 1 anterolisthesis of L4 on L5, with unilateral right-sided pars defect.  Grade 1 retrolisthesis of L1 on L2.  2. Multilevel neural foraminal narrowing, most prominently at L3-4 and L4-5.  Multilevel central canal narrowing, most prominently at L4-5. Additional degenerative changes as above.  3. Punctate calcification in the right kidney, possible nephrolith.  4. Additional findings as above.    I have personally reviewed the MRI lumbar with the pt which shows:  1. Multilevel degenerative changes of the lumbar spine detailed above. Severe spinal canal stenosis noted at L4-L5. Moderate to severe neural foraminal narrowing noted at L2-L5.  2. Right-sided L4 spondylolysis with grade 1 anterolisthesis of L4-L5.    I have personally reviewed the XR lumbar with the pt which shows:   Alignment: Scoliosis convex RIGHT. Grade 1 anterolisthesis L4 on L5.   Vertebrae: Vertebral body heights are maintained. No suspicious appearing lytic or blastic lesions.  Discs and facets: Multilevel disc space narrowing identified predominantly L1-L2 and L2-L3. Sclerosis of facet joints L4-L5 and L5-S1.  Miscellaneous: No additional findings. Flexion-extension views demonstrate no additional abnormalities.    I recommend MIS L4-5 Transforaminal lumbar interbody fusion (TLIF). I have discussed the risks/benefits, indications, and alternatives for the proposed procedure in detail. I have answered all of their questions and the pt wishes to proceed with surgery. We will schedule the pt on the next available date.

## 2022-01-19 NOTE — PROGRESS NOTES
Subjective:   I, Kellie Hurtado, attest that this documentation has been prepared under the direction and in the presence of Anibal Beebe MD.     Patient ID: Sreekanth Pitts Jr. is a 76 y.o. male     Chief Complaint: No chief complaint on file.        HPI  Mr. Sreekanth Pitts Jr. is a 76 y.o. gentleman with DDD, sciatica, who presents today for spine surgical consultation.   This is a pt with a history of low back pain and in 2002 he underwent back surgery. Since then the pain has been intermittent and very minimal for the past 20 years, but now has worsened in the last few months. He states in late September he had a gradual onset of bilateral leg pain. The pain is from his lower back into the hips and radiates down to the knees. Describes the pain as a shooting pain that is constant. He used to walk his dog every morning for 2 miles and now can only walk up to 1 block secondary to the pain. He has a history of right leg sciatica. Rates the pain a 7/10 in severity when ambulating and 5/10 while at rest. He is in physical therapy which he states is not alleviating his pain.   Per chart review, the pt was seen by Dr. Pretty and is scheduled for T10- iliac fusion and multilevel TLIF on 2/4/22. Here for a second opinion.     Review of Systems   Constitutional: Negative for activity change, appetite change, fatigue, fever and unexpected weight change.   HENT: Negative for facial swelling.    Eyes: Negative.    Respiratory: Negative.    Cardiovascular: Negative.    Gastrointestinal: Negative for diarrhea, nausea and vomiting.   Endocrine: Negative.    Genitourinary: Negative.    Musculoskeletal: Positive for arthralgias, back pain and myalgias. Negative for joint swelling and neck pain.   Neurological: Negative for dizziness, seizures, weakness, numbness and headaches.   Psychiatric/Behavioral: Negative.       Past Medical History:   Diagnosis Date    Achilles bursitis or tendinitis 9/25/2014    Allergy 12/3/2015     Arthritis     BPH without obstruction/lower urinary tract symptoms 01/04/2018    Chronic fatigue 7/10/2018    Degenerative disc disease     GERD (gastroesophageal reflux disease) 12/2/2020    Hypertension     Nuclear sclerosis - Both Eyes 7/30/2012       Objective:      Vitals:    01/19/22 0950   BP: 134/81   Pulse: 77      Physical Exam  Constitutional:       General: He is not in acute distress.     Appearance: Normal appearance.   HENT:      Head: Normocephalic and atraumatic.   Pulmonary:      Effort: Pulmonary effort is normal.   Musculoskeletal:      Cervical back: Neck supple.   Neurological:      Mental Status: He is alert and oriented to person, place, and time.      GCS: GCS eye subscore is 4. GCS verbal subscore is 5. GCS motor subscore is 6.      Cranial Nerves: No cranial nerve deficit.      Sensory: No sensory deficit.      Comments: Strong hip flexors bilaterally. Pt can rise from a sitting position without pushing off. Intact dorsiflexion strength bilaterally. Intact plantar flexion strength. Sensation intact to light touch.             IMAGING:  CT Lumbar Spine WO Contrast (12/28/2021):  1. Grade 1 anterolisthesis of L4 on L5, with unilateral right-sided pars defect.  Grade 1 retrolisthesis of L1 on L2.  2. Multilevel neural foraminal narrowing, most prominently at L3-4 and L4-5.  Multilevel central canal narrowing, most prominently at L4-5. Additional degenerative changes as above.  3. Punctate calcification in the right kidney, possible nephrolith.  4. Additional findings as above.    MRI Lumbar Spine WO Contrast (12/14/2021):  1. Multilevel degenerative changes of the lumbar spine detailed above. Severe spinal canal stenosis noted at L4-L5. Moderate to severe neural foraminal narrowing noted at L2-L5.  2. Right-sided L4 spondylolysis with grade 1 anterolisthesis of L4-L5.    X-Ray Lumbar Spine Ap Lateral w/Flex Ext (9/15/2021):  Alignment: Scoliosis convex RIGHT. Grade 1 anterolisthesis  L4 on L5.   Vertebrae: Vertebral body heights are maintained. No suspicious appearing lytic or blastic lesions.  Discs and facets: Multilevel disc space narrowing identified predominantly L1-L2 and L2-L3. Sclerosis of facet joints L4-L5 and L5-S1.  Miscellaneous: No additional findings. Flexion-extension views demonstrate no additional abnormalities.          I have personally reviewed the images with the pt.      I, Dr. Anibal Beebe, personally performed the services described in this documentation. All medical record entries made by the scribe, Kellie Hurtado, were at my direction and in my presence.  I have reviewed the chart and agree that the record reflects my personal performance and is accurate and complete. Anibal Beebe MD. 01/19/2022    Assessment:       Lumbar spondylolisthesis     Plan:   I have personally reviewed the CT lumbar with the pt which shows:  1. Grade 1 anterolisthesis of L4 on L5, with unilateral right-sided pars defect.  Grade 1 retrolisthesis of L1 on L2.  2. Multilevel neural foraminal narrowing, most prominently at L3-4 and L4-5.  Multilevel central canal narrowing, most prominently at L4-5. Additional degenerative changes as above.  3. Punctate calcification in the right kidney, possible nephrolith.  4. Additional findings as above.    I have personally reviewed the MRI lumbar with the pt which shows:  1. Multilevel degenerative changes of the lumbar spine detailed above. Severe spinal canal stenosis noted at L4-L5. Moderate to severe neural foraminal narrowing noted at L2-L5.  2. Right-sided L4 spondylolysis with grade 1 anterolisthesis of L4-L5.    I have personally reviewed the XR lumbar with the pt which shows:   Alignment: Scoliosis convex RIGHT. Grade 1 anterolisthesis L4 on L5.   Vertebrae: Vertebral body heights are maintained. No suspicious appearing lytic or blastic lesions.  Discs and facets: Multilevel disc space narrowing identified predominantly L1-L2 and L2-L3. Sclerosis  of facet joints L4-L5 and L5-S1.  Miscellaneous: No additional findings. Flexion-extension views demonstrate no additional abnormalities.    I recommend MIS L4-5 Transforaminal lumbar interbody fusion (TLIF). I have discussed the risks/benefits, indications, and alternatives for the proposed procedure in detail. I have answered all of their questions and the pt wishes to proceed with surgery. We will schedule the pt on the next available date.

## 2022-01-20 DIAGNOSIS — M47.816 LUMBAR SPONDYLOSIS: Primary | ICD-10-CM

## 2022-01-20 DIAGNOSIS — Z98.1 S/P LUMBAR FUSION: ICD-10-CM

## 2022-01-20 DIAGNOSIS — M54.16 LUMBAR RADICULOPATHY: ICD-10-CM

## 2022-01-20 DIAGNOSIS — M43.16 SPONDYLOLISTHESIS OF LUMBAR REGION: ICD-10-CM

## 2022-01-20 RX ORDER — GABAPENTIN 300 MG/1
CAPSULE ORAL
Qty: 90 CAPSULE | Refills: 11 | Status: SHIPPED | OUTPATIENT
Start: 2022-01-20 | End: 2022-10-03

## 2022-01-20 NOTE — TELEPHONE ENCOUNTER
Called pt with surgery date change. Pt understands. C/o sciatica. Discussed with Dr Beebe. He will Rx gabapentin. 300 hs to start.

## 2022-01-25 ENCOUNTER — TELEPHONE (OUTPATIENT)
Dept: NEUROSURGERY | Facility: CLINIC | Age: 77
End: 2022-01-25
Payer: MEDICARE

## 2022-01-25 DIAGNOSIS — M43.16 SPONDYLOLISTHESIS OF LUMBAR REGION: ICD-10-CM

## 2022-01-25 DIAGNOSIS — M47.27 LUMBOSACRAL SPONDYLOSIS WITH RADICULOPATHY: ICD-10-CM

## 2022-01-25 DIAGNOSIS — M48.062 SPINAL STENOSIS, LUMBAR REGION, WITH NEUROGENIC CLAUDICATION: ICD-10-CM

## 2022-01-25 DIAGNOSIS — Z01.818 PRE-OP TESTING: Primary | ICD-10-CM

## 2022-01-26 ENCOUNTER — HOSPITAL ENCOUNTER (OUTPATIENT)
Dept: PREADMISSION TESTING | Facility: HOSPITAL | Age: 77
Discharge: HOME | End: 2022-01-26
Attending: ANESTHESIOLOGY
Payer: MEDICARE

## 2022-01-26 ENCOUNTER — ANESTHESIA EVENT (OUTPATIENT)
Dept: SURGERY | Facility: HOSPITAL | Age: 77
DRG: 460 | End: 2022-01-26
Payer: MEDICARE

## 2022-01-26 ENCOUNTER — HOSPITAL ENCOUNTER (OUTPATIENT)
Dept: CARDIOLOGY | Facility: CLINIC | Age: 77
Discharge: HOME | End: 2022-01-26
Payer: MEDICARE

## 2022-01-26 VITALS
WEIGHT: 263 LBS | DIASTOLIC BLOOD PRESSURE: 72 MMHG | RESPIRATION RATE: 16 BRPM | HEART RATE: 89 BPM | SYSTOLIC BLOOD PRESSURE: 125 MMHG | OXYGEN SATURATION: 98 % | HEIGHT: 73 IN | BODY MASS INDEX: 34.85 KG/M2 | TEMPERATURE: 98 F

## 2022-01-26 DIAGNOSIS — Z01.818 PREOPERATIVE TESTING: ICD-10-CM

## 2022-01-26 PROCEDURE — 93010 ELECTROCARDIOGRAM REPORT: CPT | Mod: S$PBB,,, | Performed by: INTERNAL MEDICINE

## 2022-01-26 PROCEDURE — 93010 EKG 12-LEAD: ICD-10-PCS | Mod: S$PBB,,, | Performed by: INTERNAL MEDICINE

## 2022-01-26 PROCEDURE — 93005 ELECTROCARDIOGRAM TRACING: CPT | Mod: PBBFAC | Performed by: INTERNAL MEDICINE

## 2022-01-26 NOTE — ANESTHESIA PREPROCEDURE EVALUATION
[facility]  Anesthesia Pre-Operative Evaluation         Patient Name: Sreekanth Pitts Jr.  YOB: 1945  MRN: [id number]    SUBJECTIVE:     Pre-operative evaluation for Procedure(s) (LRB):  FUSION, SPINE, LUMBAR, TLIF, MINIMALLY INVASIVE Right L4-5 Jose Juan V notified (N/A)     01/26/2022    Sreekanth Pitts Jr. is a 76 y.o. male w/ a significant PMHx HTN, GERD, HLD, BPH, and back pain of  who presents for the above procedure.      LDA: None documented.    Prev airway: None documented.     Drips: None documented.    Patient Active Problem List   Diagnosis    Primary hypertension    Obstructive sleep apnea    Kidney cysts    GERD (gastroesophageal reflux disease)    Decreased strength of trunk and back    Decreased back mobility    Other hyperlipidemia    Benign prostatic hyperplasia with lower urinary tract symptoms       Review of patient's allergies indicates:  No Known Allergies    Current Inpatient Medications:      Current Outpatient Medications on File Prior to Encounter   Medication Sig Dispense Refill    acetaminophen (TYLENOL) 325 MG tablet Take 650 mg by mouth every 6 (six) hours as needed for Pain.      atorvastatin (LIPITOR) 10 MG tablet TAKE ONE DAILY 90 tablet 2    diclofenac (VOLTAREN) 75 MG EC tablet TAKE 1 TABLET (75 MG TOTAL) BY MOUTH 2 (TWO) TIMES DAILY. 60 tablet 2    dutasteride (AVODART) 0.5 mg capsule TAKE ONE DAILY 30 capsule 11    gabapentin (NEURONTIN) 300 MG capsule Take 1 capsule (300mg total) by mouth at bedtime for 3 days. If tolerated, add 1 capsule every morning. 90 capsule 11    ibuprofen (ADVIL,MOTRIN) 200 MG tablet Take 200 mg by mouth every 6 (six) hours as needed for Pain.      lisinopriL (PRINIVIL,ZESTRIL) 20 MG tablet TAKE ONE DAILY 90 tablet 2    MULTIVITAMIN W-MINERALS/LUTEIN (CENTRUM SILVER ORAL) Take 1 tablet by mouth once daily.      pantoprazole (PROTONIX) 40 MG tablet TAKE ONE BY MOUTH EVERY MORNING 45 MINUTES BEFORE  BREAKFAST. 90 tablet 3    tamsulosin (FLOMAX) 0.4 mg Cap TAKE ONE BY MOUTH EVERY EVENING 90 capsule 3     No current facility-administered medications on file prior to encounter.       Past Surgical History:   Procedure Laterality Date    ESOPHAGOGASTRODUODENOSCOPY N/A 2020    Procedure: EGD (ESOPHAGOGASTRODUODENOSCOPY);  Surgeon: Dion Mckinney MD;  Location: [facility] (4TH FLR);  Service: Endoscopy;  Laterality: N/A;  covid--[facility]    EYE FOREIGN BODY REMOVAL      childhood    LUMBAR LAMINECTOMY WITH FUSION      TRANSURETHRAL RESECTION OF PROSTATE (TURP) WITHOUT USE OF LASER N/A 2018    Procedure: TURP, WITHOUT USING LASER;  Surgeon: Uma Gaona MD;  Location: [facility] OR [address] [place] [address];  Service: Urology;  Laterality: N/A;  1.5 hours       Social History     Socioeconomic History    Marital status:    Occupational History     Employer: [employer]   Tobacco Use    Smoking status: Former Smoker     Packs/day: 2.00     Years: 4.00     Pack years: 8.00     Types: Cigarettes, Cigars     Quit date: 1973     Years since quittin.1    Smokeless tobacco: Never Used   Substance and Sexual Activity    Alcohol use: Yes     Alcohol/week: 1.0 standard drink     Types: 1 Cans of beer per week     Comment: rarely    Drug use: No   Social History Narrative    , 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 wife (healthy) lives at home, 2 dtrs, 43, 41. frequ walking each day.       OBJECTIVE:     Vital Signs Range (Last 24H):         CBC:   No results for input(s): WBC, RBC, HGB, HCT, PLT, MCV, MCH, MCHC in the last 72 hours.    CMP: No results for input(s): NA, K, CL, CO2, BUN, CREATININE, GLU, MG, PHOS, CALCIUM, ALBUMIN, PROT, ALKPHOS, ALT, AST, BILITOT in the last 72 hours.    INR:  No results for input(s): PT, INR, PROTIME, APTT in the last 72 hours.    Diagnostic Studies: No relevant studies.    EKG: No recent studies available.    2D ECHO:  No  results found for this or any previous visit.      ASSESSMENT/PLAN:                                                                                                                 01/26/2022  Sreekanth Pitts Jr. is a 76 y.o., male.    Anesthesia Evaluation    I have reviewed the Patient Summary Reports.    I have reviewed the Nursing Notes.    I have reviewed the Medications.     Review of Systems  Anesthesia Hx:  No problems with previous Anesthesia Denies Hx of Anesthetic complications  History of prior surgery of interest to airway management or planning: Denies Family Hx of Anesthesia complications.   Denies Personal Hx of Anesthesia complications.   Social:  Former Smoker    Hematology/Oncology:     Oncology Normal     Cardiovascular:   Hypertension  Denies Angina. hyperlipidemia ECG has been reviewed.    Pulmonary:   Denies Shortness of breath.  Denies Recent URI. Sleep Apnea    Renal/:   Chronic Renal Disease (Kidney cysts ) BPH    Hepatic/GI:   GERD Denies Liver Disease.    Musculoskeletal:   Arthritis     Neurological:   Denies CVA. Denies Seizures.    Endocrine:  Endocrine Normal Denies Diabetes.        Physical Exam  General:  Obesity    Airway/Jaw/Neck:  Airway Findings: Mouth Opening: Normal Tongue: Normal  General Airway Assessment: Adult  Mallampati: II  TM Distance: Normal, at least 6 cm  Jaw/Neck Findings:  Neck ROM: Normal ROM  Neck Findings: Normal    Eyes/Ears/Nose:  EYES/EARS/NOSE FINDINGS: Normal   Dental:  Dental Findings: Periodontal disease, Severe    Chest/Lungs:  Chest/Lungs Findings: Normal Respiratory Rate     Heart/Vascular:  Heart Findings: Rate: Normal  Rhythm: Regular Rhythm        Mental Status:  Mental Status Findings:  Cooperative, Alert and Oriented         Anesthesia Plan  Type of Anesthesia, risks & benefits discussed:  Anesthesia Type:  general    Patient's Preference:   Plan Factors:          Intra-op Monitoring Plan: standard ASA monitors  Intra-op Monitoring Plan  Comments:   Post Op Pain Control Plan: multimodal analgesia, IV/PO Opioids PRN and per primary service following discharge from PACU  Post Op Pain Control Plan Comments:     Induction:   IV  Beta Blocker:  Patient is not currently on a Beta-Blocker (No further documentation required).       Informed Consent: Patient understands risks and agrees with Anesthesia plan.  Questions answered. Anesthesia consent signed with patient.  ASA Score: 3     Day of Surgery Review of History & Physical:  There are no significant changes.  H&P update referred to the surgeon.     Anesthesia Plan Notes: npo        Ready For Surgery From Anesthesia Perspective.

## 2022-02-04 ENCOUNTER — LAB VISIT (OUTPATIENT)
Dept: PRIMARY CARE CLINIC | Facility: CLINIC | Age: 77
End: 2022-02-04
Payer: MEDICARE

## 2022-02-04 DIAGNOSIS — Z01.818 PRE-OP TESTING: ICD-10-CM

## 2022-02-04 LAB
SARS-COV-2 RNA RESP QL NAA+PROBE: NOT DETECTED
SARS-COV-2- CYCLE NUMBER: NORMAL

## 2022-02-04 PROCEDURE — U0003 INFECTIOUS AGENT DETECTION BY NUCLEIC ACID (DNA OR RNA); SEVERE ACUTE RESPIRATORY SYNDROME CORONAVIRUS 2 (SARS-COV-2) (CORONAVIRUS DISEASE [COVID-19]), AMPLIFIED PROBE TECHNIQUE, MAKING USE OF HIGH THROUGHPUT TECHNOLOGIES AS DESCRIBED BY CMS-2020-01-R: HCPCS | Performed by: NEUROLOGICAL SURGERY

## 2022-02-04 PROCEDURE — U0005 INFEC AGEN DETEC AMPLI PROBE: HCPCS | Performed by: NEUROLOGICAL SURGERY

## 2022-02-11 ENCOUNTER — TELEPHONE (OUTPATIENT)
Dept: NEUROSURGERY | Facility: CLINIC | Age: 77
End: 2022-02-11
Payer: MEDICARE

## 2022-02-11 ENCOUNTER — HOSPITAL ENCOUNTER (OUTPATIENT)
Dept: PREADMISSION TESTING | Facility: HOSPITAL | Age: 77
Discharge: HOME | DRG: 460 | End: 2022-02-11
Attending: NEUROLOGICAL SURGERY
Payer: MEDICARE

## 2022-02-11 DIAGNOSIS — Z11.52 ENCOUNTER FOR PREOPERATIVE SCREENING LABORATORY TESTING FOR COVID-19 VIRUS: ICD-10-CM

## 2022-02-11 DIAGNOSIS — Z01.818 PREOPERATIVE TESTING: ICD-10-CM

## 2022-02-11 DIAGNOSIS — Z01.812 ENCOUNTER FOR PREOPERATIVE SCREENING LABORATORY TESTING FOR COVID-19 VIRUS: ICD-10-CM

## 2022-02-11 LAB
ABO + RH BLD: NORMAL
BLD GP AB SCN CELLS X3 SERPL QL: NORMAL
SARS-COV-2 RDRP RESP QL NAA+PROBE: NEGATIVE

## 2022-02-11 PROCEDURE — U0002 COVID-19 LAB TEST NON-CDC: HCPCS | Performed by: NEUROLOGICAL SURGERY

## 2022-02-11 PROCEDURE — 86850 RBC ANTIBODY SCREEN: CPT | Performed by: NEUROLOGICAL SURGERY

## 2022-02-11 NOTE — TELEPHONE ENCOUNTER
Spoke with pt regarding his surgery arrival time ( 5 am , as his case is scheduled to start for 7 am. ) Pt is aware and verbalizes his understanding , he is urged to call clinic with any additional questions or concerns.

## 2022-02-14 ENCOUNTER — HOSPITAL ENCOUNTER (INPATIENT)
Facility: HOSPITAL | Age: 77
LOS: 1 days | Discharge: HOME HEALTH | DRG: 460 | End: 2022-02-15
Attending: NEUROLOGICAL SURGERY | Admitting: NEUROLOGICAL SURGERY
Payer: MEDICARE

## 2022-02-14 ENCOUNTER — ANESTHESIA (OUTPATIENT)
Dept: SURGERY | Facility: HOSPITAL | Age: 77
DRG: 460 | End: 2022-02-14
Payer: MEDICARE

## 2022-02-14 DIAGNOSIS — M48.061 LUMBAR SPINAL STENOSIS: ICD-10-CM

## 2022-02-14 DIAGNOSIS — Z01.812 ENCOUNTER FOR PREOPERATIVE SCREENING LABORATORY TESTING FOR COVID-19 VIRUS: ICD-10-CM

## 2022-02-14 DIAGNOSIS — Z01.818 PREOPERATIVE TESTING: ICD-10-CM

## 2022-02-14 DIAGNOSIS — Z98.1 S/P LUMBAR SPINAL FUSION: Primary | ICD-10-CM

## 2022-02-14 DIAGNOSIS — Z11.52 ENCOUNTER FOR PREOPERATIVE SCREENING LABORATORY TESTING FOR COVID-19 VIRUS: ICD-10-CM

## 2022-02-14 PROCEDURE — 71000033 HC RECOVERY, INTIAL HOUR: Performed by: NEUROLOGICAL SURGERY

## 2022-02-14 PROCEDURE — 63600175 PHARM REV CODE 636 W HCPCS: Performed by: PHYSICIAN ASSISTANT

## 2022-02-14 PROCEDURE — 27201423 OPTIME MED/SURG SUP & DEVICES STERILE SUPPLY: Performed by: NEUROLOGICAL SURGERY

## 2022-02-14 PROCEDURE — 25000003 PHARM REV CODE 250: Performed by: NURSE ANESTHETIST, CERTIFIED REGISTERED

## 2022-02-14 PROCEDURE — 63600175 PHARM REV CODE 636 W HCPCS: Performed by: ANESTHESIOLOGY

## 2022-02-14 PROCEDURE — 97535 SELF CARE MNGMENT TRAINING: CPT

## 2022-02-14 PROCEDURE — 36620 PR INSERT CATH,ART,PERCUT,SHORTTERM: ICD-10-PCS | Mod: 59,,, | Performed by: ANESTHESIOLOGY

## 2022-02-14 PROCEDURE — 22840 INSERT SPINE FIXATION DEVICE: CPT | Mod: AS,,, | Performed by: PHYSICIAN ASSISTANT

## 2022-02-14 PROCEDURE — 36620 INSERTION CATHETER ARTERY: CPT | Performed by: ANESTHESIOLOGY

## 2022-02-14 PROCEDURE — 22853 INSJ BIOMECHANICAL DEVICE: CPT | Mod: AS,,, | Performed by: PHYSICIAN ASSISTANT

## 2022-02-14 PROCEDURE — 36000711: Performed by: NEUROLOGICAL SURGERY

## 2022-02-14 PROCEDURE — 25000242 PHARM REV CODE 250 ALT 637 W/ HCPCS: Performed by: ANESTHESIOLOGY

## 2022-02-14 PROCEDURE — D9220A PRA ANESTHESIA: ICD-10-PCS | Mod: CRNA,,, | Performed by: NURSE ANESTHETIST, CERTIFIED REGISTERED

## 2022-02-14 PROCEDURE — 25000003 PHARM REV CODE 250: Performed by: NEUROLOGICAL SURGERY

## 2022-02-14 PROCEDURE — 97116 GAIT TRAINING THERAPY: CPT

## 2022-02-14 PROCEDURE — 22853 INSJ BIOMECHANICAL DEVICE: CPT | Mod: ,,, | Performed by: NEUROLOGICAL SURGERY

## 2022-02-14 PROCEDURE — 94799 UNLISTED PULMONARY SVC/PX: CPT

## 2022-02-14 PROCEDURE — 22630 PR ARTHRODESIS POSTERIOR INTERBODY LUMBAR: ICD-10-PCS | Mod: AS,,, | Performed by: PHYSICIAN ASSISTANT

## 2022-02-14 PROCEDURE — 22840 INSERT SPINE FIXATION DEVICE: CPT | Mod: ,,, | Performed by: NEUROLOGICAL SURGERY

## 2022-02-14 PROCEDURE — 71000039 HC RECOVERY, EACH ADD'L HOUR: Performed by: NEUROLOGICAL SURGERY

## 2022-02-14 PROCEDURE — 97161 PT EVAL LOW COMPLEX 20 MIN: CPT

## 2022-02-14 PROCEDURE — 37000008 HC ANESTHESIA 1ST 15 MINUTES: Performed by: NEUROLOGICAL SURGERY

## 2022-02-14 PROCEDURE — 20936 SP BONE AGRFT LOCAL ADD-ON: CPT | Mod: ,,, | Performed by: NEUROLOGICAL SURGERY

## 2022-02-14 PROCEDURE — D9220A PRA ANESTHESIA: Mod: ANES,,, | Performed by: ANESTHESIOLOGY

## 2022-02-14 PROCEDURE — 27000221 HC OXYGEN, UP TO 24 HOURS

## 2022-02-14 PROCEDURE — 25000003 PHARM REV CODE 250: Performed by: PHYSICIAN ASSISTANT

## 2022-02-14 PROCEDURE — 36000710: Performed by: NEUROLOGICAL SURGERY

## 2022-02-14 PROCEDURE — 22853 PR INSERT BIOMECH DEV W/INTERBODY ARTHRODESIS, EA CONTIGUOUS DEFECT: ICD-10-PCS | Mod: ,,, | Performed by: NEUROLOGICAL SURGERY

## 2022-02-14 PROCEDURE — 36620 INSERTION CATHETER ARTERY: CPT | Mod: 59,,, | Performed by: ANESTHESIOLOGY

## 2022-02-14 PROCEDURE — D9220A PRA ANESTHESIA: ICD-10-PCS | Mod: ANES,,, | Performed by: ANESTHESIOLOGY

## 2022-02-14 PROCEDURE — 22840 PR POSTERIOR NON-SEGMENTAL INSTRUMENTATION: ICD-10-PCS | Mod: AS,,, | Performed by: PHYSICIAN ASSISTANT

## 2022-02-14 PROCEDURE — 22840 PR POSTERIOR NON-SEGMENTAL INSTRUMENTATION: ICD-10-PCS | Mod: ,,, | Performed by: NEUROLOGICAL SURGERY

## 2022-02-14 PROCEDURE — 22630 PR ARTHRODESIS POSTERIOR INTERBODY LUMBAR: ICD-10-PCS | Mod: ,,, | Performed by: NEUROLOGICAL SURGERY

## 2022-02-14 PROCEDURE — 63600175 PHARM REV CODE 636 W HCPCS: Performed by: NURSE ANESTHETIST, CERTIFIED REGISTERED

## 2022-02-14 PROCEDURE — 20936 PR AUTOGRAFT SPINE SURGERY LOCAL FROM SAME INCISION: ICD-10-PCS | Mod: ,,, | Performed by: NEUROLOGICAL SURGERY

## 2022-02-14 PROCEDURE — 97165 OT EVAL LOW COMPLEX 30 MIN: CPT

## 2022-02-14 PROCEDURE — D9220A PRA ANESTHESIA: Mod: CRNA,,, | Performed by: NURSE ANESTHETIST, CERTIFIED REGISTERED

## 2022-02-14 PROCEDURE — 22630 ARTHRD PST TQ 1NTRSPC LUM: CPT | Mod: AS,,, | Performed by: PHYSICIAN ASSISTANT

## 2022-02-14 PROCEDURE — 22630 ARTHRD PST TQ 1NTRSPC LUM: CPT | Mod: ,,, | Performed by: NEUROLOGICAL SURGERY

## 2022-02-14 PROCEDURE — 63600175 PHARM REV CODE 636 W HCPCS: Performed by: NEUROLOGICAL SURGERY

## 2022-02-14 PROCEDURE — 37000009 HC ANESTHESIA EA ADD 15 MINS: Performed by: NEUROLOGICAL SURGERY

## 2022-02-14 PROCEDURE — 11000001 HC ACUTE MED/SURG PRIVATE ROOM

## 2022-02-14 PROCEDURE — C1713 ANCHOR/SCREW BN/BN,TIS/BN: HCPCS | Performed by: NEUROLOGICAL SURGERY

## 2022-02-14 PROCEDURE — 22853 PR INSERT BIOMECH DEV W/INTERBODY ARTHRODESIS, EA CONTIGUOUS DEFECT: ICD-10-PCS | Mod: AS,,, | Performed by: PHYSICIAN ASSISTANT

## 2022-02-14 DEVICE — IMPLANTABLE DEVICE: Type: IMPLANTABLE DEVICE | Site: SPINE LUMBAR | Status: FUNCTIONAL

## 2022-02-14 DEVICE — SCREW LOCKING EXTENDED TAB: Type: IMPLANTABLE DEVICE | Site: BACK | Status: FUNCTIONAL

## 2022-02-14 DEVICE — SCREW EXT TAB 6.5X45MM: Type: IMPLANTABLE DEVICE | Site: BACK | Status: FUNCTIONAL

## 2022-02-14 DEVICE — ROD SPINAL LORDOTIC 5.5X50MM: Type: IMPLANTABLE DEVICE | Site: BACK | Status: FUNCTIONAL

## 2022-02-14 DEVICE — ROD SPINAL PERC 40MM LORDOSED: Type: IMPLANTABLE DEVICE | Site: BACK | Status: FUNCTIONAL

## 2022-02-14 RX ORDER — SODIUM CHLORIDE 9 MG/ML
INJECTION, SOLUTION INTRAVENOUS CONTINUOUS
Status: DISCONTINUED | OUTPATIENT
Start: 2022-02-14 | End: 2022-02-15 | Stop reason: HOSPADM

## 2022-02-14 RX ORDER — MUPIROCIN 20 MG/G
OINTMENT TOPICAL
Status: DISCONTINUED | OUTPATIENT
Start: 2022-02-14 | End: 2022-02-14 | Stop reason: HOSPADM

## 2022-02-14 RX ORDER — LIDOCAINE HYDROCHLORIDE 10 MG/ML
1 INJECTION, SOLUTION EPIDURAL; INFILTRATION; INTRACAUDAL; PERINEURAL ONCE
Status: DISCONTINUED | OUTPATIENT
Start: 2022-02-14 | End: 2022-02-14 | Stop reason: HOSPADM

## 2022-02-14 RX ORDER — POLYETHYLENE GLYCOL 3350 17 G/17G
17 POWDER, FOR SOLUTION ORAL DAILY
Status: DISCONTINUED | OUTPATIENT
Start: 2022-02-14 | End: 2022-02-15 | Stop reason: HOSPADM

## 2022-02-14 RX ORDER — DOCUSATE SODIUM 100 MG/1
100 CAPSULE, LIQUID FILLED ORAL DAILY
Status: DISCONTINUED | OUTPATIENT
Start: 2022-02-14 | End: 2022-02-15 | Stop reason: HOSPADM

## 2022-02-14 RX ORDER — PHENYLEPHRINE HYDROCHLORIDE 10 MG/ML
INJECTION INTRAVENOUS
Status: DISCONTINUED | OUTPATIENT
Start: 2022-02-14 | End: 2022-02-14

## 2022-02-14 RX ORDER — LIDOCAINE HYDROCHLORIDE 20 MG/ML
INJECTION INTRAVENOUS
Status: DISCONTINUED | OUTPATIENT
Start: 2022-02-14 | End: 2022-02-14

## 2022-02-14 RX ORDER — AMOXICILLIN 250 MG
2 CAPSULE ORAL NIGHTLY PRN
Status: DISCONTINUED | OUTPATIENT
Start: 2022-02-14 | End: 2022-02-15 | Stop reason: HOSPADM

## 2022-02-14 RX ORDER — CEFAZOLIN SODIUM 2 G/50ML
2 SOLUTION INTRAVENOUS
Status: COMPLETED | OUTPATIENT
Start: 2022-02-14 | End: 2022-02-14

## 2022-02-14 RX ORDER — FENTANYL CITRATE 50 UG/ML
25 INJECTION, SOLUTION INTRAMUSCULAR; INTRAVENOUS EVERY 5 MIN PRN
Status: DISCONTINUED | OUTPATIENT
Start: 2022-02-14 | End: 2022-02-14 | Stop reason: HOSPADM

## 2022-02-14 RX ORDER — SODIUM CHLORIDE 0.9 % (FLUSH) 0.9 %
10 SYRINGE (ML) INJECTION
Status: DISCONTINUED | OUTPATIENT
Start: 2022-02-14 | End: 2022-02-15 | Stop reason: HOSPADM

## 2022-02-14 RX ORDER — TAMSULOSIN HYDROCHLORIDE 0.4 MG/1
0.4 CAPSULE ORAL DAILY
Status: DISCONTINUED | OUTPATIENT
Start: 2022-02-15 | End: 2022-02-15 | Stop reason: HOSPADM

## 2022-02-14 RX ORDER — DEXAMETHASONE SODIUM PHOSPHATE 4 MG/ML
INJECTION, SOLUTION INTRA-ARTICULAR; INTRALESIONAL; INTRAMUSCULAR; INTRAVENOUS; SOFT TISSUE
Status: DISCONTINUED | OUTPATIENT
Start: 2022-02-14 | End: 2022-02-14

## 2022-02-14 RX ORDER — LABETALOL HYDROCHLORIDE 5 MG/ML
10 INJECTION, SOLUTION INTRAVENOUS EVERY 4 HOURS PRN
Status: DISCONTINUED | OUTPATIENT
Start: 2022-02-14 | End: 2022-02-15 | Stop reason: HOSPADM

## 2022-02-14 RX ORDER — BACITRACIN 500 [USP'U]/G
OINTMENT TOPICAL
Status: DISCONTINUED | OUTPATIENT
Start: 2022-02-14 | End: 2022-02-14 | Stop reason: HOSPADM

## 2022-02-14 RX ORDER — MUPIROCIN 20 MG/G
1 OINTMENT TOPICAL
Status: DISCONTINUED | OUTPATIENT
Start: 2022-02-14 | End: 2022-02-14 | Stop reason: HOSPADM

## 2022-02-14 RX ORDER — LISINOPRIL 20 MG/1
20 TABLET ORAL DAILY
Status: DISCONTINUED | OUTPATIENT
Start: 2022-02-14 | End: 2022-02-15 | Stop reason: HOSPADM

## 2022-02-14 RX ORDER — GABAPENTIN 300 MG/1
300 CAPSULE ORAL NIGHTLY
Status: DISCONTINUED | OUTPATIENT
Start: 2022-02-14 | End: 2022-02-15 | Stop reason: HOSPADM

## 2022-02-14 RX ORDER — ONDANSETRON 4 MG/1
4 TABLET, ORALLY DISINTEGRATING ORAL EVERY 6 HOURS PRN
Qty: 15 TABLET | Refills: 0 | Status: SHIPPED | OUTPATIENT
Start: 2022-02-14 | End: 2022-02-14 | Stop reason: HOSPADM

## 2022-02-14 RX ORDER — EPHEDRINE SULFATE 50 MG/ML
INJECTION, SOLUTION INTRAVENOUS
Status: DISCONTINUED | OUTPATIENT
Start: 2022-02-14 | End: 2022-02-14

## 2022-02-14 RX ORDER — LORAZEPAM 2 MG/ML
0.25 INJECTION INTRAMUSCULAR
Status: DISCONTINUED | OUTPATIENT
Start: 2022-02-14 | End: 2022-02-14 | Stop reason: HOSPADM

## 2022-02-14 RX ORDER — ACETAMINOPHEN 10 MG/ML
INJECTION, SOLUTION INTRAVENOUS
Status: DISCONTINUED | OUTPATIENT
Start: 2022-02-14 | End: 2022-02-14

## 2022-02-14 RX ORDER — BISACODYL 10 MG
10 SUPPOSITORY, RECTAL RECTAL DAILY PRN
Status: DISCONTINUED | OUTPATIENT
Start: 2022-02-14 | End: 2022-02-15 | Stop reason: HOSPADM

## 2022-02-14 RX ORDER — MAG HYDROX/ALUMINUM HYD/SIMETH 200-200-20
30 SUSPENSION, ORAL (FINAL DOSE FORM) ORAL EVERY 4 HOURS PRN
Status: DISCONTINUED | OUTPATIENT
Start: 2022-02-14 | End: 2022-02-15 | Stop reason: HOSPADM

## 2022-02-14 RX ORDER — OXYCODONE AND ACETAMINOPHEN 10; 325 MG/1; MG/1
1 TABLET ORAL EVERY 4 HOURS PRN
Qty: 42 TABLET | Refills: 0 | Status: SHIPPED | OUTPATIENT
Start: 2022-02-14 | End: 2022-02-14 | Stop reason: HOSPADM

## 2022-02-14 RX ORDER — METHYLPREDNISOLONE ACETATE 40 MG/ML
INJECTION, SUSPENSION INTRA-ARTICULAR; INTRALESIONAL; INTRAMUSCULAR; SOFT TISSUE
Status: DISCONTINUED | OUTPATIENT
Start: 2022-02-14 | End: 2022-02-14 | Stop reason: HOSPADM

## 2022-02-14 RX ORDER — METHOCARBAMOL 750 MG/1
750 TABLET, FILM COATED ORAL EVERY 8 HOURS PRN
Status: DISCONTINUED | OUTPATIENT
Start: 2022-02-14 | End: 2022-02-15 | Stop reason: HOSPADM

## 2022-02-14 RX ORDER — OXYCODONE AND ACETAMINOPHEN 5; 325 MG/1; MG/1
1 TABLET ORAL EVERY 4 HOURS PRN
Status: DISCONTINUED | OUTPATIENT
Start: 2022-02-14 | End: 2022-02-15 | Stop reason: HOSPADM

## 2022-02-14 RX ORDER — METHOCARBAMOL 750 MG/1
750 TABLET, FILM COATED ORAL EVERY 8 HOURS PRN
Qty: 60 TABLET | Refills: 0 | Status: SHIPPED | OUTPATIENT
Start: 2022-02-14 | End: 2022-02-14 | Stop reason: HOSPADM

## 2022-02-14 RX ORDER — ATORVASTATIN CALCIUM 10 MG/1
10 TABLET, FILM COATED ORAL DAILY
Status: DISCONTINUED | OUTPATIENT
Start: 2022-02-15 | End: 2022-02-15 | Stop reason: HOSPADM

## 2022-02-14 RX ORDER — ACETAMINOPHEN 325 MG/1
650 TABLET ORAL EVERY 6 HOURS PRN
Status: DISCONTINUED | OUTPATIENT
Start: 2022-02-14 | End: 2022-02-15 | Stop reason: HOSPADM

## 2022-02-14 RX ORDER — SODIUM CHLORIDE, SODIUM LACTATE, POTASSIUM CHLORIDE, CALCIUM CHLORIDE 600; 310; 30; 20 MG/100ML; MG/100ML; MG/100ML; MG/100ML
INJECTION, SOLUTION INTRAVENOUS CONTINUOUS
Status: DISCONTINUED | OUTPATIENT
Start: 2022-02-14 | End: 2022-02-15 | Stop reason: HOSPADM

## 2022-02-14 RX ORDER — ONDANSETRON 2 MG/ML
8 INJECTION INTRAMUSCULAR; INTRAVENOUS EVERY 6 HOURS PRN
Status: DISCONTINUED | OUTPATIENT
Start: 2022-02-14 | End: 2022-02-15 | Stop reason: HOSPADM

## 2022-02-14 RX ORDER — VANCOMYCIN HYDROCHLORIDE 1 G/20ML
INJECTION, POWDER, LYOPHILIZED, FOR SOLUTION INTRAVENOUS
Status: DISCONTINUED | OUTPATIENT
Start: 2022-02-14 | End: 2022-02-14 | Stop reason: HOSPADM

## 2022-02-14 RX ORDER — ALBUTEROL SULFATE 2.5 MG/.5ML
2.5 SOLUTION RESPIRATORY (INHALATION) ONCE
Status: COMPLETED | OUTPATIENT
Start: 2022-02-14 | End: 2022-02-14

## 2022-02-14 RX ORDER — LIDOCAINE HYDROCHLORIDE AND EPINEPHRINE 20; 10 MG/ML; UG/ML
INJECTION, SOLUTION INFILTRATION; PERINEURAL
Status: DISCONTINUED | OUTPATIENT
Start: 2022-02-14 | End: 2022-02-14 | Stop reason: HOSPADM

## 2022-02-14 RX ORDER — PROPOFOL 10 MG/ML
VIAL (ML) INTRAVENOUS
Status: DISCONTINUED | OUTPATIENT
Start: 2022-02-14 | End: 2022-02-14

## 2022-02-14 RX ORDER — ONDANSETRON 2 MG/ML
INJECTION INTRAMUSCULAR; INTRAVENOUS
Status: DISCONTINUED | OUTPATIENT
Start: 2022-02-14 | End: 2022-02-14

## 2022-02-14 RX ORDER — SUCCINYLCHOLINE CHLORIDE 20 MG/ML
INJECTION INTRAMUSCULAR; INTRAVENOUS
Status: DISCONTINUED | OUTPATIENT
Start: 2022-02-14 | End: 2022-02-14

## 2022-02-14 RX ORDER — HEPARIN SODIUM 5000 [USP'U]/ML
5000 INJECTION, SOLUTION INTRAVENOUS; SUBCUTANEOUS EVERY 8 HOURS
Status: DISCONTINUED | OUTPATIENT
Start: 2022-02-15 | End: 2022-02-15 | Stop reason: HOSPADM

## 2022-02-14 RX ORDER — HYDROMORPHONE HYDROCHLORIDE 2 MG/ML
0.2 INJECTION, SOLUTION INTRAMUSCULAR; INTRAVENOUS; SUBCUTANEOUS EVERY 5 MIN PRN
Status: COMPLETED | OUTPATIENT
Start: 2022-02-14 | End: 2022-02-14

## 2022-02-14 RX ORDER — BUPIVACAINE HYDROCHLORIDE 5 MG/ML
INJECTION, SOLUTION PERINEURAL
Status: DISCONTINUED | OUTPATIENT
Start: 2022-02-14 | End: 2022-02-14 | Stop reason: HOSPADM

## 2022-02-14 RX ORDER — PROCHLORPERAZINE EDISYLATE 5 MG/ML
5 INJECTION INTRAMUSCULAR; INTRAVENOUS EVERY 6 HOURS PRN
Status: DISCONTINUED | OUTPATIENT
Start: 2022-02-14 | End: 2022-02-15 | Stop reason: HOSPADM

## 2022-02-14 RX ORDER — PROPOFOL 10 MG/ML
VIAL (ML) INTRAVENOUS CONTINUOUS PRN
Status: DISCONTINUED | OUTPATIENT
Start: 2022-02-14 | End: 2022-02-14

## 2022-02-14 RX ORDER — PANTOPRAZOLE SODIUM 40 MG/1
40 TABLET, DELAYED RELEASE ORAL DAILY
Status: DISCONTINUED | OUTPATIENT
Start: 2022-02-14 | End: 2022-02-15 | Stop reason: HOSPADM

## 2022-02-14 RX ORDER — MUPIROCIN 20 MG/G
1 OINTMENT TOPICAL 2 TIMES DAILY
Status: DISCONTINUED | OUTPATIENT
Start: 2022-02-14 | End: 2022-02-15 | Stop reason: HOSPADM

## 2022-02-14 RX ORDER — FENTANYL CITRATE 50 UG/ML
INJECTION, SOLUTION INTRAMUSCULAR; INTRAVENOUS
Status: DISCONTINUED | OUTPATIENT
Start: 2022-02-14 | End: 2022-02-14

## 2022-02-14 RX ORDER — HYDRALAZINE HYDROCHLORIDE 20 MG/ML
10 INJECTION INTRAMUSCULAR; INTRAVENOUS EVERY 4 HOURS PRN
Status: DISCONTINUED | OUTPATIENT
Start: 2022-02-14 | End: 2022-02-15 | Stop reason: HOSPADM

## 2022-02-14 RX ORDER — MIDAZOLAM HYDROCHLORIDE 1 MG/ML
INJECTION, SOLUTION INTRAMUSCULAR; INTRAVENOUS
Status: DISCONTINUED | OUTPATIENT
Start: 2022-02-14 | End: 2022-02-14

## 2022-02-14 RX ORDER — ROCURONIUM BROMIDE 10 MG/ML
INJECTION, SOLUTION INTRAVENOUS
Status: DISCONTINUED | OUTPATIENT
Start: 2022-02-14 | End: 2022-02-14

## 2022-02-14 RX ORDER — CEFAZOLIN SODIUM 1 G/50ML
2 SOLUTION INTRAVENOUS
Status: COMPLETED | OUTPATIENT
Start: 2022-02-14 | End: 2022-02-14

## 2022-02-14 RX ORDER — DUTASTERIDE 0.5 MG/1
0.5 CAPSULE, LIQUID FILLED ORAL DAILY
Status: DISCONTINUED | OUTPATIENT
Start: 2022-02-15 | End: 2022-02-15 | Stop reason: HOSPADM

## 2022-02-14 RX ORDER — HYDROMORPHONE HYDROCHLORIDE 2 MG/ML
1 INJECTION, SOLUTION INTRAMUSCULAR; INTRAVENOUS; SUBCUTANEOUS
Status: DISCONTINUED | OUTPATIENT
Start: 2022-02-14 | End: 2022-02-15

## 2022-02-14 RX ORDER — MAG HYDROX/ALUMINUM HYD/SIMETH 200-200-20
30 SUSPENSION, ORAL (FINAL DOSE FORM) ORAL
Status: DISCONTINUED | OUTPATIENT
Start: 2022-02-14 | End: 2022-02-15 | Stop reason: HOSPADM

## 2022-02-14 RX ORDER — OXYCODONE AND ACETAMINOPHEN 10; 325 MG/1; MG/1
1 TABLET ORAL EVERY 4 HOURS PRN
Status: DISCONTINUED | OUTPATIENT
Start: 2022-02-14 | End: 2022-02-15 | Stop reason: HOSPADM

## 2022-02-14 RX ADMIN — HYDROMORPHONE HYDROCHLORIDE 0.2 MG: 2 INJECTION INTRAMUSCULAR; INTRAVENOUS; SUBCUTANEOUS at 10:02

## 2022-02-14 RX ADMIN — FENTANYL CITRATE 100 MCG: 50 INJECTION, SOLUTION INTRAMUSCULAR; INTRAVENOUS at 07:02

## 2022-02-14 RX ADMIN — ACETAMINOPHEN 1000 MG: 10 INJECTION, SOLUTION INTRAVENOUS at 09:02

## 2022-02-14 RX ADMIN — MUPIROCIN: 20 OINTMENT TOPICAL at 06:02

## 2022-02-14 RX ADMIN — HYDROMORPHONE HYDROCHLORIDE 0.2 MG: 2 INJECTION INTRAMUSCULAR; INTRAVENOUS; SUBCUTANEOUS at 12:02

## 2022-02-14 RX ADMIN — SODIUM CHLORIDE, SODIUM LACTATE, POTASSIUM CHLORIDE, AND CALCIUM CHLORIDE: .6; .31; .03; .02 INJECTION, SOLUTION INTRAVENOUS at 07:02

## 2022-02-14 RX ADMIN — GABAPENTIN 300 MG: 300 CAPSULE ORAL at 08:02

## 2022-02-14 RX ADMIN — ALBUTEROL SULFATE 2.5 MG: 2.5 SOLUTION RESPIRATORY (INHALATION) at 11:02

## 2022-02-14 RX ADMIN — PHENYLEPHRINE HYDROCHLORIDE 200 MCG: 10 INJECTION INTRAVENOUS at 08:02

## 2022-02-14 RX ADMIN — DOCUSATE SODIUM 100 MG: 100 CAPSULE ORAL at 05:02

## 2022-02-14 RX ADMIN — FENTANYL CITRATE 25 MCG: 50 INJECTION INTRAMUSCULAR; INTRAVENOUS at 10:02

## 2022-02-14 RX ADMIN — LIDOCAINE HYDROCHLORIDE 100 MG: 20 INJECTION, SOLUTION INTRAVENOUS at 07:02

## 2022-02-14 RX ADMIN — CEFAZOLIN SODIUM 2 G: 2 SOLUTION INTRAVENOUS at 11:02

## 2022-02-14 RX ADMIN — LORAZEPAM 0.25 MG: 2 INJECTION INTRAMUSCULAR; INTRAVENOUS at 11:02

## 2022-02-14 RX ADMIN — CEFAZOLIN SODIUM 2 G: 1 SOLUTION INTRAVENOUS at 07:02

## 2022-02-14 RX ADMIN — MIDAZOLAM HYDROCHLORIDE 2 MG: 1 INJECTION, SOLUTION INTRAMUSCULAR; INTRAVENOUS at 07:02

## 2022-02-14 RX ADMIN — OXYCODONE HYDROCHLORIDE AND ACETAMINOPHEN 1 TABLET: 5; 325 TABLET ORAL at 04:02

## 2022-02-14 RX ADMIN — ONDANSETRON 4 MG: 2 INJECTION, SOLUTION INTRAMUSCULAR; INTRAVENOUS at 09:02

## 2022-02-14 RX ADMIN — EPHEDRINE SULFATE 5 MG: 50 INJECTION INTRAVENOUS at 08:02

## 2022-02-14 RX ADMIN — SODIUM CHLORIDE: 0.9 INJECTION, SOLUTION INTRAVENOUS at 03:02

## 2022-02-14 RX ADMIN — FENTANYL CITRATE 50 MCG: 50 INJECTION, SOLUTION INTRAMUSCULAR; INTRAVENOUS at 08:02

## 2022-02-14 RX ADMIN — POLYETHYLENE GLYCOL 3350 17 G: 17 POWDER, FOR SOLUTION ORAL at 05:02

## 2022-02-14 RX ADMIN — PANTOPRAZOLE SODIUM 40 MG: 40 TABLET, DELAYED RELEASE ORAL at 05:02

## 2022-02-14 RX ADMIN — PROPOFOL 200 MG: 10 INJECTION, EMULSION INTRAVENOUS at 07:02

## 2022-02-14 RX ADMIN — MAGNESIUM HYDROXIDE/ALUMINUM HYDROXICE/SIMETHICONE 30 ML: 120; 1200; 1200 SUSPENSION ORAL at 08:02

## 2022-02-14 RX ADMIN — FENTANYL CITRATE 25 MCG: 50 INJECTION INTRAMUSCULAR; INTRAVENOUS at 11:02

## 2022-02-14 RX ADMIN — PROPOFOL 75 MCG/KG/MIN: 10 INJECTION, EMULSION INTRAVENOUS at 07:02

## 2022-02-14 RX ADMIN — HYDROMORPHONE HYDROCHLORIDE 0.2 MG: 2 INJECTION INTRAMUSCULAR; INTRAVENOUS; SUBCUTANEOUS at 11:02

## 2022-02-14 RX ADMIN — OXYCODONE AND ACETAMINOPHEN 1 TABLET: 10; 325 TABLET ORAL at 08:02

## 2022-02-14 RX ADMIN — PHENYLEPHRINE HYDROCHLORIDE 40 MCG/MIN: 10 INJECTION INTRAVENOUS at 08:02

## 2022-02-14 RX ADMIN — DEXAMETHASONE SODIUM PHOSPHATE 4 MG: 4 INJECTION, SOLUTION INTRAMUSCULAR; INTRAVENOUS at 07:02

## 2022-02-14 RX ADMIN — ROCURONIUM BROMIDE 5 MG: 10 INJECTION, SOLUTION INTRAVENOUS at 07:02

## 2022-02-14 RX ADMIN — FENTANYL CITRATE 50 MCG: 50 INJECTION, SOLUTION INTRAMUSCULAR; INTRAVENOUS at 09:02

## 2022-02-14 RX ADMIN — SUCCINYLCHOLINE CHLORIDE 160 MG: 20 INJECTION, SOLUTION INTRAMUSCULAR; INTRAVENOUS at 07:02

## 2022-02-14 RX ADMIN — PHENYLEPHRINE HYDROCHLORIDE 200 MCG: 10 INJECTION INTRAVENOUS at 07:02

## 2022-02-14 RX ADMIN — CEFAZOLIN SODIUM 2 G: 2 SOLUTION INTRAVENOUS at 05:02

## 2022-02-14 RX ADMIN — LISINOPRIL 20 MG: 20 TABLET ORAL at 05:02

## 2022-02-14 NOTE — ANESTHESIA POSTPROCEDURE EVALUATION
Anesthesia Post Evaluation    Patient: Sreekanth Pitts Jr.    Procedure(s) Performed: Procedure(s) (LRB):  FUSION, SPINE, LUMBAR, TLIF, MINIMALLY INVASIVE Right L4-5 Jose Juan PARK notified (N/A)    Final Anesthesia Type: general      Patient location during evaluation: PACU  Patient participation: Yes- Able to Participate  Level of consciousness: awake and alert, oriented and awake  Post-procedure vital signs: reviewed and stable  Pain management: adequate  Airway patency: patent    PONV status at discharge: No PONV  Anesthetic complications: yes  Perioperative Events: other periop events (see comments)      Radha-operative Events Comments: Co of troubl breathing out...bs check no wheezing...cxr no pulm edema..later commented he had a stuffy nose causing the sensation  Cardiovascular status: blood pressure returned to baseline, hemodynamically stable and stable  Respiratory status: unassisted and spontaneous ventilation  Hydration status: euvolemic  Follow-up not needed.          Vitals Value Taken Time   /85 02/14/22 1216   Temp 36.5 °C (97.7 °F) 02/14/22 1006   Pulse 92 02/14/22 1222   Resp 20 02/14/22 1222   SpO2 99 % 02/14/22 1200   Vitals shown include unvalidated device data.      No case tracking events are documented in the log.      Pain/Darcy Score: Pain Rating Prior to Med Admin: 10 (2/14/2022 11:13 AM)  Pain Rating Post Med Admin: 10 (2/14/2022 11:13 AM)  Darcy Score: 8 (2/14/2022 10:02 AM)

## 2022-02-14 NOTE — ANESTHESIA PROCEDURE NOTES
Intubation    Date/Time: 2/14/2022 7:28 AM  Performed by: Kiko Razo CRNA  Authorized by: Bjorn Riddle MD     Intubation:     Induction:  Intravenous    Intubated:  Postinduction    Mask Ventilation:  Easy with oral airway    Attempts:  1    Attempted By:  CRNA    Method of Intubation:  Video laryngoscopy    Blade:  Pires 3    Laryngeal View Grade: Grade IIA - cords partially seen      Difficult Airway Encountered?: No      Airway Device:  Oral endotracheal tube    Airway Device Size:  7.5    Style/Cuff Inflation:  Cuffed (inflated to minimal occlusive pressure)    Tube secured:  24    Secured at:  The lips    Placement Verified By:  Capnometry and Revisualization with laryngoscopy    Complicating Factors:  Large/floppy epiglottis    Findings Post-Intubation:  BS equal bilateral and atraumatic/condition of teeth unchanged

## 2022-02-14 NOTE — PLAN OF CARE
Problem: Occupational Therapy Goal  Goal: Occupational Therapy Goal  Description: Goals to be met by: 02/28/22    Patient will increase functional independence with ADLs by performing:    UE Dressing with Modified [place].  LE Dressing with Modified [place].  Grooming while standing at sink with Modified [place].  Toileting from toilet with Modified [place] for hygiene and clothing management.   Supine to sit with Modified [place].  Step transfer with Modified [place]  Toilet transfer to toilet with Modified [place].    Outcome: Ongoing, Progressing     CGA for log roll OOB, sit<>stand transfer, and functional mobility with use of RW. OT rec HHOT for home safety/set-up and RW in order to increase safety and independence with ADLs and all aspects of functional mobility.     Seated EOB: 149/90, 115 bpm, 98% on room air   Seated in the chair post ambulation: 125/81, 127 bpm, 94% on room air  Discussed vitals with nurseSarina, who said to leave 2L O2 NC off and nurse will monitor. Door left open.

## 2022-02-14 NOTE — PT/OT/SLP EVAL
Occupational Therapy   Evaluation    Name: Sreekanth Pitts Jr.  MRN: [id number]  Recent Surgery: Procedure(s) (LRB):  FUSION, SPINE, LUMBAR, TLIF, MINIMALLY INVASIVE Right L4-5 Jose Juan V notified (N/A) Day of Surgery    Recommendations:     Discharge Recommendations: home health OT (with family support)  Discharge Equipment Recommendations:  walker, rolling (long-handled sponge)  Barriers to discharge:  None    Assessment:     Sreekanth Pitts Jr. is a 76 y.o. male s/p lumbar fusion post op day #0. Performance deficits affecting function: weakness,decreased ROM,decreased upper extremity function,decreased lower extremity function,impaired skin,impaired self care skills,pain,impaired balance,impaired functional mobilty.      CGA for log roll OOB, sit<>stand transfer, and functional mobility with use of RW. OT rec HHOT for home safety/set-up and RW in order to increase safety and independence with ADLs and all aspects of functional mobility.      Seated EOB: 149/90, 115 bpm, 98% on room air   Seated in the chair post ambulation: 125/81, 127 bpm, 94% on room air  Discussed vitals with nurseSarina, who said to leave 2L O2 NC off and nurse will monitor. Door left open    Rehab Prognosis: Good; patient would benefit from acute skilled OT services to address these deficits and reach maximum level of function.       Plan:     Patient to be seen  (5-6x/week) to address the above listed problems via self-care/home management,therapeutic activities,therapeutic exercises  · Plan of Care Expires: 02/28/22  · Plan of Care Reviewed with: patient,spouse,daughter    Subjective     Chief Complaint: a little shaky in BUE sitting EOB, pain   Patient/Family Comments/goals: pleasantly agreeable to session, asking appropriate questions to implement spinal precautions with activities     Occupational Profile:  Living Environment: Pt lives with his wife and dog in a SSH with ~2-3 MERISSA and B/L HR. Bathroom set-up: walk-in shower with shower  chair available   Previous level of function: fully independent PTA.   Roles and Routines: works part-time, drives, likes to walk his dog   Equipment Used at Home:  shower chair  Assistance upon Discharge: wife; 2 daughters local     Pain/Comfort:  · Pain Rating 1:  (6-7/10)  · Location - Orientation 1: lower  · Location 1: back  · Pain Addressed 1: Pre-medicate for activity,Reposition,Distraction,Cessation of Activity,Nurse notified    Patients cultural, spiritual, Oriental orthodox conflicts given the current situation: no    Objective:     Communicated with: nurseSarina, prior to session.  Patient found HOB elevated with peripheral IV,SCD,oxygen upon OT entry to room.    General Precautions: Standard, fall   Orthopedic Precautions:spinal precautions   Braces: LSO  Respiratory Status: Nasal cannula, flow 2 L/min    Occupational Performance:    Bed Mobility:    · Patient completed Log Rolling to Right with contact guard assistance  · Patient completed Scooting anteriorly to EOB with stand by assistance  · Patient completed Supine to Sit with contact guard assistance with HOB elevated     Functional Mobility/Transfers:  · Patient completed Sit <> Stand Transfer with contact guard assistance  with  rolling walker   · Patient completed Bed <> Chair Transfer using Step Transfer technique with contact guard assistance with rolling walker  · Functional Mobility: pt completed in-room and hallway functional mobility wearing LSO brace on room air using RW with CGA and chair following. Verbal and tactile cueing for safe hand placement as pt appeared mildly anxious with stand>sit in the chair. Please refer to PT note for gait assessment.     Activities of Daily Living:  · Upper Body Dressing: minimum assistance to don LSO seated EOB; independent with donning face mask seated  · Toileting: set-up with urinal at bed level (offered assist to ambulate to the bathroom, but wife declined and asked for therapy to wait outside while he  used the urinal in the bed)    Cognitive/Visual Perceptual:  Cognitive/Psychosocial Skills:     -       Follows Commands/attention:Follows multistep  commands  -       Communication: clear/fluent  -       Memory: No Deficits noted  -       Safety awareness/insight to disability: intact   -       Mood/Affect/Coping skills/emotional control: Cooperative and Pleasant  Visual/Perceptual:      -Intact  R/L discrimination      Physical Exam:  Balance:    -       seated: SUP; standing: CGA with RW  Postural examination/scapula alignment:    -       No postural abnormalities identified  Skin integrity: lumbar dressing saturated with some drainage noted upon sitting EOB. nurseSarina, notified immediately who came and re-dressed/examined site prior to donning LSO.   Edema:  no BUE edema noted  Upper Extremity Range of Motion:     -       Right Upper Extremity: WFL  -       Left Upper Extremity: WFL  Upper Extremity Strength:    -       Right Upper Extremity: WFL  -       Left Upper Extremity: WFL   Strength:    -       Right Upper Extremity: WFL  -       Left Upper Extremity: WFL  Fine Motor Coordination:    -       Mildly impaired  B/L UE hand, manipulation of objects d/t shakiness seated EOB; appeared more calm once in the chair at end of session   Gross motor coordination:   WFL    AMPAC 6 Click ADL:  AMPAC Total Score: 20    Treatment & Education:  · Pt and wife educated on OT role/POC.   · Importance of OOB activity with staff assistance.  · Safety during functional t/f and mobility   · White board updated- level 3- ok for OOB to chair/toilet wearing LSO using RW with staff; spinal precautions; log roll technique   · Handout provided, OT demo, review, and cueing during session for spinal precautions: no bending, twisting, nor lifting >10 lbs. LSO when OOB.   · Pt practiced donning LSO brace after nurse re-dressed dressing to lumbar area   · Edu use of long-handled sponge seated on shower chair to ensure safety  and implement precautions to avoid extreme bending and twisting; edu to keep commonly used items in the house near ~waist height   · Edu on iADL pet care management and avoid walking the dog d/t pt reports the dog extreme pulling; avoid holding dog d/t >10 lbs   · Edu on goal to sit up in the chair for 6 hrs/day, but broken up into 2 hours as tolerated. Encouraged to change positions every ~2 hours awake   · Multiple self-care tasks/functional mobility completed- assistance level noted above   · All questions/concerns answered within OT scope of practice     Education:    Patient left sitting upright in the chair wearing LSO with bedside table in front of pt with all lines intact, call button in reach, nurse, Sarina, notified and all needs met/within reach; bedside table in front of pt; door left open     GOALS:   Multidisciplinary Problems     Occupational Therapy Goals        Problem: Occupational Therapy Goal    Goal Priority Disciplines Outcome Interventions   Occupational Therapy Goal     OT, PT/OT Ongoing, Progressing    Description: Goals to be met by: 02/28/22    Patient will increase functional independence with ADLs by performing:    UE Dressing with Modified [place].  LE Dressing with Modified [place].  Grooming while standing at sink with Modified [place].  Toileting from toilet with Modified [place] for hygiene and clothing management.   Supine to sit with Modified [place].  Step transfer with Modified [place]  Toilet transfer to toilet with Modified [place].                     History:     Past Medical History:   Diagnosis Date    Achilles bursitis or tendinitis 9/25/2014    Allergy 12/3/2015    Arthritis     BPH without obstruction/lower urinary tract symptoms 01/04/2018    Chronic fatigue 7/10/2018    Degenerative disc disease     GERD (gastroesophageal reflux disease) 12/2/2020    Hypertension     Nuclear sclerosis - Both Eyes 7/30/2012       Past  Surgical History:   Procedure Laterality Date    ESOPHAGOGASTRODUODENOSCOPY N/A 12/2/2020    Procedure: EGD (ESOPHAGOGASTRODUODENOSCOPY);  Surgeon: Dion Mckinney MD;  Location: [facility] (4TH FLR);  Service: Endoscopy;  Laterality: N/A;  covid-11/29-[facility]    EYE FOREIGN BODY REMOVAL      childhood    LUMBAR LAMINECTOMY WITH FUSION  2002    TRANSURETHRAL RESECTION OF PROSTATE (TURP) WITHOUT USE OF LASER N/A 9/11/2018    Procedure: TURP, WITHOUT USING LASER;  Surgeon: Uma Gaona MD;  Location: [address];  Service: Urology;  Laterality: N/A;  1.5 hours       Time Tracking:     OT Date of Treatment: 02/14/22  OT Start Time: 1551  OT Stop Time: 1632  OT Total Time (min): 41 min    Billable Minutes:Evaluation 15 min  Self Care/Home Management 13 min  Total Time 41 min (co-eval with PT)     2/14/2022

## 2022-02-14 NOTE — OP NOTE
DATE OF PROCEDURE: 2/14/2022     SURGEON:  Anibal Beebe M.D., Ph.D.     ASSISTANT:  Monet Benavides PA-C  (the assistant is a [facility] resident).     PREOPERATIVE DIAGNOSES:  L4-5 spondylolisthesis.  Lumbar spondylosis.  L4-5 stenosis.  Thoracolumbar scoliosis.    POSTOPERATIVE DIAGNOSES:  L4-5 spondylolisthesis.  Lumbar spondylosis.  L4-5 stenosis.  Thoracolumbar scoliosis.     PROCEDURES PERFORMED:  1.  Minimally invasive approach.  2.  Right-sided complete facetectomy, laminectomy and diskectomy with decompression of the thecal sac and nerve roots.  3.  Placement of expandable interbody cage (Leva, 14 mm)   4.  L4-5 autograft with associated fusion.  5.  Placement of percutaneous pedicle screws at L4-5.     INDICATIONS IN DETAIL:  Mr. Sreekanth Pitts Jr. is a 76 y.o. gentleman with DDD, sciatica, who presents today for surgical decompression and fusion. Mr. Pitts has a history of low back pain and in 2002 he underwent lumbar laminectomy. Since then, the pain has been intermittent and very minimal for the past 20 years.  It has worsened in the last few months. He states in late September he had a gradual onset of bilateral leg pain. The pain is from his lower back into the hips and radiates down to the knees. Describes the pain as a shooting pain that is constant. He used to walk his dog every morning for 2 miles and now can only walk up to 1 block secondary to the pain. He has a history of right leg sciatica. Rates the pain a 7/10 in severity when ambulating and 5/10 while at rest. He is in physical therapy which he states is not alleviating his pain.  MRI Lumbar Spine WO Contrast (12/14/2021): Multilevel degenerative changes of the lumbar spine detailed above. Severe spinal canal stenosis noted at L4-L5. Moderate to severe neural foraminal narrowing noted at L2-L5.  Right-sided L4 spondylolysis with grade 1 anterolisthesis of L4-L5.  I have recommended MIS L4-5 Transforaminal  lumbar interbody fusion (TLIF). I have discussed the risks/benefits, indications, and alternatives for the proposed procedure in detail. I have answered all of their questions and the pt wishes to proceed with surgery.         PROCEDURE IN DETAIL:    The patient was seen in the pretreatment area and the risks, benefits and alternatives were again discussed.  The patient wished to proceed.  The patient was brought to the Operating Room and a general anesthetic was administered.  All proper lines were placed.  The patient was placed in the prone position on a Jamie table and her back was cleaned, prepped and draped in usual manner.  AP and lateral fluoroscopy was performed and the ...level could be seen.  A line of approximately 2.5 cm was drawn just lateral to the shadow of the facet at ... on the right side.  A lidocaine-bupivacaine mix was infiltrated under the skin.  An incision was made through the skin and down to the fascia.  A minimally invasive approach was then made using the METRx set down to the junction of the lamina and the facet at L4-5.  The soft tissue was removed using Bovie cautery and then using a high-speed Lifesum drill with a matchstick avila, a complete facetectomy was performed.  The bone dust from this drilling was collected using the [employer] bone press.  We then proceeded to drill the lamina and completely decompress from the foramen to the thecal sac.  Once this was performed, we dissected and retracted the thecal sac and exiting nerve root.  Once this was medially retracted, the annulus of the L4-5 disk was cut and diskectomy was performed in the usual fashion using curettes and rongeurs.  Once this was performed, we then prepared the disk for fusion using disk alber.  Upon doing this maneuver with fluoroscopy, we could clearly see good movement at this level.  We then placed a trial cage in this area and then placed a 14 mm Leva cage at this level.  Upon placement of the cage, there  was distraction of the disk and correction of most of the spondylolisthesis.  Once the cage was in place, we then placed the bone from the Hensler bone press in the cage.  This will allow for anterior fusion.  Once this was complete, the retractors were removed.  The wound was irrigated copiously and all bleeding points were coagulated.     We then turned our attention to placement of percutaneous pedicle screws.  Jamshidi needles were used to penetrate the pedicles at in the usual fashion.  This was done using AP fluoroscopy and the Jamshidi needles were placed in a lateral to medial fashion.  Once we had placed the Jamshidi needles, we then stimulated them.  All CMAPs were greater   than 32 amperes.  Once this was performed, we then placed K-wires and tapped each of the pedicles.  Then, 6.5 x 45.0 mm screws were placed at L4 and L5.  We then obtained rods of the appropriate size and placed them into the towers.  The rods were reduced into the screw heads and end caps were placed.  Once this was complete, we final tightened the end caps.  The wound was irrigated copiously using a Pulsavac and the soft tissue was then closed in layers after vancomycin powder was placed over the hardware.  Once this was complete, a clean dressing was placed.  The patient was turned to the supine position on a gurney.  The patient was awakened by the Anesthesia staff.  At the point the patient was awake and following commands, he was extubated.     EBL was approximately 75 mL.     There were no intraprocedural complications.     All counts were correct at the end of surgery.     Dr. Anibal Beebe was present during the entire procedure.

## 2022-02-14 NOTE — ANESTHESIA PROCEDURE NOTES
Arterial    Diagnosis: lumbar radiculopathy    Patient location during procedure: done out of OR  Procedure start time: 2/14/2022 7:00 AM  Timeout: 2/14/2022 7:00 AM  Procedure end time: 2/14/2022 7:10 AM    Staffing  Authorizing Provider: Bjorn Riddle MD  Performing Provider: Bjorn Riddle MD    Staffing  Performed: anesthesiologist   Anesthesiologist: Bjorn Riddle MD  Anesthesiologist was present at the time of the procedure.    Preanesthetic Checklist  Completed: patient identified, IV checked, site marked, risks and benefits discussed, monitors and equipment checked, pre-op evaluation, timeout performed and anesthesia consent givenArterial  Skin Prep: chlorhexidine gluconate  Local Infiltration: lidocaine  Orientation: left  Location: radial    Catheter Size: 20 G  Inserted Catheter Length: 4 cm  Catheter placement by Anatomical landmarks. Heme positive aspiration all ports.Insertion Attempts: 1  Assessment  Dressing: secured with tape and tegaderm (with biopatch)  Patient: Tolerated well  Additional Notes  Port asp flush

## 2022-02-14 NOTE — PROGRESS NOTES
Certification of Assistant at Surgery       Surgery Date: 2/14/2022     Participating Surgeons:  Surgeon(s) and Role:     * Anibal Beebe MD - Primary       Monet Benavides PA-C - Assisting       Procedures:  Procedure(s) (LRB):  FUSION, SPINE, LUMBAR, TLIF, MINIMALLY INVASIVE Right L4-5 Jose Juan PARK notified (N/A)    Assistant Surgeon's Certification of Necessity:  I understand that section 1842 (b) (6) (d) of the Social Security Act generally prohibits Medicare Part B reasonable charge payment for the services of assistants at surgery in teaching hospitals when qualified residents are available to furnish such services. I certify that the services for which payment is claimed were medically necessary, and that no qualified resident was available to perform the services. I further understand that these services are subject to post-payment review by the Medicare carrier.      Monet Benavides PA-C    02/14/2022  10:28 AM

## 2022-02-14 NOTE — TRANSFER OF CARE
Anesthesia Transfer of Care Note    Patient: Sreekanth Pitts Jr.    Procedure(s) Performed: Procedure(s) (LRB):  FUSION, SPINE, LUMBAR, TLIF, MINIMALLY INVASIVE Right L4-5 Jose Juan PARK notified (N/A)    Patient location: PACU    Anesthesia Type: general    Transport from OR: Transported from OR on room air with adequate spontaneous ventilation    Post pain: adequate analgesia    Post assessment: no apparent anesthetic complications and tolerated procedure well    Post vital signs: stable    Level of consciousness: awake, alert and oriented    Nausea/Vomiting: no nausea/vomiting    Complications: none    Transfer of care protocol was followed      Last vitals:   Visit Vitals  /85 (BP Location: Right arm, Patient Position: Lying)   Pulse 82   Temp 36.5 °C (97.7 °F) (Oral)   Resp 16   Wt 119.3 kg (263 lb)   SpO2 97%   BMI 34.70 kg/m²

## 2022-02-15 VITALS
HEART RATE: 87 BPM | BODY MASS INDEX: 34.7 KG/M2 | SYSTOLIC BLOOD PRESSURE: 111 MMHG | DIASTOLIC BLOOD PRESSURE: 63 MMHG | WEIGHT: 263 LBS | OXYGEN SATURATION: 95 % | TEMPERATURE: 98 F | RESPIRATION RATE: 18 BRPM

## 2022-02-15 PROCEDURE — 25000003 PHARM REV CODE 250: Performed by: PHYSICIAN ASSISTANT

## 2022-02-15 PROCEDURE — 99900035 HC TECH TIME PER 15 MIN (STAT)

## 2022-02-15 PROCEDURE — 99024 PR POST-OP FOLLOW-UP VISIT: ICD-10-PCS | Mod: POP,,, | Performed by: PHYSICIAN ASSISTANT

## 2022-02-15 PROCEDURE — 99024 POSTOP FOLLOW-UP VISIT: CPT | Mod: POP,,, | Performed by: PHYSICIAN ASSISTANT

## 2022-02-15 PROCEDURE — 97535 SELF CARE MNGMENT TRAINING: CPT

## 2022-02-15 PROCEDURE — 27000190 HC CPAP FULL FACE MASK W/VALVE

## 2022-02-15 PROCEDURE — 94660 CPAP INITIATION&MGMT: CPT

## 2022-02-15 PROCEDURE — 97116 GAIT TRAINING THERAPY: CPT

## 2022-02-15 RX ORDER — ONDANSETRON 4 MG/1
4 TABLET, ORALLY DISINTEGRATING ORAL EVERY 6 HOURS PRN
Qty: 15 TABLET | Refills: 0 | Status: SHIPPED | OUTPATIENT
Start: 2022-02-15 | End: 2022-05-13

## 2022-02-15 RX ORDER — METHOCARBAMOL 750 MG/1
750 TABLET, FILM COATED ORAL 3 TIMES DAILY PRN
Qty: 60 TABLET | Refills: 0 | Status: SHIPPED | OUTPATIENT
Start: 2022-02-15 | End: 2022-05-13

## 2022-02-15 RX ORDER — OXYCODONE AND ACETAMINOPHEN 10; 325 MG/1; MG/1
1 TABLET ORAL EVERY 4 HOURS PRN
Qty: 42 TABLET | Refills: 0 | Status: SHIPPED | OUTPATIENT
Start: 2022-02-15 | End: 2022-05-13

## 2022-02-15 RX ADMIN — LISINOPRIL 20 MG: 20 TABLET ORAL at 08:02

## 2022-02-15 RX ADMIN — OXYCODONE HYDROCHLORIDE AND ACETAMINOPHEN 1 TABLET: 5; 325 TABLET ORAL at 12:02

## 2022-02-15 RX ADMIN — MUPIROCIN 1 G: 20 OINTMENT TOPICAL at 08:02

## 2022-02-15 RX ADMIN — TAMSULOSIN HYDROCHLORIDE 0.4 MG: 0.4 CAPSULE ORAL at 08:02

## 2022-02-15 RX ADMIN — DOCUSATE SODIUM 100 MG: 100 CAPSULE ORAL at 08:02

## 2022-02-15 RX ADMIN — PANTOPRAZOLE SODIUM 40 MG: 40 TABLET, DELAYED RELEASE ORAL at 08:02

## 2022-02-15 RX ADMIN — DUTASTERIDE 0.5 MG: 0.5 CAPSULE, LIQUID FILLED ORAL at 08:02

## 2022-02-15 RX ADMIN — MUPIROCIN 1 G: 20 OINTMENT TOPICAL at 12:02

## 2022-02-15 RX ADMIN — POLYETHYLENE GLYCOL 3350 17 G: 17 POWDER, FOR SOLUTION ORAL at 08:02

## 2022-02-15 RX ADMIN — MAGNESIUM HYDROXIDE/ALUMINUM HYDROXICE/SIMETHICONE 30 ML: 120; 1200; 1200 SUSPENSION ORAL at 06:02

## 2022-02-15 RX ADMIN — ATORVASTATIN CALCIUM 10 MG: 10 TABLET, FILM COATED ORAL at 08:02

## 2022-02-15 RX ADMIN — OXYCODONE AND ACETAMINOPHEN 1 TABLET: 10; 325 TABLET ORAL at 05:02

## 2022-02-15 NOTE — ASSESSMENT & PLAN NOTE
Sreekanth Waltersdannyedgar  is a  76 y.o.  male who presented to neurosurgery with lumbar stenosis with neurogenic claudication. He is s/p MIS L4-5 TLIF with Dr. Beebe, POD#1.      -Post-op xrays pending  -Neurologically intact  -PT/OT/OOB x 6hours per day; can be divided into 2 hour intervals in the chair  ---PT recommending HH  -TEDs/SCDs/IS  -Regular diet  -Percocet prn for pain with IV dilaudid for breakthrough   -Robaxin for muscle spasms  -LSO brace whenever out of bed. No lifting >10lbs, bending, or twisting.    -HH and RW orders placed.   -Discharge instructions reviewed with patient. All questions answered. Prescriptions sent to hospital pharmacy for bedside delivery.       Dispo: discharge today pending lumbar xrays

## 2022-02-15 NOTE — PLAN OF CARE
Problem: Physical Therapy Goal  Goal: Physical Therapy Goal  Description: Goals to be met by:      Patient will increase functional independence with mobility by performin. Supine to sit with Modified [place]  2. Sit to supine with Modified [place]  3. Sit to stand transfer with Modified [place]  4. Bed to chair transfer with Modified [place]   5. Gait  x 150 feet with Modified [place] using Rolling Walker.     Outcome: Ongoing, Progressing    Ambulatory in [place] with rolling walker and LSO however tachycardic and orthostatic with activtiy though asymptomatic. D/w nurse re: tachycardia. (HR 120s with gait). Full report to follow. Recommend RW for DC.

## 2022-02-15 NOTE — PROGRESS NOTES
[facility] delivered rolling walker directly to patient room. Patient Signed for HME. Nurse Branch and CM notified.

## 2022-02-15 NOTE — PLAN OF CARE
[facility] - Med Surg  Discharge Final Note  TN sent secure chat to med surg nurse Sarina that patient is cleared for discharge from case management's standpoint, after rolling walker is delivered to room.    Primary Care Provider: Torsten Bejarano MD    Expected Discharge Date: 2/15/2022    Final Discharge Note (most recent)     Final Note - 02/15/22 1032        Final Note    Assessment Type Final Discharge Note     Anticipated Discharge Disposition Home-Health Care Svc     What phone number can be called within the next 1-3 days to see how you are doing after discharge? --   see chart    Hospital Resources/Appts/Education Provided Provided patient/caregiver with written discharge plan information;Appointments scheduled by Navigator/Coordinator        Post-Acute Status    Post-Acute Authorization HME;Home Health     HME Status Set-up Complete/Auth obtained     Home Health Status Set-up Complete/Auth obtained     Patient choice form signed by patient/caregiver List with quality metrics by geographic area provided     Discharge Delays None known at this time                 Important Message from Medicare             Contact Info     Monet Benavides PA-C   Specialty: Neurosurgery    [address] [facility] [address]  [address]  [place] [address]   Phone: [phone]       Next Steps: Follow up on 2/25/2022    Instructions: staple removal at 11am    [facility]   Specialty: DME Provider    [address]  [facility] [address]   Phone: [phone]       Next Steps: Follow up on 2/15/2022    Instructions: DME: rolling walker    [facility] [place]   Specialty: Home Health Services    [address]  [address]  [place] [address]   Phone: [phone]       Next Steps: Follow up on 2/16/2022    Instructions: Home Health

## 2022-02-15 NOTE — PLAN OF CARE
[facility]      HOME HEALTH ORDERS  FACE TO FACE ENCOUNTER    Patient Name: Sreekanth Pitts Jr.  YOB: 1945    PCP: Torsten Bejarano MD   PCP Address: [address] / [place] [address]  PCP Phone Number: [phone]  PCP Fax: [phone]    Encounter Date: 1/25/22    Admit to Home Health    Diagnoses:  Active Hospital Problems    Diagnosis  POA    *S/P lumbar spinal fusion [Z98.1]  Not Applicable     2/14/22: MIS L4-5 TLIF with Dr. Beebe         Resolved Hospital Problems   No resolved problems to display.       Follow Up Appointments:  Future Appointments   Date Time Provider Department Center   2/25/2022 11:00 AM Monet Benavides PA-C [facility]   3/23/2022 10:45 AM [facility] XR3 [facility]   3/23/2022 11:30 AM Anibal Beebe MD [facility]       Allergies:Review of patient's allergies indicates:  No Known Allergies    Medications: Review discharge medications with patient and family and provide education.    Current Facility-Administered Medications   Medication Dose Route Frequency Provider Last Rate Last Admin    0.9%  NaCl infusion   Intravenous Continuous Monet Benavides PA-C        0.9%  NaCl infusion   Intravenous Continuous Monet Benavides PA-C 75 mL/hr at 02/14/22 1543 New Bag at 02/14/22 1543    acetaminophen tablet 650 mg  650 mg Oral Q6H PRN Monet Benavides PA-C        aluminum-magnesium hydroxide-simethicone 200-200-20 mg/5 mL suspension 30 mL  30 mL Oral QID (AC & HS) Monet Benavides PA-C   30 mL at 02/15/22 0635    aluminum-magnesium hydroxide-simethicone 200-200-20 mg/5 mL suspension 30 mL  30 mL Oral Q4H PRN Monet Benavides PA-C        atorvastatin tablet 10 mg  10 mg Oral Daily Monet Benavides PA-C   10 mg at 02/15/22 0858    bisacodyL suppository 10 mg  10 mg Rectal Daily PRN Monet Benavides PA-C        docusate sodium capsule 100 mg  100 mg Oral Daily Monet Benavides,  PA-C   100 mg at 02/15/22 0858    dutasteride capsule 0.5 mg  0.5 mg Oral Daily Monet Benavides, PA-C   0.5 mg at 02/15/22 0858    gabapentin capsule 300 mg  300 mg Oral QHS Monet Benavides, PA-C   300 mg at 02/14/22 2010    heparin (porcine) injection 5,000 Units  5,000 Units Subcutaneous Q8H Monet Benavides, PA-C        hydrALAZINE injection 10 mg  10 mg Intravenous Q4H PRN Monet Benavides, PA-C        HYDROmorphone (PF) injection 1 mg  1 mg Intravenous Q3H PRN Monet Benavides, PA-C        labetaloL injection 10 mg  10 mg Intravenous Q4H PRN Monet Benavides, PA-C        lactated ringers infusion   Intravenous Continuous Kinsey Khan MD   New Bag at 02/14/22 0712    lisinopriL tablet 20 mg  20 mg Oral Daily Monet Benavides, PA-C   20 mg at 02/15/22 0858    methocarbamoL tablet 750 mg  750 mg Oral Q8H PRN Monet Benavides, PA-C        mupirocin 2 % ointment 1 g  1 g Nasal BID Monet Benavides, PA-C   1 g at 02/15/22 0858    ondansetron injection 8 mg  8 mg Intravenous Q6H PRN Monet Benavides, PA-C        oxyCODONE-acetaminophen  mg per tablet 1 tablet  1 tablet Oral Q4H PRN Monet Benavides, PA-C   1 tablet at 02/15/22 0510    oxyCODONE-acetaminophen 5-325 mg per tablet 1 tablet  1 tablet Oral Q4H PRN Monet Benavides, PA-C   1 tablet at 02/15/22 0010    pantoprazole EC tablet 40 mg  40 mg Oral Daily Monet Benavides, PA-C   40 mg at 02/15/22 0858    polyethylene glycol packet 17 g  17 g Oral Daily Monet Benavides, PA-C   17 g at 02/15/22 0858    prochlorperazine injection Soln 5 mg  5 mg Intravenous Q6H PRN Monet Benavides PA-C        senna-docusate 8.6-50 mg per tablet 2 tablet  2 tablet Oral Nightly PRN Monet Benavides PA-C        sodium chloride 0.9% flush 10 mL  10 mL Intravenous PRN Bjorn Riddle MD        tamsulosin 24 hr capsule 0.4 mg  0.4 mg Oral Daily Monet Benavides PA-C   0.4 mg at  02/15/22 0857     Current Discharge Medication List      START taking these medications    Details   methocarbamoL (ROBAXIN) 750 MG Tab Take 1 tablet (750 mg total) by mouth 3 (three) times daily as needed (muscle spasms).  Qty: 60 tablet, Refills: 0      ondansetron (ZOFRAN-ODT) 4 MG TbDL Take 1 tablet (4 mg total) by mouth every 6 (six) hours as needed (nausea).  Qty: 15 tablet, Refills: 0      oxyCODONE-acetaminophen (PERCOCET)  mg per tablet Take 1 tablet by mouth every 4 (four) hours as needed for Pain (7-10/10 pain).  Qty: 42 tablet, Refills: 0         CONTINUE these medications which have NOT CHANGED    Details   dutasteride (AVODART) 0.5 mg capsule TAKE ONE DAILY  Qty: 30 capsule, Refills: 11    Associated Diagnoses: Urinary retention      gabapentin (NEURONTIN) 300 MG capsule Take 1 capsule (300mg total) by mouth at bedtime for 3 days. If tolerated, add 1 capsule every morning.  Qty: 90 capsule, Refills: 11    Associated Diagnoses: Lumbar spondylosis; Spondylolisthesis of lumbar region; Lumbar radiculopathy      lisinopriL (PRINIVIL,ZESTRIL) 20 MG tablet TAKE ONE DAILY  Qty: 90 tablet, Refills: 2    Comments: Please inactivate all prior scripts with same name and strength including any scripts on hold.  Associated Diagnoses: Essential hypertension      pantoprazole (PROTONIX) 40 MG tablet TAKE ONE BY MOUTH EVERY MORNING 45 MINUTES BEFORE BREAKFAST.  Qty: 90 tablet, Refills: 3    Associated Diagnoses: Esophageal dysphagia; Gastroesophageal reflux disease, unspecified whether esophagitis present; Encounter for monitoring long-term proton pump inhibitor therapy      tamsulosin (FLOMAX) 0.4 mg Cap TAKE ONE BY MOUTH EVERY EVENING  Qty: 90 capsule, Refills: 3    Associated Diagnoses: Urinary retention      acetaminophen (TYLENOL) 325 MG tablet Take 650 mg by mouth every 6 (six) hours as needed for Pain.      atorvastatin (LIPITOR) 10 MG tablet TAKE ONE DAILY  Qty: 90 tablet, Refills: 2    Comments: Please  inactivate all prior scripts with same name and strength including any scripts on hold.  Associated Diagnoses: Multiple risk factors for coronary artery disease      MULTIVITAMIN W-MINERALS/LUTEIN (CENTRUM SILVER ORAL) Take 1 tablet by mouth once daily.         STOP taking these medications       diclofenac (VOLTAREN) 75 MG EC tablet Comments:   Reason for Stopping:         ibuprofen (ADVIL,MOTRIN) 200 MG tablet Comments:   Reason for Stopping:                 I have seen and examined this patient within the last 30 days. My clinical findings that support the need for the home health skilled services and home bound status are the following:no   Requiring assistive device to leave home due to unsteady gait caused by  Surgery.  Medical restrictions requiring assistance of another human to leave home due to  Unstable ambulation.     Diet:   regular diet    Labs:  n/a    Referrals/ Consults  Physical Therapy to evaluate and treat. Evaluate for home safety and equipment needs; Establish/upgrade home exercise program. Perform / instruct on therapeutic exercises, gait training, transfer training, and Range of Motion.  Occupational Therapy to evaluate and treat. Evaluate home environment for safety and equipment needs. Perform/Instruct on transfers, ADL training, ROM, and therapeutic exercises.    Activities:   No lifting >10lbs, bending, or twisting. No driving. No strenuous activity. LSO brace to be worn whenever out of bed.     Nursing:   Agency to admit patient within 24 hours of hospital discharge unless specified on physician order or at patient request    SN to complete comprehensive assessment including routine vital signs. Instruct on disease process and s/s of complications to report to MD. Review/verify medication list sent home with the patient at time of discharge  and instruct patient/caregiver as needed. Frequency may be adjusted depending on start of care date.     Skilled nurse to perform up to 3 visits PRN  for symptoms related to diagnosis    Notify MD if SBP > 160 or < 90; DBP > 90 or < 50; HR > 120 or < 50; Temp > 101; O2 < 88%; Other:       Ok to schedule additional visits based on staff availability and patient request on consecutive days within the home health episode.    When multiple disciplines ordered:    Start of Care occurs on Sunday - Wednesday schedule remaining discipline evaluations as ordered on separate consecutive days following the start of care.    Thursday SOC -schedule subsequent evaluations Friday and Monday the following week.     Friday - Saturday SOC - schedule subsequent discipline evaluations on consecutive days starting Monday of the following week.    For all post-discharge communication and subsequent orders please contact patient's primary care physician. If unable to reach primary care physician or do not receive response within 30 minutes, please contact [phone] for clinical staff order clarification    Miscellaneous   n/a    Home Health Aide:  Physical Therapy Three times weekly and Occupational Therapy Three times weekly    Wound Care Orders  Monitor wound for signs of infection or dehiscence. Call [phone] with any concerns       I certify that this patient is confined to his home and needs physical therapy and occupational therapy.

## 2022-02-15 NOTE — RESPIRATORY THERAPY
PT STATED THAT HE WOULD PUT HIMSELF ON THE CPAP IF I SET IT UP. RT SET UP A DEVIBISS AT A CPAP OF 12. PT STATED THOSE WERE HIS HOME SETTINGS. I PLACED THE MASK ON THE PATIENT AND ADJUSTED HIS STRAPS. I SHOWED HIM THE ON AND OFF BUTTON AND PT TOOK THE MASK OFF. HE TOLD ME HE WOULD GO ON WHEN HE GOT READY. RT EXPLAINED TO THE PATIENT THAT SHE WOULD CHECK ON HIM THROUGH OUT THE NIGHT.HIS NURSE WAS NOTIFIED AND PT WILL CONTINUE TO BE MONITORED.

## 2022-02-15 NOTE — NURSING
Mr. Pitts is sitting in chair watching TV. LSO brace is on. Patient denies any distress or increased pain at this time. His goal is to sit up until after dinner per MD orders. Will continue to monitor.

## 2022-02-15 NOTE — PLAN OF CARE
Problem: Adult Inpatient Plan of Care  Goal: Plan of Care Review  Outcome: Met  Goal: Patient-Specific Goal (Individualized)  Outcome: Met  Goal: Absence of Hospital-Acquired Illness or Injury  Outcome: Met  Goal: Optimal Comfort and Wellbeing  Outcome: Met  Goal: Readiness for Transition of Care  Outcome: Met     Pt remained free from injury throughout the shift. Patient was up and out of bed most of shift in locked chair. He worked with PT and OT, ambulated in room and to and from restroom with standby assistance with no difficulty. Medications and rolling walker were delivered to bedside. PIVs removed; sites C/D/I, bandaids applied. Assisted patient with getting dressed. Patient taken down via wheelchair to daughter's car. Patient was able to get into car with ease (standby assistance).

## 2022-02-15 NOTE — PLAN OF CARE
[facility] - Med Surg  Initial Discharge Assessment  Mary Pitts (Spouse)   [phone] (Mobile)     Primary Care Provider: Torsten Bejarano MD    Admission Diagnosis: Spondylolisthesis of lumbar region [M43.16]  Lumbosacral spondylosis with radiculopathy [M47.27]  Spinal stenosis, lumbar region, with neurogenic claudication [M48.062]  Lumbar spinal stenosis [M48.061]    Admission Date: 2/14/2022  Expected Discharge Date: 2/15/2022         Payor: MEDICARE / Plan: MEDICARE PART A & B / Product Type: Government /     Extended Emergency Contact Information  Primary Emergency Contact: GisselleMary  Address: [address] [place] [address] [place] [address] [facility]  Home Phone: [phone]  Mobile Phone: [phone]  Relation: Spouse    Discharge Plan A: Home with family,Home Health  Discharge Plan B: Home with family,Home Health      [facility] - [place] - [address]  [address] [place] [address]  Phone: [phone] Fax: [phone]      Initial Assessment (most recent)     Adult Discharge Assessment - 02/15/22 0948        Discharge Assessment    Assessment Type Discharge Planning Assessment     Confirmed/corrected address, phone number and insurance Yes     Confirmed Demographics Correct on Facesheet     Source of Information patient     Communicated JAYA with patient/caregiver Yes     Reason For Admission neuro surgery     Lives With spouse     Do you expect to return to your current living situation? Yes     Do you have help at home or someone to help you manage your care at home? Yes     Prior to hospitilization cognitive status: Alert/Oriented     Current cognitive status: Alert/Oriented     Walking or Climbing Stairs Difficulty ambulation difficulty, requires equipment     Mobility Management rw     Dressing/Bathing Difficulty none     Home Accessibility wheelchair accessible     Readmission within 30 days? No     Patient currently being followed by outpatient  case management? Unable to determine (comments)     Do you currently have service(s) that help you manage your care at home? No     Do you take prescription medications? Yes     Do you have prescription coverage? Yes     Coverage medicare     Do you have any problems affording any of your prescribed medications? No     Who is going to help you get home at discharge? Mary Pitts (Spouse)   [phone] (Mobile)     How do you get to doctors appointments? family or friend will provide     Are you on dialysis? No     Do you take coumadin? No     Discharge Plan A Home with family;Home Health     Discharge Plan B Home with family;Home Health     DME Needed Upon Discharge  walker, rolling     Discharge Plan discussed with: Patient        Relationship/Environment    Name(s) of Who Lives With Patient Mary Pitts (Spouse)   [phone] (Mobile)

## 2022-02-15 NOTE — PLAN OF CARE
Problem: Adult Inpatient Plan of Care  Goal: Plan of Care Review  Outcome: Ongoing, Progressing  Flowsheets (Taken 2/15/2022 3078)  Plan of Care Reviewed With: patient  Goal: Patient-Specific Goal (Individualized)  Outcome: Ongoing, Progressing  Goal: Absence of Hospital-Acquired Illness or Injury  Outcome: Ongoing, Progressing  Goal: Optimal Comfort and Wellbeing  Outcome: Ongoing, Progressing  Goal: Readiness for Transition of Care  Outcome: Ongoing, Progressing   PRN Pain medications given and effective. No SOB, no s/s of acute distress noted. SR on telemetry monitor. No events  during shift. LSO brace on when out of bed. Pt did well ambulating in room with walker this morning. Call light and personnel items within reach.

## 2022-02-15 NOTE — DISCHARGE INSTRUCTIONS
Please follow ONLY the instructions that are checked below.    Activity Restrictions:  [x]  Return to work will be determined on an individual basis.  [x]  No lifting greater than 10 pounds.  [x]  Avoid bending and twisting the area of your surgery more than 45 degrees from neutral position in any direction.  [x]  No driving or operating machinery:  [x]  until cleared by your surgeon.  [x]  while taking narcotic pain medications or muscle relaxants.    [x]  Wear brace/collar at all times except when lying flat in bed     [x]  Increase ambulation over the next 2 weeks so that you are walking 2 miles per day at 2 weeks post-operatively.  [x]  Make sure to take two dedicated 5-10 minute walks per day, along with short walks every 1-2 hours, even if just to walk to the restroom and back.   [x]  Walk on paved surfaces only. It is okay to walk up and down stairs while holding onto a side rail.  [x]  No sexual activity for 2-3 weeks.    Discharge Medication/Follow-up:  [x]  Please refer to discharge medication reconciliation form.  [x]  Do not take ANY non-steroidal anti-inflammatory drugs (NSAIDS), including the following: ibuprofen, naprosyn, Aleve, Advil, Indocin, Mobic, or Celebrex for:  [x]  8 weeks  [x]  Prescriptions for appropriate medication will be given upon discharge.   [x]  Pain control: Percocet for severe pain. Over the counter tylenol for mild pain   [x]  Muscle relaxer: Robaxin for muscle spasms. Can take every 8 hours if needed, but most helpful at bedtime.    [x]  Take docusate (Colace 100 mg) and miralax daily until bowel activity returns to chan. You can get this over the counter.  [x]  If you have not had a bowel movement by day 3 after surgery, try a fleet's enema or suppository, both over the counter. Call neurosurgery if you have not had a bowel movement by day 5 after surgery.   [x]  Follow-up appointment:  [x]  10-14 days post-op for wound check by PA  [x]  4-6 weeks with MD    Wound Care:  [x]   Remove dressing or bandaid in  2  days.  [x]  No bandage required once removed. Keep your incision open to the air.  [x]  You may shower on the 3rd day after your surgery. Have the force of water hit you opposite from the incision. Pat the incision dry after your shower; do not scrub the incision. Do not use Hibiclens after surgery.  [x]  Allow skin glue to fall off on its own over time (if applicable). If you have staples, these will be removed at your clinic appt in 2 weeks.   [x]  You cannot take a bath until 8 weeks after surgery.    Call your doctor or go to the Emergency Room for any signs of infection, including: increased redness, drainage, pain, or fever (temperature ?101.5 for 24 hours). Call your doctor or go to the Emergency Room if there are any localized neurological changes; problems with speech, vision, numbness, tingling, weakness, or severe headache; or for other concerns.    Special Instructions:  [x]  No use of tobacco products.  [x]  Diet: Please eat a regular diet as tolerated.  []  Other diet:              Specific physician instructions:           Physicians need 3 days' notice for pain medicine to be refilled. Pain medicine will only be refilled between 8 AM and 5 PM, Monday through Friday, due to Food and Drug Administration regulation of documentation.    If you have any questions about this form, please call [phone].

## 2022-02-15 NOTE — SUBJECTIVE & OBJECTIVE
Interval History: pain well controlled, urinating, passing gas, and ambulating with rolling walker. PT/OT rec home with     Medications:  Continuous Infusions:   sodium chloride 0.9%      sodium chloride 0.9% 75 mL/hr at 02/14/22 1543    lactated ringers       Scheduled Meds:   aluminum-magnesium hydroxide-simethicone  30 mL Oral QID (AC & HS)    atorvastatin  10 mg Oral Daily    docusate sodium  100 mg Oral Daily    dutasteride  0.5 mg Oral Daily    gabapentin  300 mg Oral QHS    heparin (porcine)  5,000 Units Subcutaneous Q8H    lisinopriL  20 mg Oral Daily    mupirocin  1 g Nasal BID    pantoprazole  40 mg Oral Daily    polyethylene glycol  17 g Oral Daily    tamsulosin  0.4 mg Oral Daily     PRN Meds:acetaminophen, aluminum-magnesium hydroxide-simethicone, bisacodyL, hydrALAZINE, HYDROmorphone, labetalol, methocarbamoL, ondansetron, oxyCODONE-acetaminophen, oxyCODONE-acetaminophen, prochlorperazine, senna-docusate 8.6-50 mg, sodium chloride 0.9%     Review of Systems  Objective:     Weight: 119.3 kg (263 lb)  Body mass index is 34.7 kg/m².  Vital Signs (Most Recent):  Temp: 97.8 °F (36.6 °C) (02/15/22 0653)  Pulse: 89 (02/15/22 0653)  Resp: 16 (02/15/22 0653)  BP: 106/60 (02/15/22 0653)  SpO2: 98 % (02/15/22 0653) Vital Signs (24h Range):  Temp:  [97.8 °F (36.6 °C)-98.2 °F (36.8 °C)] 97.8 °F (36.6 °C)  Pulse:  [] 89  Resp:  [11-22] 16  SpO2:  [93 %-100 %] 98 %  BP: (106-155)/(55-92) 106/60     Date 02/15/22 0700 - 02/16/22 0659   Shift 0377-4846 0591-3203 2421-3357 24 Hour Total   INTAKE   P.O. 240   240   Shift Total(mL/kg) 240(2)   240(2)   OUTPUT   Urine(mL/kg/hr) 550   550   Shift Total(mL/kg) 550(4.6)   550(4.6)   Weight (kg) 119.3 119.3 119.3 119.3              Oxygen Concentration (%):  [0.21] 0.21         Physical Exam    Neurosurgery Physical Exam   General: well developed, well nourished, no distress  Neurologic: Alert and oriented. Thought content appropriate.  GCS: Motor:  6/Verbal: 5/Eyes: 4 GCS Total: 15  Cranial nerves: II-XII grossly intact  Neck: supple, without obvious masses or lesions  Skin: grossly intact in all 4 extremities without obvious rashes or lesions    Motor Strength: No focal numbness or weakness  Strength  Iliopsoas Quadriceps Knee  Flexion Tibialis  anterior Gastro- cnemius EHL   Lower: R 5/5 5/5 5/5 5/5 5/5 5/5    L 5/5 5/5 5/5 5/5 5/5 5/5   Sensory: intact to light touch B/L UE and LE    Bilateral lumbar incisions are c/d/i. Closed with staples. Mild staining to right dressing. Telfa replaced with bilateral medipore dressings.     LSO brace in place       Significant Labs:  No results for input(s): GLU, NA, K, CL, CO2, BUN, CREATININE, CALCIUM, MG in the last 48 hours.  No results for input(s): WBC, HGB, HCT, PLT in the last 48 hours.  No results for input(s): LABPT, INR, APTT in the last 48 hours.  Microbiology Results (last 7 days)     ** No results found for the last 168 hours. **            Significant Diagnostics:  Post-op xrays pending

## 2022-02-15 NOTE — DISCHARGE SUMMARY
[facility] - Med Surg  Neurosurgery  Discharge Summary      Patient Name: Sreekanth Pitts Jr.  MRN: [id number]  Admission Date: 2/14/2022  Hospital Length of Stay: 1 days  Discharge Date and Time: 2/15/2022  1:49 PM  Attending Physician: Anibal Beebe MD    Discharging Provider: Monet Benavides PA-C  Primary Care Provider: Torsten Bejarano MD     HPI:   Mr. Sreekanth Pitts Jr. is a 76 y.o. gentleman with DDD, sciatica, who presented to neurosurgery for evaluation of chronic low back pain and more recent onset of bilateral leg pain and claudication.  In 2002, he underwent lumbar laminectomy. Since then the pain has been intermittent and very minimal for the past 20 years, but now has worsened in the last few months. The pain is from his lower back into the hips and radiates down to the knees. Describes the pain as a shooting pain that is constant. He used to walk his dog every morning for 2 miles and now can only walk up to 1 block secondary to the pain. He has a history of right leg sciatica. Rates the pain a 7/10 in severity when ambulating and 5/10 while at rest. He is in physical therapy which he states is not alleviating his pain. Admitted to [facility] on 2/14/22 for MIS L4-5 TLIF with Dr. Beebe.             Procedure(s) (LRB):  FUSION, SPINE, LUMBAR, TLIF, MINIMALLY INVASIVE Right L4-5 Jose Juan V notified (N/A)     Hospital Course:   2/14: MIS L4-5 TLIF with Dr. Beebe. Tolerated procedure well. Poor pain control immediately following the procedure that resolved before leaving PACU  2/15: pain well controlled, urinating, passing gas, and ambulating with rolling walker. PT/OT rec home with . Post-op xrays show satisfactory placement of hardware. Discharge instructions reviewed with patient. All questions answered. Prescriptions sent to hospital pharmacy for bedside delivery. Discharged home.       Pending Diagnostic Studies:     None        Final Active Diagnoses:    Diagnosis Date Noted POA    PRINCIPAL  "PROBLEM:  S/P lumbar spinal fusion [Z98.1] 02/14/2022 Not Applicable      Problems Resolved During this Admission:      Discharged Condition: stable    Disposition: Home-Health Care [facility] [place]    Follow Up:   Follow-up Information     Monet Benavides PA-C On 2/25/2022.    Specialty: Neurosurgery  Why: staple removal at 11am  Contact information:  [address] [facility] [address]  [address]  [place] [address]  [phone]             [facility]    Specialty: DME Provider  Why: DME: rolling walker  Contact information:  [address]  [address]  [place] [address]  [phone]             [facility] [place] On 2/16/2022.    Specialty: Home Health Services  Why: Home Health  Contact information:  [address]  [address]  [place] [id number] [phone]                       Patient Instructions:      WALKER FOR HOME USE     Order Specific Question Answer Comments   Type of Walker: Adult (5'4"-6'6")    With wheels? Yes    Height: 6'1"    Weight: 119.3 kg (263 lb)    Length of need (1-99 months): 99    Does patient have medical equipment at home? shower chair    Please check all that apply: Patient's condition impairs ambulation.    Please check all that apply: Patient needs help to get in and out of chair.    Please check all that apply: Patient is unable to safely ambulate without equipment.    Please check all that apply: Walker will be used for gait training.      Diet Adult Regular     Diet Adult Regular     Lifting restrictions     Other restrictions (specify):     No driving until:     Notify your health care provider if you experience any of the following:  temperature >100.4     Notify your health care provider if you experience any of the following:  persistent nausea and vomiting or diarrhea     Notify your health care provider if you experience any of the following:  severe uncontrolled pain     Notify your health care provider if you experience any of the following:  redness, tenderness, " or signs of infection (pain, swelling, redness, odor or green/yellow discharge around incision site)     Notify your health care provider if you experience any of the following:  difficulty breathing or increased cough     Notify your health care provider if you experience any of the following:  severe persistent headache     Notify your health care provider if you experience any of the following:  worsening rash     Notify your health care provider if you experience any of the following:  persistent dizziness, light-headedness, or visual disturbances     Notify your health care provider if you experience any of the following:  increased confusion or weakness      Remove dressing in 72 hours     Lifting restrictions     Other restrictions (specify):     No driving until:     Notify your health care provider if you experience any of the following:  temperature >100.4     Notify your health care provider if you experience any of the following:  persistent nausea and vomiting or diarrhea     Notify your health care provider if you experience any of the following:  severe uncontrolled pain     Notify your health care provider if you experience any of the following:  redness, tenderness, or signs of infection (pain, swelling, redness, odor or green/yellow discharge around incision site)     Notify your health care provider if you experience any of the following:  difficulty breathing or increased cough     Notify your health care provider if you experience any of the following:  severe persistent headache     Notify your health care provider if you experience any of the following:  worsening rash     Notify your health care provider if you experience any of the following:  persistent dizziness, light-headedness, or visual disturbances     Notify your health care provider if you experience any of the following:  increased confusion or weakness     Remove dressing in 48 hours     Shower on day dressing removed (No bath)      Medications:  Reconciled Home Medications:      Medication List      START taking these medications    methocarbamoL 750 MG Tab  Commonly known as: ROBAXIN  Take 1 tablet (750 mg total) by mouth 3 (three) times daily as needed (muscle spasms).     ondansetron 4 MG Tbdl  Commonly known as: ZOFRAN-ODT  Dissolve 1 tablet (4 mg total) by mouth every 6 (six) hours as needed (nausea).     oxyCODONE-acetaminophen  mg per tablet  Commonly known as: PERCOCET  Take 1 tablet by mouth every 4 (four) hours as needed for Pain (7-10/10 pain).        CHANGE how you take these medications    tamsulosin 0.4 mg Cap  Commonly known as: FLOMAX  TAKE ONE BY MOUTH EVERY EVENING  What changed: when to take this        CONTINUE taking these medications    acetaminophen 325 MG tablet  Commonly known as: TYLENOL  Take 650 mg by mouth every 6 (six) hours as needed for Pain.     atorvastatin 10 MG tablet  Commonly known as: LIPITOR  TAKE ONE DAILY     CENTRUM SILVER ORAL  Take 1 tablet by mouth once daily.     dutasteride 0.5 mg capsule  Commonly known as: AVODART  TAKE ONE DAILY     gabapentin 300 MG capsule  Commonly known as: NEURONTIN  Take 1 capsule (300mg total) by mouth at bedtime for 3 days. If tolerated, add 1 capsule every morning.     lisinopriL 20 MG tablet  Commonly known as: PRINIVIL,ZESTRIL  TAKE ONE DAILY     pantoprazole 40 MG tablet  Commonly known as: PROTONIX  TAKE ONE BY MOUTH EVERY MORNING 45 MINUTES BEFORE BREAKFAST.        STOP taking these medications    diclofenac 75 MG EC tablet  Commonly known as: VOLTAREN     ibuprofen 200 MG tablet  Commonly known as: NORMA LUNA PA-C  Neurosurgery  [facility] [place] [facility] Surg

## 2022-02-15 NOTE — PT/OT/SLP PROGRESS
Occupational Therapy   Treatment    Name: Sreekanth Pitts Jr.  MRN: [id number]  Admitting Diagnosis:  S/P lumbar spinal fusion  1 Day Post-Op    Recommendations:     Discharge Recommendations: home health OT (with family support)  Discharge Equipment Recommendations:  walker, rolling (long-handled sponge)  Barriers to discharge:  None    Assessment:     Sreekanth Pitts Jr. is a 76 y.o. male with a medical diagnosis of S/P lumbar spinal fusion. Performance deficits affecting function are weakness,impaired self care skills,decreased lower extremity function,impaired functional mobilty,impaired balance,pain,impaired skin,orthopedic precautions.     SBA for sit<> stand from the chair and toilet (BSC over commode) using RW. Pt practiced doffing/donning LSO with supervision and min verbal cueing    Rehab Prognosis:  Good; patient would benefit from acute skilled OT services to address these deficits and reach maximum level of function.       Plan:     Patient to be seen  (5-6x/week) to address the above listed problems via self-care/home management,therapeutic activities,therapeutic exercises  · Plan of Care Expires: 02/28/22  · Plan of Care Reviewed with: patient    Subjective     Chief complaint: only able to sleep ~2 hours (CPAP discomfort/noise)   Patient/family comments/ goals: feels better on his feet today; already went to the toilet and did grooming ADLs prior to OT arrival; pt reports that the urinal is very helpful in the middle of the night     Pain/Comfort:  · Pain Rating 1:  (2-4/10 pain)    Objective:     Communicated with: nurseSarina, prior to session.  Patient found seated upright in the chair wearing LSO  with peripheral IV (LSO) upon OT entry to room.    General Precautions: Standard, fall   Orthopedic Precautions:spinal precautions   Braces: LSO  Respiratory Status: Room air     Occupational Performance:     Bed Mobility:    · NT; pt found/left up in the chair    Functional  Mobility/Transfers:  · Patient completed Sit <> Stand Transfer with stand by assistance  with  rolling walker   · Patient completed Toilet Transfer Step Transfer technique with stand by assistance with  rolling walker and BSC over toilet  · Functional Mobility: pt completed in-room and bathroom functional mobility using RW wearing LSO with SBA. Pt practiced navigating RW while opening the bathroom door with SBA.      Activities of Daily Living:  · Upper Body Dressing: supervision seated practicing doffing/donning LSO brace with min verbal cueing    · Pt declined other ADLs at this time       [facility] 6 Click ADL: 21    Treatment & Education:  · Pt re-educated on OT role/POC.   · Importance of OOB activity with staff supervision.  · Safety during functional t/f and mobility   · Reviewed spinal precautions with pt able to identify all precautions without cueing   · Pt reports having an adjustable bed at home- OT edu pt that per MD order, pt may have his LSO off in the bed if the HOB is elevated less than 30*   · Self-care tasks/functional mobility completed- assistance level noted above   · All questions/concerns answered within OT scope of practice       Patient left up in chair wearing LSO and taking a phone call with all lines intact, call button in reach, nurseSarina, notified and all needs met/within reach; urinal in pt belonging bag provided for toileting needs at nighttime for safety 2* use of CPAP Education:      GOALS:   Multidisciplinary Problems     Occupational Therapy Goals        Problem: Occupational Therapy Goal    Goal Priority Disciplines Outcome Interventions   Occupational Therapy Goal     OT, PT/OT Ongoing, Progressing    Description: Goals to be met by: 02/28/22    Patient will increase functional independence with ADLs by performing:    UE Dressing with Modified [place].  LE Dressing with Modified [place].  Grooming while standing at sink with Modified [place].  Toileting from  toilet with Modified [place] for hygiene and clothing management.   Supine to sit with Modified [place].  Step transfer with Modified [place]  Toilet transfer to toilet with Modified [place].                     Time Tracking:     OT Date of Treatment: 02/15/22  OT Start Time: 0910  OT Stop Time: 0922  OT Total Time (min): 12 min    Billable Minutes:Self Care/Home Management 12 min    OT/SHARRI: OT          2/15/2022

## 2022-02-15 NOTE — PLAN OF CARE
Problem: Occupational Therapy Goal  Goal: Occupational Therapy Goal  Description: Goals to be met by: 02/28/22    Patient will increase functional independence with ADLs by performing:    UE Dressing with Modified [place].  LE Dressing with Modified [place].  Grooming while standing at sink with Modified [place].  Toileting from toilet with Modified [place] for hygiene and clothing management.   Supine to sit with Modified [place].  Step transfer with Modified [place]  Toilet transfer to toilet with Modified [place].    Outcome: Ongoing, Progressing     SBA for sit<> stand from the chair and toilet (BSC over commode) using RW. Pt practiced doffing/donning LSO with supervision and min verbal cueing.

## 2022-02-15 NOTE — PT/OT/SLP EVAL
Physical Therapy Evaluation    Patient Name:  Sreekanth Pitts Jr.   MRN:  [id number]    Recommendations:     Discharge Recommendations:  home health PT   Discharge Equipment Recommendations: walker, rolling     Assessment:     Sreekanth Pitts Jr. is a 76 y.o. male admitted with a medical diagnosis of <principal problem not specified>.  He presents with the following impairments/functional limitations:  weakness,gait instability,decreased ROM,impaired balance,impaired functional mobilty,edema,impaired self care skills,pain,impaired coordination,impaired joint extensibility,impaired cardiopulmonary response to activity .    Ambulatory with expected lumbar pain however tachycardic with activity (HR 120s). Orthostatic BP but MAP >65 (SBP dropped 24 mmHg from sitting after walking). Noted copious bloody drainage from surgical incision. Safe to ambulate with staff supervision to bathroom with LSO and RW vs handheld assist of nursing staff.     Rehab Prognosis: Good; patient would benefit from acute skilled PT services to address these deficits and reach maximum level of function.    Recent Surgery: Procedure(s) (LRB):  FUSION, SPINE, LUMBAR, TLIF, MINIMALLY INVASIVE Right L4-5 Jose Juan PARK notified (N/A) Day of Surgery    Plan:     During this hospitalization, patient to be seen daily to address the identified rehab impairments via gait training,therapeutic activities,therapeutic exercises and progress toward the following goals:    · Plan of Care Expires:       Subjective     Chief Complaint: none   Patient/Family Comments/goals: agreed to walk without encouragement  Pain/Comfort:  · Pain Rating 1: 6/10  · Location 1: back  · Pain Addressed 1: Reposition,Distraction,Nurse notified,Cessation of Activity    Patients cultural, spiritual, Temple conflicts given the current situation: no    Living Environment:  [facility] with 2 MERISSA and HR with wife. Very active. Walks dogs, drives.   Prior to admission, patients level of function  was Ind with all, no DME.  Equipment used at home: shower chair.  DME owned (not currently used): none.  Upon discharge, patient will have assistance from family  .    Objective:     Communicated with nurse Barnch prior to session.  Patient found HOB elevated with peripheral IV,oxygen,SCD  upon PT entry to room.    General Precautions: Standard, fall   Orthopedic Precautions:spinal precautions   Braces: LSO  Respiratory Status: Nasal cannula, flow 2 L/min    Exams:  · wife present. dtr present  · Cognitive Exam:  Patient is oriented to Person, Place, Time, Situation and in good spirits  · Gross Motor Coordination:  WFL  · Postural Exam:  Patient presented with the following abnormalities:    · -       No postural abnormalities identified  · Sensation:    · -       Intact  · Skin Integrity/Edema:      · -       Skin integrity: copious bloody drainage from lumbar surgical incision. notified nurse Sarina.  · RLE ROM: WFL  · RLE Strength: WFL  · LLE ROM: WFL  · LLE Strength: WFL     Patient educated about wear schedule and purpose of LSO  D/w pt proper body mechanics while seated in chair  Wife inquired about heating pad to lower back- encouraged her to s/w Dr. Beebe  Pt educated on importance of frequent position changes every 2 hours.     VS:  Seated EOB:149/90 mmHg, HR = 115 bpm. D/w nurse Sarina who stated she will notify MD  Seated, immediately after ambulatin/81 mmHg, HR = 127 bpm, O2 sat on RA = 95%  Pt left on RA. D/w nurse Branch    Functional Mobility:  · Bed Mobility:     · Supine to Sit: stand by assistance  · Transfers:     · Sit to Stand:  stand by assistance with no AD  · Gait: with RW and SBA ~ 150 ft  · Balance: good with gait and RW     Pt educated in logroll for supine to sit. Pt returned demo without cues needed  LSO donned with OT assist  Gait speed decreased, no loss of balance or visible SOB. Able to walk and talk. Good safety with turning.   D/w patient that gait with RW is  recommended at this time for improved pain control. Pt verbalized undersanding.       AM-PAC 6 CLICK MOBILITY  Total Score:19     Patient left up in chair with OT Yuni present.    GOALS:   Multidisciplinary Problems     Physical Therapy Goals        Problem: Physical Therapy Goal    Goal Priority Disciplines Outcome Goal Variances Interventions   Physical Therapy Goal     PT, PT/OT Ongoing, Progressing     Description: Goals to be met by:      Patient will increase functional independence with mobility by performin. Supine to sit with Modified [place]  2. Sit to supine with Modified [place]  3. Sit to stand transfer with Modified [place]  4. Bed to chair transfer with Modified [place]   5. Gait  x 150 feet with Modified [place] using Rolling Walker.                      History:     Past Medical History:   Diagnosis Date    Achilles bursitis or tendinitis 2014    Allergy 12/3/2015    Arthritis     BPH without obstruction/lower urinary tract symptoms 2018    Chronic fatigue 7/10/2018    Degenerative disc disease     GERD (gastroesophageal reflux disease) 2020    Hypertension     Nuclear sclerosis - Both Eyes 2012       Past Surgical History:   Procedure Laterality Date    ESOPHAGOGASTRODUODENOSCOPY N/A 2020    Procedure: EGD (ESOPHAGOGASTRODUODENOSCOPY);  Surgeon: Dion Mckinney MD;  Location: [facility] ([facility]);  Service: Endoscopy;  Laterality: N/A;  covid--[place] [facility]    EYE FOREIGN BODY REMOVAL      childhood    LUMBAR LAMINECTOMY WITH FUSION      TRANSURETHRAL RESECTION OF PROSTATE (TURP) WITHOUT USE OF LASER N/A 2018    Procedure: TURP, WITHOUT USING LASER;  Surgeon: Uma Gaona MD;  Location: [address];  Service: Urology;  Laterality: N/A;  1.5 hours       Time Tracking:     PT Received On: 22  PT Start Time: 1547     PT Stop Time: 1630  PT Total Time (min): 43 min     Billable Minutes:  Evaluation 15 and Gait Training 8      02/14/2022

## 2022-02-15 NOTE — PLAN OF CARE
Problem: Physical Therapy Goal  Goal: Physical Therapy Goal  Description: Goals to be met by:      Patient will increase functional independence with mobility by performin. Supine to sit with Modified [place]  2. Sit to supine with Modified [place]  3. Sit to stand transfer with Modified [place]  4. Bed to chair transfer with Modified [place]   5. Gait  x 150 feet with Modified [place] using Rolling Walker.     Outcome: Ongoing, Progressing     Pt ambulated ~200 ft with SBA using RW and LSO brace.

## 2022-02-15 NOTE — HPI
Mr. Sreekanth Pitts Jr. is a 76 y.o. gentleman with DDD, sciatica, who presented to neurosurgery for evaluation of chronic low back pain and more recent onset of bilateral leg pain and claudication.  In 2002, he underwent lumbar laminectomy. Since then the pain has been intermittent and very minimal for the past 20 years, but now has worsened in the last few months. The pain is from his lower back into the hips and radiates down to the knees. Describes the pain as a shooting pain that is constant. He used to walk his dog every morning for 2 miles and now can only walk up to 1 block secondary to the pain. He has a history of right leg sciatica. Rates the pain a 7/10 in severity when ambulating and 5/10 while at rest. He is in physical therapy which he states is not alleviating his pain. Admitted to [facility] on 2/14/22 for MIS L4-5 TLIF with Dr. Beebe.

## 2022-02-15 NOTE — PROGRESS NOTES
Patient complains of difficulty breathing. Lung sounds are clear and he sats 100% on 2L/nc. Dr. Riddle called to bedside. CXR and albuterol treatment ordered.

## 2022-02-15 NOTE — PT/OT/SLP PROGRESS
Physical Therapy Treatment    Patient Name:  Sreekanth Pitts Jr.   MRN:  [id number]    Recommendations:     Discharge Recommendations:  home health PT   Discharge Equipment Recommendations: walker, rolling   Barriers to discharge: None    Assessment:     Sreekanth Pitts Jr. is a 76 y.o. male admitted with a medical diagnosis of S/P lumbar spinal fusion.  He presents with the following impairments/functional limitations:  weakness,impaired self care skills,impaired functional mobilty,gait instability,impaired balance,decreased lower extremity function,pain,decreased ROM,impaired skin,orthopedic precautions.    Rehab Prognosis: Good; patient would benefit from acute skilled PT services to address these deficits and reach maximum level of function.    Recent Surgery: Procedure(s) (LRB):  FUSION, SPINE, LUMBAR, TLIF, MINIMALLY INVASIVE Right L4-5 Jose Juan V notified (N/A) 1 Day Post-Op    Plan:     During this hospitalization, patient to be seen daily to address the identified rehab impairments via gait training,therapeutic activities,therapeutic exercises and progress toward the following goals:    · Plan of Care Expires:  02/21/22    Subjective     Chief Complaint: N/A  Patient/Family Comments/goals: Pt reported going home today and that he's been up since 4:30am.  Pt agreeable to ambulation in the hallway.   Pain/Comfort:  Pain Rating 1: 4/10  Location 1: back  Pain Addressed 1: Pre-medicate for activity,Reposition,Distraction,Cessation of Activity      Objective:     Patient found up in chair with peripheral IV and LSO upon PT entry to room.     General Precautions: Standard, fall   Orthopedic Precautions:spinal precautions   Braces: LSO  Respiratory Status: Room air     Functional Mobility:  · Transfers:     · Sit to Stand:  stand by assistance and contact guard assistance with rolling walker  · Bed to Chair: stand by assistance with  rolling walker  using  Step Transfer  · Gait: Pt ambulated ~200 ft with SBA using RW  and LSO brace.  Pt with decreased step length and todd.   · Balance: Pt with fair+ dynamic standing balance.       AM-PAC 6 CLICK MOBILITY  Turning over in bed (including adjusting bedclothes, sheets and blankets)?: 4  Sitting down on and standing up from a chair with arms (e.g., wheelchair, bedside commode, etc.): 3  Moving from lying on back to sitting on the side of the bed?: 3  Moving to and from a bed to a chair (including a wheelchair)?: 3  Need to walk in hospital room?: 3  Climbing 3-5 steps with a railing?: 3  Basic Mobility Total Score: 19       Therapeutic Activities and Exercises:  Pt re-educated on spinal precautions s/p sx, handout already provided by OT.  Pt encouraged to be OOB>chair ~2 hours at a time and to ambulate with LSO brace when OOB.  Pt also educated on proper positioning in bed.  Pt verbalized good understanding of all teachings.     Patient left up in chair with all lines intact and call button in reach.  Tray table close by.     GOALS:   Multidisciplinary Problems     Physical Therapy Goals        Problem: Physical Therapy Goal    Goal Priority Disciplines Outcome Goal Variances Interventions   Physical Therapy Goal     PT, PT/OT Ongoing, Progressing     Description: Goals to be met by:      Patient will increase functional independence with mobility by performin. Supine to sit with Modified [place]  2. Sit to supine with Modified [place]  3. Sit to stand transfer with Modified [place]  4. Bed to chair transfer with Modified [place]   5. Gait  x 150 feet with Modified [place] using Rolling Walker.                      Time Tracking:     PT Received On: 02/15/22  PT Start Time: 1000     PT Stop Time: 1011  PT Total Time (min): 11 min     Billable Minutes: Gait Training 11 min    Treatment Type: Treatment        PTA Visit Number: 0     02/15/2022

## 2022-02-15 NOTE — PLAN OF CARE
Problem: Adult Inpatient Plan of Care  Goal: Plan of Care Review  Outcome: Ongoing, Progressing  Goal: Patient-Specific Goal (Individualized)  Outcome: Ongoing, Progressing  Goal: Absence of Hospital-Acquired Illness or Injury  Outcome: Ongoing, Progressing  Goal: Optimal Comfort and Wellbeing  Outcome: Ongoing, Progressing  Goal: Readiness for Transition of Care  Outcome: Ongoing, Progressing     Patient is appearing to be doing well postop. He has ambulated with PT/OT in hallway and has sat up in chair from 1630 until shift change. Patient pain level has remained between 4 and 6; prn pain medication administered at 1657. LSO brace remained on while out of bed. Patient also worked with RT on IS. CPAP is at bedside for tonight and patient aware.

## 2022-02-15 NOTE — HOSPITAL COURSE
2/14: MIS L4-5 TLIF with Dr. Beebe. Tolerated procedure well. Poor pain control immediately following the procedure that resolved before leaving PACU  2/15: pain well controlled, urinating, passing gas, and ambulating with rolling walker. PT/OT rec home with . Post-op xrays show satisfactory placement of hardware. Discharge instructions reviewed with patient. All questions answered. Prescriptions sent to hospital pharmacy for bedside delivery. Discharged home.

## 2022-02-15 NOTE — PROGRESS NOTES
[facility] [place] - Med Surg  Neurosurgery  Progress Note    Subjective:     History of Present Illness: Mr. Sreekanth Pitts Jr. is a 76 y.o. gentleman with DDD, sciatica, who presented to neurosurgery for evaluation of chronic low back pain and more recent onset of bilateral leg pain and claudication.  In 2002, he underwent lumbar laminectomy. Since then the pain has been intermittent and very minimal for the past 20 years, but now has worsened in the last few months. The pain is from his lower back into the hips and radiates down to the knees. Describes the pain as a shooting pain that is constant. He used to walk his dog every morning for 2 miles and now can only walk up to 1 block secondary to the pain. He has a history of right leg sciatica. Rates the pain a 7/10 in severity when ambulating and 5/10 while at rest. He is in physical therapy which he states is not alleviating his pain. Admitted to [facility] on 2/14/22 for MIS L4-5 TLIF with Dr. Beebe.       Post-Op Info:  Procedure(s) (LRB):  FUSION, SPINE, LUMBAR, TLIF, MINIMALLY INVASIVE Right L4-5 Jose Juan PARK notified (N/A)   1 Day Post-Op     Interval History: pain well controlled, urinating, passing gas, and ambulating with rolling walker. PT/OT rec home with     Medications:  Continuous Infusions:   sodium chloride 0.9%      sodium chloride 0.9% 75 mL/hr at 02/14/22 1543    lactated ringers       Scheduled Meds:   aluminum-magnesium hydroxide-simethicone  30 mL Oral QID (AC & HS)    atorvastatin  10 mg Oral Daily    docusate sodium  100 mg Oral Daily    dutasteride  0.5 mg Oral Daily    gabapentin  300 mg Oral QHS    heparin (porcine)  5,000 Units Subcutaneous Q8H    lisinopriL  20 mg Oral Daily    mupirocin  1 g Nasal BID    pantoprazole  40 mg Oral Daily    polyethylene glycol  17 g Oral Daily    tamsulosin  0.4 mg Oral Daily     PRN Meds:acetaminophen, aluminum-magnesium hydroxide-simethicone, bisacodyL, hydrALAZINE, HYDROmorphone, labetalol,  methocarbamoL, ondansetron, oxyCODONE-acetaminophen, oxyCODONE-acetaminophen, prochlorperazine, senna-docusate 8.6-50 mg, sodium chloride 0.9%     Review of Systems  Objective:     Weight: 119.3 kg (263 lb)  Body mass index is 34.7 kg/m².  Vital Signs (Most Recent):  Temp: 97.8 °F (36.6 °C) (02/15/22 0653)  Pulse: 89 (02/15/22 0653)  Resp: 16 (02/15/22 0653)  BP: 106/60 (02/15/22 0653)  SpO2: 98 % (02/15/22 0653) Vital Signs (24h Range):  Temp:  [97.8 °F (36.6 °C)-98.2 °F (36.8 °C)] 97.8 °F (36.6 °C)  Pulse:  [] 89  Resp:  [11-22] 16  SpO2:  [93 %-100 %] 98 %  BP: (106-155)/(55-92) 106/60     Date 02/15/22 0700 - 02/16/22 0659   Shift 3183-6479 3134-2835 3237-3406 24 Hour Total   INTAKE   P.O. 240   240   Shift Total(mL/kg) 240(2)   240(2)   OUTPUT   Urine(mL/kg/hr) 550   550   Shift Total(mL/kg) 550(4.6)   550(4.6)   Weight (kg) 119.3 119.3 119.3 119.3              Oxygen Concentration (%):  [0.21] 0.21         Physical Exam    Neurosurgery Physical Exam   General: well developed, well nourished, no distress  Neurologic: Alert and oriented. Thought content appropriate.  GCS: Motor: 6/Verbal: 5/Eyes: 4 GCS Total: 15  Cranial nerves: II-XII grossly intact  Neck: supple, without obvious masses or lesions  Skin: grossly intact in all 4 extremities without obvious rashes or lesions    Motor Strength: No focal numbness or weakness  Strength  Iliopsoas Quadriceps Knee  Flexion Tibialis  anterior Gastro- cnemius EHL   Lower: R 5/5 5/5 5/5 5/5 5/5 5/5    L 5/5 5/5 5/5 5/5 5/5 5/5   Sensory: intact to light touch B/L UE and LE    Bilateral lumbar incisions are c/d/i. Closed with staples. Mild staining to right dressing. Telfa replaced with bilateral medipore dressings.     LSO brace in place       Significant Labs:  No results for input(s): GLU, NA, K, CL, CO2, BUN, CREATININE, CALCIUM, MG in the last 48 hours.  No results for input(s): WBC, HGB, HCT, PLT in the last 48 hours.  No results for input(s): LABPT, INR, APTT  in the last 48 hours.  Microbiology Results (last 7 days)     ** No results found for the last 168 hours. **            Significant Diagnostics:  Post-op xrays pending       Assessment/Plan:     * S/P lumbar spinal fusion  Sreekanth Pitts Jr. is a  76 y.o.  male who presented to neurosurgery with lumbar stenosis with neurogenic claudication. He is s/p MIS L4-5 TLIF with Dr. Beebe, POD#1.      -Post-op xrays pending  -Neurologically intact  -PT/OT/OOB x 6hours per day; can be divided into 2 hour intervals in the chair  ---PT recommending HH  -TEDs/SCDs/IS  -Regular diet  -Percocet prn for pain with IV dilaudid for breakthrough   -Robaxin for muscle spasms  -LSO brace whenever out of bed. No lifting >10lbs, bending, or twisting.    -HH and RW orders placed.   -Discharge instructions reviewed with patient. All questions answered. Prescriptions sent to hospital pharmacy for bedside delivery.       Dispo: discharge today pending lumbar xrays            Monet Benavides PA-C  Neurosurgery  [facility] - Med Surg

## 2022-02-15 NOTE — PROGRESS NOTES
S/p back sx. Darcy 9/10 Patient is AAO x 4 and his vitals are stable at this time. He denies any shortness of breathe and appears to be in no apparent distress. Lower back dressing is c/d/i. Pain is now controlled (4/10) and he denies nausea. See flow sheet for full neuro vascular assessment. Recovery care is complete.

## 2022-02-15 NOTE — PT/OT/SLP DISCHARGE
Physical Therapy Discharge Summary    Name: Sreekanth Pitts Jr.  MRN: [id number]   Principal Problem: S/P lumbar spinal fusion     Patient Discharged from acute Physical Therapy on 2/15/22.  Please refer to prior PT notes for functional status.     Assessment:     Patient appropriate for care in another setting.    Objective:     GOALS:   Multidisciplinary Problems     Physical Therapy Goals        Problem: Physical Therapy Goal    Goal Priority Disciplines Outcome Goal Variances Interventions   Physical Therapy Goal     PT, PT/OT Ongoing, Progressing     Description: Goals to be met by:      Patient will increase functional independence with mobility by performin. Supine to sit with Modified [place]  2. Sit to supine with Modified [place]  3. Sit to stand transfer with Modified [place]  4. Bed to chair transfer with Modified [place]   5. Gait  x 150 feet with Modified [place] using Rolling Walker.                      Reasons for Discontinuation of Therapy Services  Transfer to alternate level of care.      Plan:     Patient Discharged to: Home with Home Health Service.      2/15/2022

## 2022-02-16 PROCEDURE — G0180 MD CERTIFICATION HHA PATIENT: HCPCS | Mod: ,,, | Performed by: NEUROLOGICAL SURGERY

## 2022-02-16 PROCEDURE — G0180 PR HOME HEALTH MD CERTIFICATION: ICD-10-PCS | Mod: ,,, | Performed by: NEUROLOGICAL SURGERY

## 2022-02-18 ENCOUNTER — PATIENT MESSAGE (OUTPATIENT)
Dept: NEUROSURGERY | Facility: CLINIC | Age: 77
End: 2022-02-18
Payer: MEDICARE

## 2022-02-21 ENCOUNTER — DOCUMENTATION ONLY (OUTPATIENT)
Dept: REHABILITATION | Facility: OTHER | Age: 77
End: 2022-02-21
Payer: MEDICARE

## 2022-02-21 ENCOUNTER — PATIENT MESSAGE (OUTPATIENT)
Dept: NEUROSURGERY | Facility: CLINIC | Age: 77
End: 2022-02-21
Payer: MEDICARE

## 2022-02-21 NOTE — PROGRESS NOTES
PHYSICAL THERAPY DISCHARGE:    This patient was originally evaluated at our facility on: 09/29/21         Pt attended PT for a total of 13 Visits receiving: Manual Therapy, Therex, Postural Education, Body Mechanics Training, Home Exercise Instruction     This patient's last visit occurred on: 12/03/21    Short term goals were achieved: partially met    Long term goals were achieved: not met    Pt was DC'd from our care secondary to: self discharge - pt pursue spinal surgery.  Surgery performed 02/14/22       Therapist: Arash Uribe, PT  2/21/2022

## 2022-02-22 ENCOUNTER — OFFICE VISIT (OUTPATIENT)
Dept: INTERNAL MEDICINE | Facility: CLINIC | Age: 77
End: 2022-02-22
Payer: MEDICARE

## 2022-02-22 VITALS
WEIGHT: 259.06 LBS | BODY MASS INDEX: 34.33 KG/M2 | HEART RATE: 78 BPM | OXYGEN SATURATION: 98 % | DIASTOLIC BLOOD PRESSURE: 70 MMHG | SYSTOLIC BLOOD PRESSURE: 126 MMHG | HEIGHT: 73 IN

## 2022-02-22 DIAGNOSIS — E78.49 OTHER HYPERLIPIDEMIA: Chronic | ICD-10-CM

## 2022-02-22 DIAGNOSIS — K21.9 GASTROESOPHAGEAL REFLUX DISEASE, UNSPECIFIED WHETHER ESOPHAGITIS PRESENT: ICD-10-CM

## 2022-02-22 DIAGNOSIS — I10 HYPERTENSION, UNSPECIFIED TYPE: Primary | ICD-10-CM

## 2022-02-22 DIAGNOSIS — G47.33 OBSTRUCTIVE SLEEP APNEA: Chronic | ICD-10-CM

## 2022-02-22 DIAGNOSIS — Z98.1 S/P LUMBAR SPINAL FUSION: ICD-10-CM

## 2022-02-22 PROCEDURE — 99999 PR PBB SHADOW E&M-EST. PATIENT-LVL IV: CPT | Mod: PBBFAC,,, | Performed by: PHYSICIAN ASSISTANT

## 2022-02-22 PROCEDURE — 99214 OFFICE O/P EST MOD 30 MIN: CPT | Mod: PBBFAC | Performed by: PHYSICIAN ASSISTANT

## 2022-02-22 PROCEDURE — 99999 PR PBB SHADOW E&M-EST. PATIENT-LVL IV: ICD-10-PCS | Mod: PBBFAC,,, | Performed by: PHYSICIAN ASSISTANT

## 2022-02-22 PROCEDURE — 99214 OFFICE O/P EST MOD 30 MIN: CPT | Mod: S$PBB,,, | Performed by: PHYSICIAN ASSISTANT

## 2022-02-22 PROCEDURE — 99214 PR OFFICE/OUTPT VISIT, EST, LEVL IV, 30-39 MIN: ICD-10-PCS | Mod: S$PBB,,, | Performed by: PHYSICIAN ASSISTANT

## 2022-02-22 NOTE — PROGRESS NOTES
Subjective:       Patient ID: Sreekanth Pitts Jr. is a 76 y.o. male.        Chief Complaint: Follow-up (F/U after surgery on 2/14/22 spinal infusion)    Sreekanth Pitts Jr. is an established patient of Torsten Bejarano MD here today for hospital f/u visit.    Admitted 2/14-2/15 due to chronic low back pain s/p lumbar spinal fusion.  H/o laminectomy in 2002.      Since surgery, pain is doing MUCH better.  First few days he used pain meds but has not used any oxycodone for the past 5-6 days.  Using a walker to ambulate.  PT will start today, OT assessed and no needs.      He has post op with neurosurgery scheduled in 3 days to remove staples.      Appetite is fine and bowels are moving.  BP is controlled.           Review of Systems   Constitutional: Negative for appetite change, chills, fatigue and fever.   HENT: Negative for congestion and sore throat.    Eyes: Negative for visual disturbance.   Respiratory: Negative for cough, chest tightness and shortness of breath.    Cardiovascular: Negative for chest pain, palpitations and leg swelling.   Gastrointestinal: Negative for abdominal pain, blood in stool, constipation, diarrhea, nausea and vomiting.   Genitourinary: Negative for dysuria, frequency, hematuria and urgency.   Musculoskeletal: Negative for arthralgias and back pain.   Skin: Negative for rash.   Neurological: Negative for dizziness, syncope, weakness and headaches.   Psychiatric/Behavioral: Negative for dysphoric mood and sleep disturbance. The patient is not nervous/anxious.        Objective:      Physical Exam  Vitals and nursing note reviewed.   Constitutional:       Appearance: He is well-developed.   HENT:      Head: Normocephalic.      Right Ear: External ear normal.      Left Ear: External ear normal.   Eyes:      Pupils: Pupils are equal, round, and reactive to light.   Cardiovascular:      Rate and Rhythm: Normal rate and regular rhythm.      Heart sounds: Normal heart sounds. No murmur heard.    " No friction rub. No gallop.   Pulmonary:      Effort: Pulmonary effort is normal. No respiratory distress.      Breath sounds: Normal breath sounds.   Abdominal:      Palpations: Abdomen is soft.      Tenderness: There is no abdominal tenderness.   Skin:     General: Skin is warm and dry.   Neurological:      Mental Status: He is alert.         Assessment:       1. Hypertension, unspecified type    2. S/P lumbar spinal fusion    3. Other hyperlipidemia    4. Obstructive sleep apnea    5. Gastroesophageal reflux disease, unspecified whether esophagitis present        Plan:       Sreekanth was seen today for follow-up.    Diagnoses and all orders for this visit:    Hypertension, unspecified type - stable and controlled  BP Readings from Last 5 Encounters:   02/15/22 111/63   01/26/22 125/72   01/19/22 134/81   01/18/22 114/73   01/12/22 110/72      S/P lumbar spinal fusion - doing well post op and has appropriate f/u scheduled    Other hyperlipidemia - stable and controlled  Lab Results   Component Value Date    CHOL 108 (L) 09/24/2021    TRIG 127 09/24/2021    HDL 29 (L) 09/24/2021    LDLCALC 53.6 (L) 09/24/2021     Obstructive sleep apnea - using CPAP nightly    Gastroesophageal reflux disease, unspecified whether esophagitis present - stable and controlled with protonix    Pt has been given instructions populated from [employer] database and has verbalized understanding of the after visit summary and information contained wherein.    Follow up with a primary care provider. May go to ER for acute shortness of breath, lightheadedness, fever, or any other emergent complaints or changes in condition.    "This note will be shared with the patient"    Future Appointments   Date Time Provider Department Center   2/25/2022 11:00 AM Denise Condon PA-C [facility] [address]   3/23/2022 10:45 AM [facility] X[facility]   3/23/2022 11:30 AM Anibal Beebe MD [facility]                 "

## 2022-02-25 ENCOUNTER — OFFICE VISIT (OUTPATIENT)
Dept: NEUROSURGERY | Facility: CLINIC | Age: 77
End: 2022-02-25
Payer: MEDICARE

## 2022-02-25 ENCOUNTER — EXTERNAL HOME HEALTH (OUTPATIENT)
Dept: HOME HEALTH SERVICES | Facility: HOSPITAL | Age: 77
End: 2022-02-25
Payer: MEDICARE

## 2022-02-25 VITALS
WEIGHT: 259 LBS | DIASTOLIC BLOOD PRESSURE: 73 MMHG | SYSTOLIC BLOOD PRESSURE: 121 MMHG | HEART RATE: 82 BPM | HEIGHT: 73 IN | TEMPERATURE: 98 F | BODY MASS INDEX: 34.33 KG/M2

## 2022-02-25 DIAGNOSIS — Z98.1 S/P LUMBAR SPINAL FUSION: Primary | ICD-10-CM

## 2022-02-25 PROCEDURE — 99024 PR POST-OP FOLLOW-UP VISIT: ICD-10-PCS | Mod: POP,,, | Performed by: PHYSICIAN ASSISTANT

## 2022-02-25 PROCEDURE — 99213 OFFICE O/P EST LOW 20 MIN: CPT | Mod: PBBFAC | Performed by: PHYSICIAN ASSISTANT

## 2022-02-25 PROCEDURE — 99024 POSTOP FOLLOW-UP VISIT: CPT | Mod: POP,,, | Performed by: PHYSICIAN ASSISTANT

## 2022-02-25 PROCEDURE — 99999 PR PBB SHADOW E&M-EST. PATIENT-LVL III: ICD-10-PCS | Mod: PBBFAC,,, | Performed by: PHYSICIAN ASSISTANT

## 2022-02-25 PROCEDURE — 99999 PR PBB SHADOW E&M-EST. PATIENT-LVL III: CPT | Mod: PBBFAC,,, | Performed by: PHYSICIAN ASSISTANT

## 2022-02-25 NOTE — PROGRESS NOTES
Wound Check   Neurosurgery           75 y/o male who presents to clinic today for his 2 week wound check. Patient underwent right L4-5 MIS TLIF with Dr. Beeeb on 2/14/22 for right L5 radiculopathy. Patient reports right leg pain has completely resolved. He reports minimal back pain in which he is only taking tyelnol for. Denies fevers, chills, night sweats or N/V.       Physical Exam:   General: well developed, well nourished, no distress  Neurologic: Alert and oriented. Thought content appropriate.   GCS: Motor: 6/Verbal: 5/Eyes: 4 GCS Total: 15   Mental Status: Awake, Alert, Oriented x3   Cranial nerves: face symmetric, tongue midline, pupils equal, round, reactive to light with accomodation, EOMI.   Motor Strength: moves all extremities with good strength and tone   Sensation: response to light touch throughout  No gait disturbances     Incision is clean, dry and intact with no signs of erythema, swelling or purulent drainage. Staples are intact. All skin edges are completely approximated.       Vitals:    02/25/22 1049   BP: 121/73   Pulse: 82   Temp: 98.1 °F (36.7 °C)             Assessment/Plan:     75 y/o male 2 weeks s/p right L4-5 MIS TLIF    -Keep incision open to air   -No need to continue putting bacitracin ointment on wound   -Can shower and get incision wet, just pat dry and no vigorous scrubbing. Do not submerge incision for another 4 weeks.   -No lifting more than 10 lbs or excessive bending/twisting.   -Can drive after 4 weeks when no longer taking narcotics   -Follow up with Dr. Beebe in 4 weeks   -Encouraged patient to call if they have any questions or concerns prior to next follow up appt    Denise Condon PA-C  Neurosurgery  Pager # [phone]

## 2022-03-07 ENCOUNTER — PATIENT MESSAGE (OUTPATIENT)
Dept: NEUROSURGERY | Facility: CLINIC | Age: 77
End: 2022-03-07
Payer: MEDICARE

## 2022-03-21 ENCOUNTER — TELEPHONE (OUTPATIENT)
Dept: UROLOGY | Facility: CLINIC | Age: 77
End: 2022-03-21
Payer: MEDICARE

## 2022-03-23 ENCOUNTER — PATIENT MESSAGE (OUTPATIENT)
Dept: NEUROSURGERY | Facility: CLINIC | Age: 77
End: 2022-03-23

## 2022-03-23 ENCOUNTER — TELEPHONE (OUTPATIENT)
Dept: NEUROSURGERY | Facility: CLINIC | Age: 77
End: 2022-03-23

## 2022-03-23 ENCOUNTER — TELEPHONE (OUTPATIENT)
Dept: NEUROSURGERY | Facility: CLINIC | Age: 77
End: 2022-03-23
Payer: MEDICARE

## 2022-03-23 ENCOUNTER — HOSPITAL ENCOUNTER (OUTPATIENT)
Dept: RADIOLOGY | Facility: HOSPITAL | Age: 77
Discharge: HOME | End: 2022-03-23
Attending: NEUROLOGICAL SURGERY
Payer: MEDICARE

## 2022-03-23 ENCOUNTER — TELEPHONE (OUTPATIENT)
Dept: ORTHOPEDICS | Facility: CLINIC | Age: 77
End: 2022-03-23
Payer: MEDICARE

## 2022-03-23 ENCOUNTER — OFFICE VISIT (OUTPATIENT)
Dept: NEUROSURGERY | Facility: CLINIC | Age: 77
End: 2022-03-23
Payer: MEDICARE

## 2022-03-23 DIAGNOSIS — Z98.1 S/P LUMBAR FUSION: ICD-10-CM

## 2022-03-23 DIAGNOSIS — Z51.81 ENCOUNTER FOR MONITORING LONG-TERM PROTON PUMP INHIBITOR THERAPY: ICD-10-CM

## 2022-03-23 DIAGNOSIS — R13.19 ESOPHAGEAL DYSPHAGIA: ICD-10-CM

## 2022-03-23 DIAGNOSIS — M47.816 LUMBAR SPONDYLOSIS: ICD-10-CM

## 2022-03-23 DIAGNOSIS — M25.551 RIGHT HIP PAIN: Primary | ICD-10-CM

## 2022-03-23 DIAGNOSIS — M43.16 SPONDYLOLISTHESIS OF LUMBAR REGION: ICD-10-CM

## 2022-03-23 DIAGNOSIS — Z98.1 S/P LUMBAR SPINAL FUSION: Primary | ICD-10-CM

## 2022-03-23 DIAGNOSIS — K21.9 GASTROESOPHAGEAL REFLUX DISEASE, UNSPECIFIED WHETHER ESOPHAGITIS PRESENT: ICD-10-CM

## 2022-03-23 DIAGNOSIS — M25.559 HIP PAIN: Primary | ICD-10-CM

## 2022-03-23 DIAGNOSIS — Z98.1 S/P LUMBAR SPINAL FUSION: ICD-10-CM

## 2022-03-23 DIAGNOSIS — M25.569 KNEE PAIN, UNSPECIFIED CHRONICITY, UNSPECIFIED LATERALITY: Primary | ICD-10-CM

## 2022-03-23 DIAGNOSIS — M25.569 KNEE PAIN, UNSPECIFIED CHRONICITY, UNSPECIFIED LATERALITY: ICD-10-CM

## 2022-03-23 DIAGNOSIS — M25.561 ACUTE PAIN OF RIGHT KNEE: ICD-10-CM

## 2022-03-23 DIAGNOSIS — Z79.899 ENCOUNTER FOR MONITORING LONG-TERM PROTON PUMP INHIBITOR THERAPY: ICD-10-CM

## 2022-03-23 DIAGNOSIS — M25.559 HIP PAIN: ICD-10-CM

## 2022-03-23 PROCEDURE — 99024 POSTOP FOLLOW-UP VISIT: CPT | Mod: 95,POP,, | Performed by: NEUROLOGICAL SURGERY

## 2022-03-23 PROCEDURE — 99024 PR POST-OP FOLLOW-UP VISIT: ICD-10-PCS | Mod: 95,POP,, | Performed by: NEUROLOGICAL SURGERY

## 2022-03-23 PROCEDURE — 72100 X-RAY EXAM L-S SPINE 2/3 VWS: CPT | Mod: 26,,, | Performed by: RADIOLOGY

## 2022-03-23 PROCEDURE — 72100 X-RAY EXAM L-S SPINE 2/3 VWS: CPT | Mod: TC

## 2022-03-23 PROCEDURE — 72100 XR LUMBAR SPINE AP AND LATERAL: ICD-10-PCS | Mod: 26,,, | Performed by: RADIOLOGY

## 2022-03-23 RX ORDER — PANTOPRAZOLE SODIUM 40 MG/1
TABLET, DELAYED RELEASE ORAL
Qty: 90 TABLET | Refills: 3 | Status: CANCELLED | OUTPATIENT
Start: 2022-03-23

## 2022-03-23 NOTE — PROGRESS NOTES
Subjective:        Patient ID: Sreekanth Pitts Jr. is a 76 y.o. male     Chief Complaint: No chief complaint on file.        HPI  Mr. Sreekanth Pitts Jr. is a 76 y.o. gentleman with DDD, sciatica, who presents today for follow-up. This is a pt with a history of low back pain and in 2002 he underwent back surgery. Since then the pain has been intermittent and very minimal for the past 20 years, but had worsened in the last few months. He states in late September he had a gradual onset of bilateral leg pain. The pain is from his lower back into the hips and radiates down to the knees. Described the pain as a shooting pain that is constant. He used to walk his dog every morning for 2 miles and now can only walk up to 1 block secondary to the pain. He has a history of right leg sciatica. Rated pain a 7/10 in severity when ambulating and 5/10 while at rest. He is in physical therapy which he states is not alleviating his pain.     On 2/14/2022, he underwent an uneventful minimally invasive TLIF with me.  Patient reports that he is now doing well.  That he has minimal back pain at this time.  He reports that he has not had the sciatic pain since his surgery.  Patient reports that he is walking as much more active now.  He reports that he occasionally has pain in his right hip and his right knee.  He reports that the knee had been bothering him previously and that his knee feels tight.    Review of Systems   Constitutional: Negative for activity change, appetite change, fatigue, fever and unexpected weight change.   HENT: Negative for facial swelling.    Eyes: Negative.    Respiratory: Negative.    Cardiovascular: Negative.    Gastrointestinal: Negative for diarrhea, nausea and vomiting.   Endocrine: Negative.    Genitourinary: Negative.    Musculoskeletal: Negative for joint swelling and neck pain.   Neurological: Negative for dizziness, seizures, weakness, numbness and headaches.   Psychiatric/Behavioral: Negative.        Past Medical History:   Diagnosis Date    Achilles bursitis or tendinitis 9/25/2014    Allergy 12/3/2015    Arthritis     BPH without obstruction/lower urinary tract symptoms 01/04/2018    Chronic fatigue 7/10/2018    Degenerative disc disease     GERD (gastroesophageal reflux disease) 12/2/2020    Hypertension     Nuclear sclerosis - Both Eyes 7/30/2012       Objective:      There were no vitals filed for this visit.   Physical Exam  Constitutional:       General: He is not in acute distress.     Appearance: Normal appearance.   HENT:      Head: Normocephalic and atraumatic.   Pulmonary:      Effort: Pulmonary effort is normal.   Musculoskeletal:      Cervical back: Neck supple.   Neurological:      Mental Status: He is alert and oriented to person, place, and time.      GCS: GCS eye subscore is 4. GCS verbal subscore is 5. GCS motor subscore is 6.      Cranial Nerves: No cranial nerve deficit.      Sensory: No sensory deficit.            IMAGING:  X-ray showed Lumbar spine two views: There is a dextroconvex curvature of the lumbar spine.  There are pedicle screws and fixation rods and a disc implant at L4-L5.  There is moderate DJD.  There is grade 1 anterolisthesis of L4 on L5.  No definite hardware failure seen.  No acute fracture dislocation bone destruction seen.          I have personally reviewed the images with the pt.      I, Dr. Anibal Beebe, personally performed the services described in this documentation. All medical record entries made by the scribe, Kellie Hurtado, were at my direction and in my presence.  I have reviewed the chart and agree that the record reflects my personal performance and is accurate and complete. Anibal Beebe MD. 03/23/2022    Assessment:       Lumbar spondylolisthesis  S/p Lumbar fusion.     Plan:   This patient is doing well after surgery.  This point he hip and right knee pain.  We will order an xray of the knee and hip.    I will give him a referral to  Orthopedic surgery for evaluation.  I will follow-up with him 6 months with a CT of the lumbar spine.  I have counseled the patient he can see me sooner if he develops any problems.

## 2022-03-23 NOTE — TELEPHONE ENCOUNTER
Spoke with pt and assured him he will be seen virtually. Apologized for delay.   Pt verbalizes understanding.     ----- Message from Taurn Caldwell sent at 3/23/2022 11:48 AM CDT -----  Name Of Caller: Sreeaknth        Provider Name: Anibal Beebe        Does patient feel the need to be seen today? no        Relationship to the Pt?: patient         Contact Preference?: [phone]        What is the nature of the call?:  Patient has a virtual scheduled for today at 11:30am but states that he has been waiting for about 18 minutes, wants to know of he will still have his appointment today

## 2022-03-23 NOTE — TELEPHONE ENCOUNTER
I called the patient regarding the message below. The patient is scheduled for an appointment and x-rays for Friday. The patient verbalized understanding and has no further questions.       ----- Message from Swetha Quesada RN sent at 3/23/2022  3:32 PM CDT -----  Regarding: Referral  Anton Alvarado!    Hope all is well!    This is a patient of Dr Beebe's who we did a TLIF on. He's doing great but c/o knee and hip pain. I am reaching out because his daughter is the  of [facility].    I had ordered and scheduled X-Rays for Friday but figured it would be better done there with what you need.    Can you please schedule him with a PA? It would be greatly appreciated.    Thanks!!    Kerri Quesada RN  Nurse Navigator  Neurosurgery & Neuro-Oncology  [facility]

## 2022-03-25 ENCOUNTER — HOSPITAL ENCOUNTER (OUTPATIENT)
Dept: RADIOLOGY | Facility: HOSPITAL | Age: 77
Discharge: HOME | End: 2022-03-25
Attending: ORTHOPAEDIC SURGERY
Payer: MEDICARE

## 2022-03-25 ENCOUNTER — OFFICE VISIT (OUTPATIENT)
Dept: ORTHOPEDICS | Facility: CLINIC | Age: 77
End: 2022-03-25
Payer: MEDICARE

## 2022-03-25 VITALS — WEIGHT: 266.56 LBS | HEIGHT: 73 IN | BODY MASS INDEX: 35.33 KG/M2

## 2022-03-25 DIAGNOSIS — M25.559 HIP PAIN: Primary | ICD-10-CM

## 2022-03-25 DIAGNOSIS — M25.551 RIGHT HIP PAIN: ICD-10-CM

## 2022-03-25 DIAGNOSIS — M25.561 ACUTE PAIN OF RIGHT KNEE: ICD-10-CM

## 2022-03-25 DIAGNOSIS — M25.569 KNEE PAIN, UNSPECIFIED CHRONICITY, UNSPECIFIED LATERALITY: ICD-10-CM

## 2022-03-25 PROCEDURE — 99204 PR OFFICE/OUTPT VISIT, NEW, LEVL IV, 45-59 MIN: ICD-10-PCS | Mod: S$PBB,,, | Performed by: ORTHOPAEDIC SURGERY

## 2022-03-25 PROCEDURE — 73564 X-RAY EXAM KNEE 4 OR MORE: CPT | Mod: 26,RT,, | Performed by: RADIOLOGY

## 2022-03-25 PROCEDURE — 73502 XR HIP WITH PELVIS WHEN PERFORMED, 2 OR 3  VIEWS RIGHT: ICD-10-PCS | Mod: 26,RT,, | Performed by: RADIOLOGY

## 2022-03-25 PROCEDURE — 73562 X-RAY EXAM OF KNEE 3: CPT | Mod: 26,LT,, | Performed by: RADIOLOGY

## 2022-03-25 PROCEDURE — 73502 X-RAY EXAM HIP UNI 2-3 VIEWS: CPT | Mod: 26,RT,, | Performed by: RADIOLOGY

## 2022-03-25 PROCEDURE — 99213 OFFICE O/P EST LOW 20 MIN: CPT | Mod: PBBFAC | Performed by: ORTHOPAEDIC SURGERY

## 2022-03-25 PROCEDURE — 99999 PR PBB SHADOW E&M-EST. PATIENT-LVL III: CPT | Mod: PBBFAC,,, | Performed by: ORTHOPAEDIC SURGERY

## 2022-03-25 PROCEDURE — 99999 PR PBB SHADOW E&M-EST. PATIENT-LVL III: ICD-10-PCS | Mod: PBBFAC,,, | Performed by: ORTHOPAEDIC SURGERY

## 2022-03-25 PROCEDURE — 73564 XR KNEE ORTHO RIGHT WITH FLEXION: ICD-10-PCS | Mod: 26,RT,, | Performed by: RADIOLOGY

## 2022-03-25 PROCEDURE — 73562 XR KNEE ORTHO RIGHT WITH FLEXION: ICD-10-PCS | Mod: 26,LT,, | Performed by: RADIOLOGY

## 2022-03-25 PROCEDURE — 99204 OFFICE O/P NEW MOD 45 MIN: CPT | Mod: S$PBB,,, | Performed by: ORTHOPAEDIC SURGERY

## 2022-03-25 PROCEDURE — 73502 X-RAY EXAM HIP UNI 2-3 VIEWS: CPT | Mod: TC,RT

## 2022-03-25 PROCEDURE — 73562 X-RAY EXAM OF KNEE 3: CPT | Mod: TC,LT

## 2022-03-25 NOTE — PROGRESS NOTES
Subjective:     HPI:   Sreekanth Pitts Jr. is a 76 y.o. male who presents for eval R hip and knee pain referred by Kerri Quesada from Dr Beebe's clinic    He had an L4-5 lumbar fusion on 2022 he is 5 and half weeks out from that.    Did 22 OP PT visits pre-op which made his back worse    Post op they did hip flex/ext rotation stretches and standing abduction strengthening that irritated his hips     He points to his superior posterior iliac wing as the source of his hip pain.  He denies any groin or anterior thigh pain.  He says he has some posterior knee pain as well.    Medications: Tylenol PRN 2 tabs BID-TID, off percocet 3 weeks post op, off gabapentin, told no NSAIDS x8 weeks took advil in past but tylenol doing ok    Injections: None     Physical Therapy: None. The patient has completed HH-PT prior to his back surgery. Did 22 OP PT visits pre-op which made his back worse    Post op they did hip flex/ext rotation stretches and standing abduction strengthening that irritated his hips      Bracing: None     Assistive Devices: The patient has a walker that he used for 4 weeks after back surgery. The patient stated that he does not currently use it.     Walkin - 5 blocks    Limitations: General walking, difficulty going up/down steps, difficulty getting in/out of the car, difficulty rising from sitting  and difficulty standing for long periods of time    Occupation: The patient currently works 3 days a week from home. The patient is a .     Social support: The patient stated that they live at home with their wife. The patient stated that his wife currently has problems with her back so she would not be a good. The patient stated that he has 2 daughters in the area and they may be able to help take care of them if they were to have surgery.     Daughter is  of [employer]       ROS:  The updated medical history is in the chart and has been reviewed. A review of systems  is updated and there is no reported vision changes, ear/nose/mouth/throat complaints,  chest pain, shortness of breath, abdominal pain, urological complaints, fevers or chills, psychiatric complaints. Musculoskeletal and neurologcial symptoms are as documented. All other systems are negative.      Objective:   Exam:  There were no vitals filed for this visit.  Body mass index is 35.17 kg/m².    Physical examination included assessment of the patient's general appearance with particular attention to development, nutrition, body habitus, attention to grooming, and any evidence of distress.  Constitutional: The patient is a well-developed, well-nourished patient in no acute distress.   Cardiovascular: Vascular examination included warmth and capillary refill as well inspection for edema and assessment of pedal pulses. Pulses are palpable and regular.  Musculoskeletal: Gait was assessed as to whether it was steady, non-antalgic, and/or required the use of an assist device. The patient was also asked to walk independently and get onto the examination table.  Skin: The skin was examined for any obvious rashes or lesions in the extremity.  Neurologic: Sensation is intact to light touch in the extremity. The patient has good coordination without hyperreflexia and is alert and oriented to person, place and time and has normal mood and affect.     All of the above were examined and found to be within normal limits except for the following pertinent clinical findings:    No limp nonantalgic gait negative Trendelenburg.  He can do single leg stance with no pain.  He has no tenderness over either greater trochanter.  He has no groin pain with straight leg raise.  0-100 flexion 30 abduction 20 abduction 40 external 20 internal rotation which recreates his low back pain but no groin or anterior hip pain.  He has positive BIBI, negative FADIR    Right knee 0-120 degree knee range of motion 0° alignment nontender palpation  mediolateral patellofemoral joint line no effusion knee stable anterior-posterior varus valgus stresses no flexion contracture or extensor lag      Imaging:  Hip x-rays:   He has bilateral symmetric grade 1 degenerative changes with cam type femoral acetabular impingement morphology but preserved femoral acetabular joint space and no significant subchondral sclerosis or osteophyte formation  Status post L4-5 fusion    Indication:  Right knee pain  Exam Ordered: Radiographs of the right knee include a standing anteroposterior view, a standing posterioanterior view, a lateral view in flexion, and a sunrise view  Details of Examination: Exam shows no evidence of joint space narrowing, fracture or dislocation.  No other significant findings are noted.  Impression: Normal Exam, Right knee        Assessment:       ICD-10-CM ICD-9-CM   1. Hip pain  M25.559 719.45   2. Knee pain, unspecified chronicity, unspecified laterality  M25.569 719.46     DDX, likely multi-factorial   post op spine fusion posterolateral mechanical low back pain  SI joint irritation, + BIBI  TINO on XR but appears asymptomatic, neg FADIR  Post knee hamstring discomfort, normal knee exam    5.5 weeks out L4/5 spine fusion 2/14/22 Dr Abiel wilson 2002    BPH s/p TURP x2, flomax + avodart, no POUR with spine surgery  STEPHIE + CPAP     Plan:       The above findings were discussed with patient length. At this point I do believe that the patients discomfort is mostly related to a lower back pain exacerbation.  The treatment of lower back pain was discussed with the patient and it includes a course of anti-inflammatory medications, rest, and physical therapy for lower back stretching and strengthening.  All of the patients questions were answered.    He should discuss NSAID use with his surgical team.  In the meantime will give him some prescription compound cream to apply where needed    I think most of this will get better with time.  He does have a  prescription for a muscle relaxant I recommend that he try it he could also use a heating pad    I do not think he has any intra-articular hip or knee pathology certainly does not need a hip her knee replacement at this time.  Recommend gentle physical therapy when cleared by his spine surgery team. But symptoms started with land based PT so could be a good candidate for aquatic therapy once he is cleared to get in the pool from his spine surgery (he says he has been told 8 weeks)    Long-term could consider Sports Medicine evaluation.      Follow-up if symptoms change    No orders of the defined types were placed in this encounter.            Past Medical History:   Diagnosis Date    Achilles bursitis or tendinitis 9/25/2014    Allergy 12/3/2015    Arthritis     BPH without obstruction/lower urinary tract symptoms 01/04/2018    Chronic fatigue 7/10/2018    Degenerative disc disease     GERD (gastroesophageal reflux disease) 12/2/2020    Hypertension     Nuclear sclerosis - Both Eyes 7/30/2012       Past Surgical History:   Procedure Laterality Date    ESOPHAGOGASTRODUODENOSCOPY N/A 12/2/2020    Procedure: EGD (ESOPHAGOGASTRODUODENOSCOPY);  Surgeon: Dion Mckinney MD;  Location: [address]);  Service: Endoscopy;  Laterality: N/A;  covid-11/29-[facility]    EYE FOREIGN BODY REMOVAL      childhood    LUMBAR LAMINECTOMY WITH FUSION  2002    MINIMALLY INVASIVE TRANSFORAMINAL LUMBAR INTERBODY FUSION (TLIF) N/A 2/14/2022    Procedure: FUSION, SPINE, LUMBAR, TLIF, MINIMALLY INVASIVE Right L4-5 Renaldo XAVIER notified;  Surgeon: Anibal Bebee MD;  Location: [facility];  Service: Neurosurgery;  Laterality: N/A;  ASA1  TOR1  T&Screen  EMG  C-Arm  LSO  Jamie 4 poster  NEURO MONITORING RENALDO OSBORN [phone]  SPINEWAVE RENALDO MARIS [phone] TEXTED ON 2/2/2022 @ 3:54PM/ RESPONDED ON 2/2/2022 @4:22 PM-LO  PREOP DONE A    TRANSURETHRAL RESECTION OF PROSTATE (TURP) WITHOUT USE OF LASER  N/A 2018    Procedure: TURP, WITHOUT USING LASER;  Surgeon: Uma Gaona MD;  Location: [facility] OR [address] [place] [address];  Service: Urology;  Laterality: N/A;  1.5 hours       Family History   Problem Relation Age of Onset    Cataracts Mother     Hypertension Mother     Cancer Father     Heart disease Father     Heart disease Brother     Stroke Paternal Grandfather     Heart disease Paternal Grandfather     Colon cancer Neg Hx     Esophageal cancer Neg Hx        Social History     Socioeconomic History    Marital status:    Occupational History     Employer: [employer]   Tobacco Use    Smoking status: Former Smoker     Packs/day: 2.00     Years: 4.00     Pack years: 8.00     Types: Cigarettes, Cigars     Quit date: 1973     Years since quittin.2    Smokeless tobacco: Never Used   Substance and Sexual Activity    Alcohol use: Yes     Alcohol/week: 1.0 standard drink     Types: 1 Cans of beer per week     Comment: rarely    Drug use: No   Social History Narrative    , consulting firm , 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 frequ walking each day.

## 2022-05-13 ENCOUNTER — LAB VISIT (OUTPATIENT)
Dept: LAB | Facility: HOSPITAL | Age: 77
End: 2022-05-13
Attending: UROLOGY
Payer: MEDICARE

## 2022-05-13 ENCOUNTER — OFFICE VISIT (OUTPATIENT)
Dept: UROLOGY | Facility: CLINIC | Age: 77
End: 2022-05-13
Payer: MEDICARE

## 2022-05-13 VITALS
HEIGHT: 73 IN | WEIGHT: 261.25 LBS | BODY MASS INDEX: 34.62 KG/M2 | DIASTOLIC BLOOD PRESSURE: 81 MMHG | HEART RATE: 87 BPM | SYSTOLIC BLOOD PRESSURE: 120 MMHG

## 2022-05-13 DIAGNOSIS — N40.0 BENIGN PROSTATIC HYPERPLASIA WITHOUT LOWER URINARY TRACT SYMPTOMS: Primary | ICD-10-CM

## 2022-05-13 DIAGNOSIS — Z12.5 PROSTATE CANCER SCREENING: ICD-10-CM

## 2022-05-13 DIAGNOSIS — N40.0 BENIGN PROSTATIC HYPERPLASIA WITHOUT LOWER URINARY TRACT SYMPTOMS: ICD-10-CM

## 2022-05-13 DIAGNOSIS — R33.9 URINARY RETENTION: ICD-10-CM

## 2022-05-13 LAB — COMPLEXED PSA SERPL-MCNC: 1.7 NG/ML (ref 0–4)

## 2022-05-13 PROCEDURE — 99214 OFFICE O/P EST MOD 30 MIN: CPT | Mod: S$PBB,,, | Performed by: UROLOGY

## 2022-05-13 PROCEDURE — 99999 PR PBB SHADOW E&M-EST. PATIENT-LVL III: CPT | Mod: PBBFAC,,, | Performed by: UROLOGY

## 2022-05-13 PROCEDURE — 99213 OFFICE O/P EST LOW 20 MIN: CPT | Mod: PBBFAC | Performed by: UROLOGY

## 2022-05-13 PROCEDURE — 99214 PR OFFICE/OUTPT VISIT, EST, LEVL IV, 30-39 MIN: ICD-10-PCS | Mod: S$PBB,,, | Performed by: UROLOGY

## 2022-05-13 PROCEDURE — 81002 URINALYSIS NONAUTO W/O SCOPE: CPT | Mod: PBBFAC | Performed by: UROLOGY

## 2022-05-13 PROCEDURE — 84153 ASSAY OF PSA TOTAL: CPT | Performed by: UROLOGY

## 2022-05-13 PROCEDURE — 36415 COLL VENOUS BLD VENIPUNCTURE: CPT | Performed by: UROLOGY

## 2022-05-13 PROCEDURE — 99999 PR PBB SHADOW E&M-EST. PATIENT-LVL III: ICD-10-PCS | Mod: PBBFAC,,, | Performed by: UROLOGY

## 2022-05-13 PROCEDURE — 51798 US URINE CAPACITY MEASURE: CPT | Mod: PBBFAC | Performed by: UROLOGY

## 2022-05-13 RX ORDER — DUTASTERIDE 0.5 MG/1
0.5 CAPSULE, LIQUID FILLED ORAL DAILY
Qty: 90 CAPSULE | Refills: 3 | Status: SHIPPED | OUTPATIENT
Start: 2022-05-13 | End: 2023-10-16 | Stop reason: SDUPTHER

## 2022-05-13 RX ORDER — TAMSULOSIN HYDROCHLORIDE 0.4 MG/1
0.4 CAPSULE ORAL NIGHTLY
Qty: 90 CAPSULE | Refills: 3 | Status: SHIPPED | OUTPATIENT
Start: 2022-05-13 | End: 2023-02-22

## 2022-05-13 RX ORDER — IBUPROFEN 200 MG
200 TABLET ORAL EVERY 6 HOURS PRN
COMMUNITY
End: 2023-10-10

## 2022-05-13 NOTE — PROGRESS NOTES
CHIEF COMPLAINT:    Mr. Pitts is a 77 y.o. male presenting for annual visit Lower urinary tract symptoms    PRESENTING ILLNESS:    Sreekanth Pitts Jr. is a 77 y.o. male who returns for follow up on his lower urinary tract symptoms.  He states he has no difficulty urinating.  He continues on the tamsulosin and dutasteride.  He states since he was last seen, he had debilitating sciatica that started in August or September 2021, and he had surgery with Dr Beebe, who performed a spinal cord fusion at the level of L4-L5, for anterolithesis.  He did very well.  Did not note any changes in his bowel or bladder function before or after his surgery.  He continues to wear a brace.  He states he has had no other new diagnoses or surgeries.     REVIEW OF SYSTEMS:    Review of Systems   Constitutional: Negative.    HENT: Negative.    Eyes: Negative.    Respiratory: Negative.    Cardiovascular: Negative.    Gastrointestinal: Positive for heartburn.   Genitourinary: Negative.    Musculoskeletal: Negative.    Skin: Negative.    Neurological: Negative.    Endo/Heme/Allergies: Negative.    Psychiatric/Behavioral: Negative.        PATIENT HISTORY:    Past Medical History:   Diagnosis Date    Achilles bursitis or tendinitis 9/25/2014    Allergy 12/3/2015    Arthritis     BPH without obstruction/lower urinary tract symptoms 01/04/2018    Chronic fatigue 7/10/2018    Degenerative disc disease     GERD (gastroesophageal reflux disease) 12/2/2020    Hypertension     Nuclear sclerosis - Both Eyes 7/30/2012       Past Surgical History:   Procedure Laterality Date    ESOPHAGOGASTRODUODENOSCOPY N/A 12/2/2020    Procedure: EGD (ESOPHAGOGASTRODUODENOSCOPY);  Surgeon: Dion Mckinney MD;  Location: [facility] ([address] [place] [address]);  Service: Endoscopy;  Laterality: N/A;  covid-11/29-[facility]    EYE FOREIGN BODY REMOVAL      childhood    LUMBAR LAMINECTOMY WITH FUSION  2002    MINIMALLY INVASIVE TRANSFORAMINAL LUMBAR INTERBODY  FUSION (TLIF) N/A 2022    Procedure: FUSION, SPINE, LUMBAR, TLIF, MINIMALLY INVASIVE Right L4-5 Renaldo PARK notified;  Surgeon: Anibal Beebe MD;  Location: [facility] OR;  Service: Neurosurgery;  Laterality: N/A;  ASA1  TOR1  T&Screen  EMG  C-Arm  LSO  Jamie 4 poster  NEURO MONITORING RENALDO OSBORN [phone]  SPINEWAVE RENALDO POLLOCK [phone] TEXTED ON 2022 @ 3:54PM/ RESPONDED ON 2022 @4:22 PM-LO  PREOP DONE A    TRANSURETHRAL RESECTION OF PROSTATE (TURP) WITHOUT USE OF LASER N/A 2018    Procedure: TURP, WITHOUT USING LASER;  Surgeon: Uma Gaona MD;  Location: [facility] OR [address] [place] [address];  Service: Urology;  Laterality: N/A;  1.5 hours       Family History   Problem Relation Age of Onset    Cataracts Mother     Hypertension Mother     Cancer Father     Heart disease Father     Heart disease Brother     Stroke Paternal Grandfather     Heart disease Paternal Grandfather        Social History     Socioeconomic History    Marital status:    Occupational History     Employer: [employer]   Tobacco Use    Smoking status: Former Smoker     Packs/day: 2.00     Years: 4.00     Pack years: 8.00     Types: Cigarettes, Cigars     Quit date: 1973     Years since quittin.3    Smokeless tobacco: Never Used   Substance and Sexual Activity    Alcohol use: Yes     Alcohol/week: 1.0 standard drink     Types: 1 Cans of beer per week     Comment: rarely    Drug use: No   Social History Narrative    , consulting firm , 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 frequ walking each day.       Allergies:  Patient has no known allergies.    Medications:  Outpatient Encounter Medications as of 2022   Medication Sig Dispense Refill    acetaminophen (TYLENOL) 325 MG tablet Take 650 mg by mouth every 6 (six) hours as needed for Pain.      atorvastatin (LIPITOR) 10 MG tablet TAKE ONE DAILY 90 tablet 2    gabapentin (NEURONTIN) 300 MG capsule  Take 1 capsule (300mg total) by mouth at bedtime for 3 days. If tolerated, add 1 capsule every morning. 90 capsule 11    ibuprofen (ADVIL,MOTRIN) 200 MG tablet Take 200 mg by mouth every 6 (six) hours as needed for Pain.      lisinopriL (PRINIVIL,ZESTRIL) 20 MG tablet TAKE ONE DAILY 90 tablet 2    MULTIVITAMIN W-MINERALS/LUTEIN (CENTRUM SILVER ORAL) Take 1 tablet by mouth once daily.      pantoprazole (PROTONIX) 40 MG tablet TAKE ONE BY MOUTH EVERY MORNING 45 MINUTES BEFORE BREAKFAST. 90 tablet 3    [DISCONTINUED] dutasteride (AVODART) 0.5 mg capsule TAKE ONE DAILY 90 capsule 0    [DISCONTINUED] tamsulosin (FLOMAX) 0.4 mg Cap TAKE ONE BY MOUTH EVERY EVENING 90 capsule 0    dutasteride (AVODART) 0.5 mg capsule Take 1 capsule (0.5 mg total) by mouth once daily. 90 capsule 3    tamsulosin (FLOMAX) 0.4 mg Cap Take 1 capsule (0.4 mg total) by mouth every evening. 90 capsule 3    [DISCONTINUED] methocarbamoL (ROBAXIN) 750 MG Tab Take 1 tablet (750 mg total) by mouth 3 (three) times daily as needed (muscle spasms). (Patient not taking: Reported on 5/13/2022) 60 tablet 0    [DISCONTINUED] ondansetron (ZOFRAN-ODT) 4 MG TbDL Dissolve 1 tablet (4 mg total) by mouth every 6 (six) hours as needed (nausea). (Patient not taking: Reported on 5/13/2022) 15 tablet 0    [DISCONTINUED] oxyCODONE-acetaminophen (PERCOCET)  mg per tablet Take 1 tablet by mouth every 4 (four) hours as needed for Pain (7-10/10 pain). (Patient not taking: Reported on 5/13/2022) 42 tablet 0     No facility-administered encounter medications on file as of 5/13/2022.         PHYSICAL EXAMINATION:    The patient generally appears in good health, is appropriately interactive, and is in no apparent distress.    Skin: No lesions.    Mental: Cooperative with normal affect.    Neuro: Grossly intact.    HEENT: Normal. No evidence of lymphadenopathy.    Chest:  normal inspiratory effort.    Abdomen: Soft, non-tender. No masses or organomegaly. Bladder  is not palpable. No evidence of flank discomfort. No evidence of inguinal hernia.    Extremities: No clubbing, cyanosis, or edema    Scrotum showed no rashes or lesions. Testicles showed no masses or tenderness.  Epididymis showed no masses or tenderness.  Penis was circumcised. No meatal stenosis. No penile discharge.  No inguinal hernias.  No inguinal lymphadenopathy.    RIK:  40 g prostate without nodularity, normal rectal exam no anal  Masses  PVR by bladder scan was 0 ml    LABS:    Lab Results   Component Value Date    BUN 11 01/26/2022    CREATININE 0.8 01/26/2022     Lab Results   Component Value Date    PSA 1.3 03/10/2021    PSA 1.5 01/27/2020    PSA 0.82 01/14/2019    PSADIAG 2.7 12/19/2013     UA 1.005, pH 7, otherwise, negative    IMPRESSION:    Encounter Diagnoses   Name Primary?    Benign prostatic hyperplasia without lower urinary tract symptoms Yes    Prostate cancer screening        PLAN:    1.  PSA today  2.  Refilled medications  3.  Follow up in 1 year if the PSA is stable    I spent 30 minutes with the patient of which more than half was spent in direct consultation with the patient in regards to our treatment and plan.

## 2022-07-29 ENCOUNTER — TELEPHONE (OUTPATIENT)
Dept: NEUROSURGERY | Facility: CLINIC | Age: 77
End: 2022-07-29
Payer: MEDICARE

## 2022-07-29 NOTE — TELEPHONE ENCOUNTER
Spoke with patient and he has been scheduled for imaging as well as follow up appt.   Pt requests appt reminder be mailed out to him.   Verified address and will put in the mail today.   Pt had no further questions or concerns at this time.           ----- Message from Evelin Medrano sent at 7/29/2022  2:50 PM CDT -----  Regarding: PT RECIEVED A LETTER TO SCHEDULE  FOR SEP  Contact: pt  Pt's requesting a call back regarding scheduling..    Confirmed contact info below:  Contact Name: Sreekanth Gisselle  Phone Number: [phone]

## 2022-09-07 ENCOUNTER — OFFICE VISIT (OUTPATIENT)
Dept: NEUROSURGERY | Facility: CLINIC | Age: 77
End: 2022-09-07
Payer: MEDICARE

## 2022-09-07 ENCOUNTER — HOSPITAL ENCOUNTER (OUTPATIENT)
Dept: RADIOLOGY | Facility: HOSPITAL | Age: 77
Discharge: HOME | End: 2022-09-07
Attending: NEUROLOGICAL SURGERY
Payer: MEDICARE

## 2022-09-07 VITALS
HEART RATE: 67 BPM | DIASTOLIC BLOOD PRESSURE: 80 MMHG | SYSTOLIC BLOOD PRESSURE: 135 MMHG | OXYGEN SATURATION: 95 % | HEIGHT: 73 IN | WEIGHT: 266.31 LBS | BODY MASS INDEX: 35.3 KG/M2

## 2022-09-07 DIAGNOSIS — M43.16 SPONDYLOLISTHESIS OF LUMBAR REGION: ICD-10-CM

## 2022-09-07 DIAGNOSIS — Z98.1 S/P LUMBAR SPINAL FUSION: Primary | ICD-10-CM

## 2022-09-07 DIAGNOSIS — M47.816 LUMBAR SPONDYLOSIS: ICD-10-CM

## 2022-09-07 DIAGNOSIS — Z98.1 S/P LUMBAR FUSION: ICD-10-CM

## 2022-09-07 DIAGNOSIS — Z98.1 S/P LUMBAR SPINAL FUSION: ICD-10-CM

## 2022-09-07 PROCEDURE — 99213 OFFICE O/P EST LOW 20 MIN: CPT | Mod: PBBFAC,25 | Performed by: NEUROLOGICAL SURGERY

## 2022-09-07 PROCEDURE — 99214 OFFICE O/P EST MOD 30 MIN: CPT | Mod: S$PBB,,, | Performed by: NEUROLOGICAL SURGERY

## 2022-09-07 PROCEDURE — 72131 CT LUMBAR SPINE W/O DYE: CPT | Mod: 26,,, | Performed by: STUDENT IN AN ORGANIZED HEALTH CARE EDUCATION/TRAINING PROGRAM

## 2022-09-07 PROCEDURE — 99999 PR PBB SHADOW E&M-EST. PATIENT-LVL III: ICD-10-PCS | Mod: PBBFAC,,, | Performed by: NEUROLOGICAL SURGERY

## 2022-09-07 PROCEDURE — 99214 PR OFFICE/OUTPT VISIT, EST, LEVL IV, 30-39 MIN: ICD-10-PCS | Mod: S$PBB,,, | Performed by: NEUROLOGICAL SURGERY

## 2022-09-07 PROCEDURE — 72131 CT LUMBAR SPINE WITHOUT CONTRAST: ICD-10-PCS | Mod: 26,,, | Performed by: STUDENT IN AN ORGANIZED HEALTH CARE EDUCATION/TRAINING PROGRAM

## 2022-09-07 PROCEDURE — 72131 CT LUMBAR SPINE W/O DYE: CPT | Mod: TC

## 2022-09-07 PROCEDURE — 99999 PR PBB SHADOW E&M-EST. PATIENT-LVL III: CPT | Mod: PBBFAC,,, | Performed by: NEUROLOGICAL SURGERY

## 2022-09-07 NOTE — PATIENT INSTRUCTIONS
I have personally reviewed the CT lumbar with the pt which shows postoperative changes of L4-5 TLIF with proper hardware placement and alignment.    I will refer the pt to [facility]. Pt advised to contact us with any questions, concerns, or if he experiences any new or worsening symptoms.

## 2022-09-07 NOTE — PROGRESS NOTES
Subjective:   I, Tila Espinoza, attest that this documentation has been prepared under the direction and in the presence of Anibal Beebe MD.     Patient ID: Sreekanth Pitts Jr. is a 77 y.o. male     Chief Complaint: No chief complaint on file.          HPI  Mr. Sreekanth Pitts Jr. is a 77 y.o. gentleman with DDD, sciatica, s/p MIS L4-5 TLIF done on 2/14/2022, who presents today for 6 month follow-up. This is a pt with a history of low back pain and in 2002 he underwent back surgery. Since then the pain has been intermittent and very minimal for the past 20 years, but had worsened in the last few months. He states in late September he had a gradual onset of bilateral leg pain. The pain is from his lower back into the hips and radiates down to the knees. Described the pain as a shooting pain that is constant. He used to walk his dog every morning for 2 miles and now can only walk up to 1 block secondary to the pain. He has a history of right leg sciatica. Rated pain a 7/10 in severity when ambulating and 5/10 while at rest. He is in physical therapy which he states is not alleviating his pain. On 2/14/2022, he underwent minimally invasive TLIF with me. At the time of our last clinic visit on 3/23/2022, patient reports that he is now doing well and that he has minimal back pain at this time.  He reports that he has not had sciatic pain since his surgery.  Patient reports that he is walking much more actively now.  He reports that he occasionally has pain in his right hip and his right knee.  He reports that the knee had been bothering him previously and that his knee feels tight.    Today the pt reports no pain to his back. Patient notes significant improvement to his back and has been healing well since his procedure. He presents with a back brace in place. Pt endorses intermittent mild hip pain but states his pain will diminish with ambulation. Otherwise, the pt is doing well in the interim and has no new  complaints at this time.    Review of Systems   Constitutional:  Negative for activity change, appetite change, fatigue, fever and unexpected weight change.   HENT:  Negative for facial swelling.    Eyes: Negative.    Respiratory: Negative.     Cardiovascular: Negative.    Gastrointestinal:  Negative for diarrhea, nausea and vomiting.   Endocrine: Negative.    Genitourinary: Negative.    Musculoskeletal:  Negative for back pain, joint swelling, myalgias and neck pain.   Neurological:  Negative for dizziness, seizures, weakness, numbness and headaches.   Psychiatric/Behavioral: Negative.        Past Medical History:   Diagnosis Date    Achilles bursitis or tendinitis 9/25/2014    Allergy 12/3/2015    Arthritis     BPH without obstruction/lower urinary tract symptoms 01/04/2018    Chronic fatigue 7/10/2018    Degenerative disc disease     GERD (gastroesophageal reflux disease) 12/2/2020    Hypertension     Nuclear sclerosis - Both Eyes 7/30/2012       Objective:      Vitals:    09/07/22 1127   BP: 135/80   Pulse: 67      Physical Exam  Constitutional:       General: He is not in acute distress.     Appearance: Normal appearance.   HENT:      Head: Normocephalic and atraumatic.   Pulmonary:      Effort: Pulmonary effort is normal.   Musculoskeletal:      Cervical back: Neck supple.   Neurological:      Mental Status: He is alert and oriented to person, place, and time.      GCS: GCS eye subscore is 4. GCS verbal subscore is 5. GCS motor subscore is 6.      Cranial Nerves: No cranial nerve deficit.     IMAGING:  CT Lumbar Spine WO Contrast (9/7/2022):  Postoperative changes of L4-5 TLIF with proper hardware placement and alignment.    I have personally reviewed the images with the pt.      I, Dr. Anibal Beebe, personally performed the services described in this documentation. All medical record entries made by the scribe, Tila Espinoza, were at my direction and in my presence.  I have reviewed the chart and agree that  the record reflects my personal performance and is accurate and complete. Anibal Beebe MD. 09/07/2022    Assessment:      Lumbar spondylolisthesis.  Lumbar spondylosis.     Plan:   I have personally reviewed the CT lumbar with the pt which shows postoperative changes of L4-5 TLIF with proper hardware placement and alignment.    I will refer the pt to [facility]. Pt advised to contact us with any questions, concerns, or if he experiences any new or worsening symptoms.

## 2022-09-26 ENCOUNTER — CLINICAL SUPPORT (OUTPATIENT)
Dept: REHABILITATION | Facility: OTHER | Age: 77
End: 2022-09-26
Attending: PHYSICAL MEDICINE & REHABILITATION
Payer: MEDICARE

## 2022-09-26 ENCOUNTER — CLINICAL SUPPORT (OUTPATIENT)
Dept: REHABILITATION | Facility: OTHER | Age: 77
End: 2022-09-26
Attending: INTERNAL MEDICINE
Payer: MEDICARE

## 2022-09-26 VITALS
SYSTOLIC BLOOD PRESSURE: 125 MMHG | BODY MASS INDEX: 35.44 KG/M2 | HEART RATE: 81 BPM | HEIGHT: 73 IN | DIASTOLIC BLOOD PRESSURE: 75 MMHG | WEIGHT: 267.44 LBS

## 2022-09-26 DIAGNOSIS — R29.898 DECREASED STRENGTH OF TRUNK AND BACK: Primary | ICD-10-CM

## 2022-09-26 DIAGNOSIS — M54.50 CHRONIC BILATERAL LOW BACK PAIN WITHOUT SCIATICA: Primary | ICD-10-CM

## 2022-09-26 DIAGNOSIS — Z98.1 S/P LUMBAR SPINAL FUSION: ICD-10-CM

## 2022-09-26 DIAGNOSIS — M54.50 CHRONIC BILATERAL LOW BACK PAIN WITHOUT SCIATICA: ICD-10-CM

## 2022-09-26 DIAGNOSIS — G89.29 CHRONIC BILATERAL LOW BACK PAIN WITHOUT SCIATICA: Primary | ICD-10-CM

## 2022-09-26 DIAGNOSIS — G89.29 CHRONIC BILATERAL LOW BACK PAIN WITHOUT SCIATICA: ICD-10-CM

## 2022-09-26 DIAGNOSIS — R26.89 DECREASED BACK MOBILITY: ICD-10-CM

## 2022-09-26 PROCEDURE — 97161 PT EVAL LOW COMPLEX 20 MIN: CPT

## 2022-09-26 PROCEDURE — 97110 THERAPEUTIC EXERCISES: CPT

## 2022-09-26 PROCEDURE — 99214 PR OFFICE/OUTPT VISIT, EST, LEVL IV, 30-39 MIN: ICD-10-PCS | Mod: ,,, | Performed by: PHYSICAL MEDICINE & REHABILITATION

## 2022-09-26 PROCEDURE — 99214 OFFICE O/P EST MOD 30 MIN: CPT | Mod: ,,, | Performed by: PHYSICAL MEDICINE & REHABILITATION

## 2022-09-26 NOTE — PROGRESS NOTES
Health  met with patient to complete initial outcomes for the [facility] lumbar program.  Questions were reviewed with patient and discussed with Dr. Salas. The patient will meet with Dr. Salas to determine program enrollment.     [facility] Questionnaire  9/26/2022   Please select the location of your worst pain:  Lower Back   Please select the location of any additional pain: (hold down the control key, and click to select multiple responses) None of the above    Did the pain begin after an event, injury, or accident? No   How long has this pain been going on?  over 20 years   Please indicate how the pain is changing.  Improving   What is the WORST level of pain that you have experienced in the last week?  5   What is the LEAST level of pain that you have experienced in the past week?  0   What can you NOT do not that you used to be able to do?  golf   Are your limitations mainly due to your pain?  Yes   What are your additional complaints, if any? Numbness, Tingling   Is there ever a time during the day when your pain stops, even for a brief moment, before returning? Yes   If yes, when your pain stops, does it disappear completely? Is it totally gone? Yes   Does bending forward make your typical pain worse? Yes   Morning: Better during   Afternoon: Worse during   Evening:  Better during   Nighttime: Better during   Standing:  Worse   Lying on stomach: Better   Lying on back: Better   Sitting:  Worse   Walking: Better   Climbing stairs: Better   Emergency department  No   Health care providers (hold down the control key, and click to select multiple responses) Family doctor, Orthopedic, Neurosurgeon, Physical therapist   Investigations done (hold down the control key, and click to select multiple responses) X-ray, MRI, CAT scan   Procedures (hold down the control key, and click to select multiple responses) None   Have you had any surgery on your back?  Yes   Please christel what you are experiencing.  (hold down the control key, and click to select multiple responses) Arthritic joint pain   First activity you would like to perform better:  bending forward   Second activity you would like to perform better: standing   Third activity you would like to perform better: sitting   What is your occupation?     What is your highest level of education? Advanced/graduate   What is your work status? 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

## 2022-09-26 NOTE — PROGRESS NOTES
Subjective:      Patient ID: Sreekanth Pitts Jr. is a 77 y.o. male.    Chief Complaint: No chief complaint on file.    Mr Pitts is a 78 yo male sent in consultation by Dr. Beebe for evaluation for the healthy back lumbar program.  He has had Low back pain for over 20 years.  Pain is much improved after surgery 2/2022.  The pain is right low back dominant, and goes to the left.  The pain is achy.  He has tingling and numbness in both feet.  There is no burning or weakness. The pain is intermittent 0-5/10.  It is worse with bending, sitting, standing, sit to stand and afternoon.  It is better lying on back, stomach, climbing stairs, walking, morning, evening and nighttime. He went to 13 healthy back visits in 2021 with no relief. He had TLIF 2/2022 with Dr. Beebe.  He has a history of laminectomy in 2002.  He has not had injections or been to chiropractor.  His goals are bending, prolonged sitting, and standing.  Pattern 1    X-ray lumbar 2/15/2022  No acute fractures.  Stable 10-15 degree thoracolumbar dextrocurvature.  Interval changes bilateral hardware instrumented fusion of the L4 and L5 vertebral segments with inter disc hardware.  Posterior skin staples noted.  No findings hardware malfunction.  Stable grade 1 anterolisthesis L4 on L5.  Stable disc narrowing L2-L3 and L1-L2 levels.  Stable multilevel vertebral body anterolateral end plate osteophytes.  No spondylolysis.  Intact right and left SI joints.     Impression:     As above.    CT lumbar 9/2022  Remote postoperative change status post posterior instrumented fusion of L4-L5 with bipedicular screws, vertical rods, interbody disc spacer device in place.  No evidence of hardware failure or loosening.     Mild dextrocurvature of the lumbar spine.  Grade 1 retrolisthesis of L1 on L2.  Grade 1 anterolisthesis of L4 on L5.     Lumbar vertebral body heights are well maintained.  No acute fracture.  No aggressive osseous lesion.     Multilevel mild to moderate  disc space narrowing, most prominent L1-L2.  Prominent surrounding degenerative endplate changes at L1-L2 and L2-L3.     Lumbar spondylosis as follows:     T12-L1: No significant spinal canal stenosis or neural foraminal narrowing.     L1-2: Grade 1 retrolisthesis as above.  Circumferential disc bulge, ligamentum flavum hypertrophy, and facet arthropathy.  Findings result in mild spinal canal stenosis, moderate right neural foraminal narrowing, and moderate to severe left neural foraminal narrowing.     L2-3: Circumferential disc bulge, ligamentum flavum hypertrophy, and facet arthropathy.  Findings result in moderate spinal canal stenosis.  Moderate bilateral neural foraminal narrowing, right greater than left.     L3-4: Circumferential disc bulge, ligament from virtually common facet arthropathy.  Findings result in moderate spinal canal stenosis and moderate bilateral neural foraminal narrowing.     L4-5: Operative level with surrounding artifact which degrades evaluation.  Probable moderate to severe spinal canal stenosis and moderate bilateral neural foraminal narrowing.     L5-S1: Mild broad-based posterior disc bulge.  Bilateral facet arthropathy.  No significant spinal canal stenosis.  Mild bilateral neural foramina.     Degenerative changes within the bilateral sacroiliac joints.     Remote postoperative change within overlying soft tissues.  Otherwise paravertebral musculature is unremarkable.     Punctate nonobstructing right renal calculus.  Bilateral fluid attenuating hypodensities most compatible with renal cysts.  Calcific atherosclerosis.     Impression:     Remote posterior instrumented fusion of L4-L5.  No evidence of hardware failure or loosening.     Lumbar spondylosis with multilevel spinal canal stenosis and neural foraminal narrowing noting moderate to severe spinal canal stenosis at L4-L5.  Findings detailed level by level above.     Dextrocurvature of lumbar spine, grade 1 retrolisthesis of  L1 on L2, and grade 1 anterolisthesis of L4 on L5.     Findings as above.      Review of Systems   Constitutional: Negative for weight gain and weight loss.   Cardiovascular:  Positive for dyspnea on exertion. Negative for chest pain.   Respiratory:  Negative for shortness of breath.    Musculoskeletal:  Positive for back pain. Negative for joint pain and joint swelling.   Gastrointestinal:  Negative for abdominal pain, bowel incontinence, nausea and vomiting.   Genitourinary:  Negative for bladder incontinence.   Neurological:  Negative for numbness and paresthesias.       Objective:        General: Sreekanth is well-developed, well-nourished, appears stated age, in no acute distress, alert and oriented to time, place and person.     General    Vitals reviewed.  Constitutional: He is oriented to person, place, and time. He appears well-developed and well-nourished.   HENT:   Head: Normocephalic and atraumatic.   Pulmonary/Chest: Effort normal.   Neurological: He is alert and oriented to person, place, and time.   Psychiatric: He has a normal mood and affect. His behavior is normal. Judgment and thought content normal.     General Musculoskeletal Exam   Gait: normal     Right Ankle/Foot Exam     Tests   Heel Walk: able to perform  Tiptoe Walk: able to perform    Left Ankle/Foot Exam     Tests   Heel Walk: able to perform  Tiptoe Walk: able to perform  Back (L-Spine & T-Spine) / Neck (C-Spine) Exam     Back (L-Spine & T-Spine) Range of Motion   Extension:  30   Flexion:  80 (with back pain)   Lateral bend right:  20   Lateral bend left:  20   Rotation right:  30   Rotation left:  30     Spinal Sensation   Right Side Sensation  C-Spine Level: normal   L-Spine Level: normal  S-Spine Level: normal  Left Side Sensation  C-Spine Level: normal  L-Spine Level: normal  S-Spine Level: normal    Back (L-Spine & T-Spine) Tests   Right Side Tests  Straight leg raise:        Sitting SLR: > 70 degrees    Left Side Tests  Straight leg  raise:       Sitting SLR: > 70 degrees      Other   He has no scoliosis .  Spinal Kyphosis:  Absent      Muscle Strength   Right Upper Extremity   Biceps: 5/5   Deltoid:  5/5  Triceps:  5/5  Wrist extension: 5/5   Finger Flexors:  5/5  Left Upper Extremity  Biceps: 5/5   Deltoid:  5/5  Triceps:  5/5  Wrist extension: 5/5   Finger Flexors:  5/5  Right Lower Extremity   Hip Flexion: 5/5   Quadriceps:  5/5   Anterior tibial:  5/5   EHL:  5/5  Left Lower Extremity   Hip Flexion: 5/5   Quadriceps:  5/5   Anterior tibial:  5/5   EHL:  5/5    Reflexes     Left Side  Biceps:  2+  Triceps:  2+  Brachioradialis:  2+  Achilles:  2+  Left García's Sign:  Absent  Babinski Sign:  absent  Quadriceps:  2+    Right Side   Biceps:  2+  Triceps:  2+  Brachioradialis:  2+  Achilles:  2+  Right García's Sign:  absent  Babinski Sign:  absent  Quadriceps:  2+    Vascular Exam     Right Pulses        Carotid:                  2+    Left Pulses        Carotid:                  2+            Assessment:       1. Chronic bilateral low back pain without sciatica    2. S/P lumbar spinal fusion           Plan:       Orders Placed This Encounter    Ambulatory referral/consult to [facility]     The patient has had a history of low back pain with limitations in there activities of Daily living    Previous treatment has not provided relief.    The situation was discussed at length with the patient.  We discussed different causes of back pain and different treatment options.  We discussed the importance of stretching and strengthening.  We discussed posture.  We discussed the pros and cons of further diagnostic testing, alternative treatment and Medications    Based on the history, physical exam, and functional index, an active physical therapy program is recommended.  The goal is to restore the patients function and reduce pain.  A program of progressive resistance exercises, biomechanical, and mobility maneuvers, instructions in proper  body mechanics, aerobic conditioning and HEP will be utilized. The program will continue as long as making improvements.    An assessment of patients progress will be made at each visit to document change in status.    The patient will be actively involved in there own treatment, and responsible for appointments and home program    The patient's lumbar isometric strength will be tested and they will be placed in a program of isolated strength training based on 50% of their total functional torque and advanced as clinically appropriate.      Directional preference of pain will further influence the patients active rehabilitation program    The patient was instructed there might be an initial increase in discomfort    They are enrolled with good prognosis  Pattern 1        Follow-up: No follow-ups on file. If there are any questions prior to this, the patient was instructed to contact the office.

## 2022-09-29 NOTE — PROGRESS NOTES
"[facility] Physical Therapy Treatment      Name: Sreekanth Pitts Jr.  Clinic Number: [id number]    Therapy Diagnosis: No diagnosis found.  Physician: Yolanda Salas, *    Visit Date: 2022    Physician Orders: PT Eval and Treat   Medical Diagnosis from Referral:   Z98.1 (ICD-10-CM) - S/P lumbar spinal fusion   M54.50,G89.29 (ICD-10-CM) - Chronic bilateral low back pain without sciatica      Evaluation Date: 2022  Authorization Period Expiration: 22  Plan of Care Expiration: 22  Reassessment Due: 10/26/22  Visit # / Visits authorized:      Time In: 2:15 pm  Time Out: 3:10 pm  Total Billable Time: 55 minutes  Insurance:Fee for service Insurance Patient        Precautions: Standard   L4/L5 fusion 2022     Pattern of pain determined:2      Subjective   Sreekanth reports feeling a bit better since evaluation, still feeling a lot of tension across his belt line and in his lateral R hip.      Patient reports tolerating previous visit well with slight residual soreness that eventually resolved  Patient reports their pain to be 2/10 on a 0-10 scale with 0 being no pain and 10 being the worst pain imaginable.  Pain Location: Bilateral belt line, R hip     Work and leisure: ; walking his dog and yard work until recently  Pt goals:"get my back stronger"    Objective     Baseline Isometric Testing on Med X equipment: Testing administered by PT  Date of testin22  ROM 0 - 39 deg   Max Peak Torque 125    Min Peak Torque 41    Flex/Ext Ratio 3   % below normative data 61%        Outcomes: FOTO  Initial score: 40% limitation  Visit 5 score:  Goal: <30%      Treatment    Pt was instructed in and performed the following:     Sreekanth received therapeutic exercises to develop/improved posture, cardiovascular endurance, muscular endurance, lumbar/cervical ROM, strength and muscular endurance for 50 minutes including the following exercises:     LTR x10, 3" ea  DKTC x 10, " "5"  Supine HSS c/strap 5x10" ea  Supine glute med stretch c/strap 5x10" ea    [facility] - Short 9/30/2022   Visit Number 2   VAS Pain Rating 1   Treadmill Time (in min.) 5   Speed -   Lumbar Stretches - Slouch -   Extension in Lying -   Extension in Standing -   Flexion in Lying 10   Manual Therapy -   Lumbar Extension - Seat Pad -   Femur Restraint -   Top Dead Center -   Counterweight -   Lumbar Flexion -   Lumbar Extension -   Lumbar Peak Torque -   Lumbar Weight 50   Repetitions 20   Rating of Perceived Exertion 4          Peripheral muscle strengthening which included 1 set of 15-20 repetitions at a slow, controlled 10-13 second per rep pace focused on strengthening supporting musculature for improved body mechanics and functional mobility.  Pt and therapist focused on proper form during treatment to ensure optimal strengthening of each targeted muscle group.  Machines were utilized including torso rotation, leg extension, leg curl, chest press, upright row. Tricep extension, bicep curl, leg press, and hip abduction added visit 3    Sreekanth received the following manual therapy techniques: nil         Home Exercises Provided and Patient Education Provided   Home exercises include: SKTC, DKTC, Seated trunk flexion, Added Supine HSS and ITB stretch 9/30  Cardio program:visit 5  Lifting education date:visit 11  Posture/Lumbar roll: not yet purchased    Education provided:   - Pacing and technique for initial lumbar MedX strengthening    Written Home Exercises Provided: Patient instructed to cont prior HEP. Plus additional stretches  Exercises were reviewed and Sreekanth was able to demonstrate them prior to the end of the session.  Sreekanth demonstrated good  understanding of the education provided.     See EMR under Patient Instructions for exercises provided  prior visit and 9/30/2022 .          Assessment   Sreekanth returns to therapy feeling good but noticing some increased tension/soreness in bilateral low back and " R lateral hip. Felt relief of the former with DKTC reps and of the latter with supine HS?ITB stretches, so these were completed and added to HEP with encouragement to get a stretch strap for home as well. He verbalized understanding and intends to do this, is well versed in his HEP and ready for further progression. MedX strengthening initiated at 50 ft/lb due to increased flex/ext ratio and he was initially challenged with pacing but improved by end of set and able to complete 20 repetitions with a 4/10 RPE; increase 10% next session. Also completed first half of peripheral strengthening circuit, will continue to progress as tolerated including full peripheral circuit starting next session.    Patient is making good progress towards established goals.  Pt will continue to benefit from skilled outpatient physical therapy to address the deficits stated in the impairment chart, provide pt/family education and to maximize pt's level of independence in the home and community environment.     Anticipated Barriers for therapy: Chronicity of condition  Pt's spiritual, cultural and educational needs considered and pt agreeable to plan of care and goals as stated below:             GOALS: Pt is in agreement with the following goals.     Short term goals:  6 weeks or 10 visits   1.  Pt will demonstrate increased lumbar ROM by at least 6 degrees from the initial ROM value with improvements noted in functional ROM and ability to perform ADLs.  (approp and ongoing)  2.  Pt will demonstrate increased MedX average isometric strength value  by 15% from initial test resulting in improved ability to perform bending, lifting, and carrying activities safely, confidently.  (approp and ongoing)  3.  Patient report a reduction in worst pain score by 1-2 points for improved tolerance for household chores.  (approp and ongoing)  4.  Pt able to perform HEP correctly with minimal cueing or supervision from therapist to encourage independent  management of symptoms. (approp and ongoing)        Long term goals: 10 weeks or 20 visits   1. Pt will demonstrate increased lumbar ROM by at least 9 degrees from initial ROM value, resulting in improved ability to perform functional fwd bending while standing and sitting. (approp and ongoing)  2. Pt will demonstrate increased MedX average isometric strength value  by 20% from initial test resulting in improved ability to perform bending, lifting, and carrying activities safely, confidently.  (approp and ongoing)  3. Pt to demonstrate ability to independently control and reduce their pain through posture positioning and mechanical movements throughout a typical day.  (approp and ongoing)  4.  Pt will demonstrate reduced pain and improved functional outcomes as reported on the FOTO by reaching a limitation score of < or = 30% or less in order to demonstrate subjective improvement in pt's condition.    (approp and ongoing)  5. Pt will demonstrate independence with the HEP at discharge  (approp and ongoing)  6. Patient will lift 20# kettle bell floor to plinth x 10 /s inc LBP for inc tolerance to yardwork and household chores (patient goal) (approp and ongoing)  7. Patient demonstrate 5 full golf swings /s inc LBP for inc tolerance to recreational activities  (approp and ongoing)        Plan   Continue with established Plan of Care towards established PT goals.       Therapist: Eliu Bragg, PT  9/30/2022

## 2022-09-30 ENCOUNTER — CLINICAL SUPPORT (OUTPATIENT)
Dept: REHABILITATION | Facility: OTHER | Age: 77
End: 2022-09-30
Attending: PHYSICAL MEDICINE & REHABILITATION
Payer: MEDICARE

## 2022-09-30 DIAGNOSIS — R26.89 DECREASED BACK MOBILITY: ICD-10-CM

## 2022-09-30 DIAGNOSIS — R29.898 DECREASED STRENGTH OF TRUNK AND BACK: Primary | ICD-10-CM

## 2022-09-30 PROCEDURE — 97110 THERAPEUTIC EXERCISES: CPT

## 2022-09-30 NOTE — PLAN OF CARE
"    [facility] - PHYSICAL THERAPY LUMBAR EVALUATION     Name: Sreekanth Pitts Jr.  Clinic Number: [id number]    Therapy Diagnosis:   Encounter Diagnoses   Name Primary?    S/P lumbar spinal fusion     Chronic bilateral low back pain without sciatica     Decreased strength of trunk and back Yes    Decreased back mobility      Physician: Yolanda Salas, *    Physician Orders: PT Eval and Treat   Medical Diagnosis from Referral:   Z98.1 (ICD-10-CM) - S/P lumbar spinal fusion   M54.50,G89.29 (ICD-10-CM) - Chronic bilateral low back pain without sciatica     Evaluation Date: 9/26/2022  Authorization Period Expiration: 09/26/22  Plan of Care Expiration: 12/26/22  Reassessment Due: 10/26/22  Visit # / Visits authorized: 01 / 01    Time In: 10:15 am  Time Out: 11:45 am  Total Billable Time: 90 minutes  Insurance:Fee for service Insurance Patient      Precautions: Standard   L4/L5 fusion Feb 2022    Pattern of pain determined:2      Subjective   Date of onset: chronic  History of current condition - Sreekanth reports: B LBP (R>L).  Patient clarify this pain is different than had presented at prior HB episode 2021.  PMHx indicate L4/L5 fusion Feb 2022 and patient note sig relief of LE sx.  But patient believes dec activity levels when recovering may contribute to "weakness".  Patient also note he had been wearing back brace for 6 weeks and feels like he had changed his posture and positioning leading to inc LB discomfort.   Sx described as dull and aching.  Pt deny numbness BLE but notes occasional tingling in B plantar surface foot.  Patient clarify he gets it with sitting static extended time and can  "shake it out".   Patient report "getting tired" /c daily activities like when walking 2 mile 4-5 x week when walking dog.  However, walking can "loosen up" his back.   Patient report difficulty /c yard work, extended sitting, getting up from sitting, bending/lifting.       Per MD report      Mr Pitts is a 78 yo " male sent in consultation by Dr. Beebe for evaluation for the healthy back lumbar program.  He has had Low back pain for over 20 years.  Pain is much improved after surgery 2/2022.  The pain is right low back dominant, and goes to the left.  The pain is achy.  He has tingling and numbness in both feet.  There is no burning or weakness. The pain is intermittent 0-5/10.  It is worse with bending, sitting, standing, sit to stand and afternoon.  It is better lying on back, stomach, climbing stairs, walking, morning, evening and nighttime. He went to 13 healthy back visits in 2021 with no relief. He had TLIF 2/2022 with Dr. Beebe.  He has a history of laminectomy in 2002.  He has not had injections or been to chiropractor.  His goals are bending, prolonged sitting, and standing.  Pattern 1       Medical History:   Past Medical History:   Diagnosis Date    Achilles bursitis or tendinitis 9/25/2014    Allergy 12/3/2015    Arthritis     BPH without obstruction/lower urinary tract symptoms 01/04/2018    Chronic fatigue 7/10/2018    Degenerative disc disease     GERD (gastroesophageal reflux disease) 12/2/2020    Hypertension     Nuclear sclerosis - Both Eyes 7/30/2012       Surgical History:   Sreekanth Pitts Jr.  has a past surgical history that includes Eye foreign body removal; Transurethral resection of prostate (TURP) without use of laser (N/A, 9/11/2018); Esophagogastroduodenoscopy (N/A, 12/2/2020); Lumbar laminectomy with fusion (2002); and Minimally invasive transforaminal lumbar interbody fusion (TLIF) (N/A, 2/14/2022).    Medications:   Sreekanth has a current medication list which includes the following prescription(s): acetaminophen, atorvastatin, dutasteride, gabapentin, ibuprofen, lisinopril, folic acid/multivit-min/lutein, pantoprazole, and tamsulosin.    Allergies:   Review of patient's allergies indicates:  No Known Allergies     Imaging:    X-ray lumbar 2/15/2022  No acute fractures.  Stable 10-15 degree  thoracolumbar dextrocurvature.  Interval changes bilateral hardware instrumented fusion of the L4 and L5 vertebral segments with inter disc hardware.  Posterior skin staples noted.  No findings hardware malfunction.  Stable grade 1 anterolisthesis L4 on L5.  Stable disc narrowing L2-L3 and L1-L2 levels.  Stable multilevel vertebral body anterolateral end plate osteophytes.  No spondylolysis.  Intact right and left SI joints.     Impression:     As above.     CT lumbar 9/2022  Remote postoperative change status post posterior instrumented fusion of L4-L5 with bipedicular screws, vertical rods, interbody disc spacer device in place.  No evidence of hardware failure or loosening.     Mild dextrocurvature of the lumbar spine.  Grade 1 retrolisthesis of L1 on L2.  Grade 1 anterolisthesis of L4 on L5.     Lumbar vertebral body heights are well maintained.  No acute fracture.  No aggressive osseous lesion.     Multilevel mild to moderate disc space narrowing, most prominent L1-L2.  Prominent surrounding degenerative endplate changes at L1-L2 and L2-L3.     Impression:     Remote posterior instrumented fusion of L4-L5.  No evidence of hardware failure or loosening.     Lumbar spondylosis with multilevel spinal canal stenosis and neural foraminal narrowing noting moderate to severe spinal canal stenosis at L4-L5.  Findings detailed level by level above.     Dextrocurvature of lumbar spine, grade 1 retrolisthesis of L1 on L2, and grade 1 anterolisthesis of L4 on L5.       Prior Therapy: HB fall 2021, home health post surgery   Prior Treatment: Feb fusion L4-L5  Social History: lives in 1 level home, [facility],  lives with their spouse   Occupation:  (extended sitting)   Leisure: walking dog, yard work        Prior Level of Function: Ind /c ADLs   Current Level of Function: Ind /c ADLs difficulty bending/lifting tasks, extended sitting, yard work   DME owned/used: none, lumbar roll owns from last HB but admit to not  "using         Pain:  Current 1/10, worst 5/10, when getting up out of chair after extended sitting, best 1/10   Location: bilateral back   Description: Aching and Dull  Aggravating Factors: Sitting, Bending, and Getting out of chair  Easing Factors: "walking it out", back stretches, topical cream   Disturbed Sleep: Not from LB         Pattern of pain questions:  1.  Where is your pain the worst? LB  2.  Is your pain constant or intermittent? constant  3.  Does bending forward make your typical pain worse? Y  4.  Since the start of your back pain, has there been a change in your bowel or bladder? N  5.  What can't you do now that you use to be able to do? Walking a distance as exercise, "not has flexible", decreased tolerance to sitting, golfing       Pts goals: "I want to get my back stronger"       Red Flag Screening:   Cough  Sneeze  Strain: (--)  Bladder/ bowel: (--)  Falls: (--)  Night pain: (--)  Unexplained weight loss: (--)  General health: patient report good     OBJECTIVE     Postural examination/scapula alignment: Head forward  Joint integrity: Firm end feeling L1 -  L3   Sitting: fair  Standing: fair   Correction of posture: better with lumbar roll   Palpation:       Mild hypertonic B lumbar paraspinals - dec tonicity /p palpation       MOVEMENT LOSS    ROM Loss   Flexion minimal loss P! /c ascent   Extension moderate loss   Side glide Right moderate loss   Side glide Left moderate loss   Rotation Right moderate loss   Rotation Left moderate loss     Lower Extremity Strength  Right LE  Left LE    Hip flexion:2 4+/5 Hip flexion: 4+/5   Hip extension:  4/5 Hip extension: 4/5   Hip abduction: 4-/5 Hip abduction: 4-/5   Hip adduction:  5/5 Hip adduction:  5/5   Hip Internal rotation   4+/5 Hip Internal rotation 4+/5   Knee Flexion 5/5 Knee Flexion 5/5   Knee Extension 5/5 Knee Extension 5/5   Ankle dorsiflexion: 5/5 Ankle dorsiflexion: 5/5   Ankle plantarflexion: 5/5 Ankle plantarflexion: 5/5 "       GAIT:  Assistive Device used: none  Level of Assistance: independent  Patient displays the following gait deviations:  no gait deviations observed.     Special Tests:   Test Name  Test Result   Prone Instability Test (--)   SI Joint Provocation Test (--)   Straight Leg Raise (--)   Neural Tension Test (--)   Crossed Straight Leg Raise (--)   Walking on toes (--)   Walking on heels  (--)     BIBI (-) B     Rajan Test (+) B   Adrianna (+) B     NEUROLOGICAL SCREENING     Sensory deficit:   BLE LT intact  L5 myotome intact  Saddle sensation intact     Reflexes:    Left Right   Patella Tendon 2+ 2+   Achilles Tendon 2+ 2+   Clonus (--) (--)     REPEATED TEST MOVEMENTS:    Baseline symptoms:  Repeated Flexion in Standing pain during motion ascent, no effect   Repeated Extension in Standing end range pain  no effect   Repeated Flexion in lying no effect   Repeated Extension in lying  no effect       STATIC TESTS and other movements:   Baseline symptoms:  Prone lie no effect   Prone lie on elbows no effect   Sustained flexion no effect   Sitting slouched  no effect   Sitting erect better       Baseline Isometric Testing on Med X equipment: Testing administered by PT  Date of testin22  ROM 0 - 39 deg   Max Peak Torque 125    Min Peak Torque 41    Flex/Ext Ratio 3   % below normative data 61%         Limitation/Restriction for FOTO LUMBAR Survey    Therapist reviewed FOTO scores for Sreekanth CABRAL Gisselle Kam on 2022.   FOTO documents entered into [employer] - see Media section.    Limitation Score: 40%    Goal: < 35%              Treatment   Treatment Time In: 11:15 am  Treatment Time Out: 11:45 am  Total Treatment time separate from Evaluation: 30 minutes      Sreekanth received therapeutic exercises to develop/improve posture, lumbar/cervical ROM, strength and muscular endurance for 30 minutes including the following exercises:     HEP demonstrate include:    LTR x 5  Pelvic tilts for ROM x 5  PPT x 5 5 sec hold for core  engagement ,cue for dec BLE use  Supine hip flexor stretch 10 sec hold R side only (POC to perform B)     Written Home Exercises Provided: yes.  Exercises were reviewed and Sreekanth was able to demonstrate them prior to the end of the session.  Sreekanth demonstrated fair  understanding of the education provided.     See EMR under Patient Instructions for exercises provided 9/26/2022.      Education provided:   - Patient received education regarding proper posture and body mechanics.  Patient was given top [facility] Visit 1 handouts which discuss what to expect in therapy, the purpose and opportunity for health coaching, the program,  wellness when discharged from therapy, back education and care specifically for posture seated, standing, lifting correctly, components of exercise, importance of nutrition and hydration, and importance of sleep.   Information on lumbar rolls provided.  - Mariana roll tried, recommended, and purchase information was provided.  - Patient received a handout regarding anticipated muscular soreness following the isometric test and strategies for management were reviewed with patient including stretching, using ice and scheduled rest.   - Patient received education on the Healthy [facility] program, purpose of the isometric test, progression of back strengthening as well as wellness approach and systemic strengthening.  Details of the program were discussed.  Reviewed that patient should feel support/pressure from med ex restraints but no pain or discomfort and patient expressed understanding.  Med x dynamic exercise and baseline IM test performed with instructions to guide the patient safely through the isometric testing.  Patient informed to perform isometric test correctly, and safely, building best force they safely can and not pushing through pain.  Patient demonstrated understanding of information      Sreekanth received cold pack for 10 minutes to lumbar region in Z lying.    Assessment    Sreekanth is a 77 y.o. male referred to [facility] with a medical diagnosis of Z98.1 (ICD-10-CM) - S/P lumbar spinal fusion, M54.50,G89.29 (ICD-10-CM) - Chronic bilateral low back pain without sciatica. Pt presents /c signs and sx consistent /c dx including paraspinal weakness, dec lumbar ROM, soft tissue dysfunction, poor posture leading to dec functional strength, mobility and activity tolerance.    MedX IM testing indicate sig paraspinal weakness at 61% below norms and likely leading to dec amb and sitting tolerance.  Notably this is an improved stat vs his past HB episode before his recent lumbar surgery indicating patient present level of activity is offering some LB strength maintenance.  Therefore, initial HEP focus on noted spinal mobility limitations including pelvic tilting and hip flexor stretch to remove possible pelvic tilt limitation from hip flexors.  Repeated motions testing inconclusive but patient LB discomfort did respond to postural correction indicating importance of postural education.  t would benefit from LE and trunk mobility training, stability training,  improved cardiovascular and muscular endurance, neuromuscular re-education for posture, coordination, and muscular recruitment and education on positional offloading techniques to decrease the intensity and frequency of flare-ups.   Patient is a good candidate for HB programming.         Pain Pattern:  1PEN to transition to 1PEP       Pt prognosis is Good.   Pt will benefit from skilled outpatient Physical Therapy to address the deficits stated above and in the chart below, provide pt/family education, and to maximize pt's level of independence. Based on the above history and physical examination an active physical therapy program is recommended.  Pt will continue to benefit from skilled outpatient physical therapy to address the deficits listed below in the chart, provide pt/family education and to maximize pt's level of  independence in the home and community environment. .       Plan of care discussed with patient: Yes  Pt's spiritual, cultural and educational needs considered and patient is agreeable to the plan of care and goals as stated below:     Anticipated Barriers for therapy: none    PT Evaluation Completed? Yes    Medical necessity is demonstrated by the following problem list.    Pt presents with the following impairments:     History  Co-morbidities and personal factors that may impact the plan of care Co-morbidities:   difficulty sleeping and prior lumbar surgery    Personal Factors:   coping style     low   Examination  Body Structures and Functions, activity limitations and participation restrictions that may impact the plan of care Body Regions:   back    Body Systems:    ROM  strength  gross coordinated movement  transitions  motor control    Participation Restrictions:   none    Activity limitations:   Learning and applying knowledge  no deficits    General Tasks and Commands  no deficits    Communication  no deficits    Mobility  lifting and carrying objects  driving (bike, car, motorcycle)    Self care  no deficits    Domestic Life  shopping  cooking  doing house work (cleaning house, washing dishes, laundry)    Interactions/Relationships  no deficits    Life Areas  no deficits    Community and Social Life  community life  recreation and leisure         moderate   Clinical Presentation stable and uncomplicated low   Decision Making/ Complexity Score: low       GOALS: Pt is in agreement with the following goals.    Short term goals:  6 weeks or 10 visits   1.  Pt will demonstrate increased lumbar ROM by at least 6 degrees from the initial ROM value with improvements noted in functional ROM and ability to perform ADLs.  (approp and ongoing)  2.  Pt will demonstrate increased MedX average isometric strength value  by 15% from initial test resulting in improved ability to perform bending, lifting, and carrying  activities safely, confidently.  (approp and ongoing)  3.  Patient report a reduction in worst pain score by 1-2 points for improved tolerance for household chores.  (approp and ongoing)  4.  Pt able to perform HEP correctly with minimal cueing or supervision from therapist to encourage independent management of symptoms. (approp and ongoing)      Long term goals: 10 weeks or 20 visits   1. Pt will demonstrate increased lumbar ROM by at least 9 degrees from initial ROM value, resulting in improved ability to perform functional fwd bending while standing and sitting. (approp and ongoing)  2. Pt will demonstrate increased MedX average isometric strength value  by 20% from initial test resulting in improved ability to perform bending, lifting, and carrying activities safely, confidently.  (approp and ongoing)  3. Pt to demonstrate ability to independently control and reduce their pain through posture positioning and mechanical movements throughout a typical day.  (approp and ongoing)  4.  Pt will demonstrate reduced pain and improved functional outcomes as reported on the FOTO by reaching a limitation score of < or = 35% or less in order to demonstrate subjective improvement in pt's condition.    (approp and ongoing)  5. Pt will demonstrate independence with the HEP at discharge  (approp and ongoing)  6. Patient will lift 20# kettle bell floor to plinth x 10 /s inc LBP for inc tolerance to yardwork and household chores (patient goal) (approp and ongoing)  7. Patient demonstrate 5 full golf swings /s inc LBP for inc tolerance to recreational activities  (approp and ongoing)      Plan   Outpatient physical therapy 2x week for 10 weeks or 20 visits to include the following:   - Patient education  - Therapeutic exercise  - Manual therapy  - Performance testing   - Neuromuscular Re-education  - Therapeutic activity   - Modalities    Pt may be seen by PTA as part of the rehabilitation team.     Therapist: Arash Uribe  "PT  9/30/2022    "I certify the need for these services furnished under this plan of treatment and while under my care."    ____________________________________  Physician/Referring Practitioner    _______________  Date of Signature          "

## 2022-10-01 ENCOUNTER — LAB VISIT (OUTPATIENT)
Dept: LAB | Facility: HOSPITAL | Age: 77
End: 2022-10-01
Attending: INTERNAL MEDICINE
Payer: MEDICARE

## 2022-10-01 DIAGNOSIS — I10 HYPERTENSION, UNSPECIFIED TYPE: ICD-10-CM

## 2022-10-01 DIAGNOSIS — E78.49 OTHER HYPERLIPIDEMIA: ICD-10-CM

## 2022-10-01 LAB
ALBUMIN SERPL BCP-MCNC: 4.2 G/DL (ref 3.5–5.2)
ALP SERPL-CCNC: 66 U/L (ref 55–135)
ALT SERPL W/O P-5'-P-CCNC: 36 U/L (ref 10–44)
ANION GAP SERPL CALC-SCNC: 9 MMOL/L (ref 8–16)
AST SERPL-CCNC: 33 U/L (ref 10–40)
BASOPHILS # BLD AUTO: 0.06 K/UL (ref 0–0.2)
BASOPHILS NFR BLD: 0.9 % (ref 0–1.9)
BILIRUB SERPL-MCNC: 1.4 MG/DL (ref 0.1–1)
BUN SERPL-MCNC: 14 MG/DL (ref 8–23)
CALCIUM SERPL-MCNC: 9.3 MG/DL (ref 8.7–10.5)
CHLORIDE SERPL-SCNC: 105 MMOL/L (ref 95–110)
CHOLEST SERPL-MCNC: 117 MG/DL (ref 120–199)
CHOLEST/HDLC SERPL: 3.8 {RATIO} (ref 2–5)
CO2 SERPL-SCNC: 24 MMOL/L (ref 23–29)
CREAT SERPL-MCNC: 0.8 MG/DL (ref 0.5–1.4)
DIFFERENTIAL METHOD: ABNORMAL
EOSINOPHIL # BLD AUTO: 0.1 K/UL (ref 0–0.5)
EOSINOPHIL NFR BLD: 1.7 % (ref 0–8)
ERYTHROCYTE [DISTWIDTH] IN BLOOD BY AUTOMATED COUNT: 13.2 % (ref 11.5–14.5)
EST. GFR  (NO RACE VARIABLE): >60 ML/MIN/1.73 M^2
GLUCOSE SERPL-MCNC: 90 MG/DL (ref 70–110)
HCT VFR BLD AUTO: 49 % (ref 40–54)
HDLC SERPL-MCNC: 31 MG/DL (ref 40–75)
HDLC SERPL: 26.5 % (ref 20–50)
HGB BLD-MCNC: 15.5 G/DL (ref 14–18)
IMM GRANULOCYTES # BLD AUTO: 0.02 K/UL (ref 0–0.04)
IMM GRANULOCYTES NFR BLD AUTO: 0.3 % (ref 0–0.5)
LDLC SERPL CALC-MCNC: 62.8 MG/DL (ref 63–159)
LYMPHOCYTES # BLD AUTO: 1.5 K/UL (ref 1–4.8)
LYMPHOCYTES NFR BLD: 23.7 % (ref 18–48)
MCH RBC QN AUTO: 30.1 PG (ref 27–31)
MCHC RBC AUTO-ENTMCNC: 31.6 G/DL (ref 32–36)
MCV RBC AUTO: 95 FL (ref 82–98)
MONOCYTES # BLD AUTO: 0.6 K/UL (ref 0.3–1)
MONOCYTES NFR BLD: 10.1 % (ref 4–15)
NEUTROPHILS # BLD AUTO: 4 K/UL (ref 1.8–7.7)
NEUTROPHILS NFR BLD: 63.3 % (ref 38–73)
NONHDLC SERPL-MCNC: 86 MG/DL
NRBC BLD-RTO: 0 /100 WBC
PLATELET # BLD AUTO: 252 K/UL (ref 150–450)
PMV BLD AUTO: 10.5 FL (ref 9.2–12.9)
POTASSIUM SERPL-SCNC: 4.2 MMOL/L (ref 3.5–5.1)
PROT SERPL-MCNC: 6.7 G/DL (ref 6–8.4)
RBC # BLD AUTO: 5.15 M/UL (ref 4.6–6.2)
SODIUM SERPL-SCNC: 138 MMOL/L (ref 136–145)
TRIGL SERPL-MCNC: 116 MG/DL (ref 30–150)
WBC # BLD AUTO: 6.32 K/UL (ref 3.9–12.7)

## 2022-10-01 PROCEDURE — 36415 COLL VENOUS BLD VENIPUNCTURE: CPT | Performed by: INTERNAL MEDICINE

## 2022-10-01 PROCEDURE — 80061 LIPID PANEL: CPT | Performed by: INTERNAL MEDICINE

## 2022-10-01 PROCEDURE — 80053 COMPREHEN METABOLIC PANEL: CPT | Performed by: INTERNAL MEDICINE

## 2022-10-01 PROCEDURE — 85025 COMPLETE CBC W/AUTO DIFF WBC: CPT | Performed by: INTERNAL MEDICINE

## 2022-10-03 ENCOUNTER — OFFICE VISIT (OUTPATIENT)
Dept: INTERNAL MEDICINE | Facility: CLINIC | Age: 77
End: 2022-10-03
Payer: MEDICARE

## 2022-10-03 ENCOUNTER — CLINICAL SUPPORT (OUTPATIENT)
Dept: REHABILITATION | Facility: OTHER | Age: 77
End: 2022-10-03
Payer: MEDICARE

## 2022-10-03 VITALS
RESPIRATION RATE: 19 BRPM | HEART RATE: 100 BPM | OXYGEN SATURATION: 98 % | DIASTOLIC BLOOD PRESSURE: 74 MMHG | SYSTOLIC BLOOD PRESSURE: 128 MMHG | BODY MASS INDEX: 35.21 KG/M2 | HEIGHT: 73 IN | WEIGHT: 265.63 LBS

## 2022-10-03 DIAGNOSIS — L98.9 SKIN LESION: Primary | ICD-10-CM

## 2022-10-03 DIAGNOSIS — R29.898 DECREASED STRENGTH OF TRUNK AND BACK: Primary | ICD-10-CM

## 2022-10-03 DIAGNOSIS — Z91.89 MULTIPLE RISK FACTORS FOR CORONARY ARTERY DISEASE: ICD-10-CM

## 2022-10-03 DIAGNOSIS — I10 ESSENTIAL HYPERTENSION: ICD-10-CM

## 2022-10-03 DIAGNOSIS — R20.2 TINGLING: ICD-10-CM

## 2022-10-03 DIAGNOSIS — R26.89 DECREASED BACK MOBILITY: ICD-10-CM

## 2022-10-03 DIAGNOSIS — E66.01 SEVERE OBESITY (BMI 35.0-39.9) WITH COMORBIDITY: ICD-10-CM

## 2022-10-03 PROCEDURE — 97110 THERAPEUTIC EXERCISES: CPT

## 2022-10-03 PROCEDURE — 99214 PR OFFICE/OUTPT VISIT, EST, LEVL IV, 30-39 MIN: ICD-10-PCS | Mod: S$PBB,,, | Performed by: INTERNAL MEDICINE

## 2022-10-03 PROCEDURE — 99214 OFFICE O/P EST MOD 30 MIN: CPT | Mod: PBBFAC | Performed by: INTERNAL MEDICINE

## 2022-10-03 PROCEDURE — 99999 PR PBB SHADOW E&M-EST. PATIENT-LVL IV: ICD-10-PCS | Mod: PBBFAC,,, | Performed by: INTERNAL MEDICINE

## 2022-10-03 PROCEDURE — 99999 PR PBB SHADOW E&M-EST. PATIENT-LVL IV: CPT | Mod: PBBFAC,,, | Performed by: INTERNAL MEDICINE

## 2022-10-03 PROCEDURE — 99214 OFFICE O/P EST MOD 30 MIN: CPT | Mod: S$PBB,,, | Performed by: INTERNAL MEDICINE

## 2022-10-03 RX ORDER — ATORVASTATIN CALCIUM 10 MG/1
10 TABLET, FILM COATED ORAL DAILY
Qty: 90 TABLET | Refills: 11 | Status: SHIPPED | OUTPATIENT
Start: 2022-10-03 | End: 2023-10-10 | Stop reason: SDUPTHER

## 2022-10-03 RX ORDER — LISINOPRIL 20 MG/1
20 TABLET ORAL DAILY
Qty: 90 TABLET | Refills: 11 | Status: SHIPPED | OUTPATIENT
Start: 2022-10-03 | End: 2023-10-10 | Stop reason: SDUPTHER

## 2022-10-03 NOTE — PROGRESS NOTES
"[facility]      Name: Sreekanth Pitts Jr.  Clinic Number: [id number]    Therapy Diagnosis:   Encounter Diagnoses   Name Primary?    Decreased strength of trunk and back Yes    Decreased back mobility      Physician: Yolanda Salas, *    Visit Date: 10/3/2022    Physician Orders: PT Eval and Treat   Medical Diagnosis from Referral:   Z98.1 (ICD-10-CM) - S/P lumbar spinal fusion   M54.50,G89.29 (ICD-10-CM) - Chronic bilateral low back pain without sciatica      Evaluation Date: 2022  Authorization Period Expiration: 22  Plan of Care Expiration: 22  Reassessment Due: 10/26/22  Visit # / Visits authorized:      Time In: 12:15 pm  Time Out: 1:00 pm  Total Billable Time: 45 minutes  Insurance:Fee for service Insurance Patient        Precautions: Standard   L4/L5 fusion 2022     Pattern of pain determined: 2      Subjective   Sreekanth reports some soreness /p last session /c dissipation thru the weekend.  Patient believes discomfort primarily from MedX lumbar machine.  Patient report continue AM walks despite noted soreness and finding some relief /c standing extensions.  Patient note HEP compliance but note not performing HEP in advance of walk       Patient reports tolerating previous visit well with slight residual soreness that resolved 2 days later  Patient reports their pain to be 2/10 on a 0-10 scale with 0 being no pain and 10 being the worst pain imaginable.  Pain Location: Bilateral belt line, R hip     Work and leisure: ; walking his dog and yard work until recently  Pt goals:"get my back stronger"    Objective     Baseline Isometric Testing on Med X equipment: Testing administered by PT  Date of testin22  ROM 0 - 39 deg   Max Peak Torque 125    Min Peak Torque 41    Flex/Ext Ratio 3   % below normative data 61%        Outcomes: FOTO  Initial score: 40% limitation  Visit 5 score:  Goal: <30%      Treatment    Pt was " "instructed in and performed the following:     Sreekanth received therapeutic exercises to develop/improved posture, cardiovascular endurance, muscular endurance, lumbar/cervical ROM, strength and muscular endurance for 45 minutes including the following exercises:     LTR x10, 3" ea  DKTC x 10, 5"  Pelvic tilt ant and post for range x 10   Supine hip flexor stretch 2 x 20 sec hold B   Supine HSS c/strap 5x10" ea    PPT x 5 5 sec hold for core engagement    Standing lumbar ext x 10     Next visit:  Shawanda      NP:   Supine glute med stretch c/strap 5x10" ea      [facility] 10/3/2022   Visit Number 3   VAS Pain Rating 2   Treadmill Time (in min.) 5   Speed -   Lumbar Stretches - Slouch Overcorrection -   Extension in Lying -   Extension in Standing 10   Flexion in Lying 10   Manual Therapy -   Lumbar Extension Seat Pad -   Femur Restraint -   Top Dead Center -   Counterweight -   Lumbar Flexion -   Lumbar Extension -   Lumbar Peak Torque -   Min Torque -   Test Percent Below Normative Data -   Test Percent Gain in Strength from Initial  -   Lumbar Weight 55   Repetitions 20   Rating of Perceived Exertion 4   Ice - Z Lie (in min.) 5             Peripheral muscle strengthening which included 1 set of 15-20 repetitions at a slow, controlled 10-13 second per rep pace focused on strengthening supporting musculature for improved body mechanics and functional mobility.  Pt and therapist focused on proper form during treatment to ensure optimal strengthening of each targeted muscle group.  Machines were utilized including torso rotation, leg extension, leg curl, chest press, upright row. Tricep extension, bicep curl, leg press, and hip abduction added visit 3    Sreekanth received the following manual therapy techniques: nil         Home Exercises Provided and Patient Education Provided   Home exercises include:     LTR   DKTC  Lumbar ext in standing  Hip flexor stretch  Pelvic tilts   Supine HSS and ITB stretch " 9/30    Cardio program: visit 5  Lifting education date: visit 11  Posture/Lumbar roll: obtained     Education provided:   - Pacing and technique for initial lumbar MedX strengthening    Written Home Exercises Provided: Patient instructed to cont prior HEP. Plus additional stretches  Exercises were reviewed and Sreekanth was able to demonstrate them prior to the end of the session.  Sreekanth demonstrated good  understanding of the education provided.     See EMR under Patient Instructions for exercises provided  prior visit and 9/30/2022 .          Assessment     Patient subjective report indicate soreness likely from inc activity intensity over challenge/weak muscles indicating appropriateness of present POC.  Repeated HEP performance to solidify importance of mobility exercises in POC.   Considering patient note comfort /c extension patient advised to continue exploring extension preference /c addition of standing ext to HEP.  Recommend f/u next visit for safe completion.   Lumbar MedX weight increased per pt tolerance last visit.   Patient also demonstrate sig improved core engagement /c PPT and recommend progress to bridges next visit for core stability challenge. Today pt perform 20 reps at 4 RPE indicating inc strength.  Progress per HB protocol. Patient complete full complement of MedX peripheral exercises /s issue.  Progress per HB protocol.     Patient is making good progress towards established goals.  Pt will continue to benefit from skilled outpatient physical therapy to address the deficits stated in the impairment chart, provide pt/family education and to maximize pt's level of independence in the home and community environment.     Anticipated Barriers for therapy: Chronicity of condition  Pt's spiritual, cultural and educational needs considered and pt agreeable to plan of care and goals as stated below:             GOALS: Pt is in agreement with the following goals.     Short term goals:  6 weeks or 10  visits   1.  Pt will demonstrate increased lumbar ROM by at least 6 degrees from the initial ROM value with improvements noted in functional ROM and ability to perform ADLs.  (approp and ongoing)  2.  Pt will demonstrate increased MedX average isometric strength value  by 15% from initial test resulting in improved ability to perform bending, lifting, and carrying activities safely, confidently.  (approp and ongoing)  3.  Patient report a reduction in worst pain score by 1-2 points for improved tolerance for household chores.  (approp and ongoing)  4.  Pt able to perform HEP correctly with minimal cueing or supervision from therapist to encourage independent management of symptoms. (approp and ongoing)        Long term goals: 10 weeks or 20 visits   1. Pt will demonstrate increased lumbar ROM by at least 9 degrees from initial ROM value, resulting in improved ability to perform functional fwd bending while standing and sitting. (approp and ongoing)  2. Pt will demonstrate increased MedX average isometric strength value  by 20% from initial test resulting in improved ability to perform bending, lifting, and carrying activities safely, confidently.  (approp and ongoing)  3. Pt to demonstrate ability to independently control and reduce their pain through posture positioning and mechanical movements throughout a typical day.  (approp and ongoing)  4.  Pt will demonstrate reduced pain and improved functional outcomes as reported on the FOTO by reaching a limitation score of < or = 30% or less in order to demonstrate subjective improvement in pt's condition.    (approp and ongoing)  5. Pt will demonstrate independence with the HEP at discharge  (approp and ongoing)  6. Patient will lift 20# kettle bell floor to plinth x 10 /s inc LBP for inc tolerance to yardwork and household chores (patient goal) (approp and ongoing)  7. Patient demonstrate 5 full golf swings /s inc LBP for inc tolerance to recreational activities   (approp and ongoing)        Plan   Continue with established Plan of Care towards established PT goals.       Therapist: Arash Uribe, PT  9/30/2022

## 2022-10-03 NOTE — PROGRESS NOTES
Subjective:       Patient ID: Sreekanth Pitts Jr. is a 77 y.o. male.    Chief Complaint: Annual Exam    Patient is here for followup for chronic conditions.      Doing therapy after recent back surgery.    Chronic pain outer R hip area.    No new health issues.    denies warning signs for GERD -- no dysphagia, no odynophagia, no wt loss, no blood in stool or melena.    Still working 20 hrs per week, has not had much phys active though since his spine surgery.    Review of Systems   Constitutional:  Negative for appetite change, diaphoresis, fatigue, fever and unexpected weight change.   Respiratory:  Negative for chest tightness and shortness of breath.    Cardiovascular:  Negative for chest pain.   Gastrointestinal:  Negative for abdominal distention, abdominal pain, nausea and vomiting.   Genitourinary:  Negative for difficulty urinating, scrotal swelling and testicular pain.   Musculoskeletal:  Positive for arthralgias (R outer hip). Negative for back pain.   Skin:  Negative for rash.        No lesions   Neurological:         Tingling which is quick onset and offset bilat hands, none in the feet   Psychiatric/Behavioral:  Negative for sleep disturbance.          Objective:      Physical Exam  Vitals reviewed.   Constitutional:       General: He is not in acute distress.     Appearance: Normal appearance. He is well-developed. He is obese. He is not ill-appearing, toxic-appearing or diaphoretic.   HENT:      Head: Normocephalic and atraumatic.   Eyes:      General: No scleral icterus.  Neck:      Thyroid: No thyromegaly.      Comments:     Cardiovascular:      Rate and Rhythm: Normal rate and regular rhythm.      Heart sounds: Normal heart sounds. No murmur heard.    No friction rub. No gallop.   Pulmonary:      Effort: Pulmonary effort is normal. No respiratory distress.      Breath sounds: Normal breath sounds. No wheezing or rales.   Abdominal:      General: Bowel sounds are normal. There is no distension.       Palpations: Abdomen is soft. There is no mass.      Tenderness: There is no abdominal tenderness. There is no guarding or rebound.   Musculoskeletal:         General: Normal range of motion.      Cervical back: Normal range of motion.      Comments: Neg Tinels and Phalens bilat  Nml hand  strength and LT sensation   Lymphadenopathy:      Cervical: No cervical adenopathy.   Skin:     Findings: No lesion.      Comments: A few scattered actinic areas   Neurological:      Mental Status: He is alert and oriented to person, place, and time.   Psychiatric:         Thought Content: Thought content normal.       Assessment:       1. Skin lesion    2. Tingling    3. Essential hypertension    4. Multiple risk factors for coronary artery disease    5. Severe obesity (BMI 35.0-39.9) with comorbidity        Plan:       Sreekanth was seen today for annual exam.    Diagnoses and all orders for this visit:    Skin lesions  -     Ambulatory referral/consult to Dermatology; Future    Tingling  -     Vitamin B12; Future  -     METHYLMALONIC ACID, SERUM; Future  Call if symptoms worsen    Essential hypertension  -     lisinopriL (PRINIVIL,ZESTRIL) 20 MG tablet; Take 1 tablet (20 mg total) by mouth once daily.  controlled    Multiple risk factors for coronary artery disease  -     atorvastatin (LIPITOR) 10 MG tablet; Take 1 tablet (10 mg total) by mouth once daily.    Severe obesity (BMI 35.0-39.9) with comorbidity  Lengthy discussion re importance of wt loss      Health Maintenance         Date Due Completion Date    TETANUS VACCINE Never done ---    COVID-19 Vaccine (4 - Booster for Pfizer series) 11/25/2021 9/30/2021    Influenza Vaccine (1) 09/01/2022 10/8/2021    Colorectal Cancer Screening 10/19/2022 10/19/2012    PROSTATE-SPECIFIC ANTIGEN 05/13/2023 5/13/2022    Lipid Panel 10/01/2027 10/1/2022        Get above vaccines      Follow up in about 1 year (around 10/3/2023).    Future Appointments   Date Time Provider Department Center    10/5/2022 10:00 AM Katie Butts, PT [facility]   10/12/2022  3:15 PM Tiffany Vashti, PTA [facility]   10/14/2022  2:45 PM Gareth Gandhi, PT [facility]   10/19/2022  2:15 PM Sreekanth Brown, PTA [facility]   10/21/2022 10:00 AM Cb Vega, PTA [facility]   10/24/2022  2:45 PM Gareth Gandhi, PT [facility]   10/26/2022  2:30 PM Katie Butts, PT [facility]   1/3/2023  2:00 PM Elsie Lima MD [facility]

## 2022-10-05 ENCOUNTER — CLINICAL SUPPORT (OUTPATIENT)
Dept: REHABILITATION | Facility: OTHER | Age: 77
End: 2022-10-05
Attending: PHYSICAL MEDICINE & REHABILITATION
Payer: MEDICARE

## 2022-10-05 DIAGNOSIS — R26.89 DECREASED BACK MOBILITY: ICD-10-CM

## 2022-10-05 DIAGNOSIS — R29.898 DECREASED STRENGTH OF TRUNK AND BACK: Primary | ICD-10-CM

## 2022-10-05 PROCEDURE — 97110 THERAPEUTIC EXERCISES: CPT

## 2022-10-05 NOTE — PROGRESS NOTES
"[facility]      Name: Sreekanth Pitts Jr.  Clinic Number: [id number]    Therapy Diagnosis:   Encounter Diagnoses   Name Primary?    Decreased strength of trunk and back Yes    Decreased back mobility        Physician: Yolanda Salas, *    Visit Date: 10/5/2022    Physician Orders: PT Eval and Treat   Medical Diagnosis from Referral:   Z98.1 (ICD-10-CM) - S/P lumbar spinal fusion   M54.50,G89.29 (ICD-10-CM) - Chronic bilateral low back pain without sciatica      Evaluation Date: 2022  Authorization Period Expiration: 22  Plan of Care Expiration: 22  Reassessment Due: 10/26/22  Visit # / Visits authorized:      Time In: 10  Time Out: 11  Total Billable Time: 50minutes  Insurance:Fee for service Insurance Patient        Precautions: Standard   L4/L5 fusion 2022     Pattern of pain determined: 2      Subjective   Sreekanth reports he feels he is doing better overall.  Patient reports tolerating previous visit well with slight residual soreness that resolved 2 days later  Patient reports their pain to be 2/10 on a 0-10 scale with 0 being no pain and 10 being the worst pain imaginable.  Pain Location: Bilateral belt line, R hip     Work and leisure: ; walking his dog and yard work until recently  Pt goals:"get my back stronger"    Objective     Baseline Isometric Testing on Med X equipment: Testing administered by PT  Date of testin22  ROM 0 - 39 deg   Max Peak Torque 125    Min Peak Torque 41    Flex/Ext Ratio 3   % below normative data 61%        Outcomes: FOTO  Initial score: 40% limitation  Visit 5 score:  Goal: <30%      Treatment    Pt was instructed in and performed the following:     Sreekanth received therapeutic exercises to develop/improved posture, cardiovascular endurance, muscular endurance, lumbar/cervical ROM, strength and muscular endurance for 60 minutes including the following exercises:   [facility] 10/5/2022 " "  Visit Number 4   VAS Pain Rating 2   Treadmill Time (in min.) 5   Speed -   Lumbar Stretches - Slouch Overcorrection -   Extension in Lying -   Extension in Standing 10   Flexion in Lying 10   Manual Therapy -   Lumbar Extension Seat Pad -   Femur Restraint -   Top Dead Center -   Counterweight -   Lumbar Flexion -   Lumbar Extension -   Lumbar Peak Torque -   Min Torque -   Test Percent Below Normative Data -   Test Percent Gain in Strength from Initial  -   Lumbar Weight 60   Repetitions 18   Rating of Perceived Exertion 4   Ice - Z Lie (in min.) 5       LTR x10, 3" ea  DKTC x 10, 5"  Supine hip flexor stretch 2 x 20 sec hold B   Supine HSS c/strap 2x20 sec ea  Bridge  with PPT 10x  PPT x 5 5 sec hold for core engagement  Standing lumbar ext x 10       NP:   Supine glute med stretch c/strap 5x10" ea    Peripheral muscle strengthening which included 1 set of 15-20 repetitions at a slow, controlled 10-13 second per rep pace focused on strengthening supporting musculature for improved body mechanics and functional mobility.  Pt and therapist focused on proper form during treatment to ensure optimal strengthening of each targeted muscle group.  Machines were utilized including torso rotation, leg extension, leg curl, chest press, upright row. Tricep extension, bicep curl, leg press, and hip abduction added visit 3    Sreekanth received the following manual therapy techniques: nil         Home Exercises Provided and Patient Education Provided   Home exercises include:     LTR   DKTC  Lumbar ext in standing  Hip flexor stretch  Pelvic tilts   Supine HSS and ITB stretch 9/30    Cardio program: visit 5  Lifting education date: visit 11  Posture/Lumbar roll: obtained     Education provided:   - Pacing and technique for initial lumbar MedX strengthening    Written Home Exercises Provided: Patient instructed to cont prior HEP. Plus additional stretches  Exercises were reviewed and Sreekanth was able to demonstrate them prior to the " end of the session.  Sreekanth demonstrated good  understanding of the education provided.     See EMR under Patient Instructions for exercises provided  prior visit and 9/30/2022 .          Assessment     Patient arrives today and reports min LBP.  Added bridging to increase core stability exercises.  Reviewed PPT to improve technique and decrease LE use.  Increased weight on med X to 60ft/lbs with pt completing 18 reps at 4/10 exertion level.  Patient is making good progress towards established goals.  Pt will continue to benefit from skilled outpatient physical therapy to address the deficits stated in the impairment chart, provide pt/family education and to maximize pt's level of independence in the home and community environment.     Anticipated Barriers for therapy: Chronicity of condition  Pt's spiritual, cultural and educational needs considered and pt agreeable to plan of care and goals as stated below:             GOALS: Pt is in agreement with the following goals.     Short term goals:  6 weeks or 10 visits   1.  Pt will demonstrate increased lumbar ROM by at least 6 degrees from the initial ROM value with improvements noted in functional ROM and ability to perform ADLs.  (approp and ongoing)  2.  Pt will demonstrate increased MedX average isometric strength value  by 15% from initial test resulting in improved ability to perform bending, lifting, and carrying activities safely, confidently.  (approp and ongoing)  3.  Patient report a reduction in worst pain score by 1-2 points for improved tolerance for household chores.  (approp and ongoing)  4.  Pt able to perform HEP correctly with minimal cueing or supervision from therapist to encourage independent management of symptoms. (approp and ongoing)        Long term goals: 10 weeks or 20 visits   1. Pt will demonstrate increased lumbar ROM by at least 9 degrees from initial ROM value, resulting in improved ability to perform functional fwd bending while  standing and sitting. (approp and ongoing)  2. Pt will demonstrate increased MedX average isometric strength value  by 20% from initial test resulting in improved ability to perform bending, lifting, and carrying activities safely, confidently.  (approp and ongoing)  3. Pt to demonstrate ability to independently control and reduce their pain through posture positioning and mechanical movements throughout a typical day.  (approp and ongoing)  4.  Pt will demonstrate reduced pain and improved functional outcomes as reported on the FOTO by reaching a limitation score of < or = 30% or less in order to demonstrate subjective improvement in pt's condition.    (approp and ongoing)  5. Pt will demonstrate independence with the HEP at discharge  (approp and ongoing)  6. Patient will lift 20# kettle bell floor to plinth x 10 /s inc LBP for inc tolerance to yardwork and household chores (patient goal) (approp and ongoing)  7. Patient demonstrate 5 full golf swings /s inc LBP for inc tolerance to recreational activities  (approp and ongoing)        Plan   Continue with established Plan of Care towards established PT goals.       Therapist: Katie Butts, PT  9/30/2022

## 2022-10-12 ENCOUNTER — CLINICAL SUPPORT (OUTPATIENT)
Dept: REHABILITATION | Facility: OTHER | Age: 77
End: 2022-10-12
Attending: PHYSICAL MEDICINE & REHABILITATION
Payer: MEDICARE

## 2022-10-12 DIAGNOSIS — R26.89 DECREASED BACK MOBILITY: ICD-10-CM

## 2022-10-12 DIAGNOSIS — R29.898 DECREASED STRENGTH OF TRUNK AND BACK: Primary | ICD-10-CM

## 2022-10-12 PROCEDURE — 97110 THERAPEUTIC EXERCISES: CPT | Mod: CQ

## 2022-10-12 NOTE — PROGRESS NOTES
"[facility]      Name: Sreekanth Pitts Jr.  Clinic Number: [id number]    Therapy Diagnosis:   Encounter Diagnoses   Name Primary?    Decreased strength of trunk and back Yes    Decreased back mobility          Physician: Yolanda Salas, *    Visit Date: 10/12/2022    Physician Orders: PT Eval and Treat   Medical Diagnosis from Referral:   Z98.1 (ICD-10-CM) - S/P lumbar spinal fusion   M54.50,G89.29 (ICD-10-CM) - Chronic bilateral low back pain without sciatica      Evaluation Date: 2022  Authorization Period Expiration: 22  Plan of Care Expiration: 22  Reassessment Due: 10/26/22  Visit # / Visits authorized:      Time In: 10  Time Out: 11  Total Billable Time: 50minutes  Insurance:Fee for service Insurance Patient        Precautions: Standard   L4/L5 fusion 2022     Pattern of pain determined: 2      Subjective   Sreekanth reports pain is managed in the morning with stretching.  Patient reports tolerating previous visit well   Patient reports their pain to be 1/10 on a 0-10 scale with 0 being no pain and 10 being the worst pain imaginable.  Pain Location: Bilateral belt line, R hip     Work and leisure: ; walking his dog and yard work until recently  Pt goals:"get my back stronger"    Objective     Baseline Isometric Testing on Med X equipment: Testing administered by PT  Date of testin22  ROM 0 - 39 deg   Max Peak Torque 125    Min Peak Torque 41    Flex/Ext Ratio 3   % below normative data 61%        Outcomes: FOTO  Initial score: 40% limitation  Visit 5 score:  Goal: <30%      Treatment    Pt was instructed in and performed the following:     Sreekanth received therapeutic exercises to develop/improved posture, cardiovascular endurance, muscular endurance, lumbar/cervical ROM, strength and muscular endurance for 55 minutes including the following exercises:     LTR x10, 3" ea  DKTC x10, 5"  Supine hip flexor stretch 2x30 sec hold B " "  Supine HSS c/strap 2x30 sec ea  +Kneeling hip flexor stretch (add this visit)  Bridge with PPT w/RTB x15,3"  PPT x 5 5 sec hold for core engagement  +TrA w/ball +SLR x10  +PPU x15  Standing lumbar ext x 10 NP    [facility] 10/12/2022   Visit Number 5   VAS Pain Rating 1   Treadmill Time (in min.) 5   Speed -   Lumbar Stretches - Slouch -   Extension in Lying -   Extension in Standing 10   Flexion in Lying 10   Manual Therapy -   Lumbar Extension - Seat Pad -   Femur Restraint -   Top Dead Center -   Counterweight -   Lumbar Flexion -   Lumbar Extension -   Lumbar Peak Torque -   Lumbar Weight 60   Repetitions 20   Rating of Perceived Exertion 3          NP:   Supine glute med stretch c/strap 5x10" ea    Peripheral muscle strengthening which included 1 set of 15-20 repetitions at a slow, controlled 10-13 second per rep pace focused on strengthening supporting musculature for improved body mechanics and functional mobility.  Pt and therapist focused on proper form during treatment to ensure optimal strengthening of each targeted muscle group.  Machines were utilized including torso rotation, leg extension, leg curl, chest press, upright row. Tricep extension, bicep curl, leg press, and hip abduction added visit 3    Sreekanth received the following manual therapy techniques: nil         Home Exercises Provided and Patient Education Provided   Home exercises include:     LTR   DKTC  Lumbar ext in standing  Hip flexor stretch  Pelvic tilts   Supine HSS and ITB stretch 9/30    Cardio program: 10/12/22  Lifting education date: visit 11  Posture/Lumbar roll: obtained     Education provided:   - Pacing and technique for initial lumbar MedX strengthening    Written Home Exercises Provided: Patient instructed to cont prior HEP. Plus additional stretches  Exercises were reviewed and Sreekanth was able to demonstrate them prior to the end of the session.  Sreekanth demonstrated good  understanding of the education provided. "     See EMR under Patient Instructions for exercises provided  prior visit and 9/30/2022 .          Assessment   Patient returns to PT without pain, but has complaints of increased pain with prolonged sitting despite taking frequent standing breaks. Discussed cardio program with patient who states he walks his dog 1.5 miles everyday and works in his yard. Recommended patient perform hip flexor stretch during the day, will show kneeling hip flexor stretch NV. Patient tolerated progressed core stabilization exercises well without increased pain, added TrA w/ball to HEP per pt's request. Patient able to perform 20 reps on l/s medx machine with an RPE of 3/10.    Patient is making good progress towards established goals.  Pt will continue to benefit from skilled outpatient physical therapy to address the deficits stated in the impairment chart, provide pt/family education and to maximize pt's level of independence in the home and community environment.     Anticipated Barriers for therapy: Chronicity of condition  Pt's spiritual, cultural and educational needs considered and pt agreeable to plan of care and goals as stated below:             GOALS: Pt is in agreement with the following goals.     Short term goals:  6 weeks or 10 visits   1.  Pt will demonstrate increased lumbar ROM by at least 6 degrees from the initial ROM value with improvements noted in functional ROM and ability to perform ADLs.  (approp and ongoing)  2.  Pt will demonstrate increased MedX average isometric strength value  by 15% from initial test resulting in improved ability to perform bending, lifting, and carrying activities safely, confidently.  (approp and ongoing)  3.  Patient report a reduction in worst pain score by 1-2 points for improved tolerance for household chores.  (approp and ongoing)  4.  Pt able to perform HEP correctly with minimal cueing or supervision from therapist to encourage independent management of symptoms. (approp and  ongoing)        Long term goals: 10 weeks or 20 visits   1. Pt will demonstrate increased lumbar ROM by at least 9 degrees from initial ROM value, resulting in improved ability to perform functional fwd bending while standing and sitting. (approp and ongoing)  2. Pt will demonstrate increased MedX average isometric strength value  by 20% from initial test resulting in improved ability to perform bending, lifting, and carrying activities safely, confidently.  (approp and ongoing)  3. Pt to demonstrate ability to independently control and reduce their pain through posture positioning and mechanical movements throughout a typical day.  (approp and ongoing)  4.  Pt will demonstrate reduced pain and improved functional outcomes as reported on the FOTO by reaching a limitation score of < or = 30% or less in order to demonstrate subjective improvement in pt's condition.    (approp and ongoing)  5. Pt will demonstrate independence with the HEP at discharge  (approp and ongoing)  6. Patient will lift 20# kettle bell floor to plinth x 10 /s inc LBP for inc tolerance to yardwork and household chores (patient goal) (approp and ongoing)  7. Patient demonstrate 5 full golf swings /s inc LBP for inc tolerance to recreational activities  (approp and ongoing)        Plan   Continue with established Plan of Care towards established PT goals.       Therapist: Tiffany Kingston, PTA  9/30/2022

## 2022-10-14 ENCOUNTER — CLINICAL SUPPORT (OUTPATIENT)
Dept: REHABILITATION | Facility: OTHER | Age: 77
End: 2022-10-14
Attending: PHYSICAL MEDICINE & REHABILITATION
Payer: MEDICARE

## 2022-10-14 DIAGNOSIS — R26.89 DECREASED BACK MOBILITY: ICD-10-CM

## 2022-10-14 DIAGNOSIS — R29.898 DECREASED STRENGTH OF TRUNK AND BACK: Primary | ICD-10-CM

## 2022-10-14 PROCEDURE — 97110 THERAPEUTIC EXERCISES: CPT

## 2022-10-14 NOTE — PROGRESS NOTES
"[facility]      Name: Sreekanth Pitts Jr.  Clinic Number: [id number]    Therapy Diagnosis:   Encounter Diagnoses   Name Primary?    Decreased strength of trunk and back Yes    Decreased back mobility        Physician: Yolanda Salas, *    Visit Date: 10/14/2022    Physician Orders: PT Eval and Treat   Medical Diagnosis from Referral:   Z98.1 (ICD-10-CM) - S/P lumbar spinal fusion   M54.50,G89.29 (ICD-10-CM) - Chronic bilateral low back pain without sciatica      Evaluation Date: 2022  Authorization Period Expiration: 22  Plan of Care Expiration: 22  Reassessment Due: 10/26/22  Visit # / Visits authorized:      Time In: 2:45 pm  Time Out: 3:50 pm  Total Billable Time: 55 minutes  Insurance:Fee for service Insurance Patient        Precautions: Standard   L4/L5 fusion 2022     Pattern of pain determined: 2      Subjective   Sreekanth reports increased soreness on R side lower back pain isn't constant    Patient reports tolerating previous visit well   Patient reports their pain to be 3/10 on a 0-10 scale with 0 being no pain and 10 being the worst pain imaginable.  Pain Location: Bilateral belt line, R hip     Work and leisure: ; walking his dog and yard work until recently  Pt goals:"get my back stronger"    Objective     Baseline Isometric Testing on Med X equipment: Testing administered by PT  Date of testin22  ROM 0 - 39 deg   Max Peak Torque 125    Min Peak Torque 41    Flex/Ext Ratio 3   % below normative data 61%        Outcomes: FOTO  Initial score: 40% limitation  Visit 5 score: 38%  Goal: <30%      Treatment    Pt was instructed in and performed the following:     Sreekanth received therapeutic exercises to develop/improved posture, cardiovascular endurance, muscular endurance, lumbar/cervical ROM, strength and muscular endurance for 55 minutes including the following exercises:     LTR x10, 3" ea  DKTC x10, 5"  Supine HSS c/strap " "2x30 sec ea  Kneeling hip flexor stretch 2x30"  Bridge with PPT w/RTB x15,3"  PPT 15x5 sec hold for core engagement  PPU x10  Standing lumbar ext x 10    [facility] 10/17/2022   Visit Number 6   VAS Pain Rating 3   Treadmill Time (in min.) 5   Speed -   Lumbar Stretches - Slouch -   Extension in Lying -   Extension in Standing 10   Flexion in Lying 10   Manual Therapy -   Lumbar Extension - Seat Pad -   Femur Restraint -   Top Dead Center -   Counterweight -   Lumbar Flexion -   Lumbar Extension -   Lumbar Peak Torque -   Lumbar Weight 64   Repetitions 20   Rating of Perceived Exertion 4         NP:   Supine glute med stretch c/strap 5x10" ea  TrA w/ball +SLR x10      Peripheral muscle strengthening which included 1 set of 15-20 repetitions at a slow, controlled 10-13 second per rep pace focused on strengthening supporting musculature for improved body mechanics and functional mobility.  Pt and therapist focused on proper form during treatment to ensure optimal strengthening of each targeted muscle group.  Machines were utilized including torso rotation, leg extension, leg curl, chest press, upright row. Tricep extension, bicep curl, leg press, and hip abduction added visit 3    Sreekanth received the following manual therapy techniques: nil         Home Exercises Provided and Patient Education Provided   Home exercises include:     LTR   DKTC  Lumbar ext in standing  Hip flexor stretch  Pelvic tilts   Supine HSS and ITB stretch 9/30    Cardio program: 10/12/22  Lifting education date: visit 11  Posture/Lumbar roll: obtained     Education provided:   - Pacing and technique for initial lumbar MedX strengthening    Written Home Exercises Provided: Patient instructed to cont prior HEP. Plus additional stretches  Exercises were reviewed and Sreekanth was able to demonstrate them prior to the end of the session.  Sreekanth demonstrated good  understanding of the education provided.     See EMR under Patient " Instructions for exercises provided  prior visit and 9/30/2022 .          Assessment   Patient presents today with reports of increased soreness after last visit and continued reports of intermittent symptoms. Kneeling hip flexor stretch added today due to tightness in bilateral hips with pt reporting relief after completing. Unable to tolerate supine hip flexor stretch due to low back discomfort. Medx resistance progressed to 64 ft/lbs and pt was able to complete 20 reps with 4/10 RPE. Progress 10% next visit.     Patient is making good progress towards established goals.  Pt will continue to benefit from skilled outpatient physical therapy to address the deficits stated in the impairment chart, provide pt/family education and to maximize pt's level of independence in the home and community environment.     Anticipated Barriers for therapy: Chronicity of condition  Pt's spiritual, cultural and educational needs considered and pt agreeable to plan of care and goals as stated below:             GOALS: Pt is in agreement with the following goals.     Short term goals:  6 weeks or 10 visits   1.  Pt will demonstrate increased lumbar ROM by at least 6 degrees from the initial ROM value with improvements noted in functional ROM and ability to perform ADLs.  (approp and ongoing)  2.  Pt will demonstrate increased MedX average isometric strength value  by 15% from initial test resulting in improved ability to perform bending, lifting, and carrying activities safely, confidently.  (approp and ongoing)  3.  Patient report a reduction in worst pain score by 1-2 points for improved tolerance for household chores.  (approp and ongoing)  4.  Pt able to perform HEP correctly with minimal cueing or supervision from therapist to encourage independent management of symptoms. (approp and ongoing)        Long term goals: 10 weeks or 20 visits   1. Pt will demonstrate increased lumbar ROM by at least 9 degrees from initial ROM value,  resulting in improved ability to perform functional fwd bending while standing and sitting. (approp and ongoing)  2. Pt will demonstrate increased MedX average isometric strength value  by 20% from initial test resulting in improved ability to perform bending, lifting, and carrying activities safely, confidently.  (approp and ongoing)  3. Pt to demonstrate ability to independently control and reduce their pain through posture positioning and mechanical movements throughout a typical day.  (approp and ongoing)  4.  Pt will demonstrate reduced pain and improved functional outcomes as reported on the FOTO by reaching a limitation score of < or = 30% or less in order to demonstrate subjective improvement in pt's condition.    (approp and ongoing)  5. Pt will demonstrate independence with the HEP at discharge  (approp and ongoing)  6. Patient will lift 20# kettle bell floor to plinth x 10 /s inc LBP for inc tolerance to yardwork and household chores (patient goal) (approp and ongoing)  7. Patient demonstrate 5 full golf swings /s inc LBP for inc tolerance to recreational activities  (approp and ongoing)        Plan   Continue with established Plan of Care towards established PT goals.       Therapist: Gareth Gandhi, PT  10/17/2022

## 2022-10-19 ENCOUNTER — CLINICAL SUPPORT (OUTPATIENT)
Dept: REHABILITATION | Facility: OTHER | Age: 77
End: 2022-10-19
Attending: PHYSICAL MEDICINE & REHABILITATION
Payer: MEDICARE

## 2022-10-19 ENCOUNTER — PATIENT MESSAGE (OUTPATIENT)
Dept: INTERNAL MEDICINE | Facility: CLINIC | Age: 77
End: 2022-10-19
Payer: MEDICARE

## 2022-10-19 DIAGNOSIS — R29.898 DECREASED STRENGTH OF TRUNK AND BACK: Primary | ICD-10-CM

## 2022-10-19 DIAGNOSIS — R26.89 DECREASED BACK MOBILITY: ICD-10-CM

## 2022-10-19 PROCEDURE — 97140 MANUAL THERAPY 1/> REGIONS: CPT | Mod: CQ

## 2022-10-19 PROCEDURE — 97110 THERAPEUTIC EXERCISES: CPT | Mod: CQ

## 2022-10-19 NOTE — PROGRESS NOTES
"[facility]      Name: Sreekanth Pitts Jr.  Clinic Number: [id number]    Therapy Diagnosis:   Encounter Diagnoses   Name Primary?    Decreased strength of trunk and back Yes    Decreased back mobility        Physician: Yolanda Salas, *    Visit Date: 10/19/2022    Physician Orders: PT Eval and Treat   Medical Diagnosis from Referral:   Z98.1 (ICD-10-CM) - S/P lumbar spinal fusion   M54.50,G89.29 (ICD-10-CM) - Chronic bilateral low back pain without sciatica      Evaluation Date: 2022  Authorization Period Expiration: 22  Plan of Care Expiration: 22  Reassessment Due: 10/26/22  Visit # / Visits authorized:      Time In: 2:15  pm  Time Out: 3:20 pm  Total Billable Time: 55 minutes  Insurance:Fee for service Insurance Patient      Precautions: Standard   L4/L5 fusion 2022     Pattern of pain determined: 2    Subjective   Sreekanth reports onset of increased R lower back pain over the weekend and is unsure as to the cause. States that he has not been using a cold pack at home.    Patient reports tolerating previous visit no c/o.  Patient reports their pain to be 7/10 on a 0-10 scale with 0 being no pain and 10 being the worst pain imaginable.  Pain Location: Bilateral belt line, R hip     Work and leisure: ; walking his dog and yard work until recently  Pt goals:"get my back stronger"    Objective     Baseline Isometric Testing on Med X equipment: Testing administered by PT  Date of testin22  ROM 0 - 39 deg   Max Peak Torque 125    Min Peak Torque 41    Flex/Ext Ratio 3   % below normative data 61%        Outcomes: FOTO  Initial score: 40% limitation  Visit 5 score: 38%  Goal: <30%      Treatment    Pt was instructed in and performed the following:     Sreekanth received therapeutic exercises to develop/improved posture, cardiovascular endurance, muscular endurance, lumbar/cervical ROM, strength and muscular endurance for 45 minutes " "including the following exercises:     LTR x10, 3" ea  DKTC x10, 5"  Supine HSS c/strap 2x30 sec ea  Kneeling hip flexor stretch 2x30"  PPT 15x5 sec hold for core engagement  PPU x10  Standing lumbar ext x 10    [facility] 10/19/2022   Visit Number 7   VAS Pain Rating 7   Treadmill Time (in min.) 5   Speed -   Lumbar Stretches - Slouch Overcorrection -   Extension in Lying 10   Extension in Standing 10   Flexion in Lying 10   Manual Therapy 10   Lumbar Extension Seat Pad -   Femur Restraint -   Top Dead Center -   Counterweight -   Lumbar Flexion -   Lumbar Extension -   Lumbar Peak Torque -   Min Torque -   Test Percent Below Normative Data -   Test Percent Gain in Strength from Initial  -   Lumbar Weight 64   Repetitions 20   Rating of Perceived Exertion 4   Ice - Z Lie (in min.) 10     NP:   Supine glute med stretch c/strap 5x10" ea  TrA w/ball +SLR x10  Bridge with PPT w/RTB x15,3"--NP    Peripheral muscle strengthening which included 1 set of 15-20 repetitions at a slow, controlled 10-13 second per rep pace focused on strengthening supporting musculature for improved body mechanics and functional mobility.  Pt and therapist focused on proper form during treatment to ensure optimal strengthening of each targeted muscle group.  Machines were utilized including torso rotation, leg extension, leg curl, chest press, upright row. Tricep extension, bicep curl, leg press, and hip abduction added visit 3    Sreekanth received the following manual therapy techniques: Stick massage and STM (R) lower back/lumbar paraspinals/ posterior hip x 10 minutes.      Home Exercises Provided and Patient Education Provided   Home exercises include:     LTR   DKTC  Lumbar ext in standing  Hip flexor stretch  Pelvic tilts   Supine HSS and ITB stretch 9/30    Cardio program: 10/12/22  Lifting education date: visit 11  Posture/Lumbar roll: obtained     Education provided:   - Pacing and technique for initial lumbar MedX " strengthening  - Use of cryotherapy to assist with pain control    Written Home Exercises Provided: Patient instructed to cont prior HEP. Plus additional stretches  Exercises were reviewed and Sreekanth was able to demonstrate them prior to the end of the session.  Sreekanth demonstrated good  understanding of the education provided.     See EMR under Patient Instructions for exercises provided prior visit.    Assessment   Sreekanth presents with increased (R) lower back today. He reports symptom increased started on the weekend and is unsure of aggravating incident. Treatment continued with lumbopelvic/core flexibility and strengthening ex's.  He did require some cues to perform EIL (press ups) within comfortable ROM and to keep hips in contact with mat. Bridging ex not perform today as patient reports possible increase in symptoms with this previously. Added Manual treatment to include soft tissue massage/stick massage to (R) lumbar paraspinals/posterior hip. Patient was educated on the use of cryotherapy at home to assist with pain control which he voiced understanding and compliance.    Due to increased report of discomfort today,  Lumbar Medx resistance was not progressed and was maintained at 64 ft/lbs completing 20 reps with a RPE = 4/10. Will continue to monitor and look to progress as tolerated.    Patient is making progress towards established goals.  Pt will continue to benefit from skilled outpatient physical therapy to address the deficits stated in the impairment chart, provide pt/family education and to maximize pt's level of independence in the home and community environment.     Anticipated Barriers for therapy: Chronicity of condition  Pt's spiritual, cultural and educational needs considered and pt agreeable to plan of care and goals as stated below:     GOALS: Pt is in agreement with the following goals.     Short term goals:  6 weeks or 10 visits   1.  Pt will demonstrate increased lumbar ROM by at least 6  degrees from the initial ROM value with improvements noted in functional ROM and ability to perform ADLs.  (approp and ongoing)  2.  Pt will demonstrate increased MedX average isometric strength value  by 15% from initial test resulting in improved ability to perform bending, lifting, and carrying activities safely, confidently.  (approp and ongoing)  3.  Patient report a reduction in worst pain score by 1-2 points for improved tolerance for household chores.  (approp and ongoing)  4.  Pt able to perform HEP correctly with minimal cueing or supervision from therapist to encourage independent management of symptoms. (approp and ongoing)      Long term goals: 10 weeks or 20 visits   1. Pt will demonstrate increased lumbar ROM by at least 9 degrees from initial ROM value, resulting in improved ability to perform functional fwd bending while standing and sitting. (approp and ongoing)  2. Pt will demonstrate increased MedX average isometric strength value  by 20% from initial test resulting in improved ability to perform bending, lifting, and carrying activities safely, confidently.  (approp and ongoing)  3. Pt to demonstrate ability to independently control and reduce their pain through posture positioning and mechanical movements throughout a typical day.  (approp and ongoing)  4.  Pt will demonstrate reduced pain and improved functional outcomes as reported on the FOTO by reaching a limitation score of < or = 30% or less in order to demonstrate subjective improvement in pt's condition.    (approp and ongoing)  5. Pt will demonstrate independence with the HEP at discharge  (approp and ongoing)  6. Patient will lift 20# kettle bell floor to plinth x 10 /s inc LBP for inc tolerance to yardwork and household chores (patient goal) (approp and ongoing)  7. Patient demonstrate 5 full golf swings /s inc LBP for inc tolerance to recreational activities  (approp and ongoing)    Plan   Continue with established Plan of Care  towards established PT goals.       Therapist: Sreekanth Brown, PTA  10/19/2022

## 2022-10-21 ENCOUNTER — CLINICAL SUPPORT (OUTPATIENT)
Dept: REHABILITATION | Facility: OTHER | Age: 77
End: 2022-10-21
Attending: PHYSICAL MEDICINE & REHABILITATION
Payer: MEDICARE

## 2022-10-21 DIAGNOSIS — R29.898 DECREASED STRENGTH OF TRUNK AND BACK: Primary | ICD-10-CM

## 2022-10-21 DIAGNOSIS — R26.89 DECREASED BACK MOBILITY: ICD-10-CM

## 2022-10-21 PROCEDURE — 97140 MANUAL THERAPY 1/> REGIONS: CPT | Mod: CQ

## 2022-10-21 PROCEDURE — 97110 THERAPEUTIC EXERCISES: CPT | Mod: CQ

## 2022-10-21 NOTE — PROGRESS NOTES
"[facility]      Name: Sreekanth Pitts Jr.  Clinic Number: [id number]    Therapy Diagnosis:   Encounter Diagnoses   Name Primary?    Decreased strength of trunk and back Yes    Decreased back mobility        Physician: Yolanda Salas, *    Visit Date: 10/21/2022    Physician Orders: PT Eval and Treat   Medical Diagnosis from Referral:   Z98.1 (ICD-10-CM) - S/P lumbar spinal fusion   M54.50,G89.29 (ICD-10-CM) - Chronic bilateral low back pain without sciatica      Evaluation Date: 2022  Authorization Period Expiration: 22  Plan of Care Expiration: 22  Reassessment Due: 10/26/22  Visit # / Visits authorized:      Time In: 10:00 am  Time Out: 10:55 am  Total Billable Time: 50 minutes  Insurance:Fee for service Insurance Patient      Precautions: Standard   L4/L5 fusion 2022     Pattern of pain determined: 2    Subjective   Sreekanth reports feeling better than last visit.  He reports he has been feeling increases in pain while performing bridge and supine hip flexor stretch while in clinic and at home.  He describes his back pain as a constant ache with sharp quick pains with bending forward.    Patient reports tolerating previous visit no c/o.  Patient reports their pain to be 4/10 on a 0-10 scale with 0 being no pain and 10 being the worst pain imaginable.  Pain Location: Bilateral belt line, R hip     Work and leisure: ; walking his dog and yard work until recently  Pt goals:"get my back stronger"    Objective     Baseline Isometric Testing on Med X equipment: Testing administered by PT  Date of testin22  ROM 0 - 39 deg   Max Peak Torque 125    Min Peak Torque 41    Flex/Ext Ratio 3   % below normative data 61%        Outcomes: FOTO  Initial score: 40% limitation  Visit 5 score: 38%  Goal: <30%      Treatment    Pt was instructed in and performed the following:     Sreekanth received therapeutic exercises to develop/improved posture, " "cardiovascular endurance, muscular endurance, lumbar/cervical ROM, strength and muscular endurance for 40 minutes including the following exercises:     LTR x10, 3" ea  DKTC x10, 5"  Sl QL stretch w/PT overpressure x3 15"  PPT 15x5 sec hold for core engagement  PPU x10  Standing lumbar ext x 10    [facility] 10/21/2022   Visit Number 8   VAS Pain Rating 4   Treadmill Time (in min.) 5   Speed -   Lumbar Stretches - Slouch -   Extension in Lying 10   Extension in Standing 10   Flexion in Lying 10   Manual Therapy -   Lumbar Extension - Seat Pad -   Femur Restraint -   Top Dead Center -   Counterweight -   Lumbar Flexion -   Lumbar Extension -   Lumbar Peak Torque -   Lumbar Weight 68   Repetitions 15   Rating of Perceived Exertion 5        NP:   Supine glute med stretch c/strap 5x10" ea  TrA w/ball +SLR x10  Bridge with PPT w/RTB x15,3"--NP  Supine HSS c/strap 2x30 sec ea  Kneeling hip flexor stretch 2x30"    Peripheral muscle strengthening which included 1 set of 15-20 repetitions at a slow, controlled 10-13 second per rep pace focused on strengthening supporting musculature for improved body mechanics and functional mobility.  Pt and therapist focused on proper form during treatment to ensure optimal strengthening of each targeted muscle group.  Machines were utilized including torso rotation, leg extension, leg curl, chest press, upright row. Tricep extension, bicep curl, leg press, and hip abduction added visit 3    Sreekanth received the following manual therapy techniques: Stick massage and STM (R) lower back/lumbar paraspinals/ posterior hip x 10 minutes.      Home Exercises Provided and Patient Education Provided   Home exercises include:     LTR   DKTC  Lumbar ext in standing  Hip flexor stretch  Pelvic tilts   Supine HSS and ITB stretch 9/30    Cardio program: 10/12/22  Lifting education date: visit 11  Posture/Lumbar roll: obtained     Education provided:   - Pacing and technique for initial " "lumbar MedX strengthening  - Use of cryotherapy to assist with pain control    Written Home Exercises Provided: Patient instructed to cont prior HEP. Plus additional stretches  Exercises were reviewed and Sreekanth was able to demonstrate them prior to the end of the session.  Sreekanth demonstrated good  understanding of the education provided.     See EMR under Patient Instructions for exercises provided prior visit.    Assessment   Sreekanth returned reporting decreased R sided lower back pain compared to last visit.  He continues to report a constant "ache" in lower back and sharp quick pain with bending forward that has Sreekanth avoid this motion all together in daily activities.  Treatment began with STM with roller stick to R posterior hip, STM to R sided lumbar paraspinals.  Treatment altered today by not performing hip flexor stretches or bridges due to pt's report of increased pain with ex's recently.  SL QL stretch with PT overpressure added today to address muscle stiffness.  He reported decreased pain levels following guided mat ex's and manual techniques.  Medx resistance increased to 68#.  He completed 15 reps at  RPE of 5/10.  Will continue to progress per pt's tolerance and HB protocol.      Patient is making progress towards established goals.  Pt will continue to benefit from skilled outpatient physical therapy to address the deficits stated in the impairment chart, provide pt/family education and to maximize pt's level of independence in the home and community environment.     Anticipated Barriers for therapy: Chronicity of condition  Pt's spiritual, cultural and educational needs considered and pt agreeable to plan of care and goals as stated below:     GOALS: Pt is in agreement with the following goals.     Short term goals:  6 weeks or 10 visits   1.  Pt will demonstrate increased lumbar ROM by at least 6 degrees from the initial ROM value with improvements noted in functional ROM and ability to perform ADLs.  " (approp and ongoing)  2.  Pt will demonstrate increased MedX average isometric strength value  by 15% from initial test resulting in improved ability to perform bending, lifting, and carrying activities safely, confidently.  (approp and ongoing)  3.  Patient report a reduction in worst pain score by 1-2 points for improved tolerance for household chores.  (approp and ongoing)  4.  Pt able to perform HEP correctly with minimal cueing or supervision from therapist to encourage independent management of symptoms. (approp and ongoing)      Long term goals: 10 weeks or 20 visits   1. Pt will demonstrate increased lumbar ROM by at least 9 degrees from initial ROM value, resulting in improved ability to perform functional fwd bending while standing and sitting. (approp and ongoing)  2. Pt will demonstrate increased MedX average isometric strength value  by 20% from initial test resulting in improved ability to perform bending, lifting, and carrying activities safely, confidently.  (approp and ongoing)  3. Pt to demonstrate ability to independently control and reduce their pain through posture positioning and mechanical movements throughout a typical day.  (approp and ongoing)  4.  Pt will demonstrate reduced pain and improved functional outcomes as reported on the FOTO by reaching a limitation score of < or = 30% or less in order to demonstrate subjective improvement in pt's condition.    (approp and ongoing)  5. Pt will demonstrate independence with the HEP at discharge  (approp and ongoing)  6. Patient will lift 20# kettle bell floor to plinth x 10 /s inc LBP for inc tolerance to yardwork and household chores (patient goal) (approp and ongoing)  7. Patient demonstrate 5 full golf swings /s inc LBP for inc tolerance to recreational activities  (approp and ongoing)    Plan   Continue with established Plan of Care towards established PT goals.       Therapist: Cb Vega, PTA  10/21/2022

## 2022-10-24 ENCOUNTER — CLINICAL SUPPORT (OUTPATIENT)
Dept: REHABILITATION | Facility: OTHER | Age: 77
End: 2022-10-24
Attending: PHYSICAL MEDICINE & REHABILITATION
Payer: MEDICARE

## 2022-10-24 DIAGNOSIS — R26.89 DECREASED BACK MOBILITY: ICD-10-CM

## 2022-10-24 DIAGNOSIS — R29.898 DECREASED STRENGTH OF TRUNK AND BACK: Primary | ICD-10-CM

## 2022-10-24 PROCEDURE — 97110 THERAPEUTIC EXERCISES: CPT

## 2022-10-24 NOTE — PROGRESS NOTES
"[facility]      Name: Sreekanth Pitts Jr.  Clinic Number: [id number]    Therapy Diagnosis:   Encounter Diagnoses   Name Primary?    Decreased strength of trunk and back Yes    Decreased back mobility        Physician: Yolanda Salas, *    Visit Date: 10/24/2022    Physician Orders: PT Eval and Treat   Medical Diagnosis from Referral:   Z98.1 (ICD-10-CM) - S/P lumbar spinal fusion   M54.50,G89.29 (ICD-10-CM) - Chronic bilateral low back pain without sciatica      Evaluation Date: 2022  Authorization Period Expiration: 22  Plan of Care Expiration: 22  Reassessment Due: 10/26/22  Visit # / Visits authorized:      Time In: 2:50 pm  Time Out: 3:50 pm  Total Billable Time: 55 minutes  Insurance: Fee for service Insurance Patient      Precautions: Standard   L4/L5 fusion 2022     Pattern of pain determined: 2    Subjective   Sreekanth reports any sort of slouching or bending over with give him increased pain. Still has increased pain with hip flexor stretch and bridges.    Patient reports tolerating previous visit no c/o.  Patient reports their pain to be 4/10 on a 0-10 scale with 0 being no pain and 10 being the worst pain imaginable.  Pain Location: Bilateral belt line, R hip     Work and leisure: ; walking his dog and yard work until recently  Pt goals:"get my back stronger"    Objective     Baseline Isometric Testing on Med X equipment: Testing administered by PT  Date of testin22  ROM 0 - 39 deg   Max Peak Torque 125    Min Peak Torque 41    Flex/Ext Ratio 3   % below normative data 61%        Outcomes: FOTO  Initial score: 40% limitation  Visit 5 score: 38%  Goal: <30%      Treatment    Pt was instructed in and performed the following:     Sreekanth received therapeutic exercises to develop/improved posture, cardiovascular endurance, muscular endurance, lumbar/cervical ROM, strength and muscular endurance for 40 minutes including the " "following exercises:     LTR x10, 3" ea  DKTC x10, 5"  Hamstring stretch 2x30"  Sl QL stretch w/PT overpressure x3 15"  Open books x10  PPT 15x5 sec hold for core engagement  PPU x10  Standing lumbar ext x 10    [facility] 10/24/2022   Visit Number 9   VAS Pain Rating 4   Treadmill Time (in min.) 5   Speed -   Lumbar Stretches - Slouch -   Extension in Lying 10   Extension in Standing 10   Flexion in Lying 10   Manual Therapy 10   Lumbar Extension - Seat Pad -   Femur Restraint -   Top Dead Center -   Counterweight -   Lumbar Flexion -   Lumbar Extension -   Lumbar Peak Torque -   Lumbar Weight 68   Repetitions 20   Rating of Perceived Exertion 4       NP:   Supine glute med stretch c/strap 5x10" ea  TrA w/ball +SLR x10  Bridge with PPT w/RTB x15,3"--NP  Supine HSS c/strap 2x30 sec ea  Kneeling hip flexor stretch 2x30"    Peripheral muscle strengthening which included 1 set of 15-20 repetitions at a slow, controlled 10-13 second per rep pace focused on strengthening supporting musculature for improved body mechanics and functional mobility.  Pt and therapist focused on proper form during treatment to ensure optimal strengthening of each targeted muscle group.  Machines were utilized including torso rotation, leg extension, leg curl, chest press, upright row. Tricep extension, bicep curl, leg press, and hip abduction added visit 3    Sreekanth received the following manual therapy techniques: manual hip flexor stretch in prone and piriformis release x 10 minutes.      Home Exercises Provided and Patient Education Provided   Home exercises include:     LTR   DKTC  Lumbar ext in standing  Hip flexor stretch  Pelvic tilts   Supine HSS and ITB stretch 9/30    Cardio program: 10/12/22  Lifting education date: visit 11  Posture/Lumbar roll: obtained     Education provided:   - Pacing and technique for initial lumbar MedX strengthening  - Use of cryotherapy to assist with pain control    Written Home Exercises " Provided: Patient instructed to cont prior HEP. Plus additional stretches  Exercises were reviewed and Sreekanth was able to demonstrate them prior to the end of the session.  Sreekanth demonstrated good  understanding of the education provided.     See EMR under Patient Instructions for exercises provided prior visit.    Assessment   Sreekanth presents today with continued complaints of pain. Hip flexor stretch completed manually today due to patient reports of pain when completing, he was able to tolerate manual hip flexor stretch in prone without increased pain of adverse effects. Piriformis release applied and pt reporting decreased R buttock tightness and soreness. Bridges held today. Medx resistance maintained at 68 ft/lbs and pt was able to complete 20 reps with 4/10 RPE. Retesting to be completed next visit.       Patient is making progress towards established goals.  Pt will continue to benefit from skilled outpatient physical therapy to address the deficits stated in the impairment chart, provide pt/family education and to maximize pt's level of independence in the home and community environment.     Anticipated Barriers for therapy: Chronicity of condition  Pt's spiritual, cultural and educational needs considered and pt agreeable to plan of care and goals as stated below:     GOALS: Pt is in agreement with the following goals.     Short term goals:  6 weeks or 10 visits   1.  Pt will demonstrate increased lumbar ROM by at least 6 degrees from the initial ROM value with improvements noted in functional ROM and ability to perform ADLs.  (approp and ongoing)  2.  Pt will demonstrate increased MedX average isometric strength value  by 15% from initial test resulting in improved ability to perform bending, lifting, and carrying activities safely, confidently.  (approp and ongoing)  3.  Patient report a reduction in worst pain score by 1-2 points for improved tolerance for household chores.  (approp and ongoing)  4.  Pt able  to perform HEP correctly with minimal cueing or supervision from therapist to encourage independent management of symptoms. (approp and ongoing)      Long term goals: 10 weeks or 20 visits   1. Pt will demonstrate increased lumbar ROM by at least 9 degrees from initial ROM value, resulting in improved ability to perform functional fwd bending while standing and sitting. (approp and ongoing)  2. Pt will demonstrate increased MedX average isometric strength value  by 20% from initial test resulting in improved ability to perform bending, lifting, and carrying activities safely, confidently.  (approp and ongoing)  3. Pt to demonstrate ability to independently control and reduce their pain through posture positioning and mechanical movements throughout a typical day.  (approp and ongoing)  4.  Pt will demonstrate reduced pain and improved functional outcomes as reported on the FOTO by reaching a limitation score of < or = 30% or less in order to demonstrate subjective improvement in pt's condition.    (approp and ongoing)  5. Pt will demonstrate independence with the HEP at discharge  (approp and ongoing)  6. Patient will lift 20# kettle bell floor to plinth x 10 /s inc LBP for inc tolerance to yardwork and household chores (patient goal) (approp and ongoing)  7. Patient demonstrate 5 full golf swings /s inc LBP for inc tolerance to recreational activities  (approp and ongoing)    Plan   Continue with established Plan of Care towards established PT goals.     Therapist: Gareth Gandhi, PT  10/24/2022

## 2022-10-26 ENCOUNTER — CLINICAL SUPPORT (OUTPATIENT)
Dept: REHABILITATION | Facility: OTHER | Age: 77
End: 2022-10-26
Attending: PHYSICAL MEDICINE & REHABILITATION
Payer: MEDICARE

## 2022-10-26 DIAGNOSIS — R26.89 DECREASED BACK MOBILITY: ICD-10-CM

## 2022-10-26 DIAGNOSIS — R29.898 DECREASED STRENGTH OF TRUNK AND BACK: Primary | ICD-10-CM

## 2022-10-26 PROCEDURE — 97750 PHYSICAL PERFORMANCE TEST: CPT

## 2022-10-26 PROCEDURE — 97110 THERAPEUTIC EXERCISES: CPT

## 2022-10-26 NOTE — PROGRESS NOTES
"[facility]      Name: Sreekanth Pitts Jr.  Clinic Number: [id number]    Therapy Diagnosis:   Encounter Diagnoses   Name Primary?    Decreased strength of trunk and back Yes    Decreased back mobility        Physician: Yolanda Salas, *    Visit Date: 10/26/2022    Physician Orders: PT Eval and Treat   Medical Diagnosis from Referral:   Z98.1 (ICD-10-CM) - S/P lumbar spinal fusion   M54.50,G89.29 (ICD-10-CM) - Chronic bilateral low back pain without sciatica      Evaluation Date: 2022  Authorization Period Expiration: 22  Plan of Care Expiration: 22  Reassessment Due: 10/26/22  Visit # / Visits authorized: 10/ 20     Time In: 2:20pm  Time Out: 320pm  Total Billable Time: 50 minutes  Insurance: Fee for service Insurance Patient      Precautions: Standard   L4/L5 fusion 2022     Pattern of pain determined: 2    Subjective   Sreekanth reports his R hip is feeling better after last stretching session    Patient reports tolerating previous visit no c/o.  Patient reports their pain to be 2/10 on a 0-10 scale with 0 being no pain and 10 being the worst pain imaginable.  Pain Location: Bilateral belt line, R hip     Work and leisure: ; walking his dog and yard work until recently  Pt goals:"get my back stronger"    Objective     Baseline Isometric Testing on Med X equipment: Testing administered by PT  Date of testin22  ROM 0 - 39 deg   Max Peak Torque 125    Min Peak Torque 41    Flex/Ext Ratio 3   % below normative data 61%      Mid Isometric Testing on Med X equipment: Testing administered by PT  Date of testing:10/26/22  ROM 0 - 42 deg   Max Peak Torque 179    Min Peak Torque 68   Flex/Ext Ratio 2.6     % below normative data 27%    87% strength gain  Outcomes: FOTO  Initial score: 40% limitation  Visit 5 score: 38% Visit 10 33%  Goal: <30%      Treatment    Pt was instructed in and performed the following:     Sreekanth received therapeutic " "exercises to develop/improved posture, cardiovascular endurance, muscular endurance, lumbar/cervical ROM, strength and muscular endurance for 60 minutes including the following exercises:   [facility] 10/26/2022   Visit Number 10   VAS Pain Rating 2   Treadmill Time (in min.) 5   Speed -   Lumbar Stretches - Slouch Overcorrection -   Extension in Lying 10   Extension in Standing 10   Flexion in Lying 10   Manual Therapy -   Lumbar Extension Seat Pad -   Femur Restraint -   Top Dead Center -   Counterweight -   Lumbar Flexion 39   Lumbar Extension 0   Lumbar Peak Torque 179   Min Torque 68   Test Percent Below Normative Data 27   Test Percent Gain in Strength from Initial  87   Lumbar Weight -   Repetitions -   Rating of Perceived Exertion -   Ice - Z Lie (in min.) 5         LTR x10, 3" ea  DKTC x10, 5"  Hamstring stretch 2x30"  Sl QL stretch w/PT overpressure x3 15"  Open books x10  PPT 15x5 sec hold for core engagement  PPU x10  Standing lumbar ext x 10    NP:   Supine glute med stretch c/strap 5x10" ea  TrA w/ball +SLR x10  Bridge with PPT w/RTB x15,3"--NP  Supine HSS c/strap 2x30 sec ea  Kneeling hip flexor stretch 2x30"    Peripheral muscle strengthening which included 1 set of 15-20 repetitions at a slow, controlled 10-13 second per rep pace focused on strengthening supporting musculature for improved body mechanics and functional mobility.  Pt and therapist focused on proper form during treatment to ensure optimal strengthening of each targeted muscle group.  Machines were utilized including torso rotation, leg extension, leg curl, chest press, upright row. Tricep extension, bicep curl, leg press, and hip abduction added visit 3    Sreekanth received the following manual therapy techniques: manual hip flexor stretch in prone and piriformis release x 0 minutes.      Home Exercises Provided and Patient Education Provided   Home exercises include:     LTR   DKTC  Lumbar ext in standing  Hip flexor " stretch  Pelvic tilts   Supine HSS and ITB stretch 9/30    Cardio program: 10/12/22  Lifting education date: visit 11  Posture/Lumbar roll: obtained     Education provided:   - Pacing and technique for initial lumbar MedX strengthening  - Use of cryotherapy to assist with pain control    Written Home Exercises Provided: Patient instructed to cont prior HEP. Plus additional stretches  Exercises were reviewed and Sreekanth was able to demonstrate them prior to the end of the session.  Sreekanth demonstrated good  understanding of the education provided.     See EMR under Patient Instructions for exercises provided prior visit.    Assessment   Patient has attended 10 visits at [facility] which included MD evaluation, PT evaluation with isometric testing, and physical therapy treatment including HEP instruction, education, aerobic work, dynamic strengthening on med ex equipment for the spine, and whole body strengthening on med ex equipment with increasing weight loads.  Patient  is demonstrating increased ability to reduce symptoms, improved posture, improved   ROM, and improved   strength as follows:    -Improved 3 ROM,  initially on med ex test 0-39 and   currently 0-42  -Improved strength at each test point on lumbar med ex IM test with   87 average improvement noted with Reduced pain  Noted by patient  -Initial outcome tool score 40 and current outcome tool score  33 indicating reduced pain and improved functio    Patient is making progress towards established goals.  Pt will continue to benefit from skilled outpatient physical therapy to address the deficits stated in the impairment chart, provide pt/family education and to maximize pt's level of independence in the home and community environment.     Anticipated Barriers for therapy: Chronicity of condition  Pt's spiritual, cultural and educational needs considered and pt agreeable to plan of care and goals as stated below:     GOALS: Pt is in agreement with the  following goals.     Short term goals:  6 weeks or 10 visits   1.  Pt will demonstrate increased lumbar ROM by at least 6 degrees from the initial ROM value with improvements noted in functional ROM and ability to perform ADLs.  (approp and ongoing)  2.  Pt will demonstrate increased MedX average isometric strength value  by 15% from initial test resulting in improved ability to perform bending, lifting, and carrying activities safely, confidently.  MET  3.  Patient report a reduction in worst pain score by 1-2 points for improved tolerance for household chores.  (approp and ongoing)  4.  Pt able to perform HEP correctly with minimal cueing or supervision from therapist to encourage independent management of symptoms. (approp and ongoing)      Long term goals: 10 weeks or 20 visits   1. Pt will demonstrate increased lumbar ROM by at least 9 degrees from initial ROM value, resulting in improved ability to perform functional fwd bending while standing and sitting. (approp and ongoing)  2. Pt will demonstrate increased MedX average isometric strength value  by 20% from initial test resulting in improved ability to perform bending, lifting, and carrying activities safely, confidently. MET  3. Pt to demonstrate ability to independently control and reduce their pain through posture positioning and mechanical movements throughout a typical day.  (approp and ongoing)  4.  Pt will demonstrate reduced pain and improved functional outcomes as reported on the FOTO by reaching a limitation score of < or = 30% or less in order to demonstrate subjective improvement in pt's condition.    (approp and ongoing)  5. Pt will demonstrate independence with the HEP at discharge  (approp and ongoing)  6. Patient will lift 20# kettle bell floor to plinth x 10 /s inc LBP for inc tolerance to yardwork and household chores (patient goal) (approp and ongoing)  7. Patient demonstrate 5 full golf swings /s inc LBP for inc tolerance to recreational  activities  (approp and ongoing)    Plan   Continue with established Plan of Care towards established PT goals.     Therapist: Katie Butts, PT  10/26/2022

## 2022-11-04 ENCOUNTER — CLINICAL SUPPORT (OUTPATIENT)
Dept: REHABILITATION | Facility: OTHER | Age: 77
End: 2022-11-04
Attending: INTERNAL MEDICINE
Payer: MEDICARE

## 2022-11-04 DIAGNOSIS — R26.89 DECREASED BACK MOBILITY: ICD-10-CM

## 2022-11-04 DIAGNOSIS — R29.898 DECREASED STRENGTH OF TRUNK AND BACK: Primary | ICD-10-CM

## 2022-11-04 PROCEDURE — 97110 THERAPEUTIC EXERCISES: CPT | Mod: CQ

## 2022-11-04 NOTE — PROGRESS NOTES
"[facility]      Name: Sreekanth Pitts Jr.  Clinic Number: [id number]    Therapy Diagnosis:   Encounter Diagnoses   Name Primary?    Decreased strength of trunk and back Yes    Decreased back mobility        Physician: No ref. provider found    Visit Date: 2022    Physician Orders: PT Eval and Treat   Medical Diagnosis from Referral:   Z98.1 (ICD-10-CM) - S/P lumbar spinal fusion   M54.50,G89.29 (ICD-10-CM) - Chronic bilateral low back pain without sciatica      Evaluation Date: 2022  Authorization Period Expiration: 22  Plan of Care Expiration: 22  Reassessment Due: 10/26/22  Visit # / Visits authorized:      Time In: 1:30 pm  Time Out: 2:30 pm  Total Billable Time: 60 minutes  Insurance: Fee for service Insurance Patient      Precautions: Standard   L4/L5 fusion 2022     Pattern of pain determined: 2    Subjective   Sreekanth reports pain from last re-test treatment. Patient states he felt muscle soreness for 4-5 days after session. Patient reports most pain when in sittin    Patient reports tolerating previous visit no c/o.  Patient reports their pain to be 2/10 on a 0-10 scale with 0 being no pain and 10 being the worst pain imaginable.  Pain Location: Bilateral belt line, R hip     Work and leisure: ; walking his dog and yard work until recently  Pt goals:"get my back stronger"    Objective     Baseline Isometric Testing on Med X equipment: Testing administered by PT  Date of testin22  ROM 0 - 39 deg   Max Peak Torque 125    Min Peak Torque 41    Flex/Ext Ratio 3   % below normative data 61%      Mid Isometric Testing on Med X equipment: Testing administered by PT  Date of testing:10/26/22  ROM 0 - 42 deg   Max Peak Torque 179    Min Peak Torque 68   Flex/Ext Ratio 2.6     % below normative data 27%    87% strength gain  Outcomes: FOTO  Initial score: 40% limitation  Visit 5 score: 38% Visit 10 33%  Goal: <30%      Treatment  " "  Pt was instructed in and performed the following:     Sreekanth received therapeutic exercises to develop/improved posture, cardiovascular endurance, muscular endurance, lumbar/cervical ROM, strength and muscular endurance for 60 minutes including the following exercises:     LTR x10, 3" ea  DKTC x10, 5"  Hamstring stretch 2x30"  Sl QL stretch w/PT overpressure x3 15"  Open books x10  PPT 15x5 sec hold for core engagement    [facility] 11/4/2022   Visit Number 11   VAS Pain Rating 2   Treadmill Time (in min.) 5   Speed -   Lumbar Stretches - Slouch -   Extension in Lying 10   Extension in Standing -   Flexion in Lying 10   Manual Therapy -   Lumbar Extension - Seat Pad -   Femur Restraint -   Top Dead Center -   Counterweight -   Lumbar Flexion 39   Lumbar Extension 0   Lumbar Peak Torque 179   Lumbar Weight 74   Repetitions 20   Rating of Perceived Exertion 4             NP:   Supine glute med stretch c/strap 5x10" ea  TrA w/ball +SLR x10  Bridge with PPT w/RTB x15,3"--NP  Supine HSS c/strap 2x30 sec ea  Kneeling hip flexor stretch 2x30"  Standing lumbar ext x 10  PPU x10    Peripheral muscle strengthening which included 1 set of 15-20 repetitions at a slow, controlled 10-13 second per rep pace focused on strengthening supporting musculature for improved body mechanics and functional mobility.  Pt and therapist focused on proper form during treatment to ensure optimal strengthening of each targeted muscle group.  Machines were utilized including torso rotation, leg extension, leg curl, chest press, upright row. Tricep extension, bicep curl, leg press, and hip abduction added visit 3    Sreekanth received the following manual therapy techniques: manual hip flexor stretch in prone and piriformis release x 0 minutes.      Home Exercises Provided and Patient Education Provided   Home exercises include:     LTR   DKTC  Lumbar ext in standing  Hip flexor stretch  Pelvic tilts   Supine HSS and ITB stretch " 9/30    Cardio program: 10/12/22  Lifting education date: visit 11  Posture/Lumbar roll: obtained     Education provided:   - Pacing and technique for initial lumbar MedX strengthening  - Use of cryotherapy to assist with pain control    Written Home Exercises Provided: Patient instructed to cont prior HEP. Plus additional stretches  Exercises were reviewed and Sreekanth was able to demonstrate them prior to the end of the session.  Sreekanth demonstrated good  understanding of the education provided.     See EMR under Patient Instructions for exercises provided prior visit.    Assessment   Patient tolerates stretches well with a decrease in hip flexor tension following prone HF stretch. Sreekanth is able to tolerate resistance increase in all peripheral machines with RPE of 3/4. Patient performs lumbar medx with a 5% increase in resistance and RPE of 4. Sreekanth does well with distraction when performing the lumbar medx due to anxiety of having increased back pain following performance.         -Improved 3 ROM,  initially on med ex test 0-39 and   currently 0-42  -Improved strength at each test point on lumbar med ex IM test with   87 average improvement noted with Reduced pain  Noted by patient  -Initial outcome tool score 40 and current outcome tool score  33 indicating reduced pain and improved functio    Patient is making progress towards established goals.  Pt will continue to benefit from skilled outpatient physical therapy to address the deficits stated in the impairment chart, provide pt/family education and to maximize pt's level of independence in the home and community environment.     Anticipated Barriers for therapy: Chronicity of condition  Pt's spiritual, cultural and educational needs considered and pt agreeable to plan of care and goals as stated below:     GOALS: Pt is in agreement with the following goals.     Short term goals:  6 weeks or 10 visits   1.  Pt will demonstrate increased lumbar ROM by at least 6  degrees from the initial ROM value with improvements noted in functional ROM and ability to perform ADLs.  (approp and ongoing)  2.  Pt will demonstrate increased MedX average isometric strength value  by 15% from initial test resulting in improved ability to perform bending, lifting, and carrying activities safely, confidently.  MET  3.  Patient report a reduction in worst pain score by 1-2 points for improved tolerance for household chores.  (approp and ongoing)  4.  Pt able to perform HEP correctly with minimal cueing or supervision from therapist to encourage independent management of symptoms. (approp and ongoing)      Long term goals: 10 weeks or 20 visits   1. Pt will demonstrate increased lumbar ROM by at least 9 degrees from initial ROM value, resulting in improved ability to perform functional fwd bending while standing and sitting. (approp and ongoing)  2. Pt will demonstrate increased MedX average isometric strength value  by 20% from initial test resulting in improved ability to perform bending, lifting, and carrying activities safely, confidently. MET  3. Pt to demonstrate ability to independently control and reduce their pain through posture positioning and mechanical movements throughout a typical day.  (approp and ongoing)  4.  Pt will demonstrate reduced pain and improved functional outcomes as reported on the FOTO by reaching a limitation score of < or = 30% or less in order to demonstrate subjective improvement in pt's condition.    (approp and ongoing)  5. Pt will demonstrate independence with the HEP at discharge  (approp and ongoing)  6. Patient will lift 20# kettle bell floor to plinth x 10 /s inc LBP for inc tolerance to yardwork and household chores (patient goal) (approp and ongoing)  7. Patient demonstrate 5 full golf swings /s inc LBP for inc tolerance to recreational activities  (approp and ongoing)    Plan   Continue with established Plan of Care towards established PT goals.      Therapist: Rosendo Ruiz, PTA  11/4/2022

## 2022-11-09 ENCOUNTER — CLINICAL SUPPORT (OUTPATIENT)
Dept: REHABILITATION | Facility: OTHER | Age: 77
End: 2022-11-09
Attending: INTERNAL MEDICINE
Payer: MEDICARE

## 2022-11-09 DIAGNOSIS — R26.89 DECREASED BACK MOBILITY: ICD-10-CM

## 2022-11-09 DIAGNOSIS — R29.898 DECREASED STRENGTH OF TRUNK AND BACK: Primary | ICD-10-CM

## 2022-11-09 PROCEDURE — 97110 THERAPEUTIC EXERCISES: CPT | Mod: CQ

## 2022-11-09 NOTE — PROGRESS NOTES
"[facility]      Name: Sreekanth Pitts Jr.  Clinic Number: [id number]    Therapy Diagnosis:   Encounter Diagnoses   Name Primary?    Decreased strength of trunk and back Yes    Decreased back mobility          Physician: No ref. provider found    Visit Date: 2022    Physician Orders: PT Eval and Treat   Medical Diagnosis from Referral:   Z98.1 (ICD-10-CM) - S/P lumbar spinal fusion   M54.50,G89.29 (ICD-10-CM) - Chronic bilateral low back pain without sciatica      Evaluation Date: 2022  Authorization Period Expiration: 22  Plan of Care Expiration: 22  Reassessment Due: 10/26/22  Visit # / Visits authorized:      Time In: 1:45 pm  Time Out: 2:40pm  Total Billable Time: 45 minutes  Insurance: Fee for service Insurance Patient      Precautions: Standard   L4/L5 fusion 2022     Pattern of pain determined: 2    Subjective   Sreekanth reports he felt a great deal better after last PT session, overall less pain, but minimal pain R LB/glute.    Patient reports tolerating previous visit no c/o.  Patient reports their pain to be 2/10 on a 0-10 scale with 0 being no pain and 10 being the worst pain imaginable.  Pain Location: Bilateral belt line, R hip     Work and leisure: ; walking his dog and yard work until recently  Pt goals:"get my back stronger"    Objective     Baseline Isometric Testing on Med X equipment: Testing administered by PT  Date of testin22  ROM 0 - 39 deg   Max Peak Torque 125    Min Peak Torque 41    Flex/Ext Ratio 3   % below normative data 61%      Mid Isometric Testing on Med X equipment: Testing administered by PT  Date of testing:10/26/22  ROM 0 - 42 deg   Max Peak Torque 179    Min Peak Torque 68   Flex/Ext Ratio 2.6     % below normative data 27%    87% strength gain  Outcomes: FOTO  Initial score: 40% limitation  Visit 5 score: 38% Visit 10 33%  Goal: <30%      Treatment    Pt was instructed in and performed the " "following:     Sreekanth received therapeutic exercises to develop/improved posture, cardiovascular endurance, muscular endurance, lumbar/cervical ROM, strength and muscular endurance for 45 minutes including the following exercises:     LTR x10, 3" ea  DKTC x10, 5"  +BKFO w/GTB x10  +Bridge w/GTB x15,3"  Hamstring stretch 2x30"  Sl QL stretch w/PT overpressure x3 15"  Open book bottom leg extended top knee on red bolster x10  PPT 15x5 sec hold for core engagement    [facility] - Short 11/9/2022   Visit Number 11   VAS Pain Rating 2   Treadmill Time (in min.) 5   Speed -   Lumbar Stretches - Slouch -   Extension in Lying -   Extension in Standing -   Flexion in Lying 10   Manual Therapy -   Lumbar Extension - Seat Pad -   Femur Restraint -   Top Dead Center -   Counterweight -   Lumbar Flexion -   Lumbar Extension -   Lumbar Peak Torque -   Lumbar Weight 78   Repetitions 16   Rating of Perceived Exertion 4                  NP:   Supine glute med stretch c/strap 5x10" ea  TrA w/ball +SLR x10  Bridge with PPT w/RTB x15,3"--NP  Supine HSS c/strap 2x30 sec ea  Kneeling hip flexor stretch 2x30"  Standing lumbar ext x 10  PPU x10    Peripheral muscle strengthening which included 1 set of 15-20 repetitions at a slow, controlled 10-13 second per rep pace focused on strengthening supporting musculature for improved body mechanics and functional mobility.  Pt and therapist focused on proper form during treatment to ensure optimal strengthening of each targeted muscle group.  Machines were utilized including torso rotation, leg extension, leg curl, chest press, upright row. Tricep extension, bicep curl, leg press, and hip abduction added visit 3    Sreekanth received the following manual therapy techniques: manual hip flexor stretch in prone STM to piriformis/R QL for 5 mins      Home Exercises Provided and Patient Education Provided   Home exercises include:     LTR   DKTC  Lumbar ext in standing  Hip flexor stretch  Pelvic " tilts   Supine HSS and ITB stretch 9/30    Cardio program: 10/12/22  Lifting education date: visit 11  Posture/Lumbar roll: obtained     Education provided:   - Pacing and technique for initial lumbar MedX strengthening  - Use of cryotherapy to assist with pain control    Written Home Exercises Provided: Patient instructed to cont prior HEP. Plus additional stretches  Exercises were reviewed and Sreekanth was able to demonstrate them prior to the end of the session.  Sreekanth demonstrated good  understanding of the education provided.     See EMR under Patient Instructions for exercises provided prior visit.    Assessment   Initiated manual work to address lingering pain, monitor for effectiveness NV. Patient tolerated progressed core and lumbopelvic stabilization exercises with appropriate challenge. Patient able to perform 16 reps on l/s medx machine with an RPE of 4/10. Will continue to progress per HB protocol.       Patient is making progress towards established goals.  Pt will continue to benefit from skilled outpatient physical therapy to address the deficits stated in the impairment chart, provide pt/family education and to maximize pt's level of independence in the home and community environment.     Anticipated Barriers for therapy: Chronicity of condition  Pt's spiritual, cultural and educational needs considered and pt agreeable to plan of care and goals as stated below:     GOALS: Pt is in agreement with the following goals.     Short term goals:  6 weeks or 10 visits   1.  Pt will demonstrate increased lumbar ROM by at least 6 degrees from the initial ROM value with improvements noted in functional ROM and ability to perform ADLs.  (approp and ongoing)  2.  Pt will demonstrate increased MedX average isometric strength value  by 15% from initial test resulting in improved ability to perform bending, lifting, and carrying activities safely, confidently.  MET  3.  Patient report a reduction in worst pain score by  1-2 points for improved tolerance for household chores.  (approp and ongoing)  4.  Pt able to perform HEP correctly with minimal cueing or supervision from therapist to encourage independent management of symptoms. (approp and ongoing)      Long term goals: 10 weeks or 20 visits   1. Pt will demonstrate increased lumbar ROM by at least 9 degrees from initial ROM value, resulting in improved ability to perform functional fwd bending while standing and sitting. (approp and ongoing)  2. Pt will demonstrate increased MedX average isometric strength value  by 20% from initial test resulting in improved ability to perform bending, lifting, and carrying activities safely, confidently. MET  3. Pt to demonstrate ability to independently control and reduce their pain through posture positioning and mechanical movements throughout a typical day.  (approp and ongoing)  4.  Pt will demonstrate reduced pain and improved functional outcomes as reported on the FOTO by reaching a limitation score of < or = 30% or less in order to demonstrate subjective improvement in pt's condition.    (approp and ongoing)  5. Pt will demonstrate independence with the HEP at discharge  (approp and ongoing)  6. Patient will lift 20# kettle bell floor to plinth x 10 /s inc LBP for inc tolerance to yardwork and household chores (patient goal) (approp and ongoing)  7. Patient demonstrate 5 full golf swings /s inc LBP for inc tolerance to recreational activities  (approp and ongoing)    Plan   Continue with established Plan of Care towards established PT goals.     Therapist: Tiffany Kingston, PTA  11/9/2022

## 2022-11-16 ENCOUNTER — CLINICAL SUPPORT (OUTPATIENT)
Dept: REHABILITATION | Facility: OTHER | Age: 77
End: 2022-11-16
Attending: PHYSICAL MEDICINE & REHABILITATION
Payer: MEDICARE

## 2022-11-16 DIAGNOSIS — R26.89 DECREASED BACK MOBILITY: ICD-10-CM

## 2022-11-16 DIAGNOSIS — R29.898 DECREASED STRENGTH OF TRUNK AND BACK: Primary | ICD-10-CM

## 2022-11-16 PROCEDURE — 97110 THERAPEUTIC EXERCISES: CPT | Mod: CQ

## 2022-11-16 NOTE — PROGRESS NOTES
"[facility]      Name: Sreekanth Pitts Jr.  Clinic Number: [id number]    Therapy Diagnosis:   Encounter Diagnoses   Name Primary?    Decreased strength of trunk and back Yes    Decreased back mobility          Physician: Yolanad Salas, *    Visit Date: 2022    Physician Orders: PT Eval and Treat   Medical Diagnosis from Referral:   Z98.1 (ICD-10-CM) - S/P lumbar spinal fusion   M54.50,G89.29 (ICD-10-CM) - Chronic bilateral low back pain without sciatica      Evaluation Date: 2022  Authorization Period Expiration: 22  Plan of Care Expiration: 22  Reassessment Due: 10/26/22  Visit # / Visits authorized:      Time In: 2:15 pm  Time Out: 3:15pm  Total Billable Time: 50 minutes  Insurance: Fee for service Insurance Patient      Precautions: Standard   L4/L5 fusion 2022     Pattern of pain determined: 2    Subjective   Sreekanth reports tolerable pain across his lower back R>L. States that his (R) hip pain has decreased overall. States that he was able to resume bridges last visit without increased pain.     Patient reports tolerating previous visit no c/o.  Patient reports their pain to be 3/10 on a 0-10 scale with 0 being no pain and 10 being the worst pain imaginable.  Pain Location: Bilateral belt line, R hip     Work and leisure: ; walking his dog and yard work until recently  Pt goals:"get my back stronger"    Objective     Baseline Isometric Testing on Med X equipment: Testing administered by PT  Date of testin22  ROM 0 - 39 deg   Max Peak Torque 125    Min Peak Torque 41    Flex/Ext Ratio 3   % below normative data 61%      Mid Isometric Testing on Med X equipment: Testing administered by PT  Date of testing:10/26/22  ROM 0 - 42 deg   Max Peak Torque 179    Min Peak Torque 68   Flex/Ext Ratio 2.6     % below normative data 27%    87% strength gain  Outcomes: FOTO  Initial score: 40% limitation  Visit 5 score: 38% Visit 10 " "33%  Goal: <30%      Treatment    Pt was instructed in and performed the following:     Sreekanth received therapeutic exercises to develop/improved posture, cardiovascular endurance, muscular endurance, lumbar/cervical ROM, strength and muscular endurance for 50 minutes including the following exercises:     LTR x10, 3" ea  DKTC x10, 5"  Hamstring stretch c/strap 3 x 20"  PPT 15x5 sec hold for core engagement  Bridge w/GTB x15,3"  Sl QL stretch (R) w/PT overpressure x3 15"  Open book bottom leg extended top knee on red bolster x10  +Lifting education   Floor<->waist   Golfer's lift    [facility] - Short 11/16/2022   Visit Number 13   VAS Pain Rating 3   Treadmill Time (in min.) 5   Speed -   Lumbar Stretches - Slouch -   Extension in Lying -   Extension in Standing -   Flexion in Lying 10   Manual Therapy 5   Lumbar Extension - Seat Pad -   Femur Restraint -   Top Dead Center -   Counterweight -   Lumbar Flexion -   Lumbar Extension -   Lumbar Peak Torque -   Lumbar Weight 78   Repetitions 20   Rating of Perceived Exertion 3      NP:   Supine glute med stretch c/strap 5x10" ea  TrA w/ball +SLR x10  Supine HSS c/strap 2x30 sec ea  Kneeling hip flexor stretch 2x30"  Standing lumbar ext x 10  PPU x10  BKFO w/GTB x10    Peripheral muscle strengthening which included 1 set of 15-20 repetitions at a slow, controlled 10-13 second per rep pace focused on strengthening supporting musculature for improved body mechanics and functional mobility.  Pt and therapist focused on proper form during treatment to ensure optimal strengthening of each targeted muscle group.  Machines were utilized including torso rotation, leg extension, leg curl, chest press, upright row. Tricep extension, bicep curl, leg press, and hip abduction added visit 3    Sreekanth received the following manual therapy techniques: , (R) QL manual release, STM to piriformis/R QL for 5 mins      Home Exercises Provided and Patient Education Provided   Home " exercises include:     LTR   DKTC  Lumbar ext in standing  Hip flexor stretch  Pelvic tilts   Supine HSS and ITB stretch 9/30    Cardio program: 10/12/22  Lifting education date: visit 13, Copy of Do's and don'ts of lifting handout provided 11/16/22  Posture/Lumbar roll: obtained     Education provided:   - Pacing and technique for initial lumbar MedX strengthening  - Use of cryotherapy to assist with pain control    Written Home Exercises Provided: Patient instructed to cont prior HEP. Plus additional stretches  Exercises were reviewed and Sreekanth was able to demonstrate them prior to the end of the session.  Sreekanth demonstrated good  understanding of the education provided.     See EMR under Patient Instructions for exercises provided prior visit.    Assessment   Sreekanth returns with tolerable lower back/ R QL discomfort/stiffness.  Treatment continued with lumbopelvic/core flexibility and strengthening ex's which he was able to perform without increased symptoms including resumption of bridging ex over the past few visits. Patient educated on the Do's and Don'ts of Lifting today as this was not done previously and he was able to demonstrate good understanding and ability after instruction. Lumbar MedX resistance was maintained at 78 ft/lbs progressing to complete 20 reps with a RPE = 3/10. Will attempt to progress per HB protocol and patient tolerance.    Patient is making progress towards established goals.  Pt will continue to benefit from skilled outpatient physical therapy to address the deficits stated in the impairment chart, provide pt/family education and to maximize pt's level of independence in the home and community environment.     Anticipated Barriers for therapy: Chronicity of condition  Pt's spiritual, cultural and educational needs considered and pt agreeable to plan of care and goals as stated below:     GOALS: Pt is in agreement with the following goals.     Short term goals:  6 weeks or 10 visits   1.   Pt will demonstrate increased lumbar ROM by at least 6 degrees from the initial ROM value with improvements noted in functional ROM and ability to perform ADLs.  (approp and ongoing)  2.  Pt will demonstrate increased MedX average isometric strength value  by 15% from initial test resulting in improved ability to perform bending, lifting, and carrying activities safely, confidently.  MET  3.  Patient report a reduction in worst pain score by 1-2 points for improved tolerance for household chores.  (approp and ongoing)  4.  Pt able to perform HEP correctly with minimal cueing or supervision from therapist to encourage independent management of symptoms. (approp and ongoing)      Long term goals: 10 weeks or 20 visits   1. Pt will demonstrate increased lumbar ROM by at least 9 degrees from initial ROM value, resulting in improved ability to perform functional fwd bending while standing and sitting. (approp and ongoing)  2. Pt will demonstrate increased MedX average isometric strength value  by 20% from initial test resulting in improved ability to perform bending, lifting, and carrying activities safely, confidently. MET  3. Pt to demonstrate ability to independently control and reduce their pain through posture positioning and mechanical movements throughout a typical day.  (approp and ongoing)  4.  Pt will demonstrate reduced pain and improved functional outcomes as reported on the FOTO by reaching a limitation score of < or = 30% or less in order to demonstrate subjective improvement in pt's condition.    (approp and ongoing)  5. Pt will demonstrate independence with the HEP at discharge  (approp and ongoing)  6. Patient will lift 20# kettle bell floor to plinth x 10 /s inc LBP for inc tolerance to yardwork and household chores (patient goal) (approp and ongoing)  7. Patient demonstrate 5 full golf swings /s inc LBP for inc tolerance to recreational activities  (approp and ongoing)    Plan   Continue with  established Plan of Care towards established PT goals.     Therapist: Sreekanth Brown, PTA  11/16/2022

## 2022-11-18 ENCOUNTER — CLINICAL SUPPORT (OUTPATIENT)
Dept: REHABILITATION | Facility: OTHER | Age: 77
End: 2022-11-18
Attending: PHYSICAL MEDICINE & REHABILITATION
Payer: MEDICARE

## 2022-11-18 DIAGNOSIS — R26.89 DECREASED BACK MOBILITY: ICD-10-CM

## 2022-11-18 DIAGNOSIS — R29.898 DECREASED STRENGTH OF TRUNK AND BACK: Primary | ICD-10-CM

## 2022-11-18 PROCEDURE — 97110 THERAPEUTIC EXERCISES: CPT | Mod: CQ

## 2022-11-18 NOTE — PROGRESS NOTES
"[facility]      Name: Sreekanth Pitts Jr.  Clinic Number: [id number]    Therapy Diagnosis:   No diagnosis found.        Physician: Yolanda Salas, *    Visit Date: 2022    Physician Orders: PT Eval and Treat   Medical Diagnosis from Referral:   Z98.1 (ICD-10-CM) - S/P lumbar spinal fusion   M54.50,G89.29 (ICD-10-CM) - Chronic bilateral low back pain without sciatica      Evaluation Date: 2022  Authorization Period Expiration: 22  Plan of Care Expiration: 22  Reassessment Due: 10/26/22  Visit # / Visits authorized:      Time In: 2:00 pm  Time Out: 3:15pm  Total Billable Time: 50 minutes  Insurance: Fee for service Insurance Patient      Precautions: Standard   L4/L5 fusion 2022     Pattern of pain determined: 2    Subjective   Sreekanth reports tolerable pain across his lower back R>L. States that his (R) hip pain has decreased overall. States that he was able to resume bridges last visit without increased pain.     Patient reports tolerating previous visit no c/o.  Patient reports their pain to be 3/10 on a 0-10 scale with 0 being no pain and 10 being the worst pain imaginable.  Pain Location: Bilateral belt line, R hip     Work and leisure: ; walking his dog and yard work until recently  Pt goals:"get my back stronger"    Objective     Baseline Isometric Testing on Med X equipment: Testing administered by PT  Date of testin22  ROM 0 - 39 deg   Max Peak Torque 125    Min Peak Torque 41    Flex/Ext Ratio 3   % below normative data 61%      Mid Isometric Testing on Med X equipment: Testing administered by PT  Date of testing:10/26/22  ROM 0 - 42 deg   Max Peak Torque 179    Min Peak Torque 68   Flex/Ext Ratio 2.6     % below normative data 27%    87% strength gain  Outcomes: FOTO  Initial score: 40% limitation  Visit 5 score: 38% Visit 10 33%  Goal: <30%      Treatment    Pt was instructed in and performed the following: " "    Sreekanth received therapeutic exercises to develop/improved posture, cardiovascular endurance, muscular endurance, lumbar/cervical ROM, strength and muscular endurance for 50 minutes including the following exercises:     LTR x10, 3" ea  DKTC x10, 5"  Hamstring stretch c/strap 3 x 20"  PPT 15x5 sec hold for core engagement  Bridge w/GTB x15,3"  Sl QL stretch (R) w/PT overpressure x3 15"  Open book bottom leg extended top knee on red bolster x20  BKFO w/GTB x10    [facility] 11/18/2022   Visit Number 14   VAS Pain Rating 3   Treadmill Time (in min.) -   Speed -   Time 5   Lumbar Stretches - Slouch Overcorrection -   Extension in Lying -   Extension in Standing -   Flexion in Lying 10   Manual Therapy -   Lumbar Extension Seat Pad -   Femur Restraint -   Top Dead Center -   Counterweight -   Lumbar Flexion -   Lumbar Extension -   Lumbar Peak Torque -   Min Torque -   Test Percent Below Normative Data -   Test Percent Gain in Strength from Initial  -   Lumbar Weight 85   Repetitions 15   Rating of Perceived Exertion 3   Ice - Z Lie (in min.) 5         NP:   Supine glute med stretch c/strap 5x10" ea  TrA w/ball +SLR x10  Supine HSS c/strap 2x30 sec ea  Kneeling hip flexor stretch 2x30"  Standing lumbar ext x 10  PPT x10  Sl QL stretch (R) w/PT overpressure x3 15"  +Lifting education   Floor<->waist   Golfer's lift  Peripheral muscle strengthening which included 1 set of 15-20 repetitions at a slow, controlled 10-13 second per rep pace focused on strengthening supporting musculature for improved body mechanics and functional mobility.  Pt and therapist focused on proper form during treatment to ensure optimal strengthening of each targeted muscle group.  Machines were utilized including torso rotation, leg extension, leg curl, chest press, upright row. Tricep extension, bicep curl, leg press, and hip abduction added visit 3    Sreekanth received the following manual therapy techniques:       Home Exercises Provided " and Patient Education Provided   Home exercises include:     LTR   DKTC  Lumbar ext in standing  Hip flexor stretch  Pelvic tilts   Supine HSS and ITB stretch 9/30    Cardio program: 10/12/22  Lifting education date: visit 13, Copy of Do's and don'ts of lifting handout provided 11/16/22  Posture/Lumbar roll: obtained     Education provided:   - Pacing and technique for initial lumbar MedX strengthening  - Use of cryotherapy to assist with pain control    Written Home Exercises Provided: Patient instructed to cont prior HEP. Plus additional stretches  Exercises were reviewed and Sreekanth was able to demonstrate them prior to the end of the session.  Sreekanth demonstrated good  understanding of the education provided.     See EMR under Patient Instructions for exercises provided prior visit.    Assessment   Sreekanth returns with tolerable lower back/ R QL discomfort/stiffness.  Treatment continued with lumbopelvic/core flexibility and strengthening ex's which he was able to perform without increased symptoms including resumption of bridging ex over the past few visits. Lumbar MedX resistance was maintained at 85ft/lbs progressing to complete 20 reps with a RPE = 3/10. Will attempt to progress per HB protocol and patient tolerance.    Patient is making progress towards established goals.  Pt will continue to benefit from skilled outpatient physical therapy to address the deficits stated in the impairment chart, provide pt/family education and to maximize pt's level of independence in the home and community environment.     Anticipated Barriers for therapy: Chronicity of condition  Pt's spiritual, cultural and educational needs considered and pt agreeable to plan of care and goals as stated below:     GOALS: Pt is in agreement with the following goals.     Short term goals:  6 weeks or 10 visits   1.  Pt will demonstrate increased lumbar ROM by at least 6 degrees from the initial ROM value with improvements noted in functional ROM  and ability to perform ADLs.  (approp and ongoing)  2.  Pt will demonstrate increased MedX average isometric strength value  by 15% from initial test resulting in improved ability to perform bending, lifting, and carrying activities safely, confidently.  MET  3.  Patient report a reduction in worst pain score by 1-2 points for improved tolerance for household chores.  (approp and ongoing)  4.  Pt able to perform HEP correctly with minimal cueing or supervision from therapist to encourage independent management of symptoms. (approp and ongoing)      Long term goals: 10 weeks or 20 visits   1. Pt will demonstrate increased lumbar ROM by at least 9 degrees from initial ROM value, resulting in improved ability to perform functional fwd bending while standing and sitting. (approp and ongoing)  2. Pt will demonstrate increased MedX average isometric strength value  by 20% from initial test resulting in improved ability to perform bending, lifting, and carrying activities safely, confidently. MET  3. Pt to demonstrate ability to independently control and reduce their pain through posture positioning and mechanical movements throughout a typical day.  (approp and ongoing)  4.  Pt will demonstrate reduced pain and improved functional outcomes as reported on the FOTO by reaching a limitation score of < or = 30% or less in order to demonstrate subjective improvement in pt's condition.    (approp and ongoing)  5. Pt will demonstrate independence with the HEP at discharge  (approp and ongoing)  6. Patient will lift 20# kettle bell floor to plinth x 10 /s inc LBP for inc tolerance to yardwork and household chores (patient goal) (approp and ongoing)  7. Patient demonstrate 5 full golf swings /s inc LBP for inc tolerance to recreational activities  (approp and ongoing)    Plan   Continue with established Plan of Care towards established PT goals.     Therapist: Isabel Houston, PTA  11/18/2022

## 2022-11-23 ENCOUNTER — CLINICAL SUPPORT (OUTPATIENT)
Dept: REHABILITATION | Facility: OTHER | Age: 77
End: 2022-11-23
Attending: PHYSICAL MEDICINE & REHABILITATION
Payer: MEDICARE

## 2022-11-23 DIAGNOSIS — R26.89 DECREASED BACK MOBILITY: ICD-10-CM

## 2022-11-23 DIAGNOSIS — R29.898 DECREASED STRENGTH OF TRUNK AND BACK: Primary | ICD-10-CM

## 2022-11-23 PROCEDURE — 97110 THERAPEUTIC EXERCISES: CPT

## 2022-11-23 NOTE — PROGRESS NOTES
"[facility]      Name: Sreekanth Pitts Jr.  Clinic Number: [id number]    Therapy Diagnosis:   Encounter Diagnoses   Name Primary?    Decreased strength of trunk and back Yes    Decreased back mobility            Physician: Yolanda Salas, *    Visit Date: 2022    Physician Orders: PT Eval and Treat   Medical Diagnosis from Referral:   Z98.1 (ICD-10-CM) - S/P lumbar spinal fusion   M54.50,G89.29 (ICD-10-CM) - Chronic bilateral low back pain without sciatica      Evaluation Date: 2022  Authorization Period Expiration: 22  Plan of Care Expiration: 22  Reassessment Due: 10/26/22  Visit # / Visits authorized: 15/ 20     Time In: 10 am  Time Out: 11  Total Billable Time: 50 minutes  Insurance: Fee for service Insurance Patient      Precautions: Standard   L4/L5 fusion 2022     Pattern of pain determined: 2    Subjective   Sreekanth reports his pain level has decreased recently in R hip/across LB  Patient reports tolerating previous visit no c/o.  Patient reports their pain to be 4/10 on a 0-10 scale with 0 being no pain and 10 being the worst pain imaginable.  Pain Location: Bilateral belt line, R hip     Work and leisure: ; walking his dog and yard work until recently  Pt goals:"get my back stronger"    Objective     Baseline Isometric Testing on Med X equipment: Testing administered by PT  Date of testin22  ROM 0 - 39 deg   Max Peak Torque 125    Min Peak Torque 41    Flex/Ext Ratio 3   % below normative data 61%      Mid Isometric Testing on Med X equipment: Testing administered by PT  Date of testing:10/26/22  ROM 0 - 42 deg   Max Peak Torque 179    Min Peak Torque 68   Flex/Ext Ratio 2.6     % below normative data 27%    87% strength gain  Outcomes: FOTO  Initial score: 40% limitation  Visit 5 score: 38% Visit 10 33%  Goal: <30%      Treatment    Pt was instructed in and performed the following:     Sreekanth received therapeutic " "exercises to develop/improved posture, cardiovascular endurance, muscular endurance, lumbar/cervical ROM, strength and muscular endurance for 60 minutes including the following exercises:   [facility] 11/23/2022   Visit Number 15   VAS Pain Rating 3   Treadmill Time (in min.) 5   Speed -   Time -   Lumbar Stretches - Slouch Overcorrection -   Extension in Lying -   Extension in Standing -   Flexion in Lying 10   Manual Therapy 5   Lumbar Extension Seat Pad -   Femur Restraint -   Top Dead Center -   Counterweight -   Lumbar Flexion -   Lumbar Extension -   Lumbar Peak Torque -   Min Torque -   Test Percent Below Normative Data -   Test Percent Gain in Strength from Initial  -   Lumbar Weight 85   Repetitions 20   Rating of Perceived Exertion 3   Ice - Z Lie (in min.) 5       LTR x10, 3" ea  DKTC x10, 5"  Hamstring stretch c/strap 2 x 30"  PPT 15x5 sec hold for core engagement  Bridge w/GTB x15,3"  Sl QL stretch (R) w/PT overpressure x3 15"  Open book bottom leg extended top knee on red bolster x20  BKFO w/GTB x 15      NP:   Supine glute med stretch c/strap 5x10" ea  TrA w/ball +SLR x10  Supine HSS c/strap 2x30 sec ea  Kneeling hip flexor stretch 2x30"  Standing lumbar ext x 10  PPT x10  Sl QL stretch (R) w/PT overpressure x3 15"  +Lifting education   Floor<->waist   Golfer's lift  Peripheral muscle strengthening which included 1 set of 15-20 repetitions at a slow, controlled 10-13 second per rep pace focused on strengthening supporting musculature for improved body mechanics and functional mobility.  Pt and therapist focused on proper form during treatment to ensure optimal strengthening of each targeted muscle group.  Machines were utilized including torso rotation, leg extension, leg curl, chest press, upright row. Tricep extension, bicep curl, leg press, and hip abduction added visit 3    Sreekanth received the following manual therapy techniques: DTM to R piriformis/LB/gluts 5 min      Home Exercises " Provided and Patient Education Provided   Home exercises include:     LTR   DKTC  Lumbar ext in standing  Hip flexor stretch  Pelvic tilts   Supine HSS and ITB stretch 9/30    Cardio program: 10/12/22  Lifting education date: visit 13, Copy of Do's and don'ts of lifting handout provided 11/16/22  Posture/Lumbar roll: obtained     Education provided:   - Pacing and technique for initial lumbar MedX strengthening  - Use of cryotherapy to assist with pain control    Written Home Exercises Provided: Patient instructed to cont prior HEP. Plus additional stretches  Exercises were reviewed and Sreekanth was able to demonstrate them prior to the end of the session.  Sreekanth demonstrated good  understanding of the education provided.     See EMR under Patient Instructions for exercises provided prior visit.    Assessment   Sreekanth returns with decrease in overall symptoms.  Performed DTM to R gluts/piriformis x 5 minutes to decrease muscle tightness and discomfort.  Pt able to complete 20 reps at 3/10 exertion level at 85ft/lbs.  Increase 5% next visit.  Discussed continuing with Wellness after DC, ptvery interested in continuing and becoming more I with .    Patient is making progress towards established goals.  Pt will continue to benefit from skilled outpatient physical therapy to address the deficits stated in the impairment chart, provide pt/family education and to maximize pt's level of independence in the home and community environment.     Anticipated Barriers for therapy: Chronicity of condition  Pt's spiritual, cultural and educational needs considered and pt agreeable to plan of care and goals as stated below:     GOALS: Pt is in agreement with the following goals.     Short term goals:  6 weeks or 10 visits   1.  Pt will demonstrate increased lumbar ROM by at least 6 degrees from the initial ROM value with improvements noted in functional ROM and ability to perform ADLs.  (approp and ongoing)  2.  Pt will  demonstrate increased MedX average isometric strength value  by 15% from initial test resulting in improved ability to perform bending, lifting, and carrying activities safely, confidently.  MET  3.  Patient report a reduction in worst pain score by 1-2 points for improved tolerance for household chores.  (approp and ongoing)  4.  Pt able to perform HEP correctly with minimal cueing or supervision from therapist to encourage independent management of symptoms. (approp and ongoing)      Long term goals: 10 weeks or 20 visits   1. Pt will demonstrate increased lumbar ROM by at least 9 degrees from initial ROM value, resulting in improved ability to perform functional fwd bending while standing and sitting. (approp and ongoing)  2. Pt will demonstrate increased MedX average isometric strength value  by 20% from initial test resulting in improved ability to perform bending, lifting, and carrying activities safely, confidently. MET  3. Pt to demonstrate ability to independently control and reduce their pain through posture positioning and mechanical movements throughout a typical day.  (approp and ongoing)  4.  Pt will demonstrate reduced pain and improved functional outcomes as reported on the FOTO by reaching a limitation score of < or = 30% or less in order to demonstrate subjective improvement in pt's condition.    (approp and ongoing)  5. Pt will demonstrate independence with the HEP at discharge  (approp and ongoing)  6. Patient will lift 20# kettle bell floor to plinth x 10 /s inc LBP for inc tolerance to yardwork and household chores (patient goal) (approp and ongoing)  7. Patient demonstrate 5 full golf swings /s inc LBP for inc tolerance to recreational activities  (approp and ongoing)    Plan   Continue with established Plan of Care towards established PT goals.     Therapist: Katie Butts, PT  11/23/2022

## 2022-11-28 ENCOUNTER — CLINICAL SUPPORT (OUTPATIENT)
Dept: REHABILITATION | Facility: OTHER | Age: 77
End: 2022-11-28
Attending: PHYSICAL MEDICINE & REHABILITATION
Payer: MEDICARE

## 2022-11-28 DIAGNOSIS — R29.898 DECREASED STRENGTH OF TRUNK AND BACK: Primary | ICD-10-CM

## 2022-11-28 DIAGNOSIS — R26.89 DECREASED BACK MOBILITY: ICD-10-CM

## 2022-11-28 PROCEDURE — 97110 THERAPEUTIC EXERCISES: CPT | Mod: CQ

## 2022-11-28 NOTE — PROGRESS NOTES
"[facility]      Name: Sreekanth Pitts Jr.  Clinic Number: [id number]    Therapy Diagnosis:   Encounter Diagnoses   Name Primary?    Decreased strength of trunk and back Yes    Decreased back mobility            Physician: Yolanda Salas, *    Visit Date: 2022    Physician Orders: PT Eval and Treat   Medical Diagnosis from Referral:   Z98.1 (ICD-10-CM) - S/P lumbar spinal fusion   M54.50,G89.29 (ICD-10-CM) - Chronic bilateral low back pain without sciatica      Evaluation Date: 2022  Authorization Period Expiration: 22  Plan of Care Expiration: 22  Reassessment Due: 10/26/22  Visit # / Visits authorized: 15/ 20     Time In: 2:00 PM  Time Out: 3:00 PM  Total Billable Time: 60 minutes  Insurance: Fee for service Insurance Patient      Precautions: Standard   L4/L5 fusion 2022     Pattern of pain determined: 2    Subjective   Sreekanth reports no new c/o pain.   Patient reports tolerating previous visit no c/o.  Patient reports their pain to be 4/10 on a 0-10 scale with 0 being no pain and 10 being the worst pain imaginable.  Pain Location: Bilateral belt line, R hip     Work and leisure: ; walking his dog and yard work until recently  Pt goals:"get my back stronger"    Objective     Baseline Isometric Testing on Med X equipment: Testing administered by PT  Date of testin22  ROM 0 - 39 deg   Max Peak Torque 125    Min Peak Torque 41    Flex/Ext Ratio 3   % below normative data 61%      Mid Isometric Testing on Med X equipment: Testing administered by PT  Date of testing:10/26/22  ROM 0 - 42 deg   Max Peak Torque 179    Min Peak Torque 68   Flex/Ext Ratio 2.6     % below normative data 27%    87% strength gain  Outcomes: FOTO  Initial score: 40% limitation  Visit 5 score: 38% Visit 10 33%  Goal: <30%      Treatment    Pt was instructed in and performed the following:     Sreekanth received therapeutic exercises to develop/improved posture, " "cardiovascular endurance, muscular endurance, lumbar/cervical ROM, strength and muscular endurance for 60 minutes including the following exercises:   [facility] 11/28/2022   Visit Number -   VAS Pain Rating -   Treadmill Time (in min.) -   Speed -   Time -   Lumbar Stretches - Slouch -   Extension in Lying -   Extension in Standing -   Flexion in Lying -   Manual Therapy -   Lumbar Extension - Seat Pad -   Femur Restraint -   Top Dead Center -   Counterweight -   Lumbar Flexion -   Lumbar Extension -   Lumbar Peak Torque -   Lumbar Weight 88   Repetitions 20   Rating of Perceived Exertion 4        LTR x5, 3" ea  Hamstring stretch c/strap 2 x 30"  Bridge w/GTB x15,3"  BKFO w/GTB x 15  Rajan stretch 20''x 2   Dead bugs red ball x 8     NP:   DKTC x10, 5"  Open book bottom leg extended top knee on red bolster x20  PPT 15x5 sec hold for core engagement  Supine glute med stretch c/strap 5x10" ea  TrA w/ball +SLR x10  Supine HSS c/strap 2x30 sec ea  Kneeling hip flexor stretch 2x30"  Standing lumbar ext x 10  PPT x10  Sl QL stretch (R) w/PT overpressure x3 15"  Sl QL stretch (R) w/PT overpressure x3 15"  +Lifting education   Floor<->waist   Golfer's lift  Peripheral muscle strengthening which included 1 set of 15-20 repetitions at a slow, controlled 10-13 second per rep pace focused on strengthening supporting musculature for improved body mechanics and functional mobility.  Pt and therapist focused on proper form during treatment to ensure optimal strengthening of each targeted muscle group.  Machines were utilized including torso rotation, leg extension, leg curl, chest press, upright row. Tricep extension, bicep curl, leg press, and hip abduction added visit 3    Sreekanth received the following manual therapy techniques: DTM to R piriformis/LB/gluts 5 min      Home Exercises Provided and Patient Education Provided   Home exercises include:     LTR   DKTC  Lumbar ext in standing  Hip flexor " stretch  Pelvic tilts   Supine HSS and ITB stretch 9/30    Cardio program: 10/12/22  Lifting education date: visit 13, Copy of Do's and don'ts of lifting handout provided 11/16/22  Posture/Lumbar roll: obtained     Education provided:   - Pacing and technique for initial lumbar MedX strengthening  - Use of cryotherapy to assist with pain control    Written Home Exercises Provided: Patient instructed to cont prior HEP. Plus additional stretches  Exercises were reviewed and Sreekanth was able to demonstrate them prior to the end of the session.  Sreekanth demonstrated good  understanding of the education provided.     See EMR under Patient Instructions for exercises provided prior visit.    Assessment   Sreekanth tolerates new progressed table exercises demonstrating good form and no c/o pain. Patient requires verbal cues to breath during exercises and activate glutes during bridges. Sreekanth tolerates a 5% increase in resistance when performing lumbar medx completing 20 reps with RPE of 4. Patient is making progress towards established goals.  Pt will continue to benefit from skilled outpatient physical therapy to address the deficits stated in the impairment chart, provide pt/family education and to maximize pt's level of independence in the home and community environment.     Anticipated Barriers for therapy: Chronicity of condition  Pt's spiritual, cultural and educational needs considered and pt agreeable to plan of care and goals as stated below:     GOALS: Pt is in agreement with the following goals.     Short term goals:  6 weeks or 10 visits   1.  Pt will demonstrate increased lumbar ROM by at least 6 degrees from the initial ROM value with improvements noted in functional ROM and ability to perform ADLs.  (approp and ongoing)  2.  Pt will demonstrate increased MedX average isometric strength value  by 15% from initial test resulting in improved ability to perform bending, lifting, and carrying activities safely, confidently.   MET  3.  Patient report a reduction in worst pain score by 1-2 points for improved tolerance for household chores.  (approp and ongoing)  4.  Pt able to perform HEP correctly with minimal cueing or supervision from therapist to encourage independent management of symptoms. (approp and ongoing)      Long term goals: 10 weeks or 20 visits   1. Pt will demonstrate increased lumbar ROM by at least 9 degrees from initial ROM value, resulting in improved ability to perform functional fwd bending while standing and sitting. (approp and ongoing)  2. Pt will demonstrate increased MedX average isometric strength value  by 20% from initial test resulting in improved ability to perform bending, lifting, and carrying activities safely, confidently. MET  3. Pt to demonstrate ability to independently control and reduce their pain through posture positioning and mechanical movements throughout a typical day.  (approp and ongoing)  4.  Pt will demonstrate reduced pain and improved functional outcomes as reported on the FOTO by reaching a limitation score of < or = 30% or less in order to demonstrate subjective improvement in pt's condition.    (approp and ongoing)  5. Pt will demonstrate independence with the HEP at discharge  (approp and ongoing)  6. Patient will lift 20# kettle bell floor to plinth x 10 /s inc LBP for inc tolerance to yardwork and household chores (patient goal) (approp and ongoing)  7. Patient demonstrate 5 full golf swings /s inc LBP for inc tolerance to recreational activities  (approp and ongoing)    Plan   Continue with established Plan of Care towards established PT goals.     Therapist: Rosendo Ruiz, PTA  11/28/2022

## 2022-11-30 ENCOUNTER — CLINICAL SUPPORT (OUTPATIENT)
Dept: REHABILITATION | Facility: OTHER | Age: 77
End: 2022-11-30
Attending: PHYSICAL MEDICINE & REHABILITATION
Payer: MEDICARE

## 2022-11-30 DIAGNOSIS — R29.898 DECREASED STRENGTH OF TRUNK AND BACK: Primary | ICD-10-CM

## 2022-11-30 DIAGNOSIS — R26.89 DECREASED BACK MOBILITY: ICD-10-CM

## 2022-11-30 PROCEDURE — 97110 THERAPEUTIC EXERCISES: CPT | Mod: CQ

## 2022-11-30 NOTE — PROGRESS NOTES
"[facility]      Name: Sreekanth Pitts Jr.  Clinic Number: [id number]    Therapy Diagnosis:   Encounter Diagnoses   Name Primary?    Decreased strength of trunk and back Yes    Decreased back mobility        Physician: Yolanda Salas, *    Visit Date: 2022    Physician Orders: PT Eval and Treat   Medical Diagnosis from Referral:   Z98.1 (ICD-10-CM) - S/P lumbar spinal fusion   M54.50,G89.29 (ICD-10-CM) - Chronic bilateral low back pain without sciatica      Evaluation Date: 2022  Authorization Period Expiration: 22  Plan of Care Expiration: 22  Reassessment Due: 22   Visit # / Visits authorized:      Time In: 2:20 PM  Time Out: 3:25 PM  Total Billable Time: 60 minutes  Insurance: Fee for service Insurance Patient      Precautions: Standard   L4/L5 fusion 2022     Pattern of pain determined: 2    Subjective   Sreekanth reports some continued minimal lower back/(R) glute soreness but states that it is improved overall.     Patient reports tolerating previous visit no c/o.  Patient reports their pain to be 3/10 on a 0-10 scale with 0 being no pain and 10 being the worst pain imaginable.  Pain Location: Bilateral belt line, R hip     Work and leisure: ; walking his dog and yard work until recently  Pt goals:"get my back stronger"    Objective     Baseline Isometric Testing on Med X equipment: Testing administered by PT  Date of testin22  ROM 0 - 39 deg   Max Peak Torque 125    Min Peak Torque 41    Flex/Ext Ratio 3   % below normative data 61%      Mid Isometric Testing on Med X equipment: Testing administered by PT  Date of testing:10/26/22  ROM 0 - 42 deg   Max Peak Torque 179    Min Peak Torque 68   Flex/Ext Ratio 2.6     % below normative data 27%    87% strength gain  Outcomes: FOTO  Initial score: 40% limitation  Visit 5 score: 38% Visit 10 33%  Goal: <30%      Treatment    Pt was instructed in and performed the " "following:     Sreekanth received therapeutic exercises to develop/improved posture, cardiovascular endurance, muscular endurance, lumbar/cervical ROM, strength and muscular endurance for 55 minutes including the following exercises:     LTR x10 3" ea  DKTC x10, 5"  Hamstring stretch c/strap 2 x 30"  Piriformis stretch 3x 20 sec  hip flexor stretch 2x30"  Bridge w/GTB x20,3"  BKFO w/GTB x 15   Open book bottom leg extended top knee on red bolster x10     [facility] 11/30/2022   Visit Number 17   VAS Pain Rating 3   Treadmill Time (in min.) 5   Speed -   Time -   Lumbar Stretches - Slouch -   Extension in Lying -   Extension in Standing -   Flexion in Lying 10   Manual Therapy 5   Lumbar Extension - Seat Pad -   Femur Restraint -   Top Dead Center -   Counterweight -   Lumbar Flexion -   Lumbar Extension -   Lumbar Peak Torque -   Lumbar Weight 92   Repetitions 18   Rating of Perceived Exertion 4     NP:   Dead bugs red ball x 8   PPT 15x5 sec hold for core engagement  Supine glute med stretch c/strap 5x10" ea  TrA w/ball +SLR x10  Standing lumbar ext x 10  PPT x10  Sl QL stretch (R) w/PT overpressure x3 15"  Sl QL stretch (R) w/PT overpressure x3 15"  +Lifting education   Floor<->waist   Golfer's lift  Peripheral muscle strengthening which included 1 set of 15-20 repetitions at a slow, controlled 10-13 second per rep pace focused on strengthening supporting musculature for improved body mechanics and functional mobility.  Pt and therapist focused on proper form during treatment to ensure optimal strengthening of each targeted muscle group.  Machines were utilized including torso rotation, leg extension, leg curl, chest press, upright row. Tricep extension, bicep curl, leg press, and hip abduction added visit 3    Sreekanth received the following manual therapy techniques: DTM to R piriformis, piriformis release 5 min      Home Exercises Provided and Patient Education Provided   Home exercises include:     LTR "   DKTC  Lumbar ext in standing  Hip flexor stretch  Pelvic tilts   Supine HSS and ITB stretch 9/30    Cardio program: 10/12/22  Lifting education date: visit 13, Copy of Do's and don'ts of lifting handout provided 11/16/22  Posture/Lumbar roll: obtained     Education provided:   - Pacing and technique for initial lumbar MedX strengthening  - Use of cryotherapy to assist with pain control    Written Home Exercises Provided: Patient instructed to cont prior HEP. Plus additional stretches  Exercises were reviewed and Sreekanth was able to demonstrate them prior to the end of the session.  Sreekanth demonstrated good  understanding of the education provided.     See EMR under Patient Instructions for exercises provided prior visit.    Assessment   Sreekanth returns with continued lower back/(R) glute discomfort/stiffness which he reports is improved overall.  Treatment continued with lumbopelvic/core flexibility and strengthening ex's which he was able to perform without increased symptoms including progressed reps for bridging. Resumed manual techniques for R piriformis release and STM which provide some relief. Lumbar MedX resistance was increased to 92 ft/lbs completing 18 reps with a RPE = 4/10. Will attempt to progress per HB protocol and patient tolerance.    Patient is making progress towards established goals.  Pt will continue to benefit from skilled outpatient physical therapy to address the deficits stated in the impairment chart, provide pt/family education and to maximize pt's level of independence in the home and community environment.     Anticipated Barriers for therapy: Chronicity of condition  Pt's spiritual, cultural and educational needs considered and pt agreeable to plan of care and goals as stated below:     GOALS: Pt is in agreement with the following goals.     Short term goals:  6 weeks or 10 visits   1.  Pt will demonstrate increased lumbar ROM by at least 6 degrees from the initial ROM value with  improvements noted in functional ROM and ability to perform ADLs.  (approp and ongoing)  2.  Pt will demonstrate increased MedX average isometric strength value  by 15% from initial test resulting in improved ability to perform bending, lifting, and carrying activities safely, confidently.  MET  3.  Patient report a reduction in worst pain score by 1-2 points for improved tolerance for household chores.  (approp and ongoing)  4.  Pt able to perform HEP correctly with minimal cueing or supervision from therapist to encourage independent management of symptoms. (approp and ongoing)      Long term goals: 10 weeks or 20 visits   1. Pt will demonstrate increased lumbar ROM by at least 9 degrees from initial ROM value, resulting in improved ability to perform functional fwd bending while standing and sitting. (approp and ongoing)  2. Pt will demonstrate increased MedX average isometric strength value  by 20% from initial test resulting in improved ability to perform bending, lifting, and carrying activities safely, confidently. MET  3. Pt to demonstrate ability to independently control and reduce their pain through posture positioning and mechanical movements throughout a typical day.  (approp and ongoing)  4.  Pt will demonstrate reduced pain and improved functional outcomes as reported on the FOTO by reaching a limitation score of < or = 30% or less in order to demonstrate subjective improvement in pt's condition.    (approp and ongoing)  5. Pt will demonstrate independence with the HEP at discharge  (approp and ongoing)  6. Patient will lift 20# kettle bell floor to plinth x 10 /s inc LBP for inc tolerance to yardwork and household chores (patient goal) (approp and ongoing)  7. Patient demonstrate 5 full golf swings /s inc LBP for inc tolerance to recreational activities  (approp and ongoing)    Plan   Continue with established Plan of Care towards established PT goals.     Therapist: Sreekanth Brown  PTA  11/30/2022

## 2022-12-07 ENCOUNTER — CLINICAL SUPPORT (OUTPATIENT)
Dept: REHABILITATION | Facility: OTHER | Age: 77
End: 2022-12-07
Attending: PHYSICAL MEDICINE & REHABILITATION
Payer: MEDICARE

## 2022-12-07 DIAGNOSIS — R29.898 DECREASED STRENGTH OF TRUNK AND BACK: Primary | ICD-10-CM

## 2022-12-07 DIAGNOSIS — R26.89 DECREASED BACK MOBILITY: ICD-10-CM

## 2022-12-07 PROCEDURE — 97110 THERAPEUTIC EXERCISES: CPT

## 2022-12-07 NOTE — PROGRESS NOTES
"[facility]      Name: Sreekanth Pitts Jr.  Clinic Number: [id number]    Therapy Diagnosis:   Encounter Diagnoses   Name Primary?    Decreased strength of trunk and back Yes    Decreased back mobility          Physician: Yolanda Salas, *    Visit Date: 2022    Physician Orders: PT Eval and Treat   Medical Diagnosis from Referral:   Z98.1 (ICD-10-CM) - S/P lumbar spinal fusion   M54.50,G89.29 (ICD-10-CM) - Chronic bilateral low back pain without sciatica      Evaluation Date: 2022  Authorization Period Expiration: 22  Plan of Care Expiration: 22  Reassessment Due: 22   Visit # / Visits authorized:      Time In: 2:10 PM  Time Out:300  Total Billable Time: 40 minutes  Insurance: Fee for service Insurance Patient      Precautions: Standard   L4/L5 fusion 2022     Pattern of pain determined: 2    Subjective   Sreekanth reports he had intense pain following last session he has had pain is R the posterior distal HS and achilles with changing positions, ie crossing R knee over left    Patient reports tolerating previous visit no c/o.  Patient reports their pain to be 3/10 on a 0-10 scale with 0 being no pain and 10 being the worst pain imaginable.  Pain Location: Bilateral belt line, R hip     Work and leisure: ; walking his dog and yard work until recently  Pt goals:"get my back stronger"    Objective     Baseline Isometric Testing on Med X equipment: Testing administered by PT  Date of testin22  ROM 0 - 39 deg   Max Peak Torque 125    Min Peak Torque 41    Flex/Ext Ratio 3   % below normative data 61%      Mid Isometric Testing on Med X equipment: Testing administered by PT  Date of testing:10/26/22  ROM 0 - 42 deg   Max Peak Torque 179    Min Peak Torque 68   Flex/Ext Ratio 2.6     % below normative data 27%    87% strength gain  Outcomes: FOTO  Initial score: 40% limitation  Visit 5 score: 38% Visit 10 33%  Goal: " "<30%      Treatment    Pt was instructed in and performed the following:     Sreekanth received therapeutic exercises to develop/improved posture, cardiovascular endurance, muscular endurance, lumbar/cervical ROM, strength and muscular endurance for 50 minutes including the following exercises:   [facility] 12/7/2022   Visit Number 18   VAS Pain Rating 3   Treadmill Time (in min.) 5   Speed -   Time -   Lumbar Stretches - Slouch Overcorrection -   Extension in Lying -   Extension in Standing -   Flexion in Lying 10   Manual Therapy -   Lumbar Extension Seat Pad -   Femur Restraint -   Top Dead Center -   Counterweight -   Lumbar Flexion -   Lumbar Extension -   Lumbar Peak Torque -   Min Torque -   Test Percent Below Normative Data -   Test Percent Gain in Strength from Initial  -   Lumbar Weight 92   Repetitions 20   Rating of Perceived Exertion 6   Ice - Z Lie (in min.) 5       LTR x10 3" ea  DKTC x10, 5"  Hamstring stretch c/strap 2 x 30"  Piriformis stretch 3x 20 sec  hip flexor stretch 2x30"  Bridge w/GTB x20,3" N/P sec HS pain  BKFO w/GTB x 15    N/P sec HS pain  Open book bottom leg extended top knee on red bolster x10   NP:   Dead bugs red ball x 8   PPT 15x5 sec hold for core engagement  Supine glute med stretch c/strap 5x10" ea  TrA w/ball +SLR x10  Standing lumbar ext x 10  PPT x10  Sl QL stretch (R) w/PT overpressure x3 15"  Sl QL stretch (R) w/PT overpressure x3 15"  +Lifting education   Floor<->waist   Golfer's lift  Peripheral muscle strengthening which included 1 set of 15-20 repetitions at a slow, controlled 10-13 second per rep pace focused on strengthening supporting musculature for improved body mechanics and functional mobility.  Pt and therapist focused on proper form during treatment to ensure optimal strengthening of each targeted muscle group.  Machines were utilized including torso rotation, leg extension, leg curl, chest press, upright row. Tricep extension, bicep curl, leg press, and " hip abduction added visit 3    Sreekanth received the following manual therapy techniques: DTM to R piriformis, piriformis release 0 min      Home Exercises Provided and Patient Education Provided   Home exercises include:     LTR   DKTC  Lumbar ext in standing  Hip flexor stretch  Pelvic tilts   Supine HSS and ITB stretch 9/30    Cardio program: 10/12/22  Lifting education date: visit 13, Copy of Do's and don'ts of lifting handout provided 11/16/22  Posture/Lumbar roll: obtained     Education provided:   - Pacing and technique for initial lumbar MedX strengthening  - Use of cryotherapy to assist with pain control    Written Home Exercises Provided: Patient instructed to cont prior HEP. Plus additional stretches  Exercises were reviewed and Sreekanth was able to demonstrate them prior to the end of the session.  Sreekanth demonstrated good  understanding of the education provided.     See EMR under Patient Instructions for exercises provided prior visit.    Assessment   Sreekanth returns with increased c/o prox  R calf and distal HS pain from last session.  Pt had increased HS seated curl on peripheral machines by 6 lbs which I feel may have irritated HS.  Added calf stretch in standing which assisted in reducing pain. Added calf stretch for HEP as well. Pt felt decrease in pain after stretching and HS curls/leg press deferred today.  Reduce peripheral leg curl weight next visit.  Pt completed 20 reps at 92 ft/lbs with 6/10 exertion level.  Patient is making progress towards established goals.  Pt will continue to benefit from skilled outpatient physical therapy to address the deficits stated in the impairment chart, provide pt/family education and to maximize pt's level of independence in the home and community environment.     Anticipated Barriers for therapy: Chronicity of condition  Pt's spiritual, cultural and educational needs considered and pt agreeable to plan of care and goals as stated below:     GOALS: Pt is in agreement  with the following goals.     Short term goals:  6 weeks or 10 visits   1.  Pt will demonstrate increased lumbar ROM by at least 6 degrees from the initial ROM value with improvements noted in functional ROM and ability to perform ADLs.  (approp and ongoing)  2.  Pt will demonstrate increased MedX average isometric strength value  by 15% from initial test resulting in improved ability to perform bending, lifting, and carrying activities safely, confidently.  MET  3.  Patient report a reduction in worst pain score by 1-2 points for improved tolerance for household chores.  (approp and ongoing)  4.  Pt able to perform HEP correctly with minimal cueing or supervision from therapist to encourage independent management of symptoms. (approp and ongoing)      Long term goals: 10 weeks or 20 visits   1. Pt will demonstrate increased lumbar ROM by at least 9 degrees from initial ROM value, resulting in improved ability to perform functional fwd bending while standing and sitting. (approp and ongoing)  2. Pt will demonstrate increased MedX average isometric strength value  by 20% from initial test resulting in improved ability to perform bending, lifting, and carrying activities safely, confidently. MET  3. Pt to demonstrate ability to independently control and reduce their pain through posture positioning and mechanical movements throughout a typical day.  (approp and ongoing)  4.  Pt will demonstrate reduced pain and improved functional outcomes as reported on the FOTO by reaching a limitation score of < or = 30% or less in order to demonstrate subjective improvement in pt's condition.    (approp and ongoing)  5. Pt will demonstrate independence with the HEP at discharge  (approp and ongoing)  6. Patient will lift 20# kettle bell floor to plinth x 10 /s inc LBP for inc tolerance to yardwork and household chores (patient goal) (approp and ongoing)  7. Patient demonstrate 5 full golf swings /s inc LBP for inc tolerance to  recreational activities  (approp and ongoing)    Plan   Continue with established Plan of Care towards established PT goals.     Therapist: Katie Butts, PT  12/7/2022

## 2022-12-09 ENCOUNTER — CLINICAL SUPPORT (OUTPATIENT)
Dept: REHABILITATION | Facility: OTHER | Age: 77
End: 2022-12-09
Attending: PHYSICAL MEDICINE & REHABILITATION
Payer: MEDICARE

## 2022-12-09 DIAGNOSIS — R26.89 DECREASED BACK MOBILITY: ICD-10-CM

## 2022-12-09 DIAGNOSIS — R29.898 DECREASED STRENGTH OF TRUNK AND BACK: Primary | ICD-10-CM

## 2022-12-09 PROCEDURE — 97110 THERAPEUTIC EXERCISES: CPT | Mod: CQ

## 2022-12-09 NOTE — PROGRESS NOTES
"[facility]      Name: Sreekanth Pitts Jr.  Clinic Number: [id number]    Therapy Diagnosis:   Encounter Diagnoses   Name Primary?    Decreased strength of trunk and back Yes    Decreased back mobility        Physician: Yolanda Salas, *    Visit Date: 2022    Physician Orders: PT Eval and Treat   Medical Diagnosis from Referral:   Z98.1 (ICD-10-CM) - S/P lumbar spinal fusion   M54.50,G89.29 (ICD-10-CM) - Chronic bilateral low back pain without sciatica      Evaluation Date: 2022  Authorization Period Expiration: 22  Plan of Care Expiration: 22  Reassessment Due: 22   Visit # / Visits authorized:      Time In: 2:30 PM  Time Out:330  Total Billable Time: 60 minutes  Insurance: Fee for service Insurance Patient      Precautions: Standard   L4/L5 fusion 2022     Pattern of pain determined: 2    Subjective   Sreekanth reports he had intense pain following session last wk that started in R HS/achilles and now is in R lateral knee joint area. LB and post hip are improving    Patient reports tolerating previous visit no c/o.  Patient reports their pain to be 3/10 on a 0-10 scale with 0 being no pain and 10 being the worst pain imaginable.  Pain Location: Bilateral belt line, R hip     Work and leisure: ; walking his dog and yard work until recently  Pt goals:"get my back stronger"    Objective     Baseline Isometric Testing on Med X equipment: Testing administered by PT  Date of testin22  ROM 0 - 39 deg   Max Peak Torque 125    Min Peak Torque 41    Flex/Ext Ratio 3   % below normative data 61%      Mid Isometric Testing on Med X equipment: Testing administered by PT  Date of testing:10/26/22  ROM 0 - 42 deg   Max Peak Torque 179    Min Peak Torque 68   Flex/Ext Ratio 2.6     % below normative data 27%    87% strength gain  Outcomes: FOTO  Initial score: 40% limitation  Visit 5 score: 38% Visit 10 33%  Goal: <30%      Treatment  " "  Pt was instructed in and performed the following:     Sreekanth received therapeutic exercises to develop/improved posture, cardiovascular endurance, muscular endurance, lumbar/cervical ROM, strength and muscular endurance for 60 minutes including the following exercises:     LTR x10 3" ea  DKTC x10, 5"  Hamstring stretch c/strap 3 x 30"  Piriformis stretch c/ towel 3x 20 sec  hip flexor stretch 2x30"  Bridge w/GTB x20,3"   BKFO w/GTB x 15      Open book bottom leg extended top knee on red bolster x10     [facility] 12/9/2022   Visit Number 19   VAS Pain Rating 3   Treadmill Time (in min.) 5   Speed -   Time -   Lumbar Stretches - Slouch Overcorrection -   Extension in Lying -   Extension in Standing -   Flexion in Lying 10   Manual Therapy 5   Lumbar Extension Seat Pad -   Femur Restraint -   Top Dead Center -   Counterweight -   Lumbar Flexion -   Lumbar Extension -   Lumbar Peak Torque -   Min Torque -   Test Percent Below Normative Data -   Test Percent Gain in Strength from Initial  -   Lumbar Weight 92   Repetitions 20   Rating of Perceived Exertion 5   Ice - Z Lie (in min.) 5      NP:   Dead bugs red ball x 8   PPT 15x5 sec hold for core engagement  Supine glute med stretch c/strap 5x10" ea  TrA w/ball +SLR x10  Standing lumbar ext x 10  PPT x10  Sl QL stretch (R) w/PT overpressure x3 15"  Sl QL stretch (R) w/PT overpressure x3 15"  +Lifting education   Floor<->waist   Golfer's lift  Peripheral muscle strengthening which included 1 set of 15-20 repetitions at a slow, controlled 10-13 second per rep pace focused on strengthening supporting musculature for improved body mechanics and functional mobility.  Pt and therapist focused on proper form during treatment to ensure optimal strengthening of each targeted muscle group.  Machines were utilized including torso rotation, leg extension, leg curl, chest press, upright row. Tricep extension, bicep curl, leg press, and hip abduction added visit 3    Sreekanth " received the following manual therapy techniques: R patella mobs and PROM R knee 5 min      Home Exercises Provided and Patient Education Provided   Home exercises include:     LTR   DKTC  Lumbar ext in standing  Hip flexor stretch  Pelvic tilts   Supine HSS and ITB stretch 9/30    Cardio program: 10/12/22  Lifting education date: visit 13, Copy of Do's and don'ts of lifting handout provided 11/16/22  Posture/Lumbar roll: obtained     Education provided:   - Use of cryotherapy to assist with pain control    Written Home Exercises Provided: Patient instructed to cont prior HEP. Plus additional stretches  Exercises were reviewed and Sreekanth was able to demonstrate them prior to the end of the session.  Sreekanth demonstrated good  understanding of the education provided.     See EMR under Patient Instructions for exercises provided prior visit.    Assessment   Sreekanth returns with c/o  R lateral knee pain and limited knee flex ROM.  Pt received patella mobs and PROM which decreased pain during increased flexion. Advised pt to apply CP to R knee.  Pt able to resume bridges and BKFO without pain. Peripheral leg curl weight was reduced and pt able to complete 20 with PRE of 3/10.  MedX  remained at 92 ft/lbs with 5/10 exertion level.   Patient is making progress towards established goals.  Pt will continue to benefit from skilled outpatient physical therapy to address the deficits stated in the impairment chart, provide pt/family education and to maximize pt's level of independence in the home and community environment.     Anticipated Barriers for therapy: Chronicity of condition  Pt's spiritual, cultural and educational needs considered and pt agreeable to plan of care and goals as stated below:     GOALS: Pt is in agreement with the following goals.     Short term goals:  6 weeks or 10 visits   1.  Pt will demonstrate increased lumbar ROM by at least 6 degrees from the initial ROM value with improvements noted in functional ROM  and ability to perform ADLs.  (approp and ongoing)  2.  Pt will demonstrate increased MedX average isometric strength value  by 15% from initial test resulting in improved ability to perform bending, lifting, and carrying activities safely, confidently.  MET  3.  Patient report a reduction in worst pain score by 1-2 points for improved tolerance for household chores.  (approp and ongoing)  4.  Pt able to perform HEP correctly with minimal cueing or supervision from therapist to encourage independent management of symptoms. (approp and ongoing)      Long term goals: 10 weeks or 20 visits   1. Pt will demonstrate increased lumbar ROM by at least 9 degrees from initial ROM value, resulting in improved ability to perform functional fwd bending while standing and sitting. (approp and ongoing)  2. Pt will demonstrate increased MedX average isometric strength value  by 20% from initial test resulting in improved ability to perform bending, lifting, and carrying activities safely, confidently. MET  3. Pt to demonstrate ability to independently control and reduce their pain through posture positioning and mechanical movements throughout a typical day.  (approp and ongoing)  4.  Pt will demonstrate reduced pain and improved functional outcomes as reported on the FOTO by reaching a limitation score of < or = 30% or less in order to demonstrate subjective improvement in pt's condition.    (approp and ongoing)  5. Pt will demonstrate independence with the HEP at discharge  (approp and ongoing)  6. Patient will lift 20# kettle bell floor to plinth x 10 /s inc LBP for inc tolerance to yardwork and household chores (patient goal) (approp and ongoing)  7. Patient demonstrate 5 full golf swings /s inc LBP for inc tolerance to recreational activities  (approp and ongoing)    Plan   Continue with established Plan of Care towards established PT goals.     Therapist: Isabel Houston,  PTA  12/9/2022

## 2022-12-16 ENCOUNTER — CLINICAL SUPPORT (OUTPATIENT)
Dept: REHABILITATION | Facility: OTHER | Age: 77
End: 2022-12-16
Attending: PHYSICAL MEDICINE & REHABILITATION
Payer: MEDICARE

## 2022-12-16 DIAGNOSIS — R26.89 DECREASED BACK MOBILITY: ICD-10-CM

## 2022-12-16 DIAGNOSIS — R29.898 DECREASED STRENGTH OF TRUNK AND BACK: Primary | ICD-10-CM

## 2022-12-16 PROCEDURE — 97110 THERAPEUTIC EXERCISES: CPT

## 2022-12-16 NOTE — PROGRESS NOTES
"[facility]      Name: Sreekanth Pitts Jr.  Clinic Number: [id number]    Therapy Diagnosis:   Encounter Diagnoses   Name Primary?    Decreased strength of trunk and back Yes    Decreased back mobility        Physician: Yolanda Salas, *    Visit Date: 2022    Physician Orders: PT Eval and Treat   Medical Diagnosis from Referral:   Z98.1 (ICD-10-CM) - S/P lumbar spinal fusion   M54.50,G89.29 (ICD-10-CM) - Chronic bilateral low back pain without sciatica      Evaluation Date: 2022  Authorization Period Expiration: 22  Plan of Care Expiration: 22  Reassessment Due: 22   Visit # / Visits authorized:      Time In: 1:00 PM  Time Out: 2:00 pm  Total Billable Time: 55 minutes  Insurance: Fee for service Insurance Patient      Precautions: Standard   L4/L5 fusion 2022     Pattern of pain determined: 2    Subjective   Sreekanth reports his leg has been feeling better since last time and his back pain has improved significantly since he started. He is interested in continuing with Wellness program    Patient reports tolerating previous visit no c/o.  Patient reports their pain to be 0/10 on a 0-10 scale with 0 being no pain and 10 being the worst pain imaginable.  Pain Location: Bilateral belt line, R hip     Work and leisure: ; walking his dog and yard work until recently  Pt goals:"get my back stronger"    Objective     Baseline Isometric Testing on Med X equipment: Testing administered by PT  Date of testin22  ROM 0 - 39 deg   Max Peak Torque 125    Min Peak Torque 41    Flex/Ext Ratio 3   % below normative data 61%      Mid Isometric Testing on Med X equipment: Testing administered by PT  Date of testing:10/26/22  ROM 0 - 42 deg   Max Peak Torque 179    Min Peak Torque 68   Flex/Ext Ratio 2.6     % below normative data 27%    87% strength gain    Mid Isometric Testing on Med X equipment: Testing administered by PT  Date of " "testin22  ROM 0 - 42 deg   Max Peak Torque 239   Min Peak Torque 115   Flex/Ext Ratio 2.1:1     % below normative data 5%    145 % strength gain    Outcomes: FOTO  Initial score: 40% limitation  Visit 5 score: 38% Visit 10 33%  Visit 20: 37%  Goal: <30%      Treatment    Pt was instructed in and performed the following:     Sreekanth received therapeutic exercises to develop/improved posture, cardiovascular endurance, muscular endurance, lumbar/cervical ROM, strength and muscular endurance for 55 minutes including the following exercises:     [facility] Therapy - Short 2022   Visit Number 20   VAS Pain Rating 0   Treadmill Time (in min.) 5   Speed -   Time -   Lumbar Stretches - Slouch -   Extension in Lying -   Extension in Standing -   Flexion in Lying 10   Manual Therapy -   Lumbar Extension - Seat Pad -   Femur Restraint -   Top Dead Center -   Counterweight -   Lumbar Flexion 42   Lumbar Extension 0   Lumbar Peak Torque 239   Lumbar Weight 60   Repetitions 5   Rating of Perceived Exertion -       LTR x10 3" ea  DKTC x10, 5"  Hamstring stretch c/strap 3 x 30"  Piriformis stretch c/ towel 3x 20 sec  hip flexor stretch 2x30"  Bridge w/GTB x20,3"   BKFO w/GTB x 15      Open book bottom leg extended top knee on red bolster x10     NP:  Dead bugs red ball x 8   PPT 15x5 sec hold for core engagement  Supine glute med stretch c/strap 5x10" ea  TrA w/ball +SLR x10  Standing lumbar ext x 10  PPT x10    NP:     Peripheral muscle strengthening which included 1 set of 15-20 repetitions at a slow, controlled 10-13 second per rep pace focused on strengthening supporting musculature for improved body mechanics and functional mobility.  Pt and therapist focused on proper form during treatment to ensure optimal strengthening of each targeted muscle group.  Machines were utilized including torso rotation, leg extension, leg curl, chest press, upright row. Tricep extension, bicep curl, leg press, and hip abduction added " visit 3    Sreekanth received the following manual therapy techniques: nil      Home Exercises Provided and Patient Education Provided   Home exercises include:     LTR   DKTC  Lumbar ext in standing  Hip flexor stretch  Pelvic tilts   Supine HSS and ITB stretch 9/30    Cardio program: 10/12/22  Lifting education date: visit 13, Copy of Do's and don'ts of lifting handout provided 11/16/22  Posture/Lumbar roll: obtained     Education provided:   - Use of cryotherapy to assist with pain control    Written Home Exercises Provided: Patient instructed to cont prior HEP. Plus additional stretches  Exercises were reviewed and Sreekanth was able to demonstrate them prior to the end of the session.  Sreekanth demonstrated good  understanding of the education provided.     See EMR under Patient Instructions for exercises provided prior visit.    Assessment   Patient has attended 20 visits of the [facility] program for aerobic work, med ex isometric testing with dynamic strengthening on med ex equipment for spine, whole body strengthening on med ex equipment, HEP, education.  Patient has completed Healthy Back Program and is ready to be transitioned into wellness program.  Importance of wellness program, and attending 1/week to maintain strength stressed.  Importance of continuing there ex and body mech and ergonomics stressed.   Patient demonstrates improvement in ability to reduce symptoms, improved posture, improved ROM and improved strength.   Reviewed HEP, and importance of maintaining a healthy spine through continued stretching and performance of HEP, good posture, good ergonomics, good body mech with ADL, leisure, and work.  Discharge handout with HEP given, and discussed aspects of exercise program, cardio, strengthening, and stretching.    Patient expressed understanding information given.  Patient plans to attend wellness and is ready to transition to wellness.      -Improved posture,   when using lumbar roll  -Improved lumbar  ROM,  initially on med ex test 0-39 deg and   currently 0-42 deg  -Improved strength at each test point on lumbar med ex IM test with   145% average improvement noted with Reduced pain  Noted by patient  -Initial outcome tool score 40% and current outcome tool score  37% indicating reduced pain and improved function            Patient is making progress towards established goals.  Pt will continue to benefit from skilled outpatient physical therapy to address the deficits stated in the impairment chart, provide pt/family education and to maximize pt's level of independence in the home and community environment.     Anticipated Barriers for therapy: Chronicity of condition  Pt's spiritual, cultural and educational needs considered and pt agreeable to plan of care and goals as stated below:     GOALS: Pt is in agreement with the following goals.     Short term goals:  6 weeks or 10 visits   1.  Pt will demonstrate increased lumbar ROM by at least 6 degrees from the initial ROM value with improvements noted in functional ROM and ability to perform ADLs.  (approp and ongoing)  2.  Pt will demonstrate increased MedX average isometric strength value  by 15% from initial test resulting in improved ability to perform bending, lifting, and carrying activities safely, confidently.  MET  3.  Patient report a reduction in worst pain score by 1-2 points for improved tolerance for household chores.  (approp and ongoing)  4.  Pt able to perform HEP correctly with minimal cueing or supervision from therapist to encourage independent management of symptoms. (approp and ongoing)      Long term goals: 10 weeks or 20 visits   1. Pt will demonstrate increased lumbar ROM by at least 9 degrees from initial ROM value, resulting in improved ability to perform functional fwd bending while standing and sitting. (approp and ongoing)  2. Pt will demonstrate increased MedX average isometric strength value  by 20% from initial test resulting in  improved ability to perform bending, lifting, and carrying activities safely, confidently. MET  3. Pt to demonstrate ability to independently control and reduce their pain through posture positioning and mechanical movements throughout a typical day.  (approp and ongoing)  4.  Pt will demonstrate reduced pain and improved functional outcomes as reported on the FOTO by reaching a limitation score of < or = 30% or less in order to demonstrate subjective improvement in pt's condition.    (approp and ongoing)  5. Pt will demonstrate independence with the HEP at discharge  (approp and ongoing)  6. Patient will lift 20# kettle bell floor to plinth x 10 /s inc LBP for inc tolerance to yardwork and household chores (patient goal) (approp and ongoing)  7. Patient demonstrate 5 full golf swings /s inc LBP for inc tolerance to recreational activities  (approp and ongoing)    Plan   Continue with established Plan of Care towards established PT goals.     Therapist: Gareth Gandhi, PT  12/16/2022                     Refused

## 2023-01-03 ENCOUNTER — OFFICE VISIT (OUTPATIENT)
Dept: DERMATOLOGY | Facility: CLINIC | Age: 78
End: 2023-01-03
Payer: MEDICARE

## 2023-01-03 DIAGNOSIS — L57.0 AK (ACTINIC KERATOSIS): Primary | ICD-10-CM

## 2023-01-03 DIAGNOSIS — D18.01 ANGIOMA OF SKIN: ICD-10-CM

## 2023-01-03 DIAGNOSIS — L72.0 MILIA: ICD-10-CM

## 2023-01-03 DIAGNOSIS — D22.9 MULTIPLE BENIGN NEVI: ICD-10-CM

## 2023-01-03 DIAGNOSIS — Z12.83 SCREENING EXAM FOR SKIN CANCER: ICD-10-CM

## 2023-01-03 DIAGNOSIS — L82.1 SK (SEBORRHEIC KERATOSIS): ICD-10-CM

## 2023-01-03 DIAGNOSIS — L57.8 CHRONIC SOLAR DERMATITIS: ICD-10-CM

## 2023-01-03 PROCEDURE — 99999 PR PBB SHADOW E&M-EST. PATIENT-LVL III: ICD-10-PCS | Mod: PBBFAC,,, | Performed by: DERMATOLOGY

## 2023-01-03 PROCEDURE — 17000 PR DESTRUCTION(LASER SURGERY,CRYOSURGERY,CHEMOSURGERY),PREMALIGNANT LESIONS,FIRST LESION: ICD-10-PCS | Mod: S$PBB,,, | Performed by: DERMATOLOGY

## 2023-01-03 PROCEDURE — 17000 DESTRUCT PREMALG LESION: CPT | Mod: PBBFAC | Performed by: DERMATOLOGY

## 2023-01-03 PROCEDURE — 17003 DESTRUCT PREMALG LES 2-14: CPT | Mod: PBBFAC | Performed by: DERMATOLOGY

## 2023-01-03 PROCEDURE — 99204 PR OFFICE/OUTPT VISIT, NEW, LEVL IV, 45-59 MIN: ICD-10-PCS | Mod: 25,S$PBB,, | Performed by: DERMATOLOGY

## 2023-01-03 PROCEDURE — 99213 OFFICE O/P EST LOW 20 MIN: CPT | Mod: PBBFAC | Performed by: DERMATOLOGY

## 2023-01-03 PROCEDURE — 17003 DESTRUCTION, PREMALIGNANT LESIONS; SECOND THROUGH 14 LESIONS: ICD-10-PCS | Mod: S$PBB,,, | Performed by: DERMATOLOGY

## 2023-01-03 PROCEDURE — 17003 DESTRUCT PREMALG LES 2-14: CPT | Mod: S$PBB,,, | Performed by: DERMATOLOGY

## 2023-01-03 PROCEDURE — 17000 DESTRUCT PREMALG LESION: CPT | Mod: S$PBB,,, | Performed by: DERMATOLOGY

## 2023-01-03 PROCEDURE — 99999 PR PBB SHADOW E&M-EST. PATIENT-LVL III: CPT | Mod: PBBFAC,,, | Performed by: DERMATOLOGY

## 2023-01-03 PROCEDURE — 99204 OFFICE O/P NEW MOD 45 MIN: CPT | Mod: 25,S$PBB,, | Performed by: DERMATOLOGY

## 2023-01-03 RX ORDER — FLUOROURACIL 50 MG/G
CREAM TOPICAL
Qty: 30 G | Refills: 0 | Status: SHIPPED | OUTPATIENT
Start: 2023-01-03 | End: 2023-03-13

## 2023-01-03 NOTE — PROGRESS NOTES
Subjective:       Patient ID:  Sreekanth Pitts Jr. is a 77 y.o. male who presents for   Chief Complaint   Patient presents with    Lesion     R cheek, R lower leg, R temple    Skin Check     UBSE     Here for Upper Body Skin Exam    Last seen by dermatology 2018 by Dr. Vines    No h/o nmsc or mm      Pt reports a moderate amount of lifetime sun exposure     Patient with specific complaint of lesion(s)  Location: R cheek  Duration: 3 years  Symptoms: darker, rough  Relieving factors/Previous treatments: neosporin    Patient with new complaint of lesion(s)  Location: R lower leg  Duration: 3 years  Symptoms: none  Relieving factors/Previous treatments: none    Patient with new complaint of lesion(s)  Location: R temple  Duration: 3 years  Symptoms: flaky  Relieving factors/Previous treatments: neosporin             Review of Systems   Skin:  Positive for activity-related sunscreen use and wears hat (activities). Negative for daily sunscreen use and recent sunburn.   Hematologic/Lymphatic: Bruises/bleeds easily (bruise).      Objective:    Physical Exam   Constitutional: He appears well-developed and well-nourished. No distress.   Neurological: He is alert and oriented to person, place, and time. He is not disoriented.   Psychiatric: He has a normal mood and affect.   Skin:   Areas Examined (abnormalities noted in diagram):   Scalp / Hair Palpated and Inspected  Head / Face Inspection Performed  Neck Inspection Performed  Chest / Axilla Inspection Performed  Back Inspection Performed  RUE Inspected  LUE Inspection Performed                 Diagram Legend     Erythematous scaling macule/papule c/w actinic keratosis       Vascular papule c/w angioma      Pigmented verrucoid papule/plaque c/w seborrheic keratosis      Yellow umbilicated papule c/w sebaceous hyperplasia      Irregularly shaped tan macule c/w lentigo     1-2 mm smooth white papules consistent with Milia      Movable subcutaneous cyst with punctum c/w  epidermal inclusion cyst      Subcutaneous movable cyst c/w pilar cyst      Firm pink to brown papule c/w dermatofibroma      Pedunculated fleshy papule(s) c/w skin tag(s)      Evenly pigmented macule c/w junctional nevus     Mildly variegated pigmented, slightly irregular-bordered macule c/w mildly atypical nevus      Flesh colored to evenly pigmented papule c/w intradermal nevus       Pink pearly papule/plaque c/w basal cell carcinoma      Erythematous hyperkeratotic cursted plaque c/w SCC      Surgical scar with no sign of skin cancer recurrence      Open and closed comedones      Inflammatory papules and pustules      Verrucoid papule consistent consistent with wart     Erythematous eczematous patches and plaques     Dystrophic onycholytic nail with subungual debris c/w onychomycosis     Umbilicated papule    Erythematous-base heme-crusted tan verrucoid plaque consistent with inflamed seborrheic keratosis     Erythematous Silvery Scaling Plaque c/w Psoriasis     See annotation      Assessment / Plan:        AK (actinic keratosis)  -     Ambulatory referral/consult to Dermatology  Cryosurgery Procedure Note    Verbal consent from the patient is obtained including, but not limited to, risk of hypopigmentation/hyperpigmentation, scar, recurrence of lesion. The patient is aware of the precancerous quality and need for treatment of these lesions. Liquid nitrogen cryosurgery is applied to the 5 actinic keratoses, as detailed in the physical exam, to produce a freeze injury. The patient is aware that blisters may form and is instructed on wound care with gentle cleansing and use of vaseline ointment to keep moist until healed. The patient is supplied a handout on cryosurgery and is instructed to call if lesions do not completely resolve.    And    Sent Rx to [facility] for efudex/dovonex bid to AA scalp x 5 - 7 days (30g $45) or 60g $75)    Wear hat always    SK (seborrheic keratosis)  These are benign inherited  growths without a malignant potential. Reassurance given to patient. No treatment is necessary.       Multiple benign nevi   - minor problem and chronic.   Reassurance given to patient. No treatment necessary.       Angioma of skin  This is a benign vascular lesion. Reassurance given. No treatment required.       Milia   - minor problem and chronic.   Reassurance given to patient. No treatment necessary.       Screening exam for skin cancer    Upper body skin examination performed today including at least 6 points as noted in physical examination. No lesions suspicious for malignancy noted.    Recommend daily sun protection/avoidance and use of at least SPF 30, broad spectrum sunscreen (OTC drug).     Chronic solar skin damage  Apply Am Lactin lotion or cream to arms and hands nightly. Available over-the counter.                 Follow up in about 3 months (around 4/3/2023).

## 2023-01-03 NOTE — PATIENT INSTRUCTIONS
Field Treatment for Actinic Keratoses (precancerous lesions)    5-Fluoruracil + Dovonex (calcipotriene) - This is a topical chemotherapy for your skin. This cream should never be applied without discussing with you dermatologist.    This treatment gets your immune system involved in fighting precancers (actinic keratoses), even those we can't yet see.     HOW TO APPLY: Apply the combination cream to the affected area scalp 2x/day x 5 - 7 days. Apply a fingertip length amount to the entire area. Wash your hands carefully after applying the creams and wipe glasses, BIPAP/CPAP machines, or anything else that comes in frequent contact with the creams.    WHAT TO EXPECT: Your skin will likely become red, crusted, sore, and tender during the treatment usually starting around day 3 and continuing for about 1 week after last application.     HOW TO HEAL FAST: To speed healing, wash with a gentle wash and apply Vaseline jelly especially to crusted open areas.    WE CAN HELP: Please contact us for any questions or problem shooting the application of these creams: [phone] or [email or web address]    GREAT NEWS: Newer studies suggest that the combination of these creams not only decrease the sun damage spots and precancers (actinic keratoses) but also decrease your risk of getting skin cancer the year following application.     IT WILL BE WORTH IT!!!     Your prescription has been sent to the compounding pharmacy [facility].  They are located in [place] at [address] just before the [address].  If you have any questions, please call the pharmacy at [phone].  Website: [email or web address]

## 2023-01-06 ENCOUNTER — DOCUMENTATION ONLY (OUTPATIENT)
Dept: REHABILITATION | Facility: OTHER | Age: 78
End: 2023-01-06
Attending: INTERNAL MEDICINE
Payer: MEDICARE

## 2023-01-06 NOTE — PROGRESS NOTES
Health  Wellness Visit Note    Name: Sreekanth Pitts Jr.  Clinic Number: [id number]  Physician: No ref. provider found  Diagnosis: No diagnosis found.  Past Medical History:   Diagnosis Date    Achilles bursitis or tendinitis 9/25/2014    Allergy 12/3/2015    Arthritis     BPH without obstruction/lower urinary tract symptoms 01/04/2018    Chronic fatigue 7/10/2018    Degenerative disc disease     GERD (gastroesophageal reflux disease) 12/2/2020    Hypertension     Nuclear sclerosis - Both Eyes 7/30/2012     Visit Number: 21  Precautions: Standard L4/L5 Fusion      1st PT visit:  08/26/2022  Year of care end date:  08/2023  6 Month test  performed: 02/2023  12 Month test performed: 08/2023  Mind body plan: 1F  Patient level: NP    Time In: 11:05 AM  Time Out: 12:00 PM  Total Treatment Time:55 Minutes    Wellness Vision 2022  Handout on this week's wellness topic Gratitude provided  along with a discussion on what it means, the benefits, and suggestions for practice.  Reviewed last week's topic of n/a (today is the patient's initial Wellness session).    Subjective:   Patient reports his sciatica hasn't been a problem. He does have a little bit of lower back and glute pain. The pain isn't unbearable, he usually does stretches to help relieve that pain. He does his stretches almost daily but doesn't do any additional exercises.  Patient does not ice at home.    Objective:   Sreekanth completed therapeutic stretches (EIL, CHANTELLE) and the following MedX exercise machines: core lumbar, torso rotation l/r, leg extension, leg curl, upright row, chest press, biceps curl, triceps extension, leg press    See exercise log in patient folder for rate of exertion and repetitions completed.       Fitness Machine Education Key:  E=education on equipment initiated and further follow up and education needed  I=independent with  and exercise.  The patient:  Adjusts machines to his/her settings  Uses equipment levers, pins,  weights safely  Maintains safe and correct posture while exercising  Moves through exercise with correct pace and control  Gets on and off equipment safely      Lumbar/Cervical Ext.  Torso Rotation  Leg Press    Leg Extension  Seated Leg Curl  Chest Press    Seated Row  Hip ADD X Hip ABD    Triceps Extension  Bicep Curl  Other:        Assessment:   Patient tolerated Patient tolerated MedX Core Lumbar Strength and all other peripheral exercises without an increase in symptoms. Patient warmed up on Treadmill for 6 minutes, stretched, and iced low back for 5 minutes after the workout.     Plan:  Continue with established plan of care towards wellness goals.     Health  : Luciana Earl  1/6/2023

## 2023-01-12 ENCOUNTER — DOCUMENTATION ONLY (OUTPATIENT)
Dept: REHABILITATION | Facility: OTHER | Age: 78
End: 2023-01-12
Attending: INTERNAL MEDICINE
Payer: MEDICARE

## 2023-01-12 NOTE — PROGRESS NOTES
Health  Wellness Visit Note    Name: Sreekanth Pitts Jr.  Clinic Number: [id number]  Physician: No ref. provider found  Diagnosis: No diagnosis found.  Past Medical History:   Diagnosis Date    Achilles bursitis or tendinitis 9/25/2014    Allergy 12/3/2015    Arthritis     BPH without obstruction/lower urinary tract symptoms 01/04/2018    Chronic fatigue 7/10/2018    Degenerative disc disease     GERD (gastroesophageal reflux disease) 12/2/2020    Hypertension     Nuclear sclerosis - Both Eyes 7/30/2012     Visit Number: 22  Precautions: Standard L4/L5 Fusion      1st PT visit:  08/26/2022  Year of care end date:  08/2023  6 Month test  performed: 02/2023  12 Month test performed: 08/2023  Mind body plan: 1F  Patient level: NP    Time In: 10:53 AM  Time Out: 11:42 PM  Total Treatment Time: 49 Minutes    Wellness Vision 2022  Handout on this week's wellness topic Get Outside provided  along with a discussion on what it means, the benefits, and suggestions for practice.  Reviewed last week's topic of Gratitude.    Subjective:   Patient reports his sciatica hasn't been a problem. He does have a little bit of lower back and hip pain. He usually does stretches to help relieve that pain. He does his stretches almost daily but doesn't do any additional exercises. Patient does not ice at home.    Objective:   Sreekanth completed therapeutic stretches (EIL, CHANTELLE) and the following MedX exercise machines: core lumbar, torso rotation l/r, leg extension, leg curl, upright row, chest press, biceps curl, triceps extension, leg press    See exercise log in patient folder for rate of exertion and repetitions completed.       Fitness Machine Education Key:  E=education on equipment initiated and further follow up and education needed  I=independent with  and exercise.  The patient:  Adjusts machines to his/her settings  Uses equipment levers, pins, weights safely  Maintains safe and correct posture while  exercising  Moves through exercise with correct pace and control  Gets on and off equipment safely      Lumbar/Cervical Ext.  Torso Rotation  Leg Press    Leg Extension  Seated Leg Curl  Chest Press    Seated Row  Hip ADD X Hip ABD    Triceps Extension  Bicep Curl  Other:        Assessment:   Patient tolerated Patient tolerated MedX Core Lumbar Strength and all other peripheral exercises without an increase in symptoms. Patient warmed up on Treadmill for 6 minutes, stretched, and iced low back for 5 minutes after the workout.     Plan:  Continue with established plan of care towards wellness goals.     Health  : Agnes Landeros  1/12/2023

## 2023-01-20 ENCOUNTER — DOCUMENTATION ONLY (OUTPATIENT)
Dept: REHABILITATION | Facility: OTHER | Age: 78
End: 2023-01-20
Attending: INTERNAL MEDICINE
Payer: MEDICARE

## 2023-01-20 ENCOUNTER — OFFICE VISIT (OUTPATIENT)
Dept: INTERNAL MEDICINE | Facility: CLINIC | Age: 78
End: 2023-01-20
Payer: MEDICARE

## 2023-01-20 VITALS
HEIGHT: 73 IN | DIASTOLIC BLOOD PRESSURE: 70 MMHG | HEART RATE: 84 BPM | SYSTOLIC BLOOD PRESSURE: 114 MMHG | OXYGEN SATURATION: 97 % | BODY MASS INDEX: 35.03 KG/M2 | WEIGHT: 264.31 LBS

## 2023-01-20 DIAGNOSIS — K22.70 BARRETT'S ESOPHAGUS WITHOUT DYSPLASIA: ICD-10-CM

## 2023-01-20 DIAGNOSIS — E66.01 SEVERE OBESITY (BMI 35.0-39.9) WITH COMORBIDITY: ICD-10-CM

## 2023-01-20 DIAGNOSIS — R59.1 LYMPHADENOPATHY OF HEAD AND NECK: Primary | ICD-10-CM

## 2023-01-20 DIAGNOSIS — K22.70 BARRETT'S ESOPHAGUS WITHOUT DYSPLASIA: Primary | ICD-10-CM

## 2023-01-20 DIAGNOSIS — K21.9 GASTROESOPHAGEAL REFLUX DISEASE WITHOUT ESOPHAGITIS: ICD-10-CM

## 2023-01-20 PROCEDURE — 99214 OFFICE O/P EST MOD 30 MIN: CPT | Mod: S$PBB,,, | Performed by: INTERNAL MEDICINE

## 2023-01-20 PROCEDURE — 99214 OFFICE O/P EST MOD 30 MIN: CPT | Mod: PBBFAC | Performed by: INTERNAL MEDICINE

## 2023-01-20 PROCEDURE — 99999 PR PBB SHADOW E&M-EST. PATIENT-LVL IV: ICD-10-PCS | Mod: PBBFAC,,, | Performed by: INTERNAL MEDICINE

## 2023-01-20 PROCEDURE — 99999 PR PBB SHADOW E&M-EST. PATIENT-LVL IV: CPT | Mod: PBBFAC,,, | Performed by: INTERNAL MEDICINE

## 2023-01-20 PROCEDURE — 99214 PR OFFICE/OUTPT VISIT, EST, LEVL IV, 30-39 MIN: ICD-10-PCS | Mod: S$PBB,,, | Performed by: INTERNAL MEDICINE

## 2023-01-20 NOTE — PROGRESS NOTES
Health  Wellness Visit Note    Name: Sreekanth Pitts Jr.  Clinic Number: [id number]  Physician: No ref. provider found  Diagnosis: No diagnosis found.  Past Medical History:   Diagnosis Date    Achilles bursitis or tendinitis 9/25/2014    Allergy 12/3/2015    Arthritis     BPH without obstruction/lower urinary tract symptoms 01/04/2018    Chronic fatigue 7/10/2018    Degenerative disc disease     GERD (gastroesophageal reflux disease) 12/2/2020    Hypertension     Nuclear sclerosis - Both Eyes 7/30/2012     Visit Number: 23  Precautions: Standard L4/L5 Fusion      1st PT visit:  08/26/2022  Year of care end date:  08/2023  6 Month test  performed: 02/2023  12 Month test performed: 08/2023  Mind body plan: 4-visit package  Patient level: B    Time In: 12:15 PM  Time Out: 1:05 PM  Total Treatment Time: 50 Minutes    Wellness Vision 2022  Handout on this week's wellness topic Meditation provided  along with a discussion on what it means, the benefits, and suggestions for practice.  Reviewed last week's topic of Get Outside.    Subjective:   Patient reports that his back feels good today. Pt states that he completed his stretches every other day over the last week. Patient does not ice at home, nor does he typically engage with organized physical activity.    Objective:   Sreekanth completed therapeutic stretches (EIL, CHANTELLE) and the following MedX exercise machines: core lumbar, torso rotation l/r, leg extension, leg curl, upright row, chest press, biceps curl, triceps extension, leg press    See exercise log in patient folder for rate of exertion and repetitions completed.       Fitness Machine Education Key:  E=education on equipment initiated and further follow up and education needed  I=independent with  and exercise.  The patient:  Adjusts machines to his/her settings  Uses equipment levers, pins, weights safely  Maintains safe and correct posture while exercising  Moves through exercise with correct  pace and control  Gets on and off equipment safely      Lumbar/Cervical Ext.  Torso Rotation  Leg Press    Leg Extension E Seated Leg Curl  Chest Press    Seated Row  Hip ADD X Hip ABD E   Triceps Extension  Bicep Curl  Other:        Assessment:   Patient tolerated Patient tolerated MedX Core Lumbar Strength and all other peripheral exercises without an increase in symptoms. Patient warmed up on Treadmill for 5 minutes, stretched, and iced low back for 5 minutes after the workout.     Plan:  Continue with established plan of care towards wellness goals.     Health  : Orquidea Reid  1/20/2023

## 2023-01-20 NOTE — PROGRESS NOTES
Subjective:       Patient ID: Sreekanth Pitts Jr. is a 77 y.o. male.    Chief Complaint: Pre-op Exam (Right eye) and Hand Pain (Right hand pain and stiffness)    Patient is here for followup for chronic conditions.      R hand bothering him, diff to close the hand. Has swelling of the proximal fingers. May have come on and pulling up weeds in his garden 1-2 wks ago. No significant am stiffness, no other new jt pains.    Has lump L side of the neck, wants re-examined.    Has upcoming cataract surgery and needs preop today.    Review of Systems   Constitutional:  Negative for appetite change, diaphoresis, fatigue, fever and unexpected weight change.   Respiratory:  Negative for chest tightness and shortness of breath.    Cardiovascular:  Negative for chest pain.   Gastrointestinal:  Negative for abdominal distention, abdominal pain, nausea and vomiting.   Genitourinary:  Negative for difficulty urinating, scrotal swelling and testicular pain.   Musculoskeletal:  Positive for arthralgias (R hand). Negative for back pain.   Skin:  Negative for rash.        No lesions   Neurological:         Tingling which is quick onset and offset bilat hands, none in the feet   Psychiatric/Behavioral:  Negative for sleep disturbance.          Objective:      Physical Exam  Vitals reviewed.   Constitutional:       General: He is not in acute distress.     Appearance: Normal appearance. He is well-developed. He is obese. He is not ill-appearing, toxic-appearing or diaphoretic.   HENT:      Head: Normocephalic and atraumatic.   Eyes:      General: No scleral icterus.  Neck:      Thyroid: No thyromegaly.      Comments: L angle of jaw with nodule --  moveable, nontender, pea sized, not hard.      Cardiovascular:      Rate and Rhythm: Normal rate and regular rhythm.      Heart sounds: Normal heart sounds. No murmur heard.    No friction rub. No gallop.   Pulmonary:      Effort: Pulmonary effort is normal. No respiratory distress.       Breath sounds: Normal breath sounds. No wheezing or rales.   Abdominal:      General: Bowel sounds are normal. There is no distension.      Palpations: Abdomen is soft. There is no mass.      Tenderness: There is no abdominal tenderness. There is no guarding or rebound.   Musculoskeletal:         General: Normal range of motion.      Cervical back: Normal range of motion.      Comments: Tenderness R hand palmar MCPs -- 2nd and 3rd, no redness or obvious swelling. Nml wrist rom.   Lymphadenopathy:      Cervical: Cervical adenopathy present.   Skin:     Findings: No lesion.      Comments: A few scattered actinic areas   Neurological:      Mental Status: He is alert and oriented to person, place, and time.   Psychiatric:         Thought Content: Thought content normal.       Assessment:       1. Lymphadenopathy of head and neck    2. Severe obesity (BMI 35.0-39.9) with comorbidity    3. Gastroesophageal reflux disease without esophagitis    4. 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    Diagnoses and all orders for this visit:    Cataract surgery -- Pt here for preop exam.  Pt is at low risk for a moderately risky procedure, I do not see any medical contra-indications for proceeding with this surgery, pt is medically cleared.      Lymphadenopathy of head and neck  -     US Soft Tissue Head Neck Thyroid; Future  Consider gen surgery referral, he prefers to see US result first    Severe obesity (BMI 35.0-39.9) with comorbidity  Has been stable, limited exercise due to spine    Gastroesophageal reflux disease without esophagitis  Loaiza's esophagus without dysplasia  -     Ambulatory referral/consult to Gastroenterology; Future  Determine whether he should get egd/colo    R hand pain I think from recent overuse -- try bengay, offered voltaren gel, he will first try bengay.  Explained that if not better in 1-2 weeks, pt should rtc/call [facility]      Health  Maintenance         Date Due Completion Date    TETANUS VACCINE Never done ---    Colorectal Cancer Screening 10/19/2022 10/19/2012    PROSTATE-SPECIFIC ANTIGEN 05/13/2023 5/13/2022    Lipid Panel 10/01/2027 10/1/2022            No follow-ups on file.    Future Appointments   Date Time Provider Department Center   1/25/2023  2:00 PM Orquidea Reid [facility]   1/27/2023  9:30 AM [facility]   2/6/2023  2:30 PM Agnes Landeros [facility]   2/13/2023  1:30 PM Agnes Landeros [facility]   2/20/2023 12:30 PM Luciana Earl [facility]   2/27/2023  1:30 PM Orquidea Reid [facility]   4/4/2023  9:40 AM Elsie Lima MD [facility] LAD[facility]

## 2023-01-21 DIAGNOSIS — Z79.899 ENCOUNTER FOR MONITORING LONG-TERM PROTON PUMP INHIBITOR THERAPY: Primary | ICD-10-CM

## 2023-01-21 DIAGNOSIS — Z51.81 ENCOUNTER FOR MONITORING LONG-TERM PROTON PUMP INHIBITOR THERAPY: Primary | ICD-10-CM

## 2023-01-21 RX ORDER — RABEPRAZOLE SODIUM 20 MG/1
20 TABLET, DELAYED RELEASE ORAL
Qty: 90 TABLET | Refills: 3 | Status: SHIPPED | OUTPATIENT
Start: 2023-01-21 | End: 2023-08-04

## 2023-01-25 ENCOUNTER — DOCUMENTATION ONLY (OUTPATIENT)
Dept: REHABILITATION | Facility: OTHER | Age: 78
End: 2023-01-25
Attending: INTERNAL MEDICINE
Payer: MEDICARE

## 2023-01-25 NOTE — PROGRESS NOTES
Health  Wellness Visit Note    Name: Sreekanth Pitts Jr.  Clinic Number: [id number]  Physician: No ref. provider found  Diagnosis: No diagnosis found.  Past Medical History:   Diagnosis Date    Achilles bursitis or tendinitis 9/25/2014    Allergy 12/3/2015    Arthritis     BPH without obstruction/lower urinary tract symptoms 01/04/2018    Chronic fatigue 7/10/2018    Degenerative disc disease     GERD (gastroesophageal reflux disease) 12/2/2020    Hypertension     Nuclear sclerosis - Both Eyes 7/30/2012     Visit Number: 23  Precautions: Standard L4/L5 Fusion      1st PT visit:  08/26/2022  Year of care end date:  08/2023  6 Month test  performed: 02/2023  12 Month test performed: 08/2023  Mind body plan: 4-visit package  Patient level: B    Time In: 1:50 PM  Time Out: 2:40 PM  Total Treatment Time:  50 Minutes    Wellness Vision 2022  Handout on this week's wellness topic Meditation provided  along with a discussion on what it means, the benefits, and suggestions for practice.  Reviewed last week's topic of Get Outside.    Subjective:   Patient reports that his back feels good today. Pt states that he completed his stretches most days last week. Patient does not ice at home, nor does he typically engage with organized physical activity.    Objective:   Sreekanth completed therapeutic stretches (EIL, CHANTELLE) and the following MedX exercise machines: core lumbar, torso rotation l/r, leg extension, leg curl, upright row, chest press, biceps curl, triceps extension, leg press    See exercise log in patient folder for rate of exertion and repetitions completed.       Fitness Machine Education Key:  E=education on equipment initiated and further follow up and education needed  I=independent with  and exercise.  The patient:  Adjusts machines to his/her settings  Uses equipment levers, pins, weights safely  Maintains safe and correct posture while exercising  Moves through exercise with correct pace and  control  Gets on and off equipment safely      Lumbar/Cervical Ext.  Torso Rotation  Leg Press    Leg Extension E Seated Leg Curl E Chest Press    Seated Row E Hip ADD X Hip ABD E   Triceps Extension  Bicep Curl  Other:        Assessment:   Patient tolerated Patient tolerated MedX Core Lumbar Strength and all other peripheral exercises without an increase in symptoms. Patient warmed up on Treadmill for 5 minutes, stretched, and iced low back for 5 minutes after the workout.     Plan:  Continue with established plan of care towards wellness goals.     Health  : Orquidea Reid  1/25/2023

## 2023-01-27 ENCOUNTER — TELEPHONE (OUTPATIENT)
Dept: INTERNAL MEDICINE | Facility: CLINIC | Age: 78
End: 2023-01-27
Payer: MEDICARE

## 2023-01-27 ENCOUNTER — HOSPITAL ENCOUNTER (OUTPATIENT)
Dept: RADIOLOGY | Facility: HOSPITAL | Age: 78
Discharge: HOME | End: 2023-01-27
Attending: INTERNAL MEDICINE
Payer: MEDICARE

## 2023-01-27 DIAGNOSIS — R59.1 LYMPHADENOPATHY OF HEAD AND NECK: Primary | ICD-10-CM

## 2023-01-27 DIAGNOSIS — R59.1 LYMPHADENOPATHY OF HEAD AND NECK: ICD-10-CM

## 2023-01-27 PROCEDURE — 76536 US EXAM OF HEAD AND NECK: CPT | Mod: TC

## 2023-01-27 PROCEDURE — 76536 US EXAM OF HEAD AND NECK: CPT | Mod: 26,,, | Performed by: RADIOLOGY

## 2023-01-27 PROCEDURE — 76536 US SOFT TISSUE HEAD NECK THYROID: ICD-10-PCS | Mod: 26,,, | Performed by: RADIOLOGY

## 2023-01-27 NOTE — TELEPHONE ENCOUNTER
Patient called back about US of the neck. Discussed the results and then made an appt to see an ENT for February. Patient verbalized understanding with read back.

## 2023-01-27 NOTE — TELEPHONE ENCOUNTER
Attempted to call pt back about an US for lump in the side of his neck but no response at this time. Left VM to call office when available.

## 2023-01-27 NOTE — TELEPHONE ENCOUNTER
anirudh Alvarado call him -- it is unclear from the neck ultrasound what is the lump on the left side of his neck. I recommend that he see a head and neck surgeon to have it removed.  please assist in scheduling    Orders Placed This Encounter    Ambulatory referral/consult to ENT     Let me know if patient has any questions.  Thank you, Torsten Bejarano

## 2023-02-06 ENCOUNTER — DOCUMENTATION ONLY (OUTPATIENT)
Dept: REHABILITATION | Facility: OTHER | Age: 78
End: 2023-02-06
Attending: INTERNAL MEDICINE
Payer: MEDICARE

## 2023-02-06 NOTE — PROGRESS NOTES
Health  Wellness Visit Note    Name: Sreekanth Pitts Jr.  Clinic Number: [id number]  Physician: No ref. provider found  Diagnosis: No diagnosis found.  Past Medical History:   Diagnosis Date    Achilles bursitis or tendinitis 9/25/2014    Allergy 12/3/2015    Arthritis     BPH without obstruction/lower urinary tract symptoms 01/04/2018    Chronic fatigue 7/10/2018    Degenerative disc disease     GERD (gastroesophageal reflux disease) 12/2/2020    Hypertension     Nuclear sclerosis - Both Eyes 7/30/2012     Visit Number: 24  Precautions: Standard L4/L5 Fusion      1st PT visit:  08/26/2022  Year of care end date:  08/2023  6 Month test  performed: 02/2023  12 Month test performed: 08/2023  Mind body plan: 4-visit package  Patient level: B    Time In: 2:18 PM  Time Out: 3:09 PM  Total Treatment Time: 51 Minutes    Wellness Vision 2022  Handout on this week's wellness topic Meditation provided  along with a discussion on what it means, the benefits, and suggestions for practice.  Reviewed last week's topic of NA patient .    Subjective:   Patient reports that his back feels good today. He was working in the garden Richard morning and strained his back, He did stretching to help alleviate the aggravation in his back. Pt states that he completed his stretches most days last week. Patient does not ice at home, nor does he typically engage with organized physical activity.    Objective:   Sreekanth completed therapeutic stretches (EIL, CHANTELLE) and the following MedX exercise machines: core lumbar, torso rotation l/r, leg extension, leg curl, upright row, chest press, biceps curl, triceps extension, leg press    See exercise log in patient folder for rate of exertion and repetitions completed.       Fitness Machine Education Key:  E=education on equipment initiated and further follow up and education needed  I=independent with  and exercise.  The patient:  Adjusts machines to his/her settings  Uses equipment  levers, pins, weights safely  Maintains safe and correct posture while exercising  Moves through exercise with correct pace and control  Gets on and off equipment safely      Lumbar/Cervical Ext.  Torso Rotation  Leg Press    Leg Extension E Seated Leg Curl E Chest Press    Seated Row E Hip ADD X Hip ABD E   Triceps Extension  Bicep Curl  Other:        Assessment:   Patient tolerated Patient tolerated MedX Core Lumbar Strength and all other peripheral exercises without an increase in symptoms. Patient warmed up on Treadmill for 5 minutes, stretched, and iced low back for 5 minutes after the workout.     Plan:  Continue with established plan of care towards wellness goals.     Health  : Agnes Landeros  2/6/2023

## 2023-02-07 ENCOUNTER — LAB VISIT (OUTPATIENT)
Dept: LAB | Facility: HOSPITAL | Age: 78
End: 2023-02-07
Attending: OTOLARYNGOLOGY
Payer: MEDICARE

## 2023-02-07 ENCOUNTER — OFFICE VISIT (OUTPATIENT)
Dept: OTOLARYNGOLOGY | Facility: CLINIC | Age: 78
End: 2023-02-07
Payer: MEDICARE

## 2023-02-07 VITALS
DIASTOLIC BLOOD PRESSURE: 79 MMHG | WEIGHT: 259.69 LBS | SYSTOLIC BLOOD PRESSURE: 125 MMHG | OXYGEN SATURATION: 95 % | HEIGHT: 73 IN | HEART RATE: 93 BPM | BODY MASS INDEX: 34.42 KG/M2

## 2023-02-07 DIAGNOSIS — R59.1 LYMPHADENOPATHY OF HEAD AND NECK: ICD-10-CM

## 2023-02-07 DIAGNOSIS — D37.039 NEOPLASM OF UNCERTAIN BEHAVIOR OF MAJOR SALIVARY GLAND: ICD-10-CM

## 2023-02-07 DIAGNOSIS — R59.1 LYMPHADENOPATHY OF HEAD AND NECK: Primary | ICD-10-CM

## 2023-02-07 LAB
ANION GAP SERPL CALC-SCNC: 11 MMOL/L (ref 8–16)
BASOPHILS # BLD AUTO: 0.05 K/UL (ref 0–0.2)
BASOPHILS NFR BLD: 0.7 % (ref 0–1.9)
BUN SERPL-MCNC: 15 MG/DL (ref 8–23)
CALCIUM SERPL-MCNC: 9.3 MG/DL (ref 8.7–10.5)
CHLORIDE SERPL-SCNC: 107 MMOL/L (ref 95–110)
CO2 SERPL-SCNC: 22 MMOL/L (ref 23–29)
CREAT SERPL-MCNC: 0.9 MG/DL (ref 0.5–1.4)
DIFFERENTIAL METHOD: NORMAL
EOSINOPHIL # BLD AUTO: 0.1 K/UL (ref 0–0.5)
EOSINOPHIL NFR BLD: 0.9 % (ref 0–8)
ERYTHROCYTE [DISTWIDTH] IN BLOOD BY AUTOMATED COUNT: 12.8 % (ref 11.5–14.5)
EST. GFR  (NO RACE VARIABLE): >60 ML/MIN/1.73 M^2
GLUCOSE SERPL-MCNC: 102 MG/DL (ref 70–110)
HCT VFR BLD AUTO: 48.3 % (ref 40–54)
HGB BLD-MCNC: 15.8 G/DL (ref 14–18)
IMM GRANULOCYTES # BLD AUTO: 0.03 K/UL (ref 0–0.04)
IMM GRANULOCYTES NFR BLD AUTO: 0.4 % (ref 0–0.5)
LYMPHOCYTES # BLD AUTO: 1.8 K/UL (ref 1–4.8)
LYMPHOCYTES NFR BLD: 25.5 % (ref 18–48)
MCH RBC QN AUTO: 30 PG (ref 27–31)
MCHC RBC AUTO-ENTMCNC: 32.7 G/DL (ref 32–36)
MCV RBC AUTO: 92 FL (ref 82–98)
MONOCYTES # BLD AUTO: 0.7 K/UL (ref 0.3–1)
MONOCYTES NFR BLD: 10.4 % (ref 4–15)
NEUTROPHILS # BLD AUTO: 4.4 K/UL (ref 1.8–7.7)
NEUTROPHILS NFR BLD: 62.1 % (ref 38–73)
NRBC BLD-RTO: 0 /100 WBC
PLATELET # BLD AUTO: 258 K/UL (ref 150–450)
PMV BLD AUTO: 9.8 FL (ref 9.2–12.9)
POTASSIUM SERPL-SCNC: 4.2 MMOL/L (ref 3.5–5.1)
RBC # BLD AUTO: 5.26 M/UL (ref 4.6–6.2)
SODIUM SERPL-SCNC: 140 MMOL/L (ref 136–145)
WBC # BLD AUTO: 7.05 K/UL (ref 3.9–12.7)

## 2023-02-07 PROCEDURE — 99999 PR PBB SHADOW E&M-EST. PATIENT-LVL IV: CPT | Mod: PBBFAC,,, | Performed by: OTOLARYNGOLOGY

## 2023-02-07 PROCEDURE — 99214 OFFICE O/P EST MOD 30 MIN: CPT | Mod: PBBFAC | Performed by: OTOLARYNGOLOGY

## 2023-02-07 PROCEDURE — 80048 BASIC METABOLIC PNL TOTAL CA: CPT | Performed by: OTOLARYNGOLOGY

## 2023-02-07 PROCEDURE — 99214 OFFICE O/P EST MOD 30 MIN: CPT | Mod: S$PBB,,, | Performed by: OTOLARYNGOLOGY

## 2023-02-07 PROCEDURE — 36415 COLL VENOUS BLD VENIPUNCTURE: CPT | Performed by: OTOLARYNGOLOGY

## 2023-02-07 PROCEDURE — 99999 PR PBB SHADOW E&M-EST. PATIENT-LVL IV: ICD-10-PCS | Mod: PBBFAC,,, | Performed by: OTOLARYNGOLOGY

## 2023-02-07 PROCEDURE — 85025 COMPLETE CBC W/AUTO DIFF WBC: CPT | Performed by: OTOLARYNGOLOGY

## 2023-02-07 PROCEDURE — 99214 PR OFFICE/OUTPT VISIT, EST, LEVL IV, 30-39 MIN: ICD-10-PCS | Mod: S$PBB,,, | Performed by: OTOLARYNGOLOGY

## 2023-02-07 NOTE — PROGRESS NOTES
[facility]    Subjective:       Patient ID: Sreekanth Pitts Jr. is a 77 y.o. male.    Chief Complaint: Mass    HPI    Sreekanth Pitts Jr. is a 77 y.o. male who presents with a left neck mass that has been present for at least 2 years. He has had a couple of ultrasounds. He reports that it has become noticeable and more prominent. There is no pain, redness, or warmth. He has not had anything like this previously. He has no history of local or regional skin cancers, but has had some AKs treated with cryotherapy and/or 5-FU.    He denies dysphagia, odynophagia, throat pain, and otalgia. His voice is normal. There is no hemoptysis or hematemesis. He is breathing well. He has no axillary or inguinal adenopathy and denies fevers, chills, nightsweats, fatigue, and weight loss. He was in [employer] but has no known/documented radiation exposure.    He is a reformed smoker, stopping a 2ppd habit in 1973 after about 4-5 years.     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       Past Surgical History:   Procedure Laterality Date    ESOPHAGOGASTRODUODENOSCOPY N/A 12/2/2020    Procedure: EGD (ESOPHAGOGASTRODUODENOSCOPY);  Surgeon: Dion Mckinney MD;  Location: [address]);  Service: Endoscopy;  Laterality: N/A;  covid-11/29-[facility]    EYE FOREIGN BODY REMOVAL      childhood    LUMBAR LAMINECTOMY WITH FUSION  2002    MINIMALLY INVASIVE TRANSFORAMINAL LUMBAR INTERBODY FUSION (TLIF) N/A 2/14/2022    Procedure: FUSION, SPINE, LUMBAR, TLIF, MINIMALLY INVASIVE Right L4-5 Jose Juan PARK notified;  Surgeon: Anibal  ALEX Beebe MD;  Location: [facility] OR;  Service: Neurosurgery;  Laterality: N/A;  ASA1  TOR1  T&Screen  EMG  C-Arm  LSO  Jamie 4 poster  NEURO MONITORING RENALDO OSBORN [phone]  SPINEWAVE RENALDO POLLOCK [phone] TEXTED ON 2/2/2022 @ 3:54PM/ RESPONDED ON 2/2/2022 @4:22 PM-LO  PREOP DONE A    TRANSURETHRAL RESECTION OF PROSTATE (TURP) WITHOUT USE OF LASER N/A 9/11/2018    Procedure: TURP, WITHOUT USING LASER;  Surgeon: Uma Gaona MD;  Location: [facility] OR [address] [place] [address];  Service: Urology;  Laterality: N/A;  1.5 hours       Current Outpatient Medications:     acetaminophen (TYLENOL) 325 MG tablet, Take 650 mg by mouth every 6 (six) hours as needed for Pain., Disp: , Rfl:     atorvastatin (LIPITOR) 10 MG tablet, Take 1 tablet (10 mg total) by mouth once daily., Disp: 90 tablet, Rfl: 11    cyanocobalamin (VITAMIN B-12) 100 MCG tablet, Take 1 tablet (100 mcg total) by mouth once daily., Disp: , Rfl:     dutasteride (AVODART) 0.5 mg capsule, Take 1 capsule (0.5 mg total) by mouth once daily., Disp: 90 capsule, Rfl: 3    ibuprofen (ADVIL,MOTRIN) 200 MG tablet, Take 200 mg by mouth every 6 (six) hours as needed for Pain., Disp: , Rfl:     lisinopriL (PRINIVIL,ZESTRIL) 20 MG tablet, Take 1 tablet (20 mg total) by mouth once daily., Disp: 90 tablet, Rfl: 11    MULTIVITAMIN W-MINERALS/LUTEIN (CENTRUM SILVER ORAL), Take 1 tablet by mouth once daily., Disp: , Rfl:     RABEprazole (ACIPHEX) 20 mg tablet, Take 1 tablet (20 mg total) by mouth before breakfast., Disp: 90 tablet, Rfl: 3    tamsulosin (FLOMAX) 0.4 mg Cap, Take 1 capsule (0.4 mg total) by mouth every evening., Disp: 90 capsule, Rfl: 3    fluorouraciL (EFUDEX) 5 % cream, Compound fluorouracil 5% + calcipotriene 0.005% cream. Apply a pea-sized amount to entire scalp BID x 5 - 7 days. (Patient not taking: Reported on 2/7/2023), Disp: 30 g, Rfl: 0    Review of patient's allergies indicates:  No Known Allergies    Social History     Socioeconomic History    Marital  status:    Occupational History     Employer: [employer]   Tobacco Use    Smoking status: Former     Packs/day: 2.00     Years: 4.00     Pack years: 8.00     Types: Cigarettes, Cigars     Quit date: 1973     Years since quittin.1    Smokeless tobacco: Never   Substance and Sexual Activity    Alcohol use: Yes     Alcohol/week: 1.0 standard drink     Types: 1 Cans of beer per week     Comment: rarely    Drug use: No   Social History Narrative    , consulting firm , 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 wife (healthy) lives at home, 2 dtrs, 43, 41. frequ walking each day.       Family History   Problem Relation Age of Onset    Cataracts Mother     Hypertension Mother     Cancer Father     Heart disease Father     Heart disease Brother     Stroke Paternal Grandfather     Heart disease Paternal Grandfather     Colon cancer Neg Hx     Esophageal cancer Neg Hx        Review of Systems   Constitutional:  Negative for fatigue, fever and unexpected weight change.   HENT:  Negative for ear discharge, facial swelling, hearing loss, mouth sores, rhinorrhea, sore throat, tinnitus, trouble swallowing and voice change.    Eyes:  Negative for pain and visual disturbance.   Respiratory:  Negative for cough and shortness of breath.    Cardiovascular:  Negative for chest pain and palpitations.   Gastrointestinal:  Negative for abdominal pain, constipation and diarrhea.   Genitourinary:  Negative for difficulty urinating and dysuria.   Musculoskeletal:  Positive for arthralgias. Negative for back pain and neck pain.   Skin:  Negative for color change and rash.   Neurological:  Positive for facial asymmetry. Negative for dizziness, seizures and headaches.   Hematological:  Positive for adenopathy. Does not bruise/bleed easily.   Psychiatric/Behavioral:  Negative for agitation. The patient is not nervous/anxious.      Objective:     Physical Exam  Vitals reviewed.   Constitutional:       Appearance: Normal  appearance.   HENT:      Head: Normocephalic and atraumatic.        Comments:        Right Ear: Tympanic membrane, ear canal and external ear normal. No decreased hearing noted.      Left Ear: Tympanic membrane, ear canal and external ear normal. No decreased hearing noted.      Nose: Nose normal. No rhinorrhea.      Mouth/Throat:      Palate: No mass and lesions.   Eyes:      Extraocular Movements: Extraocular movements intact.      Conjunctiva/sclera: Conjunctivae normal.   Neck:      Comments: Salivary glands - there are no lesions or asymmetric findings in the submandibular glands  Pulmonary:      Effort: Pulmonary effort is normal. No respiratory distress.      Breath sounds: Normal breath sounds. No stridor.   Musculoskeletal:      Right shoulder: No deformity. Normal range of motion.      Left shoulder: No deformity. Normal range of motion.      Cervical back: Normal range of motion and neck supple.   Lymphadenopathy:      Cervical: No cervical adenopathy.   Skin:     General: Skin is warm and dry.      Findings: No lesion.   Neurological:      Mental Status: He is alert and oriented to person, place, and time.      Cranial Nerves: No cranial nerve deficit or facial asymmetry.      Motor: No weakness.      Gait: Gait normal.      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 is a 77 y.o. male with a left parotid mass. I would like a CT scan of the neck with contrast to confirm its location. Once that is complete, he will return to the [facility] and likely see Celeste, at which time we will obtain an FNA, as this is palpable. We will discuss his options afterwards, but the workup needs to happen first.                Other Visit Diagnoses       Lymphadenopathy of head and neck    -  Primary    Relevant Orders    Basic Metabolic Panel (Completed)     CBC Auto Differential (Completed)    CT Soft Tissue Neck With Contrast

## 2023-02-07 NOTE — ASSESSMENT & PLAN NOTE
Sreekanth CABRAL Gisselle Kam is a 77 y.o. male with a left parotid mass. I would like a CT scan of the neck with contrast to confirm its location. Once that is complete, he will return to the [facility] and likely see Celeste, at which time we will obtain an FNA, as this is palpable. We will discuss his options afterwards, but the workup needs to happen first.

## 2023-02-07 NOTE — PATIENT INSTRUCTIONS
Please get the CT scan  Then we will have you return to see Celeste in 2 weeks  We will discuss next steps once the results from the needle biopsy are available

## 2023-02-10 ENCOUNTER — HOSPITAL ENCOUNTER (OUTPATIENT)
Dept: RADIOLOGY | Facility: HOSPITAL | Age: 78
Discharge: HOME | End: 2023-02-10
Attending: OTOLARYNGOLOGY
Payer: MEDICARE

## 2023-02-10 DIAGNOSIS — R59.1 LYMPHADENOPATHY OF HEAD AND NECK: ICD-10-CM

## 2023-02-10 PROCEDURE — 70491 CT SOFT TISSUE NECK WITH CONTRAST: ICD-10-PCS | Mod: 26,,, | Performed by: RADIOLOGY

## 2023-02-10 PROCEDURE — 25500020 PHARM REV CODE 255: Performed by: OTOLARYNGOLOGY

## 2023-02-10 PROCEDURE — 70491 CT SOFT TISSUE NECK W/DYE: CPT | Mod: 26,,, | Performed by: RADIOLOGY

## 2023-02-10 PROCEDURE — 70491 CT SOFT TISSUE NECK W/DYE: CPT | Mod: TC

## 2023-02-10 RX ADMIN — IOHEXOL 100 ML: 350 INJECTION, SOLUTION INTRAVENOUS at 09:02

## 2023-02-13 ENCOUNTER — DOCUMENTATION ONLY (OUTPATIENT)
Dept: REHABILITATION | Facility: OTHER | Age: 78
End: 2023-02-13
Attending: INTERNAL MEDICINE
Payer: MEDICARE

## 2023-02-13 ENCOUNTER — PATIENT MESSAGE (OUTPATIENT)
Dept: OTOLARYNGOLOGY | Facility: CLINIC | Age: 78
End: 2023-02-13
Payer: MEDICARE

## 2023-02-13 NOTE — PROGRESS NOTES
Health  Wellness Visit Note    Name: Sreekanth Pitts Jr.  Clinic Number: [id number]  Physician: No ref. provider found  Diagnosis: No diagnosis found.  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  Reviewed last week's topic of Cardiovascular Exercise.    Subjective:   Patient reports that his back feels good today. Pt states that he completed his stretches 4 days last week. Patient does not ice at home, nor does he typically engage with organized physical activity.    Objective:   Sreekanth completed therapeutic stretches (EIL, CHANTELLE) and the following MedX exercise machines: core lumbar, torso rotation l/r, leg extension, leg curl, upright row, chest press, biceps curl, triceps extension, leg press    See exercise log in patient folder for rate of exertion and repetitions completed.       Fitness Machine Education Key:  E=education on equipment initiated and further follow up and education needed  I=independent with  and exercise.  The patient:  Adjusts machines to his/her settings  Uses equipment levers, pins, weights safely  Maintains safe and correct  posture while exercising  Moves through exercise with correct pace and control  Gets on and off equipment safely      Lumbar/Cervical Ext.  Torso Rotation  Leg Press    Leg Extension E Seated Leg Curl E Chest Press    Seated Row E Hip ADD X Hip ABD E   Triceps Extension  Bicep Curl  Other:        Assessment:   Patient tolerated Patient tolerated MedX Core Lumbar Strength and all other peripheral exercises without an increase in symptoms. Patient warmed up on Treadmill for 5 minutes, stretched, and iced low back for 5 minutes after the workout.     Plan:  Continue with established plan of care towards wellness goals.     Health  : Agnes Landeros  2/13/2023

## 2023-02-23 ENCOUNTER — OFFICE VISIT (OUTPATIENT)
Dept: OTOLARYNGOLOGY | Facility: CLINIC | Age: 78
End: 2023-02-23
Payer: MEDICARE

## 2023-02-23 VITALS
HEART RATE: 99 BPM | OXYGEN SATURATION: 98 % | BODY MASS INDEX: 34.82 KG/M2 | DIASTOLIC BLOOD PRESSURE: 72 MMHG | WEIGHT: 263.88 LBS | SYSTOLIC BLOOD PRESSURE: 119 MMHG

## 2023-02-23 DIAGNOSIS — D37.039 NEOPLASM OF UNCERTAIN BEHAVIOR OF MAJOR SALIVARY GLAND: Primary | ICD-10-CM

## 2023-02-23 PROCEDURE — 10021 PR FINE NEEDLE ASP BIOPSY, W/O IMAGING GUIDANCE, 1ST LESION: ICD-10-PCS | Mod: ,,, | Performed by: STUDENT IN AN ORGANIZED HEALTH CARE EDUCATION/TRAINING PROGRAM

## 2023-02-23 PROCEDURE — 99214 PR OFFICE/OUTPT VISIT, EST, LEVL IV, 30-39 MIN: ICD-10-PCS | Mod: S$PBB,,, | Performed by: NURSE PRACTITIONER

## 2023-02-23 PROCEDURE — 88305 TISSUE EXAM BY PATHOLOGIST: CPT | Performed by: STUDENT IN AN ORGANIZED HEALTH CARE EDUCATION/TRAINING PROGRAM

## 2023-02-23 PROCEDURE — 88173 CYTOPATH EVAL FNA REPORT: CPT | Performed by: STUDENT IN AN ORGANIZED HEALTH CARE EDUCATION/TRAINING PROGRAM

## 2023-02-23 PROCEDURE — 88305 TISSUE EXAM BY PATHOLOGIST: CPT | Mod: 26,,, | Performed by: STUDENT IN AN ORGANIZED HEALTH CARE EDUCATION/TRAINING PROGRAM

## 2023-02-23 PROCEDURE — 88173 PR  INTERPRETATION OF FNA SMEAR: ICD-10-PCS | Mod: 26,,, | Performed by: STUDENT IN AN ORGANIZED HEALTH CARE EDUCATION/TRAINING PROGRAM

## 2023-02-23 PROCEDURE — 99213 OFFICE O/P EST LOW 20 MIN: CPT | Mod: PBBFAC | Performed by: NURSE PRACTITIONER

## 2023-02-23 PROCEDURE — 88173 CYTOPATH EVAL FNA REPORT: CPT | Mod: 26,,, | Performed by: STUDENT IN AN ORGANIZED HEALTH CARE EDUCATION/TRAINING PROGRAM

## 2023-02-23 PROCEDURE — 99214 OFFICE O/P EST MOD 30 MIN: CPT | Mod: S$PBB,,, | Performed by: NURSE PRACTITIONER

## 2023-02-23 PROCEDURE — 99999 PR PBB SHADOW E&M-EST. PATIENT-LVL III: ICD-10-PCS | Mod: PBBFAC,,, | Performed by: NURSE PRACTITIONER

## 2023-02-23 PROCEDURE — 10021 FNA BX W/O IMG GDN 1ST LES: CPT | Mod: ,,, | Performed by: STUDENT IN AN ORGANIZED HEALTH CARE EDUCATION/TRAINING PROGRAM

## 2023-02-23 PROCEDURE — 99999 PR PBB SHADOW E&M-EST. PATIENT-LVL III: CPT | Mod: PBBFAC,,, | Performed by: NURSE PRACTITIONER

## 2023-02-23 PROCEDURE — 88305 TISSUE EXAM BY PATHOLOGIST: ICD-10-PCS | Mod: 26,,, | Performed by: STUDENT IN AN ORGANIZED HEALTH CARE EDUCATION/TRAINING PROGRAM

## 2023-02-23 NOTE — ASSESSMENT & PLAN NOTE
CT scan reviewed. We discussed parotid lesions and potential outcomes and treatments, including surgery. FNA done in clinic today by pathology. Patient tolerated well.

## 2023-02-23 NOTE — PROCEDURES
Sreekanth Pitts Jr. is a 77 y.o. male patient.    Pulse: 99 (02/23/23 0916)  BP: 119/72 (02/23/23 0916)  SpO2: 98 % (02/23/23 0916)  Weight: 119.7 kg (263 lb 14.3 oz) (02/23/23 0916)     Fine needle aspiration  Procedures  The patient was examined and there was a palpable mass, left parotid gland.      The patient was explained the nature of the procedure and risks, and a consent form was signed. At timeout was performed at 02/23/2023, 9:55am.    The skin was prepared with alcohol, and fine needle aspirate was performed. 3 passes were performed by Lesia Dunn Cytopathology fellow under the supervision of Dr. Jc Campbell, [facility]. Material was obtained, and results will follow in a separate report. The patient tolerated the procedure well.  Additional comments: None  Complications: None    2/23/2023

## 2023-02-23 NOTE — PROGRESS NOTES
HEAD AND NECK SURGICAL ONCOLOGY CLINIC    Subjective:       Patient ID: Sreekanth Pitts Jr. is a 77 y.o. male.    Chief Complaint: Follow-up    HPI    Sreekanth Pitts Jr. is a 77 y.o. male who returns for follow up. 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     He initially presented with a left neck mass that has been present for at least 2 years. He has had a couple of ultrasounds. He reports that it has become noticeable and more prominent. There is no pain, redness, or warmth. He has not had anything like this previously. He has no history of local or regional skin cancers, but has had some AKs treated with cryotherapy and/or 5-FU.    He denies dysphagia, odynophagia, throat pain, and otalgia. His voice is normal. There is no hemoptysis or hematemesis. He is breathing well. He has no axillary or inguinal adenopathy and denies fevers, chills, nightsweats, fatigue, and weight loss. He was in the [employer] but has no known/documented radiation exposure.    He is a reformed smoker, stopping a 2ppd habit in 1973 after about 4-5 years.     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       Past Surgical History:   Procedure Laterality Date    ESOPHAGOGASTRODUODENOSCOPY N/A 12/2/2020    Procedure: EGD (ESOPHAGOGASTRODUODENOSCOPY);  Surgeon: Dion Mckinney MD;  Location: [address]);  Service: Endoscopy;  Laterality: N/A;  covid-11/29-[facility]    EYE FOREIGN BODY REMOVAL      childhood    LUMBAR LAMINECTOMY WITH FUSION  2002    MINIMALLY INVASIVE TRANSFORAMINAL LUMBAR INTERBODY FUSION (TLIF) N/A 2/14/2022    Procedure:  FUSION, SPINE, LUMBAR, TLIF, MINIMALLY INVASIVE Right L4-5 Renaldo PARK notified;  Surgeon: Anibal Beebe MD;  Location: [facility] OR;  Service: Neurosurgery;  Laterality: N/A;  ASA1  TOR1  T&Screen  EMG  C-Arm  LSO  Jamie 4 poster  NEURO MONITORING RENALDO OSBORN [phone]  SPINEWAVE RENALDO POLLOCK [phone] TEXTED ON 2/2/2022 @ 3:54PM/ RESPONDED ON 2/2/2022 @4:22 PM-LO  PREOP DONE A    TRANSURETHRAL RESECTION OF PROSTATE (TURP) WITHOUT USE OF LASER N/A 9/11/2018    Procedure: TURP, WITHOUT USING LASER;  Surgeon: Uma Gaona MD;  Location: [facility] OR [address] [place] [address];  Service: Urology;  Laterality: N/A;  1.5 hours       Current Outpatient Medications:     acetaminophen (TYLENOL) 325 MG tablet, Take 650 mg by mouth every 6 (six) hours as needed for Pain., Disp: , Rfl:     atorvastatin (LIPITOR) 10 MG tablet, Take 1 tablet (10 mg total) by mouth once daily., Disp: 90 tablet, Rfl: 11    cyanocobalamin (VITAMIN B-12) 100 MCG tablet, Take 1 tablet (100 mcg total) by mouth once daily., Disp: , Rfl:     dutasteride (AVODART) 0.5 mg capsule, Take 1 capsule (0.5 mg total) by mouth once daily., Disp: 90 capsule, Rfl: 3    fluorouraciL (EFUDEX) 5 % cream, Compound fluorouracil 5% + calcipotriene 0.005% cream. Apply a pea-sized amount to entire scalp BID x 5 - 7 days. (Patient not taking: Reported on 2/7/2023), Disp: 30 g, Rfl: 0    ibuprofen (ADVIL,MOTRIN) 200 MG tablet, Take 200 mg by mouth every 6 (six) hours as needed for Pain., Disp: , Rfl:     lisinopriL (PRINIVIL,ZESTRIL) 20 MG tablet, Take 1 tablet (20 mg total) by mouth once daily., Disp: 90 tablet, Rfl: 11    MULTIVITAMIN W-MINERALS/LUTEIN (CENTRUM SILVER ORAL), Take 1 tablet by mouth once daily., Disp: , Rfl:     RABEprazole (ACIPHEX) 20 mg tablet, Take 1 tablet (20 mg total) by mouth before breakfast., Disp: 90 tablet, Rfl: 3    tamsulosin (FLOMAX) 0.4 mg Cap, TAKE 1 CAPSULE (0.4 MG TOTAL) BY MOUTH EVERY EVENING., Disp: 90 capsule, Rfl: 3    Review of patient's  allergies indicates:  No Known Allergies    Social History     Socioeconomic History    Marital status:    Occupational History     Employer: [employer]   Tobacco Use    Smoking status: Former     Packs/day: 2.00     Years: 4.00     Pack years: 8.00     Types: Cigarettes, Cigars     Quit date: 1973     Years since quittin.1    Smokeless tobacco: Never   Substance and Sexual Activity    Alcohol use: Yes     Alcohol/week: 1.0 standard drink     Types: 1 Cans of beer per week     Comment: rarely    Drug use: No   Social History Narrative    , consulting firm , 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 wife (healthy) lives at home, 2 dtrs, 43, 41. frequ walking each day.       Family History   Problem Relation Age of Onset    Cataracts Mother     Hypertension Mother     Cancer Father     Heart disease Father     Heart disease Brother     Stroke Paternal Grandfather     Heart disease Paternal Grandfather     Colon cancer Neg Hx     Esophageal cancer Neg Hx        Review of Systems   Constitutional:  Negative for fatigue, fever and unexpected weight change.   HENT:  Negative for ear discharge, facial swelling, hearing loss, mouth sores, rhinorrhea, sore throat, tinnitus, trouble swallowing and voice change.    Eyes:  Negative for pain and visual disturbance.   Respiratory:  Negative for cough and shortness of breath.    Cardiovascular:  Negative for chest pain and palpitations.   Gastrointestinal:  Negative for abdominal pain, constipation and diarrhea.   Genitourinary:  Negative for difficulty urinating and dysuria.   Musculoskeletal:  Positive for arthralgias. Negative for back pain and neck pain.   Skin:  Negative for color change and rash.   Neurological:  Positive for facial asymmetry. Negative for dizziness, seizures and headaches.   Hematological:  Positive for adenopathy. Does not bruise/bleed easily.   Psychiatric/Behavioral:  Negative for agitation. The patient is not nervous/anxious.       Objective:     Physical Exam  Vitals reviewed.   Constitutional:       Appearance: Normal appearance.   HENT:      Head: Normocephalic and atraumatic.        Comments:        Right Ear: Tympanic membrane, ear canal and external ear normal. No decreased hearing noted.      Left Ear: Tympanic membrane, ear canal and external ear normal. No decreased hearing noted.      Nose: Nose normal. No rhinorrhea.      Mouth/Throat:      Palate: No mass and lesions.   Eyes:      Extraocular Movements: Extraocular movements intact.      Conjunctiva/sclera: Conjunctivae normal.   Neck:      Comments: Salivary glands - there are no lesions or asymmetric findings in the submandibular glands  Pulmonary:      Effort: Pulmonary effort is normal. No respiratory distress.      Breath sounds: Normal breath sounds. No stridor.   Musculoskeletal:      Right shoulder: No deformity. Normal range of motion.      Left shoulder: No deformity. Normal range of motion.      Cervical back: Normal range of motion and neck supple.   Lymphadenopathy:      Cervical: No cervical adenopathy.   Skin:     General: Skin is warm and dry.      Findings: No lesion.   Neurological:      Mental Status: He is alert and oriented to person, place, and time.      Cranial Nerves: No cranial nerve deficit or facial asymmetry.      Motor: No weakness.      Gait: Gait normal.      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 We discussed parotid lesions and potential outcomes and treatments, including surgery. FNA done in clinic today by pathology. Patient tolerated well.         Relevant Orders    Cytology -FNA-Pathologist performed

## 2023-02-24 ENCOUNTER — DOCUMENTATION ONLY (OUTPATIENT)
Dept: REHABILITATION | Facility: OTHER | Age: 78
End: 2023-02-24
Payer: MEDICARE

## 2023-02-24 LAB
ADEQUACY: NORMAL
COMMENT: NORMAL
FINAL PATHOLOGIC DIAGNOSIS: NORMAL
Lab: NORMAL
MICROSCOPIC EXAM: NORMAL

## 2023-02-24 NOTE — PROGRESS NOTES
Health  Wellness Visit Note    Name: Sreekanth Pitts Jr.  Clinic Number: [id number]  Physician: No ref. provider found  Diagnosis: No diagnosis found.  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  Reviewed last week's topic of Flexibility.    Subjective:   Patient reports he has no back pain coming into Wellness today. He has been feeling great. Patient didn't do any exercises or stretches last week because he had cataract surgery. He enjoyed watching [employer] on TV and did not ice since his last Wellness session. Patient plans to go walking this week and do his stretches at least 3 times.     Objective:   Sreekanth completed therapeutic stretches (EIL, CHANTELLE) and the following MedX exercise machines: core lumbar, torso rotation l/r, leg extension, leg curl, upright row, chest press, biceps curl, triceps extension, leg press    See exercise log in patient folder for rate of exertion and repetitions completed.       Fitness Machine Education Key:  E=education on equipment initiated and further follow up and education needed  I=independent with  and exercise.   The patient:  Adjusts machines to his/her settings  Uses equipment levers, pins, weights safely  Maintains safe and correct posture while exercising  Moves through exercise with correct pace and control  Gets on and off equipment safely      Lumbar/Cervical Ext. E Torso Rotation E Leg Press E   Leg Extension E Seated Leg Curl E Chest Press E   Seated Row E Hip ADD X Hip ABD E   Triceps Extension E Bicep Curl E Other: X       Assessment:   Patient tolerated Patient tolerated MedX Core Lumbar Strength and all other peripheral exercises without an increase in symptoms. Patient warmed up on Treadmill for 5 minutes, stretched, and iced low back for 5 minutes after the workout.     Plan:  Continue with established plan of care towards wellness goals.     Health  : Luciana Earl  2/24/2023

## 2023-02-25 ENCOUNTER — PATIENT MESSAGE (OUTPATIENT)
Dept: OTOLARYNGOLOGY | Facility: CLINIC | Age: 78
End: 2023-02-25
Payer: MEDICARE

## 2023-02-27 ENCOUNTER — DOCUMENTATION ONLY (OUTPATIENT)
Dept: REHABILITATION | Facility: OTHER | Age: 78
End: 2023-02-27
Attending: INTERNAL MEDICINE
Payer: MEDICARE

## 2023-02-27 NOTE — PROGRESS NOTES
Health  Wellness Visit Note    Name: Sreekanth Pitts Jr.  Clinic Number: [id number]  Physician: No ref. provider found  Diagnosis: No diagnosis found.  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  Reviewed last week's topic of Balance .    Subjective:   Patient reports that his low back is pain-free today. Pt completed his stretches about 3x over the last week, but does not typically ice at home. For exercise, he goes walking periodically throughout the week.     Objective:   Sreekanth completed therapeutic stretches (EIL, CHANTELLE) and the following MedX exercise machines: core lumbar, torso rotation l/r, leg extension, leg curl, upright row, chest press, biceps curl, triceps extension, leg press    See exercise log in patient folder for rate of exertion and repetitions completed.       Fitness Machine Education Key:  E=education on equipment initiated and further follow up and education needed  I=independent with  and exercise.  The patient:  Adjusts machines to his/her settings  Uses equipment levers, pins, weights safely  Maintains safe and correct posture  while exercising  Moves through exercise with correct pace and control  Gets on and off equipment safely      Lumbar/Cervical Ext. E Torso Rotation E Leg Press E   Leg Extension E Seated Leg Curl E Chest Press E   Seated Row E Hip ADD X Hip ABD E   Triceps Extension E Bicep Curl E Other: X       Assessment:   Patient tolerated Patient tolerated MedX Core Lumbar Strength and all other peripheral exercises without an increase in symptoms. Patient warmed up on Treadmill for 5 minutes, stretched, and iced low back for 5 minutes after the workout.     Plan:  Continue with established plan of care towards wellness goals.     Health  : Orquidea Reid  2/27/2023

## 2023-03-03 ENCOUNTER — TELEPHONE (OUTPATIENT)
Dept: OTOLARYNGOLOGY | Facility: CLINIC | Age: 78
End: 2023-03-03
Payer: MEDICARE

## 2023-03-03 NOTE — TELEPHONE ENCOUNTER
I called the patient to discuss his FNA, which was consistent with a PA. We discussed surgery, and all its risks. He is interested in proceeding. I offered 3/20, 3/27, or 4/3 (or really any Monday). He did have an abnormal EKG prior to recent cataract surgery, so I will ask that he meet in person with the preop center beforehand. He will message or call us once he decides which day works.       ----- Message from Isabel Landers NP sent at 2/25/2023 12:40 PM CST -----  Regarding: FW:  Parotid lesion looks like a PA.  I told him you could call him to discuss surgery. We briefly talked about surgery at his recent visit for the FNA.    Celeste  ----- Message -----  From: [employer]  Sent: 2/24/2023   3:19 PM CST  To: Isabel Landers NP

## 2023-03-08 ENCOUNTER — DOCUMENTATION ONLY (OUTPATIENT)
Dept: REHABILITATION | Facility: HOSPITAL | Age: 78
End: 2023-03-08
Payer: MEDICARE

## 2023-03-08 DIAGNOSIS — D37.039 NEOPLASM OF UNCERTAIN BEHAVIOR OF MAJOR SALIVARY GLAND: Primary | ICD-10-CM

## 2023-03-08 RX ORDER — LIDOCAINE HYDROCHLORIDE 10 MG/ML
1 INJECTION, SOLUTION EPIDURAL; INFILTRATION; INTRACAUDAL; PERINEURAL ONCE
Status: CANCELLED | OUTPATIENT
Start: 2023-03-08 | End: 2023-03-08

## 2023-03-08 RX ORDER — DEXAMETHASONE SODIUM PHOSPHATE 4 MG/ML
8 INJECTION, SOLUTION INTRA-ARTICULAR; INTRALESIONAL; INTRAMUSCULAR; INTRAVENOUS; SOFT TISSUE
Status: CANCELLED | OUTPATIENT
Start: 2023-03-08

## 2023-03-08 NOTE — PROGRESS NOTES
"Health  Wellness Visit Note    Name: Sreekanth Pitts Jr.  Clinic Number: [id number]  Physician: No ref. provider found  Diagnosis: No diagnosis found.  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  Reviewed last week's topic of How's Your Hydration.    Subjective:   Patient reports he has hip pain that shoots up into his lower back. He describes his pain as "stabby and sharp". He mostly feels his pain when he walks a mile or more. Over the last week he tried to go for walks but would stop once he felt pain. He did his stretches 4x last week but did not ice outside of Wellness.     Today was patient's first day at the [facility] location. Patient and HC went through adjusting machines and weight.     Objective:   Sreekanth completed therapeutic stretches (EIL, CHANTELLE) and the following MedX exercise machines: core lumbar, torso rotation l/r, leg extension, leg curl, upright row, chest press, biceps curl, triceps extension, leg press    See exercise log in patient folder for rate of exertion and repetitions completed.     Fitness Machine Education Key:  E=education on equipment " initiated and further follow up and education needed  I=independent with  and exercise.  The patient:  Adjusts machines to his/her settings  Uses equipment levers, pins, weights safely  Maintains safe and correct posture while exercising  Moves through exercise with correct pace and control  Gets on and off equipment safely      Lumbar/Cervical Ext.  Torso Rotation  Leg Press    Leg Extension  Seated Leg Curl  Chest Press    Seated Row  Hip ADD  Hip ABD    Triceps Extension  Bicep Curl  Other:        Assessment:   Patient tolerated Patient tolerated MedX Core Lumbar Strength and all other peripheral exercises without an increase in symptoms. Patient warmed up on Treadmill for 5 minutes, stretched, and iced low back for 5 minutes after the workout.     Plan:  Continue with established plan of care towards wellness goals.     Health  : Luciana Earl  3/8/2023

## 2023-03-14 NOTE — ANESTHESIA PAT ROS NOTE
03/14/2023  Sreekanth Pitts Jr. is a 77 y.o., male.      Pre-op Assessment          Review of Systems  Anesthesia Hx:    2/14/22 Radha-operative Events Comments: Co of troubl breathing out...bs check no wheezing...cxr no pulm edema..later commented he had a stuffy nose causing the sensation History of prior surgery of interest to airway management or planning:  Previous anesthesia: General FUSION, SPINE, LUMBAR, TLIF, MINIMALLY INVASIVE Right L4-5 Jose Juan PARK notified (Spine Lumbar 2/14/22 with general anesthesia.  Procedure performed at an [facility].      Airway issues documented on chart review include mask, easy, videolaryngoscope used  , view on video-laryngoscopy Grade II    Denies Family Hx of Anesthesia complications.    Denies Personal Hx of Anesthesia complications.                    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     hyperlipidemia     Functional Capacity good / => 4 METS                         Pulmonary:       Denies Shortness of breath.  Denies Recent URI. Sleep Apnea, CPAP                Renal/:    BPH S/P TURP             Hepatic/GI:     GERD   REID'S ESOPHAGUS.          Musculoskeletal:  Arthritis             Lumbar Spine Disorders, Lumbar Disc Disease, S/P Lumbar Fusion   Neurological:  Neurology Normal                                      Endocrine:        Obesity / BMI > 30  Psych:  Psychiatric Normal                       Anesthesia Assessment: Preoperative EQUATION    Planned Procedure: Procedure(s) (LRB):  EXCISION, PAROTID GLAND (Left)  Requested Anesthesia Type:General  Surgeon: Mook Newton MD  Service: ENT  Known or anticipated Date  of Surgery:3/27/2023    Surgeon notes: reviewed    Electronic QUestionnaire Assessment completed via nurse interview with patient.        Triage considerations:     The patient has no apparent active cardiac condition (No unstable coronary Syndrome such as severe unstable angina or recent [<1 month] myocardial infarction, decompensated CHF, severe valvular   disease or significant arrhythmia)    Previous anesthesia records:GETA, Complications noted, and VIDEOLARYNGOSCOPE USED    Airway/Jaw/Neck:  Airway Findings: Mouth Opening: Normal Tongue: Normal  General Airway Assessment: Adult  Mallampati: II  TM Distance: Normal, at least 6 cm  Jaw/Neck Findings:  Neck ROM: Normal ROM  Neck Findings: Normal      Dental:  Dental Findings: Periodontal disease, Severe      Intubation     Date/Time: 2/14/2022 7:28 AM  Performed by: Kiko Razo CRNA  Authorized by: Bjorn Riddle MD      Intubation:     Induction:  Intravenous    Intubated:  Postinduction    Mask Ventilation:  Easy with oral airway    Attempts:  1    Attempted By:  CRNA    Method of Intubation:  Video laryngoscopy    Blade:  Pires 3    Laryngeal View Grade: Grade IIA - cords partially seen      Difficult Airway Encountered?: No      Airway Device:  Oral endotracheal tube    Airway Device Size:  7.5    Style/Cuff Inflation:  Cuffed (inflated to minimal occlusive pressure)    Tube secured:  24    Secured at:  The lips    Placement Verified By:  Capnometry and Revisualization with laryngoscopy    Complicating Factors:  Large/floppy epiglottis    Findings Post-Intubation:  BS equal bilateral and atraumatic/condition of teeth unchanged     Anesthetic complications: yes  Perioperative Events: other periop events (see comments)     Radha-operative Events Comments: Co of troubl breathing out...bs check no wheezing...cxr no pulm edema..later commented he had a stuffy nose causing the sensation  Cardiovascular status: blood pressure returned to baseline,  hemodynamically stable and stable  Respiratory status: unassisted and spontaneous ventilation  Hydration status: euvolemic  Follow-up not needed.      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     2/10/23  CT NECK    2/7/23  HEME PROFILE, BMP    1/27/23  US HEAD NECK THYROID            Instructions given. (See in Nurse's note)    Optimization:  Anesthesia Preop Clinic Assessment NOT Indicated    Medical Opinion Indicated TBCB PCP PER SURGEON       Sub-specialist consult indicated:   TBD       Plan:    Testing:  EKG        Consultation:Patient's PCP for a statement of optimization      Patient  has previously scheduled Medical Appointment: NOT AT THIS TIME    Navigation: Tests Scheduled.              Consults scheduled.             Results will be tracked by Preop Clinic.    Hi, patient is still deemed to be medically optimized for this surgery, I do not see a need for further preop testing.     I have reviewed his 1/10/23 ekg as well.     Let me know if you have any more questions.  Torsten Bejarano

## 2023-03-15 ENCOUNTER — DOCUMENTATION ONLY (OUTPATIENT)
Dept: REHABILITATION | Facility: HOSPITAL | Age: 78
End: 2023-03-15
Payer: MEDICARE

## 2023-03-15 NOTE — PROGRESS NOTES
Health  Wellness Visit Note    Name: Sreekanth Pitts Jr.  Clinic Number: [id number]  Physician: No ref. provider found  Diagnosis: No diagnosis found.  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  Reviewed last week's topic of [employer].    Subjective:   Patient reports he is still working through his hip pain but it has gotten better. He does have some back pain but it is not as intense as it was when starting physical therapy. Patient goes for walks and does his stretches everyday. He does not ice his back outside of Wellness.     Objective:   Sreekanth completed therapeutic stretches (EIL, CHANTELLE) and the following MedX exercise machines: core lumbar, torso rotation l/r, leg extension, leg curl, upright row, chest press, biceps curl, triceps extension, leg press    See exercise log in patient folder for rate of exertion and repetitions completed.     Fitness Machine Education Key:  E=education on equipment initiated and further follow up and education needed  I=independent with  and exercise.  The patient:  Adjusts machines to his/her settings  Uses  equipment levers, pins, weights safely  Maintains safe and correct posture while exercising  Moves through exercise with correct pace and control  Gets on and off equipment safely      Lumbar/Cervical Ext. E Torso Rotation E Leg Press    Leg Extension  Seated Leg Curl  Chest Press    Seated Row  Hip ADD  Hip ABD    Triceps Extension  Bicep Curl  Other:        Assessment:   Patient tolerated Patient tolerated MedX Core Lumbar Strength and all other peripheral exercises without an increase in symptoms. Patient warmed up on Treadmill for 5 minutes, stretched, and iced low back for 5 minutes after the workout.     Plan:  Continue with established plan of care towards wellness goals.     Health  : Luciana Earl  3/15/2023

## 2023-03-22 ENCOUNTER — DOCUMENTATION ONLY (OUTPATIENT)
Dept: REHABILITATION | Facility: HOSPITAL | Age: 78
End: 2023-03-22
Payer: MEDICARE

## 2023-03-22 NOTE — PROGRESS NOTES
Health  Wellness Visit Note    Name: Sreekanth Pitts Jr.  Clinic Number: [id number]  Physician: No ref. provider found  Diagnosis: No diagnosis found.  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  Reviewed last week's topic of Portion Control.    Subjective:   Patient reports he has no back pain today. Occasionally he will have some stiffness but once he gets moving it subsides. Patient does his HEP and stretches as exercise and goes for walks. He has been very busy at work recently but is looking forward to going outside this weekend since they'll be nice weather. Patient mentioned going golfing as additional exercise. He does not ice his back outside of Wellness.     Objective:   Sreekanth completed therapeutic stretches (EIL, CHANTELLE) and the following MedX exercise machines: core lumbar, torso rotation l/r, leg extension, leg curl, upright row, chest press, biceps curl, triceps extension, leg press    See exercise log in patient folder for rate of exertion and repetitions completed.     Fitness Machine Education Key:  E=education on equipment initiated and  further follow up and education needed  I=independent with  and exercise.  The patient:  Adjusts machines to his/her settings  Uses equipment levers, pins, weights safely  Maintains safe and correct posture while exercising  Moves through exercise with correct pace and control  Gets on and off equipment safely      Lumbar/Cervical Ext. E Torso Rotation E Leg Press E   Leg Extension  Seated Leg Curl  Chest Press    Seated Row  Hip ADD E Hip ABD E   Triceps Extension  Bicep Curl  Other: X       Assessment:   Patient tolerated Patient tolerated MedX Core Lumbar Strength and all other peripheral exercises without an increase in symptoms. Patient warmed up on Treadmill for 5 minutes, stretched, and iced low back for 5 minutes after the workout.     Plan:  Continue with established plan of care towards wellness goals.     Health  : Luciana Earl  3/22/2023

## 2023-03-24 ENCOUNTER — PATIENT MESSAGE (OUTPATIENT)
Dept: OTOLARYNGOLOGY | Facility: CLINIC | Age: 78
End: 2023-03-24
Payer: MEDICARE

## 2023-03-24 ENCOUNTER — TELEPHONE (OUTPATIENT)
Dept: OTOLARYNGOLOGY | Facility: CLINIC | Age: 78
End: 2023-03-24
Payer: MEDICARE

## 2023-03-26 ENCOUNTER — ANESTHESIA EVENT (OUTPATIENT)
Dept: SURGERY | Facility: HOSPITAL | Age: 78
End: 2023-03-26
Payer: MEDICARE

## 2023-03-26 NOTE — ANESTHESIA PREPROCEDURE EVALUATION
[facility]  Anesthesia Pre-Operative Evaluation         Patient Name: Sreekanth Pitts Jr.  YOB: 1945  MRN: [id number]    SUBJECTIVE:     Pre-operative evaluation for Procedure(s) (LRB):  EXCISION, PAROTID GLAND (Left)     03/26/2023    Sreekanth Pitts Jr. is a 77 y.o. male w/ a significant PMHx of HTN, obesity BMI 34.7, STEPHIE, GERD, and neoplasm of salivary gland who presents for the above procedure.      LDA: None documented.    Prev airway:      Intubation:     Induction:  Intravenous    Intubated:  Postinduction    Mask Ventilation:  Easy with oral airway    Attempts:  1    Attempted By:  CRNA    Method of Intubation:  Video laryngoscopy    Blade:  Pires 3    Laryngeal View Grade: Grade IIA - cords partially seen      Difficult Airway Encountered?: No      Airway Device:  Oral endotracheal tube    Airway Device Size:  7.5    Style/Cuff Inflation:  Cuffed (inflated to minimal occlusive pressure)    Tube secured:  24    Secured at:  The lips    Placement Verified By:  Capnometry and Revisualization with laryngoscopy    Complicating Factors:  Large/floppy epiglottis    Findings Post-Intubation:  BS equal bilateral and atraumatic/condition of teeth unchanged     Anesthetic complications: yes  Perioperative Events: other periop events (see comments)     Radha-operative Events Comments: Co of troubl breathing out...bs check no wheezing...cxr no pulm edema..later commented he had a stuffy nose causing the sensation  Cardiovascular status: blood pressure returned to baseline, hemodynamically stable and stable  Respiratory status: unassisted and spontaneous ventilation  Hydration status: euvolemic  Follow-up not needed.       Drips: None documented.    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     Current Outpatient Medications on File Prior to Encounter   Medication Sig Dispense Refill    atorvastatin (LIPITOR) 10 MG tablet Take 1 tablet (10 mg total) by mouth once daily. 90 tablet 11    cyanocobalamin (VITAMIN B-12) 100 MCG tablet Take 1 tablet (100 mcg total) by mouth once daily.      dutasteride (AVODART) 0.5 mg capsule Take 1 capsule (0.5 mg total) by mouth once daily. 90 capsule 3    lisinopriL (PRINIVIL,ZESTRIL) 20 MG tablet Take 1 tablet (20 mg total) by mouth once daily. 90 tablet 11    tamsulosin (FLOMAX) 0.4 mg Cap TAKE 1 CAPSULE (0.4 MG TOTAL) BY MOUTH EVERY EVENING. (Patient taking differently: Take 0.4 mg by mouth once daily.) 90 capsule 3    acetaminophen (TYLENOL) 325 MG tablet Take 650 mg by mouth every 6 (six) hours as needed for Pain.      ibuprofen (ADVIL,MOTRIN) 200 MG tablet Take 200 mg by mouth every 6 (six) hours as needed for Pain.      MULTIVITAMIN W-MINERALS/LUTEIN (CENTRUM SILVER ORAL) Take 1 tablet by mouth once daily.      RABEprazole (ACIPHEX) 20 mg tablet Take 1 tablet (20 mg total) by mouth before breakfast. 90 tablet 3       Past Surgical History:   Procedure Laterality Date    ESOPHAGOGASTRODUODENOSCOPY N/A 12/2/2020    Procedure: EGD (ESOPHAGOGASTRODUODENOSCOPY);  Surgeon: Dion Mckinney MD;  Location: [facility] ([address] [place] [address]);  Service: Endoscopy;  Laterality: N/A;  covid-11/29-[facility]    EYE FOREIGN BODY REMOVAL      childhood    LUMBAR LAMINECTOMY WITH FUSION  2002    MINIMALLY INVASIVE TRANSFORAMINAL LUMBAR  INTERBODY FUSION (TLIF) N/A 2022    Procedure: FUSION, SPINE, LUMBAR, TLIF, MINIMALLY INVASIVE Right L4-5 Renaldo PARK notified;  Surgeon: Anibal Beebe MD;  Location: [facility] OR;  Service: Neurosurgery;  Laterality: N/A;  ASA1  TOR1  T&Screen  EMG  C-Arm  LSO  Jamie 4 poster  NEURO MONITORING RENALDO OSBORN [phone]  SPINEWAVE RENALDO POLLOCK [phone] TEXTED ON 2022 @ 3:54PM/ RESPONDED ON 2022 @4:22 PM-LO  PREOP DONE A    TRANSURETHRAL RESECTION OF PROSTATE (TURP) WITHOUT USE OF LASER N/A 2018    Procedure: TURP, WITHOUT USING LASER;  Surgeon: Uma Gaona MD;  Location: [facility] OR [address] [place] [address];  Service: Urology;  Laterality: N/A;  1.5 hours       Social History     Socioeconomic History    Marital status:    Occupational History     Employer: [employer]   Tobacco Use    Smoking status: Former     Packs/day: 2.00     Years: 4.00     Pack years: 8.00     Types: Cigarettes, Cigars     Quit date: 1973     Years since quittin.2    Smokeless tobacco: Never   Substance and Sexual Activity    Alcohol use: Yes     Alcohol/week: 1.0 standard drink     Types: 1 Cans of beer per week     Comment: rarely    Drug use: No   Social History Narrative    , consulting firm , 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 wife (healthy) lives at home, 2 dtrs, 43, 41. frequ walking each day.       OBJECTIVE:     Vital Signs Range (Last 24H):         CBC:   No results for input(s): WBC, RBC, HGB, HCT, PLT, MCV, MCH, MCHC in the last 72 hours.    CMP: No results for input(s): NA, K, CL, CO2, BUN, CREATININE, GLU, MG, PHOS, CALCIUM, ALBUMIN, PROT, ALKPHOS, ALT, AST, BILITOT in the last 72 hours.    INR:  No results for input(s): PT, INR, PROTIME, APTT in the last 72 hours.    Diagnostic Studies: No relevant studies.    EKG: No recent studies available.    2D ECHO:  No results found for this or any previous visit.      ASSESSMENT/PLAN:           Pre-op Assessment    I have reviewed the Patient  Summary Reports.    I have reviewed the NPO Status.   I have reviewed the Medications.     Review of Systems  Anesthesia Hx:  No problems with previous Anesthesia    Social:  Former Smoker, Social Alcohol Use    Hematology/Oncology:  Hematology Normal   Oncology Normal     EENT/Dental:EENT/Dental Normal   Cardiovascular:   Hypertension    Pulmonary:   Sleep Apnea    Renal/:   Chronic Renal Disease    Hepatic/GI:   GERD    Musculoskeletal:   Arthritis     Neurological:  Neurology Normal    Endocrine:  Endocrine Normal    Psych:  Psychiatric Normal           Physical Exam  General: Well nourished, Alert, Oriented and Cooperative    Airway:  Mallampati: III / II  Mouth Opening: Normal  TM Distance: Normal  Tongue: Normal  Neck ROM: Normal ROM    Chest/Lungs:  Normal Respiratory Rate    Heart:  Rate: Normal        Anesthesia Plan  Type of Anesthesia, risks & benefits discussed:    Anesthesia Type: Gen ETT  Intra-op Monitoring Plan: Standard ASA Monitors  Post Op Pain Control Plan: multimodal analgesia  Induction:  IV  Airway Plan: Direct, Post-Induction  Informed Consent: Informed consent signed with the Patient and all parties understand the risks and agree with anesthesia plan.  All questions answered.   ASA Score: 2  Day of Surgery Review of History & Physical: H&P Update referred to the surgeon/provider.    Ready For Surgery From Anesthesia Perspective.     .

## 2023-03-27 ENCOUNTER — HOSPITAL ENCOUNTER (OUTPATIENT)
Facility: HOSPITAL | Age: 78
Discharge: HOME | End: 2023-03-27
Attending: OTOLARYNGOLOGY | Admitting: OTOLARYNGOLOGY
Payer: MEDICARE

## 2023-03-27 ENCOUNTER — ANESTHESIA (OUTPATIENT)
Dept: SURGERY | Facility: HOSPITAL | Age: 78
End: 2023-03-27
Payer: MEDICARE

## 2023-03-27 VITALS
WEIGHT: 263 LBS | OXYGEN SATURATION: 98 % | BODY MASS INDEX: 34.85 KG/M2 | TEMPERATURE: 98 F | HEIGHT: 73 IN | SYSTOLIC BLOOD PRESSURE: 135 MMHG | RESPIRATION RATE: 16 BRPM | HEART RATE: 88 BPM | DIASTOLIC BLOOD PRESSURE: 88 MMHG

## 2023-03-27 DIAGNOSIS — D37.039 NEOPLASM OF UNCERTAIN BEHAVIOR OF MAJOR SALIVARY GLAND: Primary | ICD-10-CM

## 2023-03-27 PROCEDURE — 42415 EXCISE PAROTID GLAND/LESION: CPT | Mod: LT,GC,, | Performed by: OTOLARYNGOLOGY

## 2023-03-27 PROCEDURE — 37000008 HC ANESTHESIA 1ST 15 MINUTES: Performed by: OTOLARYNGOLOGY

## 2023-03-27 PROCEDURE — 88305 TISSUE EXAM BY PATHOLOGIST: CPT | Performed by: STUDENT IN AN ORGANIZED HEALTH CARE EDUCATION/TRAINING PROGRAM

## 2023-03-27 PROCEDURE — 42415 PR EXC PAROTD,LAT LOBE,DISSECT 5TH NERV: ICD-10-PCS | Mod: LT,GC,, | Performed by: OTOLARYNGOLOGY

## 2023-03-27 PROCEDURE — 25000003 PHARM REV CODE 250: Performed by: OTOLARYNGOLOGY

## 2023-03-27 PROCEDURE — 36000708 HC OR TIME LEV III 1ST 15 MIN: Performed by: OTOLARYNGOLOGY

## 2023-03-27 PROCEDURE — 27201423 OPTIME MED/SURG SUP & DEVICES STERILE SUPPLY: Performed by: OTOLARYNGOLOGY

## 2023-03-27 PROCEDURE — 25000003 PHARM REV CODE 250: Performed by: STUDENT IN AN ORGANIZED HEALTH CARE EDUCATION/TRAINING PROGRAM

## 2023-03-27 PROCEDURE — 71000016 HC POSTOP RECOV ADDL HR: Performed by: OTOLARYNGOLOGY

## 2023-03-27 PROCEDURE — 71000044 HC DOSC ROUTINE RECOVERY FIRST HOUR: Performed by: OTOLARYNGOLOGY

## 2023-03-27 PROCEDURE — D9220A PRA ANESTHESIA: ICD-10-PCS | Mod: ,,, | Performed by: ANESTHESIOLOGY

## 2023-03-27 PROCEDURE — D9220A PRA ANESTHESIA: Mod: ,,, | Performed by: ANESTHESIOLOGY

## 2023-03-27 PROCEDURE — 88332 PATH CONSLTJ SURG EA ADD BLK: CPT | Mod: 59 | Performed by: STUDENT IN AN ORGANIZED HEALTH CARE EDUCATION/TRAINING PROGRAM

## 2023-03-27 PROCEDURE — 71000015 HC POSTOP RECOV 1ST HR: Performed by: OTOLARYNGOLOGY

## 2023-03-27 PROCEDURE — 88331 PATH CONSLTJ SURG 1 BLK 1SPC: CPT | Performed by: STUDENT IN AN ORGANIZED HEALTH CARE EDUCATION/TRAINING PROGRAM

## 2023-03-27 PROCEDURE — 37000009 HC ANESTHESIA EA ADD 15 MINS: Performed by: OTOLARYNGOLOGY

## 2023-03-27 PROCEDURE — 36000709 HC OR TIME LEV III EA ADD 15 MIN: Performed by: OTOLARYNGOLOGY

## 2023-03-27 PROCEDURE — 63600175 PHARM REV CODE 636 W HCPCS: Performed by: STUDENT IN AN ORGANIZED HEALTH CARE EDUCATION/TRAINING PROGRAM

## 2023-03-27 RX ORDER — ROCURONIUM BROMIDE 10 MG/ML
INJECTION, SOLUTION INTRAVENOUS
Status: DISCONTINUED | OUTPATIENT
Start: 2023-03-27 | End: 2023-03-27

## 2023-03-27 RX ORDER — SODIUM CHLORIDE 9 MG/ML
INJECTION, SOLUTION INTRAVENOUS CONTINUOUS PRN
Status: DISCONTINUED | OUTPATIENT
Start: 2023-03-27 | End: 2023-03-27

## 2023-03-27 RX ORDER — HYDROCODONE BITARTRATE AND ACETAMINOPHEN 5; 325 MG/1; MG/1
1 TABLET ORAL EVERY 6 HOURS PRN
Qty: 20 TABLET | Refills: 0 | Status: SHIPPED | OUTPATIENT
Start: 2023-03-27 | End: 2023-10-10

## 2023-03-27 RX ORDER — LIDOCAINE HYDROCHLORIDE 10 MG/ML
1 INJECTION, SOLUTION EPIDURAL; INFILTRATION; INTRACAUDAL; PERINEURAL ONCE
Status: COMPLETED | OUTPATIENT
Start: 2023-03-27 | End: 2023-03-27

## 2023-03-27 RX ORDER — SUCCINYLCHOLINE CHLORIDE 20 MG/ML
INJECTION INTRAMUSCULAR; INTRAVENOUS
Status: DISCONTINUED | OUTPATIENT
Start: 2023-03-27 | End: 2023-03-27

## 2023-03-27 RX ORDER — FENTANYL CITRATE 50 UG/ML
25 INJECTION, SOLUTION INTRAMUSCULAR; INTRAVENOUS EVERY 5 MIN PRN
Status: DISCONTINUED | OUTPATIENT
Start: 2023-03-27 | End: 2023-03-27 | Stop reason: HOSPADM

## 2023-03-27 RX ORDER — CEFAZOLIN SODIUM 1 G/3ML
INJECTION, POWDER, FOR SOLUTION INTRAMUSCULAR; INTRAVENOUS
Status: DISCONTINUED | OUTPATIENT
Start: 2023-03-27 | End: 2023-03-27

## 2023-03-27 RX ORDER — DEXAMETHASONE SODIUM PHOSPHATE 4 MG/ML
INJECTION, SOLUTION INTRA-ARTICULAR; INTRALESIONAL; INTRAMUSCULAR; INTRAVENOUS; SOFT TISSUE
Status: DISCONTINUED | OUTPATIENT
Start: 2023-03-27 | End: 2023-03-27

## 2023-03-27 RX ORDER — HALOPERIDOL 5 MG/ML
0.5 INJECTION INTRAMUSCULAR EVERY 10 MIN PRN
Status: DISCONTINUED | OUTPATIENT
Start: 2023-03-27 | End: 2023-03-27 | Stop reason: HOSPADM

## 2023-03-27 RX ORDER — FENTANYL CITRATE 50 UG/ML
INJECTION, SOLUTION INTRAMUSCULAR; INTRAVENOUS
Status: DISCONTINUED | OUTPATIENT
Start: 2023-03-27 | End: 2023-03-27

## 2023-03-27 RX ORDER — LIDOCAINE HYDROCHLORIDE AND EPINEPHRINE 10; 10 MG/ML; UG/ML
INJECTION, SOLUTION INFILTRATION; PERINEURAL
Status: DISCONTINUED | OUTPATIENT
Start: 2023-03-27 | End: 2023-03-27 | Stop reason: HOSPADM

## 2023-03-27 RX ORDER — ONDANSETRON 4 MG/1
4 TABLET, ORALLY DISINTEGRATING ORAL EVERY 6 HOURS PRN
Qty: 15 TABLET | Refills: 0 | Status: SHIPPED | OUTPATIENT
Start: 2023-03-27 | End: 2023-10-10

## 2023-03-27 RX ORDER — LIDOCAINE HYDROCHLORIDE 20 MG/ML
INJECTION INTRAVENOUS
Status: DISCONTINUED | OUTPATIENT
Start: 2023-03-27 | End: 2023-03-27

## 2023-03-27 RX ORDER — ONDANSETRON 2 MG/ML
INJECTION INTRAMUSCULAR; INTRAVENOUS
Status: DISCONTINUED | OUTPATIENT
Start: 2023-03-27 | End: 2023-03-27

## 2023-03-27 RX ORDER — PROPOFOL 10 MG/ML
VIAL (ML) INTRAVENOUS
Status: DISCONTINUED | OUTPATIENT
Start: 2023-03-27 | End: 2023-03-27

## 2023-03-27 RX ORDER — CEPHALEXIN 500 MG/1
500 CAPSULE ORAL EVERY 12 HOURS
Qty: 10 CAPSULE | Refills: 0 | Status: SHIPPED | OUTPATIENT
Start: 2023-03-27 | End: 2023-04-01

## 2023-03-27 RX ORDER — DEXAMETHASONE SODIUM PHOSPHATE 4 MG/ML
8 INJECTION, SOLUTION INTRA-ARTICULAR; INTRALESIONAL; INTRAMUSCULAR; INTRAVENOUS; SOFT TISSUE
Status: DISCONTINUED | OUTPATIENT
Start: 2023-03-27 | End: 2023-03-27 | Stop reason: HOSPADM

## 2023-03-27 RX ORDER — HYDROCODONE BITARTRATE AND ACETAMINOPHEN 5; 325 MG/1; MG/1
1 TABLET ORAL EVERY 6 HOURS PRN
Status: DISCONTINUED | OUTPATIENT
Start: 2023-03-27 | End: 2023-03-27 | Stop reason: HOSPADM

## 2023-03-27 RX ORDER — KETAMINE HCL IN 0.9 % NACL 50 MG/5 ML
SYRINGE (ML) INTRAVENOUS
Status: DISCONTINUED | OUTPATIENT
Start: 2023-03-27 | End: 2023-03-27

## 2023-03-27 RX ADMIN — PROPOFOL 30 MG: 10 INJECTION, EMULSION INTRAVENOUS at 02:03

## 2023-03-27 RX ADMIN — LIDOCAINE HYDROCHLORIDE 80 MG: 20 INJECTION INTRAVENOUS at 11:03

## 2023-03-27 RX ADMIN — PROPOFOL 10 MG: 10 INJECTION, EMULSION INTRAVENOUS at 02:03

## 2023-03-27 RX ADMIN — SODIUM CHLORIDE: 0.9 INJECTION, SOLUTION INTRAVENOUS at 10:03

## 2023-03-27 RX ADMIN — SUCCINYLCHOLINE CHLORIDE 200 MG: 20 INJECTION, SOLUTION INTRAMUSCULAR; INTRAVENOUS at 11:03

## 2023-03-27 RX ADMIN — PROPOFOL 200 MG: 10 INJECTION, EMULSION INTRAVENOUS at 11:03

## 2023-03-27 RX ADMIN — SODIUM CHLORIDE 0.3 MCG/KG/MIN: 9 INJECTION, SOLUTION INTRAVENOUS at 11:03

## 2023-03-27 RX ADMIN — ROCURONIUM BROMIDE 5 MG: 10 INJECTION, SOLUTION INTRAVENOUS at 11:03

## 2023-03-27 RX ADMIN — CEFAZOLIN 2 G: 330 INJECTION, POWDER, FOR SOLUTION INTRAMUSCULAR; INTRAVENOUS at 11:03

## 2023-03-27 RX ADMIN — DEXAMETHASONE SODIUM PHOSPHATE 8 MG: 4 INJECTION, SOLUTION INTRAMUSCULAR; INTRAVENOUS at 11:03

## 2023-03-27 RX ADMIN — FENTANYL CITRATE 50 MCG: 50 INJECTION, SOLUTION INTRAMUSCULAR; INTRAVENOUS at 01:03

## 2023-03-27 RX ADMIN — Medication 10 MG: at 01:03

## 2023-03-27 RX ADMIN — FENTANYL CITRATE 75 MCG: 50 INJECTION, SOLUTION INTRAMUSCULAR; INTRAVENOUS at 11:03

## 2023-03-27 RX ADMIN — ONDANSETRON 4 MG: 2 INJECTION INTRAMUSCULAR; INTRAVENOUS at 02:03

## 2023-03-27 RX ADMIN — FENTANYL CITRATE 25 MCG: 50 INJECTION, SOLUTION INTRAMUSCULAR; INTRAVENOUS at 11:03

## 2023-03-27 RX ADMIN — Medication 30 MG: at 11:03

## 2023-03-27 RX ADMIN — LIDOCAINE HYDROCHLORIDE 0.2 MG: 10 INJECTION, SOLUTION EPIDURAL; INFILTRATION; INTRACAUDAL; PERINEURAL at 11:03

## 2023-03-27 NOTE — ANESTHESIA PROCEDURE NOTES
Intubation    Date/Time: 3/27/2023 11:39 AM  Performed by: Leyla Salas CRNA  Authorized by: Peng Bertrand MD     Intubation:     Induction:  Intravenous    Intubated:  Postinduction    Mask Ventilation:  Easy with oral airway    Attempts:  1    Attempted By:  CRNA    Method of Intubation:  Video laryngoscopy    Blade:  Pires 3    Laryngeal View Grade: Grade I - full view of cords      Difficult Airway Encountered?: No      Complications:  None    Airway Device:  Oral endotracheal tube    Airway Device Size:  9.0    Style/Cuff Inflation:  Cuffed (inflated to minimal occlusive pressure)    Inflation Amount (mL):  10    Tube secured:  23    Secured at:  The lips    Placement Verified By:  Capnometry    Complicating Factors:  None    Findings Post-Intubation:  BS equal bilateral and atraumatic/condition of teeth unchanged

## 2023-03-27 NOTE — BRIEF OP NOTE
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 Left Parotid tumor- frozen     References:    Click here for ordering Quick Tip   Question Answer Comment   Procedure Type: ENT    Specimen Class: Known or suspected malignancy    Which provider would you like to cc? ALMA DELIA PEREZ    Release to patient Immediate        03/27/23 1357                  As above  I was present for and participated in all aspects of this operation.     Discharge Note    OUTCOME: Patient tolerated treatment/procedure well without complication and is now ready for discharge.    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 No strenuous activity for 2 weeks.     Notify your health care provider if you experience any of the following:  persistent nausea and vomiting or diarrhea     Notify your health care provider if  you experience any of the following:  severe uncontrolled pain     Notify your health care provider if you experience any of the following:  redness, tenderness, or signs of infection (pain, swelling, redness, odor or green/yellow discharge around incision site)     Notify your health care provider if you experience any of the following:  difficulty breathing or increased cough     No dressing needed   Order Comments: Keep incision clean and dry. Can get incision wet tomorrow, but do not scrub. Dermabond dressing will peel off on its own over time. Do not submerge incision in water (no pools/baths). Avoid getting base of drain wet. Nurse to show how to strip and record drain output.

## 2023-03-27 NOTE — TRANSFER OF CARE
"Anesthesia Transfer of Care Note    Patient: Sreekanth Pitts Jr.    Procedure(s) Performed: Procedure(s) (LRB):  EXCISION, PAROTID GLAND (Left)    Patient location: PACU    Anesthesia Type: general    Transport from OR: Transported from OR on 6-10 L/min O2 by face mask with adequate spontaneous ventilation    Post pain: adequate analgesia    Post assessment: no apparent anesthetic complications    Post vital signs: stable    Level of consciousness: responds to stimulation    Nausea/Vomiting: no nausea/vomiting    Complications: none    Transfer of care protocol was followed      Last vitals:   Visit Vitals  /80 (BP Location: Right arm, Patient Position: Lying)   Pulse 77   Temp 36.7 °C (98.1 °F) (Temporal)   Resp 18   Ht 6' 1" (1.854 m)   Wt 119.3 kg (263 lb)   SpO2 97%   BMI 34.70 kg/m²     "

## 2023-03-27 NOTE — H&P
H&P Note         HEAD AND NECK SURGICAL ONCOLOGY CLINIC     Subjective:       Patient ID: Sreekanth Pitts Jr. is a 77 y.o. male.     Chief Complaint: Follow-up     HPI     Sreekanth Pitts Jr. is a 77 y.o. male who returns for follow up. 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      He initially presented with a left neck mass that has been present for at least 2 years. He has had a couple of ultrasounds. He reports that it has become noticeable and more prominent. There is no pain, redness, or warmth. He has not had anything like this previously. He has no history of local or regional skin cancers, but has had some AKs treated with cryotherapy and/or 5-FU.     He denies dysphagia, odynophagia, throat pain, and otalgia. His voice is normal. There is no hemoptysis or hematemesis. He is breathing well. He has no axillary or inguinal adenopathy and denies fevers, chills, nightsweats, fatigue, and weight loss. He was in [employer] but has no known/documented radiation exposure.     He is a reformed smoker, stopping a 2ppd habit in 1973 after about 4-5 years.      Interval 3/27/23: To OR today for surgery as planned.         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               Past Surgical History:   Procedure Laterality Date    ESOPHAGOGASTRODUODENOSCOPY N/A 12/2/2020     Procedure: EGD (ESOPHAGOGASTRODUODENOSCOPY);  Surgeon: Dion Mckinney MD;  Location: [facility] ([address] [place] [address]);  Service: Endoscopy;  Laterality: N/A;  covid-11/29-[facility]    EYE FOREIGN BODY REMOVAL         childhood    LUMBAR LAMINECTOMY WITH  FUSION   2002    MINIMALLY INVASIVE TRANSFORAMINAL LUMBAR INTERBODY FUSION (TLIF) N/A 2/14/2022     Procedure: FUSION, SPINE, LUMBAR, TLIF, MINIMALLY INVASIVE Right L4-5 Renaldo PARK notified;  Surgeon: Anibal Beebe MD;  Location: [facility];  Service: Neurosurgery;  Laterality: N/A;  ASA1  TOR1  T&Screen  EMG  C-Arm  LSO  Jamie 4 poster  NEURO MONITORING RENALDO OSBORN [phone]  SPINEWAVE RENALDO POLLOCK [phone] TEXTED ON 2/2/2022 @ 3:54PM/ RESPONDED ON 2/2/2022 @4:22 PM-LO  PREOP DONE A    TRANSURETHRAL RESECTION OF PROSTATE (TURP) WITHOUT USE OF LASER N/A 9/11/2018     Procedure: TURP, WITHOUT USING LASER;  Surgeon: Uma Gaona MD;  Location: [address];  Service: Urology;  Laterality: N/A;  1.5 hours         Current Outpatient Medications:     acetaminophen (TYLENOL) 325 MG tablet, Take 650 mg by mouth every 6 (six) hours as needed for Pain., Disp: , Rfl:     atorvastatin (LIPITOR) 10 MG tablet, Take 1 tablet (10 mg total) by mouth once daily., Disp: 90 tablet, Rfl: 11    cyanocobalamin (VITAMIN B-12) 100 MCG tablet, Take 1 tablet (100 mcg total) by mouth once daily., Disp: , Rfl:     dutasteride (AVODART) 0.5 mg capsule, Take 1 capsule (0.5 mg total) by mouth once daily., Disp: 90 capsule, Rfl: 3    fluorouraciL (EFUDEX) 5 % cream, Compound fluorouracil 5% + calcipotriene 0.005% cream. Apply a pea-sized amount to entire scalp BID x 5 - 7 days. (Patient not taking: Reported on 2/7/2023), Disp: 30 g, Rfl: 0    ibuprofen (ADVIL,MOTRIN) 200 MG tablet, Take 200 mg by mouth every 6 (six) hours as needed for Pain., Disp: , Rfl:     lisinopriL (PRINIVIL,ZESTRIL) 20 MG tablet, Take 1 tablet (20 mg total) by mouth once daily., Disp: 90 tablet, Rfl: 11    MULTIVITAMIN W-MINERALS/LUTEIN (CENTRUM SILVER ORAL), Take 1 tablet by mouth once daily., Disp: , Rfl:     RABEprazole (ACIPHEX) 20 mg tablet, Take 1 tablet (20 mg total) by mouth before breakfast., Disp: 90 tablet, Rfl: 3    tamsulosin (FLOMAX) 0.4 mg  Cap, TAKE 1 CAPSULE (0.4 MG TOTAL) BY MOUTH EVERY EVENING., Disp: 90 capsule, Rfl: 3     Review of patient's allergies indicates:  No Known Allergies     Social History               Socioeconomic History    Marital status:    Occupational History       Employer: [employer]   Tobacco Use    Smoking status: Former       Packs/day: 2.00       Years: 4.00       Pack years: 8.00       Types: Cigarettes, Cigars       Quit date: 1973       Years since quittin.1    Smokeless tobacco: Never   Substance and Sexual Activity    Alcohol use: Yes       Alcohol/week: 1.0 standard drink       Types: 1 Cans of beer per week       Comment: rarely    Drug use: No   Social History Narrative     , consulting firm , 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 frequ walking each day.                  Family History   Problem Relation Age of Onset    Cataracts Mother      Hypertension Mother      Cancer Father      Heart disease Father      Heart disease Brother      Stroke Paternal Grandfather      Heart disease Paternal Grandfather      Colon cancer Neg Hx      Esophageal cancer Neg Hx           Review of Systems   Constitutional:  Negative for fatigue, fever and unexpected weight change.   HENT:  Negative for ear discharge, facial swelling, hearing loss, mouth sores, rhinorrhea, sore throat, tinnitus, trouble swallowing and voice change.    Eyes:  Negative for pain and visual disturbance.   Respiratory:  Negative for cough and shortness of breath.    Cardiovascular:  Negative for chest pain and palpitations.   Gastrointestinal:  Negative for abdominal pain, constipation and diarrhea.   Genitourinary:  Negative for difficulty urinating and dysuria.   Musculoskeletal:  Positive for arthralgias. Negative for back pain and neck pain.   Skin:  Negative for color change and rash.   Neurological:  Positive for facial asymmetry. Negative for dizziness, seizures and headaches.    Hematological:  Positive for adenopathy. Does not bruise/bleed easily.   Psychiatric/Behavioral:  Negative for agitation. The patient is not nervous/anxious.      Objective:     Physical Exam  Vitals reviewed.   Constitutional:       Appearance: Normal appearance.   HENT:      Head: Normocephalic and atraumatic.        Comments:         Right Ear: Tympanic membrane, ear canal and external ear normal. No decreased hearing noted.      Left Ear: Tympanic membrane, ear canal and external ear normal. No decreased hearing noted.      Nose: Nose normal. No rhinorrhea.      Mouth/Throat:      Palate: No mass and lesions.   Eyes:      Extraocular Movements: Extraocular movements intact.      Conjunctiva/sclera: Conjunctivae normal.   Neck:      Comments: Salivary glands - there are no lesions or asymmetric findings in the submandibular glands  Pulmonary:      Effort: Pulmonary effort is normal. No respiratory distress.      Breath sounds: Normal breath sounds. No stridor.   Musculoskeletal:      Right shoulder: No deformity. Normal range of motion.      Left shoulder: No deformity. Normal range of motion.      Cervical back: Normal range of motion and neck supple.   Lymphadenopathy:      Cervical: No cervical adenopathy.   Skin:     General: Skin is warm and dry.      Findings: No lesion.   Neurological:      Mental Status: He is alert and oriented to person, place, and time.      Cranial Nerves: No cranial nerve deficit or facial asymmetry.      Motor: No weakness.      Gait: Gait normal.      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 We discussed parotid lesions and potential outcomes and treatments, including surgery. FNA done in clinic today by pathology. Patient tolerated well.    To OR today for left parotidectomy.

## 2023-03-28 PROCEDURE — 88305 TISSUE EXAM BY PATHOLOGIST: ICD-10-PCS | Mod: 26,,, | Performed by: STUDENT IN AN ORGANIZED HEALTH CARE EDUCATION/TRAINING PROGRAM

## 2023-03-28 PROCEDURE — 88305 TISSUE EXAM BY PATHOLOGIST: CPT | Mod: 26,,, | Performed by: STUDENT IN AN ORGANIZED HEALTH CARE EDUCATION/TRAINING PROGRAM

## 2023-03-28 PROCEDURE — 88332 PR  PATH CONSULT IN SURG,W ADDN FRZ SEC: ICD-10-PCS | Mod: 26,,, | Performed by: STUDENT IN AN ORGANIZED HEALTH CARE EDUCATION/TRAINING PROGRAM

## 2023-03-28 PROCEDURE — 88331 PATH CONSLTJ SURG 1 BLK 1SPC: CPT | Mod: 26,,, | Performed by: STUDENT IN AN ORGANIZED HEALTH CARE EDUCATION/TRAINING PROGRAM

## 2023-03-28 PROCEDURE — 88331 PR  PATH CONSULT IN SURG,W FRZ SEC: ICD-10-PCS | Mod: 26,,, | Performed by: STUDENT IN AN ORGANIZED HEALTH CARE EDUCATION/TRAINING PROGRAM

## 2023-03-28 PROCEDURE — 88332 PATH CONSLTJ SURG EA ADD BLK: CPT | Mod: 26,,, | Performed by: STUDENT IN AN ORGANIZED HEALTH CARE EDUCATION/TRAINING PROGRAM

## 2023-03-28 NOTE — OP NOTE
DATE OF PROCEDURE: 3/27/23     PREOPERATIVE DIAGNOSIS: Left parotid mass of uncertain significance.     POSTOPERATIVE DIAGNOSIS:  1.  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 Left superficial parotidectomy with facial nerve dissection and preservation.     SURGEON: Mook Newton M.D.     ASSISTANT: Dejon Land (RES)     ANESTHESIA: Via general endotracheal.     ESTIMATED BLOOD LOSS: 10mL     INTRAVENOUS FLUIDS: May be found in the operative record.     INDICATIONS FOR OPERATION: Sreekanth Pitts Jr. is a 77 y.o. male who presented with a left facial/upper neck that was consistent with PA on FNA and imaging. The risks, benefits, indications and alternatives to this procedure were thoroughly discussed with the patient preoperatively and informed consent was obtained. Please see my detailed preoperative note for a thorough account of this discussion.     FINDINGS: There was a well-defined mass in the tail of parotid, just anterior to the Greater auricular nerve and its branches, and just posterior to the external jugular vein. The inferior division of the facial nerve was intact and stimulated to amplitudes of > 1000 microVolts at a stimulus intensity of 0.8mA. Frozen section was consistent with pleomorphic adenoma.     DETAILED ACCOUNT OF TECHNIQUE EMPLOYED:     The patient was identified in the holding area per routine. He was transported to the Operating Room and placed supine on the operating table. He was intubated transorally by the Anesthesiology Service and an adequate level of general endotracheal anesthesia was achieved. The facial nerve monitor was placed and appropriate functioning was confirmed by gently tapping the brow and lip regions with an appropriate response. The patient's face, neck and chest as well as the lower abdomen were then prepped and draped in a standard fashion. A timeout was performed according to the universal protocol. A  modified facelift incision was then marked and injected with 1% lidocaine with 1:100,000 epinephrine.     The skin and subcutaneous tissues were divided and subplatysmal/sub-SMAS flaps were elevated anteriorly on the neck/face until we were well in front of the tumor - adequate exposure was achieved without making the preauricular component of the incision. I then dissected the greater auricular nerve to the tail of parotid in order to preserve it, retracting it posteriorly with a blunt hook. I reflected the tail of parotid off of the sternocleidomastoid and dissected until the posterior belly of the digastric was noted. Guided by palpation, the EJV was dissected superiorly, and the mass was reflected posteriorly. In this fashion, I was able to identify the inferior division with both marginal mandibular and cervical branches of the nerve, and reflect the tumor off them posteriorly. We were superior to the tumor with a cuff of normal gland, so dissection to the pes was not required. The tumor and cuff of gland was dissected off the digastric and the SCM and sent to pathology. The tail of parotid was minimally closed upon itself with 3-0 vicryl, as there was not a notable cosmetic deformity/concavity. A 10 FR drain was placed. No reconstruction was pursued outside of limited gland capsule rearrangement.     The facial nerve branches stimulated appropriately at the end of the case with brisk differential responses of over 1000 microvolts at 0.8 milliamps of stimulus using the nerve integrity monitor system. The deep dermal/SMAS/platysmal layer was closed with interrupted 4-0 Monocryl in a buried fashion in the deep dermal layer followed by Dermabond on the skin. The drain was secured with nylon and connected to suction. This patient was allowed to awaken from anesthesia, extubated and transported to the Recovery Room in stable condition. All sponge, needle and instrument counts were correct at the end of the case x2.      I was present for and performed all essential portions of this operation, which include identification and preservation of the greater auricular nerve, facial nerve branches, and tumor resection.

## 2023-03-29 ENCOUNTER — OFFICE VISIT (OUTPATIENT)
Dept: OTOLARYNGOLOGY | Facility: CLINIC | Age: 78
End: 2023-03-29
Payer: MEDICARE

## 2023-03-29 VITALS — WEIGHT: 263 LBS | BODY MASS INDEX: 34.7 KG/M2

## 2023-03-29 DIAGNOSIS — D37.039 NEOPLASM OF UNCERTAIN BEHAVIOR OF MAJOR SALIVARY GLAND: Primary | ICD-10-CM

## 2023-03-29 PROCEDURE — 99999 PR PBB SHADOW E&M-EST. PATIENT-LVL II: ICD-10-PCS | Mod: PBBFAC,,, | Performed by: NURSE PRACTITIONER

## 2023-03-29 PROCEDURE — 99212 OFFICE O/P EST SF 10 MIN: CPT | Mod: PBBFAC | Performed by: NURSE PRACTITIONER

## 2023-03-29 PROCEDURE — 99024 POSTOP FOLLOW-UP VISIT: CPT | Mod: POP,,, | Performed by: NURSE PRACTITIONER

## 2023-03-29 PROCEDURE — 99999 PR PBB SHADOW E&M-EST. PATIENT-LVL II: CPT | Mod: PBBFAC,,, | Performed by: NURSE PRACTITIONER

## 2023-03-29 PROCEDURE — 99024 PR POST-OP FOLLOW-UP VISIT: ICD-10-PCS | Mod: POP,,, | Performed by: NURSE PRACTITIONER

## 2023-03-29 NOTE — ANESTHESIA POSTPROCEDURE EVALUATION
Anesthesia Post Evaluation    Patient: Sreekanth Pitts Jr.    Procedure(s) Performed: Procedure(s) (LRB):  EXCISION, PAROTID GLAND (Left)    Final Anesthesia Type: general      Patient location during evaluation: PACU  Patient participation: Yes- Able to Participate  Level of consciousness: awake and alert  Post-procedure vital signs: reviewed and stable  Pain management: adequate  Airway patency: patent    PONV status at discharge: No PONV  Anesthetic complications: no      Cardiovascular status: blood pressure returned to baseline  Respiratory status: unassisted  Hydration status: euvolemic  Follow-up not needed.          Vitals Value Taken Time   /88 03/27/23 1646   Temp  03/29/23 1405   Pulse 88 03/27/23 1700   Resp 16 03/27/23 1700   SpO2 98 % 03/27/23 1700         No case tracking events are documented in the log.      Pain/Darcy Score: No data recorded

## 2023-03-29 NOTE — PROGRESS NOTES
HEAD AND NECK SURGICAL ONCOLOGY CLINIC    Subjective:       Patient ID: Sreekanth Pitts Jr. is a 77 y.o. male.    Chief Complaint: post op drain removal    HPI    Sreekanth Pitts Jr. is a 77 y.o. male who returns for a post op visit. He has been doing well. LUCIA drain output has been <30ml in 24 hours. No complaints.     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       Past Surgical History:   Procedure Laterality Date    ESOPHAGOGASTRODUODENOSCOPY N/A 12/2/2020    Procedure: EGD (ESOPHAGOGASTRODUODENOSCOPY);  Surgeon: Dion Mckinney MD;  Location: [facility] (4TH FLR);  Service: Endoscopy;  Laterality: N/A;  covid-11/29-[facility]    EXCISION OF PAROTID GLAND Left 3/27/2023    Procedure: EXCISION, PAROTID GLAND;  Surgeon: Mook Newton MD;  Location: [facility] 2ND FLR;  Service: ENT;  Laterality: Left;    EYE FOREIGN BODY REMOVAL      childhood    LUMBAR LAMINECTOMY WITH FUSION  2002    MINIMALLY INVASIVE TRANSFORAMINAL LUMBAR INTERBODY FUSION (TLIF) N/A 2/14/2022    Procedure: FUSION, SPINE, LUMBAR, TLIF, MINIMALLY INVASIVE Right L4-5 Renaldo V notified;  Surgeon: Anibal Beebe MD;  Location: [facility];  Service: Neurosurgery;  Laterality: N/A;  ASA1  TOR1  T&Screen  EMG  C-Arm  LSO  Jamie 4 poster  NEURO MONITORING RENALDO OSBORN [phone]  SPINEWAVE RENALDO MARIS [phone] TEXTED ON 2/2/2022 @ 3:54PM/ RESPONDED ON 2/2/2022 @4:22 PM-LO  PREOP DONE A    TRANSURETHRAL RESECTION OF PROSTATE (TURP) WITHOUT USE OF LASER N/A 9/11/2018    Procedure: TURP, WITHOUT USING LASER;  Surgeon: Uma Gaona MD;  Location:  [facility] OR 1ST FLR;  Service: Urology;  Laterality: N/A;  1.5 hours       Current Outpatient Medications:     atorvastatin (LIPITOR) 10 MG tablet, Take 1 tablet (10 mg total) by mouth once daily., Disp: 90 tablet, Rfl: 11    cephALEXin (KEFLEX) 500 MG capsule, Take 1 capsule (500 mg total) by mouth every 12 (twelve) hours. for 5 days, Disp: 10 capsule, Rfl: 0    cyanocobalamin (VITAMIN B-12) 100 MCG tablet, Take 1 tablet (100 mcg total) by mouth once daily., Disp: , Rfl:     dutasteride (AVODART) 0.5 mg capsule, Take 1 capsule (0.5 mg total) by mouth once daily., Disp: 90 capsule, Rfl: 3    HYDROcodone-acetaminophen (NORCO) 5-325 mg per tablet, Take 1 tablet by mouth every 6 (six) hours as needed for Pain., Disp: 20 tablet, Rfl: 0    ibuprofen (ADVIL,MOTRIN) 200 MG tablet, Take 200 mg by mouth every 6 (six) hours as needed for Pain., Disp: , Rfl:     lisinopriL (PRINIVIL,ZESTRIL) 20 MG tablet, Take 1 tablet (20 mg total) by mouth once daily., Disp: 90 tablet, Rfl: 11    MULTIVITAMIN W-MINERALS/LUTEIN (CENTRUM SILVER ORAL), Take 1 tablet by mouth once daily., Disp: , Rfl:     ondansetron (ZOFRAN-ODT) 4 MG TbDL, Dissolve 1 tablet (4 mg total) by mouth every 6 (six) hours as needed (nausea)., Disp: 15 tablet, Rfl: 0    RABEprazole (ACIPHEX) 20 mg tablet, Take 1 tablet (20 mg total) by mouth before breakfast., Disp: 90 tablet, Rfl: 3    tamsulosin (FLOMAX) 0.4 mg Cap, TAKE 1 CAPSULE (0.4 MG TOTAL) BY MOUTH EVERY EVENING. (Patient taking differently: Take 0.4 mg by mouth once daily.), Disp: 90 capsule, Rfl: 3    Review of patient's allergies indicates:  No Known Allergies    Social History     Socioeconomic History    Marital status:    Occupational History     Employer: [employer]   Tobacco Use    Smoking status: Former     Packs/day: 2.00     Years: 4.00     Pack years: 8.00     Types: Cigarettes, Cigars     Quit date: 1973     Years since quittin.2    Smokeless tobacco: Never   Substance and  Sexual Activity    Alcohol use: Yes     Alcohol/week: 1.0 standard drink     Types: 1 Cans of beer per week     Comment: rarely    Drug use: No   Social History Narrative    , 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 frequ walking each day.       Family History   Problem Relation Age of Onset    Cataracts Mother     Hypertension Mother     Cancer Father     Heart disease Father     Heart disease Brother     Stroke Paternal Grandfather     Heart disease Paternal Grandfather     Colon cancer Neg Hx     Esophageal cancer Neg Hx        Review of Systems   Constitutional:  Negative for fatigue, fever and unexpected weight change.   HENT:  Negative for ear discharge, facial swelling, hearing loss, mouth sores, rhinorrhea, sore throat, tinnitus, trouble swallowing and voice change.    Eyes:  Negative for pain and visual disturbance.   Respiratory:  Negative for cough and shortness of breath.    Cardiovascular:  Negative for chest pain and palpitations.   Gastrointestinal:  Negative for abdominal pain, constipation and diarrhea.   Genitourinary:  Negative for difficulty urinating and dysuria.   Musculoskeletal:  Positive for arthralgias. Negative for back pain and neck pain.   Skin:  Negative for color change and rash.   Neurological:  Positive for facial asymmetry. Negative for dizziness, seizures and headaches.   Hematological:  Positive for adenopathy. Does not bruise/bleed easily.   Psychiatric/Behavioral:  Negative for agitation. The patient is not nervous/anxious.      Objective:     Physical Exam  Constitutional:       Appearance: Normal appearance.   HENT:      Head: Normocephalic and atraumatic.      Right Ear: External ear normal.      Left Ear: External ear normal.   Neck:      Comments: Left neck Incision CDI.  No redness, drainage, or fluid collection noted.  Edges well approximated.  LUCIA drain removed.  Pulmonary:      Effort: Pulmonary effort is normal.  No respiratory distress.   Neurological:      General: No focal deficit present.      Mental Status: He is alert.   Psychiatric:         Mood and Affect: Mood normal.         Behavior: Behavior normal.         Thought Content: Thought content normal.        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 Drain removed. Path pending. RTC in 3 weeks, sooner if needed.

## 2023-04-04 ENCOUNTER — OFFICE VISIT (OUTPATIENT)
Dept: DERMATOLOGY | Facility: CLINIC | Age: 78
End: 2023-04-04
Payer: MEDICARE

## 2023-04-04 DIAGNOSIS — L57.0 AK (ACTINIC KERATOSIS): Primary | ICD-10-CM

## 2023-04-04 DIAGNOSIS — L72.0 MILIA: ICD-10-CM

## 2023-04-04 PROCEDURE — 17000 DESTRUCT PREMALG LESION: CPT | Mod: S$PBB,,, | Performed by: DERMATOLOGY

## 2023-04-04 PROCEDURE — 99212 OFFICE O/P EST SF 10 MIN: CPT | Mod: 25,S$PBB,, | Performed by: DERMATOLOGY

## 2023-04-04 PROCEDURE — 99212 PR OFFICE/OUTPT VISIT, EST, LEVL II, 10-19 MIN: ICD-10-PCS | Mod: 25,S$PBB,, | Performed by: DERMATOLOGY

## 2023-04-04 PROCEDURE — 17003 DESTRUCT PREMALG LES 2-14: CPT | Mod: S$PBB,,, | Performed by: DERMATOLOGY

## 2023-04-04 PROCEDURE — 99999 PR PBB SHADOW E&M-EST. PATIENT-LVL III: CPT | Mod: PBBFAC,,, | Performed by: DERMATOLOGY

## 2023-04-04 PROCEDURE — 17003 DESTRUCTION, PREMALIGNANT LESIONS; SECOND THROUGH 14 LESIONS: ICD-10-PCS | Mod: S$PBB,,, | Performed by: DERMATOLOGY

## 2023-04-04 PROCEDURE — 99999 PR PBB SHADOW E&M-EST. PATIENT-LVL III: ICD-10-PCS | Mod: PBBFAC,,, | Performed by: DERMATOLOGY

## 2023-04-04 PROCEDURE — 17003 DESTRUCT PREMALG LES 2-14: CPT | Mod: PBBFAC | Performed by: DERMATOLOGY

## 2023-04-04 PROCEDURE — 99213 OFFICE O/P EST LOW 20 MIN: CPT | Mod: PBBFAC | Performed by: DERMATOLOGY

## 2023-04-04 PROCEDURE — 17000 PR DESTRUCTION(LASER SURGERY,CRYOSURGERY,CHEMOSURGERY),PREMALIGNANT LESIONS,FIRST LESION: ICD-10-PCS | Mod: S$PBB,,, | Performed by: DERMATOLOGY

## 2023-04-04 PROCEDURE — 17000 DESTRUCT PREMALG LESION: CPT | Mod: PBBFAC | Performed by: DERMATOLOGY

## 2023-04-04 NOTE — PROGRESS NOTES
Subjective:      Patient ID:  Sreekanth Pitts Jr. is a 77 y.o. male who presents for   Chief Complaint   Patient presents with    Follow-up     Efudex treatment on scalp     History of Present Illness: The patient presents for follow up of skin check.    The patient was last seen on: 1/3/23 for cryosurgery to actinic keratoses which have resolved and Efudex/dovonex treatment bid to AA scalp x 7 days.    No h/o nmsc or mm      Other skin complaints:   Patient with new complaint of lesion(s)  Location: L cheek  Duration: 2 months  Symptoms: none  Relieving factors/Previous treatments: none      Patient with new complaint of lesion(s)  Location: Nose  Duration: 2 months  Symptoms: none  Relieving factors/Previous treatments: none             Review of Systems   Skin:  Positive for activity-related sunscreen use and wears hat (95%). Negative for daily sunscreen use and recent sunburn.   Hematologic/Lymphatic: Bruises/bleeds easily (bruise).     Objective:   Physical Exam   Constitutional: He appears well-developed and well-nourished. No distress.   Neurological: He is alert and oriented to person, place, and time. He is not disoriented.   Psychiatric: He has a normal mood and affect.   Skin:   Areas Examined (abnormalities noted in diagram):   Scalp / Hair Palpated and Inspected  Head / Face Inspection Performed  RUE Inspected               Diagram Legend     Erythematous scaling macule/papule c/w actinic keratosis       Vascular papule c/w angioma      Pigmented verrucoid papule/plaque c/w seborrheic keratosis      Yellow umbilicated papule c/w sebaceous hyperplasia      Irregularly shaped tan macule c/w lentigo     1-2 mm smooth white papules consistent with Milia      Movable subcutaneous cyst with punctum c/w epidermal inclusion cyst      Subcutaneous movable cyst c/w pilar cyst      Firm pink to brown papule c/w dermatofibroma      Pedunculated fleshy papule(s) c/w skin tag(s)      Evenly pigmented macule c/w  junctional nevus     Mildly variegated pigmented, slightly irregular-bordered macule c/w mildly atypical nevus      Flesh colored to evenly pigmented papule c/w intradermal nevus       Pink pearly papule/plaque c/w basal cell carcinoma      Erythematous hyperkeratotic cursted plaque c/w SCC      Surgical scar with no sign of skin cancer recurrence      Open and closed comedones      Inflammatory papules and pustules      Verrucoid papule consistent consistent with wart     Erythematous eczematous patches and plaques     Dystrophic onycholytic nail with subungual debris c/w onychomycosis     Umbilicated papule    Erythematous-base heme-crusted tan verrucoid plaque consistent with inflamed seborrheic keratosis     Erythematous Silvery Scaling Plaque c/w Psoriasis     See annotation      Assessment / Plan:            Milia   - minor problem and chronic.   Reassurance given to patient. No treatment necessary.         AK (actinic keratosis)  Today's Plan:      Cryosurgery Procedure Note    Verbal consent from the patient is obtained including, but not limited to, risk of hypopigmentation/hyperpigmentation, scar, recurrence of lesion. The patient is aware of the precancerous quality and need for treatment of these lesions. Liquid nitrogen cryosurgery is applied to the 3 actinic keratoses, as detailed in the physical exam, to produce a freeze injury. The patient is aware that blisters may form and is instructed on wound care with gentle cleansing and use of vaseline ointment to keep moist until healed. The patient is supplied a handout on cryosurgery and is instructed to call if lesions do not completely resolve.    Wear hat always!      Follow up in about 6 months (around 10/4/2023).

## 2023-04-04 NOTE — ASSESSMENT & PLAN NOTE
Today's Plan:      Cryosurgery Procedure Note    Verbal consent from the patient is obtained including, but not limited to, risk of hypopigmentation/hyperpigmentation, scar, recurrence of lesion. The patient is aware of the precancerous quality and need for treatment of these lesions. Liquid nitrogen cryosurgery is applied to the 3 actinic keratoses, as detailed in the physical exam, to produce a freeze injury. The patient is aware that blisters may form and is instructed on wound care with gentle cleansing and use of vaseline ointment to keep moist until healed. The patient is supplied a handout on cryosurgery and is instructed to call if lesions do not completely resolve.    Wear hat always!

## 2023-04-04 NOTE — PATIENT INSTRUCTIONS
CRYOSURGERY      Your doctor has used a method called cryosurgery to treat your skin condition. Cryosurgery refers to the use of very cold substances to treat a variety of skin conditions such as warts, pre-skin cancers, molluscum contagiosum, sun spots, and several benign growths. The substance we use in cryosurgery is liquid nitrogen and is so cold (-195 degrees Celsius) that is burns when administered.     Following treatment in the office, the skin may immediately burn and become red. You may find the area around the lesion is affected as well. It is sometimes necessary to treat not only the lesion, but a small area of the surrounding normal skin to achieve a good response.     A blister, and even a blood filled blister, may form after treatment.   This is a normal response. If the blister is painful, it is acceptable to sterilize a needle and with rubbing alcohol and gently pop the blister. It is important that you gently wash the area with soap and warm water as the blister fluid may contain wart virus if a wart was treated. Do no remove the roof of the blister.     The area treated can take anywhere from 1-3 weeks to heal. Healing time depends on the kind skin lesion treated, the location, and how aggressively the lesion was treated. It is recommended that the areas treated are covered with Vaseline or bacitracin ointment and a band-aid. If a band-aid is not practical, just ointment applied several times per day will do. Keeping these areas moist will speed the healing time.    Treatment with liquid nitrogen can leave a scar. In dark skin, it may be a light or dark scar, in light skin it may be a white or pink scar. These will generally fade with time, but may never go away completely.     If you have any concerns after your treatment, please feel free to call the office.       [address] [facility] [place] [address]/ [phone] FAX/ [email or web address]     Recommend apply Sarna lotion or  Cerave anti-itch lotion/cream or Dermeleve to skin as often as needed. May store in refrigerator for additional cooling properties.    For forearm itching:  Dermeleve is available exclusively at [email or web address] or by [phone]    Nava HERZOG Discount Code: [email or web address]    30gram tube = $28.49 with code  60gram tube = $37.99 with code    Shipping is free for both sizes

## 2023-04-06 LAB
FINAL PATHOLOGIC DIAGNOSIS: NORMAL
FROZEN SECTION DIAGNOSIS: NORMAL
FROZEN SECTION FOOTNOTE: NORMAL
GROSS: NORMAL
Lab: NORMAL
MICROSCOPIC EXAM: NORMAL

## 2023-04-12 ENCOUNTER — PATIENT MESSAGE (OUTPATIENT)
Dept: OTOLARYNGOLOGY | Facility: CLINIC | Age: 78
End: 2023-04-12
Payer: MEDICARE

## 2023-04-20 ENCOUNTER — OFFICE VISIT (OUTPATIENT)
Dept: OTOLARYNGOLOGY | Facility: CLINIC | Age: 78
End: 2023-04-20
Payer: MEDICARE

## 2023-04-20 VITALS
BODY MASS INDEX: 34.85 KG/M2 | DIASTOLIC BLOOD PRESSURE: 67 MMHG | SYSTOLIC BLOOD PRESSURE: 99 MMHG | WEIGHT: 263 LBS | HEART RATE: 82 BPM | HEIGHT: 73 IN

## 2023-04-20 DIAGNOSIS — D37.039 NEOPLASM OF UNCERTAIN BEHAVIOR OF MAJOR SALIVARY GLAND: Primary | ICD-10-CM

## 2023-04-20 PROCEDURE — 99999 PR PBB SHADOW E&M-EST. PATIENT-LVL III: CPT | Mod: PBBFAC,,, | Performed by: NURSE PRACTITIONER

## 2023-04-20 PROCEDURE — 99024 PR POST-OP FOLLOW-UP VISIT: ICD-10-PCS | Mod: POP,,, | Performed by: NURSE PRACTITIONER

## 2023-04-20 PROCEDURE — 99024 POSTOP FOLLOW-UP VISIT: CPT | Mod: POP,,, | Performed by: NURSE PRACTITIONER

## 2023-04-20 PROCEDURE — 99999 PR PBB SHADOW E&M-EST. PATIENT-LVL III: ICD-10-PCS | Mod: PBBFAC,,, | Performed by: NURSE PRACTITIONER

## 2023-04-20 PROCEDURE — 99213 OFFICE O/P EST LOW 20 MIN: CPT | Mod: PBBFAC | Performed by: NURSE PRACTITIONER

## 2023-04-20 NOTE — PROGRESS NOTES
[facility]GICAL ONCOL[facility]Y [facility]IN[facility]    Subjective:       Patient ID: Sreekanth Pitts Jr. is a 77 y.o. male.    Chief Complaint: No chief complaint on file.    HPI    Sreekanth Pitts Jr. is a 77 y.o. male who returns for a post op visit. He has been doing well. No complaints.     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 OR 2ND FLR;  Service: ENT;  Laterality: Left;    EYE FOREIGN BODY REMOVAL      childhood    LUMBAR LAMINECTOMY WITH FUSION  2002    MINIMALLY INVASIVE TRANSFORAMINAL LUMBAR INTERBODY FUSION (TLIF) N/A 2/14/2022    Procedure: FUSION, SPINE, LUMBAR, TLIF, MINIMALLY INVASIVE Right L4-5 Renaldo V notified;  Surgeon: Anibal Beebe MD;  Location: [facility];  Service: Neurosurgery;  Laterality: N/A;  ASA1  TOR1  T&Screen  EMG  C-Arm  LSO  Jamie 4 poster  NEURO MONITORING RENALDO ANURAG [phone]  SPINEWAVE RENALDO MARIS [phone] TEXTED ON 2/2/2022 @ 3:54PM/ RESPONDED ON 2/2/2022 @4:22 PM-LO  PREOP DONE A    TRANSURETHRAL RESECTION OF PROSTATE (TURP) WITHOUT USE OF LASER N/A 9/11/2018    Procedure: TURP, WITHOUT USING LASER;  Surgeon: Uma Gaona MD;  Location: [facility] OR [facility] FLR;   Service: Urology;  Laterality: N/A;  1.5 hours       Current Outpatient Medications:     atorvastatin (LIPITOR) 10 MG tablet, Take 1 tablet (10 mg total) by mouth once daily., Disp: 90 tablet, Rfl: 11    cyanocobalamin (VITAMIN B-12) 100 MCG tablet, Take 1 tablet (100 mcg total) by mouth once daily., Disp: , Rfl:     dutasteride (AVODART) 0.5 mg capsule, Take 1 capsule (0.5 mg total) by mouth once daily., Disp: 90 capsule, Rfl: 3    HYDROcodone-acetaminophen (NORCO) 5-325 mg per tablet, Take 1 tablet by mouth every 6 (six) hours as needed for Pain., Disp: 20 tablet, Rfl: 0    ibuprofen (ADVIL,MOTRIN) 200 MG tablet, Take 200 mg by mouth every 6 (six) hours as needed for Pain., Disp: , Rfl:     lisinopriL (PRINIVIL,ZESTRIL) 20 MG tablet, Take 1 tablet (20 mg total) by mouth once daily., Disp: 90 tablet, Rfl: 11    MULTIVITAMIN W-MINERALS/LUTEIN (CENTRUM SILVER ORAL), Take 1 tablet by mouth once daily., Disp: , Rfl:     ondansetron (ZOFRAN-ODT) 4 MG TbDL, Dissolve 1 tablet (4 mg total) by mouth every 6 (six) hours as needed (nausea)., Disp: 15 tablet, Rfl: 0    RABEprazole (ACIPHEX) 20 mg tablet, Take 1 tablet (20 mg total) by mouth before breakfast., Disp: 90 tablet, Rfl: 3    tamsulosin (FLOMAX) 0.4 mg Cap, TAKE 1 CAPSULE (0.4 MG TOTAL) BY MOUTH EVERY EVENING. (Patient taking differently: Take 0.4 mg by mouth once daily.), Disp: 90 capsule, Rfl: 3    Review of patient's allergies indicates:  No Known Allergies    Social History     Socioeconomic History    Marital status:    Occupational History     Employer: [employer]   Tobacco Use    Smoking status: Former     Packs/day: 2.00     Years: 4.00     Pack years: 8.00     Types: Cigarettes, Cigars     Quit date: 1973     Years since quittin.3    Smokeless tobacco: Never   Substance and Sexual Activity    Alcohol use: Yes     Alcohol/week: 1.0 standard drink     Types: 1 Cans of beer per week     Comment: rarely    Drug use: No    Social History Narrative    , 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 wife (healthy) lives at home, 2 dtrs, 43, 41. frequ walking each day.       Family History   Problem Relation Age of Onset    Cataracts Mother     Hypertension Mother     Cancer Father     Heart disease Father     Heart disease Brother     Stroke Paternal Grandfather     Heart disease Paternal Grandfather     Colon cancer Neg Hx     Esophageal cancer Neg Hx        Review of Systems   Constitutional:  Negative for fatigue, fever and unexpected weight change.   HENT:  Negative for ear discharge, facial swelling, hearing loss, mouth sores, rhinorrhea, sore throat, tinnitus, trouble swallowing and voice change.    Eyes:  Negative for pain and visual disturbance.   Respiratory:  Negative for cough and shortness of breath.    Cardiovascular:  Negative for chest pain and palpitations.   Gastrointestinal:  Negative for abdominal pain, constipation and diarrhea.   Genitourinary:  Negative for difficulty urinating and dysuria.   Musculoskeletal:  Positive for arthralgias. Negative for back pain and neck pain.   Skin:  Negative for color change and rash.   Neurological:  Positive for facial asymmetry. Negative for dizziness, seizures and headaches.   Hematological:  Positive for adenopathy. Does not bruise/bleed easily.   Psychiatric/Behavioral:  Negative for agitation. The patient is not nervous/anxious.      Objective:     Physical Exam  Constitutional:       Appearance: Normal appearance.   HENT:      Head: Normocephalic and atraumatic.      Right Ear: External ear normal.      Left Ear: External ear normal.   Neck:      Comments: Left neck Incision CDI.  No redness, drainage, or fluid collection noted.  Edges well approximated.    Pulmonary:      Effort: Pulmonary effort is normal. No respiratory distress.   Neurological:      General: No focal deficit present.      Mental Status: He is alert.   Psychiatric:         Mood and  Affect: Mood normal.         Behavior: Behavior normal.         Thought Content: Thought content normal.        Assessment & Plan:       Problem List Items Addressed This Visit    None

## 2023-04-21 ENCOUNTER — PATIENT MESSAGE (OUTPATIENT)
Dept: ADMINISTRATIVE | Facility: HOSPITAL | Age: 78
End: 2023-04-21
Payer: MEDICARE

## 2023-04-26 ENCOUNTER — DOCUMENTATION ONLY (OUTPATIENT)
Dept: REHABILITATION | Facility: HOSPITAL | Age: 78
End: 2023-04-26
Payer: MEDICARE

## 2023-04-26 NOTE — PROGRESS NOTES
Health  Wellness Visit Note    Name: Sreekanth Pitts Jr.  Clinic Number: [id number]  Physician: Torsten Bejarano MD  Diagnosis: No diagnosis found.  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  Reviewed last week's topic of n/a (patient did not attend Wellness last week).    Subjective:   Patient reports he has back pain that travels down into his hips and glutes. He had a procedure done a month ago and is still recovering. The MD suggested he take it easy, so he hasn't been doing any lifting, stretches or walking. Today is his first time doing his stretches and plans to slowly get back to exercising. Patient does not ice his back outside of Wellness, instead he applies heat.     Objective:   Sreekanth completed therapeutic stretches (EIL, CHANTELLE) and the following MedX exercise machines: core lumbar, torso rotation l/r, leg extension, leg curl, upright row, chest press, biceps curl, triceps extension, leg press    See exercise log in patient folder for rate of exertion and repetitions completed.     Fitness Machine Education Key:  E=education on equipment initiated  and further follow up and education needed  I=independent with  and exercise.  The patient:  Adjusts machines to his/her settings  Uses equipment levers, pins, weights safely  Maintains safe and correct posture while exercising  Moves through exercise with correct pace and control  Gets on and off equipment safely      Lumbar/Cervical Ext. E Torso Rotation E Leg Press E   Leg Extension  Seated Leg Curl  Chest Press    Seated Row  Hip ADD E Hip ABD E   Triceps Extension  Bicep Curl  Other: X       Assessment:   Patient tolerated Patient tolerated MedX Core Lumbar Strength and all other peripheral exercises without an increase in symptoms. Patient warmed up on Treadmill for 5 minutes, stretched, and iced low back for 5 minutes after the workout.     Plan:  Continue with established plan of care towards wellness goals.     Health  : Luciana Earl  4/26/2023

## 2023-05-03 ENCOUNTER — DOCUMENTATION ONLY (OUTPATIENT)
Dept: REHABILITATION | Facility: HOSPITAL | Age: 78
End: 2023-05-03
Payer: MEDICARE

## 2023-05-03 NOTE — PROGRESS NOTES
Health  Wellness Visit Note    Name: Sreekanth Pitts Jr.  Clinic Number: [id number]  Physician: No ref. provider found  Diagnosis: No diagnosis found.  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  Reviewed last week's topic of Breathing Exercises.     Subjective:   Patient reports he has no low back pain today. The pain that traveled from his low back into his glutes has subsided. He has noticed that doing his stretches really help with his pain. He stretches and goes for walks regularly. Patient spends time with his family as extra activity. Last week he iced his back every night before bed. He really enjoyed icing and finds it a great way to end his long days.     Objective:   Sreekanth completed therapeutic stretches (EIL, CHANTELLE) and the following MedX exercise machines: core lumbar, torso rotation l/r, leg extension, leg curl, upright row, chest press, biceps curl, triceps extension, leg press    See exercise log in patient folder for rate of exertion and repetitions completed.     Fitness Machine Education Key:  E=education on equipment initiated and further follow  up and education needed  I=independent with  and exercise.  The patient:  Adjusts machines to his/her settings  Uses equipment levers, pins, weights safely  Maintains safe and correct posture while exercising  Moves through exercise with correct pace and control  Gets on and off equipment safely      Lumbar/Cervical Ext. E Torso Rotation E Leg Press E   Leg Extension  Seated Leg Curl  Chest Press E   Seated Row E Hip ADD E Hip ABD E   Triceps Extension  Bicep Curl  Other: X       Assessment:   Patient tolerated Patient tolerated MedX Core Lumbar Strength and all other peripheral exercises without an increase in symptoms. Patient warmed up on Treadmill for 5 minutes, stretched, and iced low back for 5 minutes after the workout.     Plan:  Continue with established plan of care towards wellness goals.     Health  : Luciana Earl  5/3/2023

## 2023-05-10 ENCOUNTER — DOCUMENTATION ONLY (OUTPATIENT)
Dept: REHABILITATION | Facility: HOSPITAL | Age: 78
End: 2023-05-10
Payer: MEDICARE

## 2023-05-10 NOTE — PROGRESS NOTES
Health  Wellness Visit Note    Name: Sreekanth Pitts Jr.  Clinic Number: [id number]  Physician: No ref. provider found  Diagnosis: No diagnosis found.  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  Reviewed last week's topic of Creative Brain.     Subjective:   Patient reports he is back pain free. His back has been feeling great over the last few weeks with minimal pain. He does his stretches every morning before starting his day. He stays busy with his family, walking regularly and running errands. He recently started icing his back every night before bed.     Objective:   Sreekanth completed therapeutic stretches (EIL, CHANTELLE) and the following MedX exercise machines: core lumbar, torso rotation l/r, leg extension, leg curl, upright row, chest press, biceps curl, triceps extension, leg press    See exercise log in patient folder for rate of exertion and repetitions completed.     Fitness Machine Education Key:  E=education on equipment initiated and further follow up and education needed  I=independent with  and exercise.  The patient:  Adjusts machines to  his/her settings  Uses equipment levers, pins, weights safely  Maintains safe and correct posture while exercising  Moves through exercise with correct pace and control  Gets on and off equipment safely      Lumbar/Cervical Ext. E Torso Rotation E Leg Press E   Leg Extension E Seated Leg Curl E Chest Press E   Seated Row E Hip ADD E Hip ABD E   Triceps Extension E Bicep Curl E Other: X       Assessment:   Patient tolerated Patient tolerated MedX Core Lumbar Strength and all other peripheral exercises without an increase in symptoms. Patient warmed up on Treadmill for 5 minutes, stretched, and iced low back for 5 minutes after the workout.     Plan:  Continue with established plan of care towards wellness goals.     Health  : Luciana Earl  5/10/2023

## 2023-05-17 ENCOUNTER — DOCUMENTATION ONLY (OUTPATIENT)
Dept: REHABILITATION | Facility: HOSPITAL | Age: 78
End: 2023-05-17
Payer: MEDICARE

## 2023-05-17 NOTE — PROGRESS NOTES
Health  Wellness Visit Note    Name: Sreekanth Pitts Jr.  Clinic Number: [id number]  Physician: No ref. provider found  Diagnosis: No diagnosis found.  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  Reviewed last week's topic of Learning.     Subjective:   Patient reports he has no back pain today.  He does his stretches every morning before starting his day. He stays busy with his family, walking regularly and running errands. He recently started icing his back every night before bed.     Objective:   Sreekanth completed therapeutic stretches (EIL, CHANTELLE) and the following MedX exercise machines: core lumbar, torso rotation l/r, leg extension, leg curl, upright row, chest press, biceps curl, triceps extension, leg press    See exercise log in patient folder for rate of exertion and repetitions completed.     Fitness Machine Education Key:  E=education on equipment initiated and further follow up and education needed  I=independent with  and exercise.  The patient:  Adjusts machines to his/her settings  Uses equipment levers, pins, weights  safely  Maintains safe and correct posture while exercising  Moves through exercise with correct pace and control  Gets on and off equipment safely      Lumbar/Cervical Ext. E Torso Rotation E Leg Press E   Leg Extension E Seated Leg Curl E Chest Press E   Seated Row E Hip ADD E Hip ABD E   Triceps Extension E Bicep Curl E Other: X       Assessment:   Patient tolerated Patient tolerated MedX Core Lumbar Strength and all other peripheral exercises without an increase in symptoms. Patient warmed up on Treadmill for 5 minutes, stretched, and iced low back for 5 minutes after the workout.     Plan:  Continue with established plan of care towards wellness goals.     Health  : Luciana Earl  5/17/2023

## 2023-05-24 ENCOUNTER — DOCUMENTATION ONLY (OUTPATIENT)
Dept: REHABILITATION | Facility: HOSPITAL | Age: 78
End: 2023-05-24
Payer: MEDICARE

## 2023-05-24 NOTE — PROGRESS NOTES
Health  Wellness Visit Note    Name: Sreekanth Pitts Jr.  Clinic Number: [id number]  Physician: No ref. provider found  Diagnosis: No diagnosis found.  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  Reviewed last week's topic of Positive Thinking.     Subjective:   Patient reports he has some low back pain. His pain wraps around like a belt. He hasn't done anything out of the ordinary that could have caused his pain. Patient did yard work over the weekend and relaxed. He does his stretches daily before starting his day. He does not ice outside of Wellness. Patient and HC discussed joining a gym after Wellness is over in September.     Objective:   Sreekanth completed therapeutic stretches (EIL, CHANTELLE) and the following MedX exercise machines: core lumbar, torso rotation l/r, leg extension, leg curl, upright row, chest press, biceps curl, triceps extension, leg press    See exercise log in patient folder for rate of exertion and repetitions completed.     Fitness Machine Education Key:  E=education on equipment initiated and further follow up and education needed  I=independent  with  and exercise.  The patient:  Adjusts machines to his/her settings  Uses equipment levers, pins, weights safely  Maintains safe and correct posture while exercising  Moves through exercise with correct pace and control  Gets on and off equipment safely      Lumbar/Cervical Ext. E Torso Rotation E Leg Press E   Leg Extension E Seated Leg Curl E Chest Press E   Seated Row E Hip ADD E Hip ABD E   Triceps Extension E Bicep Curl E Other: X       Assessment:   Patient tolerated Patient tolerated MedX Core Lumbar Strength and all other peripheral exercises without an increase in symptoms. Patient warmed up on Treadmill for 5 minutes, stretched, and iced low back for 5 minutes after the workout.     Plan:  Continue with established plan of care towards wellness goals.     Health  : Luciana Earl  5/24/2023

## 2023-05-31 ENCOUNTER — DOCUMENTATION ONLY (OUTPATIENT)
Dept: REHABILITATION | Facility: HOSPITAL | Age: 78
End: 2023-05-31
Payer: MEDICARE

## 2023-05-31 NOTE — PROGRESS NOTES
Health  Wellness Visit Note    Name: Sreekanth Pitts Jr.  Clinic Number: [id number]  Physician: No ref. provider found  Diagnosis: No diagnosis found.  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  Reviewed last week's topic of Memory.      Subjective:   Patient reports he has no low back pain coming into Wellness today. Over the weekend he took a quick trip to [place]. He did a lot of walking and stretching over the week. He has a small dog that keeps him very busy. Patient ices his back at night. He plans to relax this weekend and be in his yard.     Patient and HC discussed joining a gym after Wellness is over in September.     Objective:   Sreekanth completed therapeutic stretches (EIL, CHANTELLE) and the following MedX exercise machines: core lumbar, torso rotation l/r, leg extension, leg curl, upright row, chest press, biceps curl, triceps extension, leg press    See exercise log in patient folder for rate of exertion and repetitions completed.     Fitness Machine Education Key:  E=education on equipment initiated and further follow up and education  needed  I=independent with  and exercise.  The patient:  Adjusts machines to his/her settings  Uses equipment levers, pins, weights safely  Maintains safe and correct posture while exercising  Moves through exercise with correct pace and control  Gets on and off equipment safely      Lumbar/Cervical Ext. E Torso Rotation E Leg Press E   Leg Extension E Seated Leg Curl E Chest Press E   Seated Row E Hip ADD E Hip ABD E   Triceps Extension E Bicep Curl E Other: X       Assessment:   Patient tolerated Patient tolerated MedX Core Lumbar Strength and all other peripheral exercises without an increase in symptoms. Patient warmed up on Treadmill for 5 minutes, stretched, and iced low back for 5 minutes after the workout.     Plan:  Continue with established plan of care towards wellness goals.     Health  : Luciana Earl  5/31/2023

## 2023-06-07 ENCOUNTER — DOCUMENTATION ONLY (OUTPATIENT)
Dept: REHABILITATION | Facility: HOSPITAL | Age: 78
End: 2023-06-07
Payer: MEDICARE

## 2023-06-07 NOTE — PROGRESS NOTES
Health  Wellness Visit Note    Name: Sreekanth Pitts Jr.  Clinic Number: [id number]  Physician: No ref. provider found  Diagnosis: No diagnosis found.  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 Shame provided along with a discussion on what it means, the benefits, and suggestions for practice.  Reviewed last week's topic of Mindfulness.      Subjective:   Patient reports he has back pain but it is at a minimum and can be tolerated. He notices that the more he moves the better he feels. Over the weekend he worked in his yard  twice for about 2 hours each.He continued to walk his dog around his neighborhood daily and stretched every morning. He ices his back at home when need be at night.     Patient and HC discussed joining a gym after Wellness is over in September.     Objective:   Sreekanth completed therapeutic stretches (EIL, CHANTELLE) and the following MedX exercise machines: core lumbar, torso rotation l/r, leg extension, leg curl, upright row, chest press, biceps curl, triceps extension, leg press    See exercise log in patient folder for rate of exertion and repetitions completed.     Fitness Machine Education Key:  E=education on equipment initiated and further  follow up and education needed  I=independent with  and exercise.  The patient:  Adjusts machines to his/her settings  Uses equipment levers, pins, weights safely  Maintains safe and correct posture while exercising  Moves through exercise with correct pace and control  Gets on and off equipment safely      Lumbar/Cervical Ext. E Torso Rotation E Leg Press E   Leg Extension E Seated Leg Curl E Chest Press E   Seated Row E Hip ADD E Hip ABD E   Triceps Extension E Bicep Curl E Other: X       Assessment:   Patient tolerated Patient tolerated MedX Core Lumbar Strength and all other peripheral exercises without an increase in symptoms. Patient warmed up on Treadmill for 5 minutes, stretched, and iced low back for 5 minutes after the workout.     Plan:  Continue with established plan of care towards wellness goals.     Health  : Luciana Earl  6/7/2023

## 2023-06-28 ENCOUNTER — DOCUMENTATION ONLY (OUTPATIENT)
Dept: REHABILITATION | Facility: HOSPITAL | Age: 78
End: 2023-06-28
Payer: MEDICARE

## 2023-06-28 NOTE — PROGRESS NOTES
Health  Wellness Visit Note    Name: Sreekanth Pitts Jr.  Clinic Number: [id number]  Physician: No ref. provider found  Diagnosis: No diagnosis found.  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  Reviewed last week's topic of n/a (patient did not attend Wellness last week).       Subjective:   Patient reports he is feeling good and has no low back pain today. He has been busy with MD appointments recently. Patient stretches almost daily and when he is sore or stiff he will stretch continuously throughout the day. For additional exercise he walks his dog around his neighborhood but due to the heat limits it to just once a day. He usually ices his back at night especially on days he doesn't feel his best.     Patient and HC discussed joining a gym after Wellness is over in September.     Objective:   Sreekanth completed therapeutic stretches (EIL, CHANTELLE) and the following MedX exercise machines: core lumbar, torso rotation l/r, leg extension, leg curl, upright row, chest press, biceps curl, triceps extension, leg press    See exercise log in patient folder for rate  of exertion and repetitions completed.     Fitness Machine Education Key:  E=education on equipment initiated and further follow up and education needed  I=independent with  and exercise.  The patient:  Adjusts machines to his/her settings  Uses equipment levers, pins, weights safely  Maintains safe and correct posture while exercising  Moves through exercise with correct pace and control  Gets on and off equipment safely      Lumbar/Cervical Ext. E Torso Rotation E Leg Press E   Leg Extension E Seated Leg Curl E Chest Press E   Seated Row E Hip ADD E Hip ABD E   Triceps Extension E Bicep Curl E Other: X       Assessment:   Patient tolerated Patient tolerated MedX Core Lumbar Strength and all other peripheral exercises without an increase in symptoms. Patient warmed up on Treadmill for 5 minutes, stretched, and iced low back for 5 minutes after the workout.     Plan:  Continue with established plan of care towards wellness goals.     Health  : Luciana Earl  6/28/2023

## 2023-07-05 ENCOUNTER — DOCUMENTATION ONLY (OUTPATIENT)
Dept: REHABILITATION | Facility: HOSPITAL | Age: 78
End: 2023-07-05
Payer: MEDICARE

## 2023-07-05 NOTE — PROGRESS NOTES
Health  Wellness Visit Note    Name: Sreekanth Pitts Jr.  Clinic Number: [id number]  Physician: No ref. provider found  Diagnosis: No diagnosis found.  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  Reviewed last week's topic of Emotional Expression.    Subjective:   Patient reports he has no low back pain but some stiffness. Over the last week he stayed busy working in his yard and home office, spent time with his family and did his stretches daily. He usually ices his back at night especially on days he doesn't feel his best.     Patient and HC discussed joining a gym after Wellness is over in September.     Objective:   Sreekanth completed therapeutic stretches (EIL, CHANTELLE) and the following MedX exercise machines: core lumbar, torso rotation l/r, leg extension, leg curl, upright row, chest press, biceps curl, triceps extension, leg press    See exercise log in patient folder for rate of exertion and repetitions completed.     Fitness Machine Education Key:  E=education on equipment initiated and further follow up and education needed  I=independent with  and  exercise.  The patient:  Adjusts machines to his/her settings  Uses equipment levers, pins, weights safely  Maintains safe and correct posture while exercising  Moves through exercise with correct pace and control  Gets on and off equipment safely      Lumbar/Cervical Ext. E Torso Rotation E Leg Press E   Leg Extension E Seated Leg Curl E Chest Press E   Seated Row E Hip ADD E Hip ABD E   Triceps Extension E Bicep Curl E Other: X       Assessment:   Patient tolerated Patient tolerated MedX Core Lumbar Strength and all other peripheral exercises without an increase in symptoms. Patient warmed up on Treadmill for 5 minutes, stretched, and iced low back for 5 minutes after the workout.     Plan:  Continue with established plan of care towards wellness goals.     Health  : Luciana Earl  7/5/2023

## 2023-07-12 ENCOUNTER — DOCUMENTATION ONLY (OUTPATIENT)
Dept: REHABILITATION | Facility: HOSPITAL | Age: 78
End: 2023-07-12
Payer: MEDICARE

## 2023-07-12 NOTE — PROGRESS NOTES
Health  Wellness Visit Note    Name: Sreekanth Pitts Jr.  Clinic Number: [id number]  Physician: No ref. provider found  Diagnosis: No diagnosis found.  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  Reviewed last week's topic of Workaholic.    Subjective:   Patient reports he is at a 2/10 low back pain. He has very little low back pain. He has stayed busy working around the house, doing yard work and stretching daily. He does not ice his back outside of Wellness.     Patient and HC discussed joining a gym after Wellness is over in September.     Objective:   Sreekanth completed therapeutic stretches (EIL, CHANTELLE) and the following MedX exercise machines: core lumbar, torso rotation l/r, leg extension, leg curl, upright row, chest press, biceps curl, triceps extension, leg press    See exercise log in patient folder for rate of exertion and repetitions completed.     Fitness Machine Education Key:  E=education on equipment initiated and further follow up and education needed  I=independent with  and exercise.  The patient:  Adjusts machines to his/her settings  Uses  equipment levers, pins, weights safely  Maintains safe and correct posture while exercising  Moves through exercise with correct pace and control  Gets on and off equipment safely      Lumbar/Cervical Ext. E Torso Rotation E Leg Press E   Leg Extension E Seated Leg Curl E Chest Press E   Seated Row E Hip ADD E Hip ABD E   Triceps Extension E Bicep Curl E Other: X       Assessment:   Patient tolerated Patient tolerated MedX Core Lumbar Strength and all other peripheral exercises without an increase in symptoms. Patient warmed up on Treadmill for 5 minutes, stretched, and iced low back for 5 minutes after the workout.     Plan:  Continue with established plan of care towards wellness goals.     Health  : Luciana Earl  7/12/2023

## 2023-07-19 ENCOUNTER — DOCUMENTATION ONLY (OUTPATIENT)
Dept: REHABILITATION | Facility: HOSPITAL | Age: 78
End: 2023-07-19
Payer: MEDICARE

## 2023-07-19 NOTE — PROGRESS NOTES
Health  Wellness Visit Note    Name: Sreekanth Pitts Jr.  Clinic Number: [id number]  Physician: No ref. provider found  Diagnosis: No diagnosis found.  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  Reviewed last week's topic of Play.    Subjective:   Patient reports he has no low back pain coming into Wellness today. Over the last week nothing changed in his routine. He stayed active going on walks with his dog, doing yard work and stretching everyday.  He does not ice his back outside of Wellness.     Patient and HC discussed joining a gym after Wellness is over in September.     Objective:   Sreekanth completed therapeutic stretches (EIL, CHANTELLE) and the following MedX exercise machines: core lumbar, torso rotation l/r, leg extension, leg curl, upright row, chest press, biceps curl, triceps extension, leg press    See exercise log in patient folder for rate of exertion and repetitions completed.     Fitness Machine Education Key:  E=education on equipment initiated and further follow up and education needed  I=independent with  and exercise.  The  patient:  Adjusts machines to his/her settings  Uses equipment levers, pins, weights safely  Maintains safe and correct posture while exercising  Moves through exercise with correct pace and control  Gets on and off equipment safely      Lumbar/Cervical Ext. E Torso Rotation E Leg Press E   Leg Extension E Seated Leg Curl E Chest Press E   Seated Row E Hip ADD E Hip ABD E   Triceps Extension E Bicep Curl E Other: X       Assessment:   Patient tolerated Patient tolerated MedX Core Lumbar Strength and all other peripheral exercises without an increase in symptoms. Patient warmed up on Treadmill for 5 minutes, stretched, and iced low back for 5 minutes after the workout.     Plan:  Continue with established plan of care towards wellness goals.     Health  : Luciana Earl  7/19/2023

## 2023-07-25 ENCOUNTER — TELEPHONE (OUTPATIENT)
Dept: DERMATOLOGY | Facility: CLINIC | Age: 78
End: 2023-07-25
Payer: MEDICARE

## 2023-07-25 NOTE — TELEPHONE ENCOUNTER
----- Message from Kathi Johnson sent at 7/25/2023  9:36 AM CDT -----  Regarding: Appt  Contact: [phone]  Patient Sreekanth is calling. Patient would like to schedule his 6 month f/u. Please call patient at [phone]

## 2023-07-26 ENCOUNTER — DOCUMENTATION ONLY (OUTPATIENT)
Dept: REHABILITATION | Facility: HOSPITAL | Age: 78
End: 2023-07-26
Payer: MEDICARE

## 2023-08-02 ENCOUNTER — DOCUMENTATION ONLY (OUTPATIENT)
Dept: REHABILITATION | Facility: HOSPITAL | Age: 78
End: 2023-08-02
Payer: MEDICARE

## 2023-08-02 NOTE — PROGRESS NOTES
Health  Wellness Visit Note    Name: Serekanth Pitts Jr.  Clinic Number: [id number]  Physician: No ref. provider found  Diagnosis: No diagnosis found.  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  Reviewed last week's topic of Self-Dating.    Subjective:   Patient reports he is low-back pain free. Over the last week he has gone through his normal routine of yard work and walking his dog. He did his stretches almost daily. He does not ice his back outside of Wellness. HC and patient discussed plans moving forward since [facility] is approaching.      Patient and HC discussed joining a gym after Wellness is over in September.     Objective:   Sreekanth completed therapeutic stretches (EIL, CHANTELLE) and the following MedX exercise machines: core lumbar, torso rotation l/r, leg extension, leg curl, upright row, chest press, biceps curl, triceps extension, leg press    See exercise log in patient folder for rate of exertion and repetitions completed.     Fitness Machine Education Key:  E=education on equipment initiated and further follow up and education needed  I=independent with machine  set up and exercise.  The patient:  Adjusts machines to his/her settings  Uses equipment levers, pins, weights safely  Maintains safe and correct posture while exercising  Moves through exercise with correct pace and control  Gets on and off equipment safely      Lumbar/Cervical Ext. E Torso Rotation E Leg Press E   Leg Extension E Seated Leg Curl E Chest Press E   Seated Row E Hip ADD E Hip ABD E   Triceps Extension E Bicep Curl E Other: X       Assessment:   Patient tolerated Patient tolerated MedX Core Lumbar Strength and all other peripheral exercises without an increase in symptoms. Patient warmed up on Treadmill for 5 minutes, stretched, and iced low back for 5 minutes after the workout.     Plan:  Continue with established plan of care towards wellness goals.     Health  : Luciana Earl  8/2/2023

## 2023-08-04 RX ORDER — PANTOPRAZOLE SODIUM 40 MG/1
40 TABLET, DELAYED RELEASE ORAL
Qty: 90 TABLET | Refills: 3 | Status: ON HOLD | OUTPATIENT
Start: 2023-08-04 | End: 2023-08-29 | Stop reason: SDUPTHER

## 2023-08-07 ENCOUNTER — TELEPHONE (OUTPATIENT)
Dept: ENDOSCOPY | Facility: HOSPITAL | Age: 78
End: 2023-08-07
Payer: MEDICARE

## 2023-08-07 VITALS — WEIGHT: 260 LBS | HEIGHT: 74 IN | BODY MASS INDEX: 33.37 KG/M2

## 2023-08-07 DIAGNOSIS — K22.70 BARRETT'S ESOPHAGUS WITHOUT DYSPLASIA: Primary | ICD-10-CM

## 2023-08-07 DIAGNOSIS — K44.9 HIATAL HERNIA: ICD-10-CM

## 2023-08-07 NOTE — TELEPHONE ENCOUNTER
Contacted the patient to schedule an endoscopy procedure(s) 8/7/23. The patient did not answer the call and left a voice message requesting a call back.

## 2023-08-07 NOTE — TELEPHONE ENCOUNTER
"----- Message from Anastasiya Pathak MA sent at 2023 12:51 PM CDT -----    ----- Message -----  From: Dion Mckinney MD  Sent: 2023   6:00 PM CDT  To: [facility]    Procedure: EGD    Diagnosis:  Barretts esophagus, hiatal hernia    Procedure Timin-12 weeks    #If within 4 weeks selected, please christel as high priority#    #If greater than 12 weeks, please select "5-12 weeks" and delay sending until 2 months prior to requested date#     Provider: Myself    Location: [address]    Additional Scheduling Information: No scheduling concerns    Prep Specifications:Standard prep    Have you attached a patient to this message: yes       "

## 2023-08-07 NOTE — TELEPHONE ENCOUNTER
"----- Message -----  From: Dion Mckinney MD  Sent: 2023   6:00 PM CDT  To: [facility] Patients     Procedure: EGD     Diagnosis:  Barretts esophagus, hiatal hernia     Procedure Timin-12 weeks     #If within 4 weeks selected, please christel as high priority#     #If greater than 12 weeks, please select "5-12 weeks" and delay sending until 2 months prior to requested date#      Provider: Myself     Location: [address]     Additional Scheduling Information: No scheduling concerns     Prep Specifications:Standard prep     Have you attached a patient to this message: yes   "

## 2023-08-07 NOTE — TELEPHONE ENCOUNTER
Spoke to Sreekanth to schedule procedure(s) Upper Endoscopy (EGD)       Physician to perform procedure(s) Dr. RYAN Mckinney  Date of Procedure (s) 8/29/23  Arrival Time 12:15PM  Time of Procedure(s) 1:15 PM   Location of Procedure(s) [address]  Type of Rx Prep sent to patient: Other  Instructions provided to patient via [facility]    Patient was informed on the following information and verbalized understanding. Screening questionnaire reviewed with patient and complete. If procedure requires anesthesia, a responsible adult needs to be present to accompany the patient home, patient cannot drive after receiving anesthesia. Appointment details are tentative, especially check-in time. Patient will receive a prep-op call 4 days prior to confirm check-in time for procedure. If applicable the patient should contact their pharmacy to verify Rx for procedure prep is ready for pick-up. Patient was advised to call the scheduling department at [phone] if pharmacy states no Rx is available. Patient was advised to call the endoscopy scheduling department if any questions or concerns arise.      [facility] Scheduling Department

## 2023-08-08 ENCOUNTER — TELEPHONE (OUTPATIENT)
Dept: ENDOSCOPY | Facility: HOSPITAL | Age: 78
End: 2023-08-08
Payer: MEDICARE

## 2023-08-08 NOTE — TELEPHONE ENCOUNTER
----- Message from Dion Mckinney MD sent at 8/8/2023  2:31 PM CDT -----  Regarding: RE: Questions and concerns  Contact: [phone]  Not that I am aware of but you can set him up with a telemedicine video visit follow-up  ----- Message -----  From: Tere Galo MA  Sent: 8/5/2023   8:28 PM CDT  To: Dion Mckinney MD  Subject: FW: Questions and concerns                       Did you try to call him?  ----- Message -----  From: Kathi Johnson  Sent: 8/5/2023   1:04 PM CDT  To: [facility] Staff  Subject: Questions and concerns                           Patient Sreekanth is calling. Patient would like to know if office called him. He had a missed call. . Please call patient back at  [phone]

## 2023-08-09 ENCOUNTER — DOCUMENTATION ONLY (OUTPATIENT)
Dept: REHABILITATION | Facility: HOSPITAL | Age: 78
End: 2023-08-09
Payer: MEDICARE

## 2023-08-09 NOTE — PROGRESS NOTES
Health  Wellness Visit Note    Name: Sreekanth Pitts Jr.  Clinic Number: [id number]  Physician: No ref. provider found  Diagnosis: No diagnosis found.  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  Reviewed last week's topic of Smart Goals.     Subjective:   Patient reports he has no low-back pain today. Over the last week he had some pain from time to time but nothing doing his stretches does help relieve. He does his stretches everyday and goes for walks with his dog. Patient does not ice his back outside of Wellness.    Patient and HC discussed joining a gym after Wellness is over in September.     Objective:   Sreekanth completed therapeutic stretches (EIL, CHANTELLE) and the following MedX exercise machines: core lumbar, torso rotation l/r, leg extension, leg curl, upright row, chest press, biceps curl, triceps extension, leg press    See exercise log in patient folder for rate of exertion and repetitions completed.     Fitness Machine Education Key:  E=education on equipment initiated and further follow up and education needed  I=independent with  and  exercise.  The patient:  Adjusts machines to his/her settings  Uses equipment levers, pins, weights safely  Maintains safe and correct posture while exercising  Moves through exercise with correct pace and control  Gets on and off equipment safely      Lumbar/Cervical Ext. I Torso Rotation I Leg Press I   Leg Extension I Seated Leg Curl I Chest Press I   Seated Row I Hip ADD I Hip ABD I   Triceps Extension I Bicep Curl I Other: X       Assessment:   Patient tolerated Patient tolerated MedX Core Lumbar Strength and all other peripheral exercises without an increase in symptoms. Patient warmed up on Treadmill for 5 minutes, stretched, and iced low back for 5 minutes after the workout.     Plan:  Continue with established plan of care towards wellness goals.     Health  : Luciana Earl  8/9/2023

## 2023-08-15 ENCOUNTER — PES CALL (OUTPATIENT)
Dept: ADMINISTRATIVE | Facility: CLINIC | Age: 78
End: 2023-08-15
Payer: MEDICARE

## 2023-08-16 ENCOUNTER — DOCUMENTATION ONLY (OUTPATIENT)
Dept: REHABILITATION | Facility: HOSPITAL | Age: 78
End: 2023-08-16
Payer: MEDICARE

## 2023-08-16 NOTE — PROGRESS NOTES
Health  Wellness Visit Note    Name: Sreekanth Pitts Jr.  Clinic Number: [id number]  Physician: No ref. provider found  Diagnosis: No diagnosis found.  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  Reviewed last week's topic of Small Steps.     Subjective:   Patient reports he is back pain free today. He had no low back pain over the last week. He stayed busy with errands and his usual walks with his dog. He did his stretches almost everyday. He does not ice outside of Wellness.     Patient and HC discussed joining a gym after Wellness is over in September.     Objective:   Sreekanth completed therapeutic stretches (EIL, CHANTELLE) and the following MedX exercise machines: core lumbar, torso rotation l/r, leg extension, leg curl, upright row, chest press, biceps curl, triceps extension, leg press    See exercise log in patient folder for rate of exertion and repetitions completed.     Fitness Machine Education Key:  E=education on equipment initiated and further follow up and education needed  I=independent with  and exercise.  The patient:  Adjusts machines  to his/her settings  Uses equipment levers, pins, weights safely  Maintains safe and correct posture while exercising  Moves through exercise with correct pace and control  Gets on and off equipment safely      Lumbar/Cervical Ext. I Torso Rotation I Leg Press I   Leg Extension I Seated Leg Curl I Chest Press I   Seated Row I Hip ADD I Hip ABD I   Triceps Extension I Bicep Curl I Other: X       Assessment:   Patient tolerated Patient tolerated MedX Core Lumbar Strength and all other peripheral exercises without an increase in symptoms. Patient warmed up on Treadmill for 5 minutes, stretched, and iced low back for 5 minutes after the workout.     Plan:  Continue with established plan of care towards wellness goals.     Health  : Luciana Earl  8/16/2023

## 2023-08-18 ENCOUNTER — TELEPHONE (OUTPATIENT)
Dept: INTERNAL MEDICINE | Facility: CLINIC | Age: 78
End: 2023-08-18
Payer: MEDICARE

## 2023-08-18 DIAGNOSIS — I10 ESSENTIAL HYPERTENSION: ICD-10-CM

## 2023-08-18 DIAGNOSIS — K21.9 GASTROESOPHAGEAL REFLUX DISEASE WITHOUT ESOPHAGITIS: ICD-10-CM

## 2023-08-18 DIAGNOSIS — E53.8 LOW SERUM VITAMIN B12: ICD-10-CM

## 2023-08-18 DIAGNOSIS — K22.70 BARRETT'S ESOPHAGUS WITHOUT DYSPLASIA: ICD-10-CM

## 2023-08-18 DIAGNOSIS — E66.01 SEVERE OBESITY (BMI 35.0-39.9) WITH COMORBIDITY: ICD-10-CM

## 2023-08-18 DIAGNOSIS — R59.1 LYMPHADENOPATHY OF HEAD AND NECK: Primary | ICD-10-CM

## 2023-08-18 DIAGNOSIS — K21.9 GASTROESOPHAGEAL REFLUX DISEASE, UNSPECIFIED WHETHER ESOPHAGITIS PRESENT: ICD-10-CM

## 2023-08-18 DIAGNOSIS — G47.33 OBSTRUCTIVE SLEEP APNEA: Chronic | ICD-10-CM

## 2023-08-18 NOTE — TELEPHONE ENCOUNTER
----- Message from Zaria Tomas sent at 8/18/2023  9:00 AM CDT -----  Contact: pt [phone]  Patient scheduled their Annual Physical and is requesting labs prior to appt. Please enter order and contact patient to schedule.    Date of Annual Physical:  10/10/2023    Thank you

## 2023-08-22 NOTE — TELEPHONE ENCOUNTER
Hi, please contact the patient to assist in scheduling  Prior to his next appt with me  Orders Placed This Encounter    CBC Auto Differential    Comprehensive Metabolic Panel    Lipid Panel    Vitamin B12       Thank you, Torsten Bejarano

## 2023-08-23 ENCOUNTER — DOCUMENTATION ONLY (OUTPATIENT)
Dept: REHABILITATION | Facility: HOSPITAL | Age: 78
End: 2023-08-23
Payer: MEDICARE

## 2023-08-23 NOTE — PROGRESS NOTES
Health  Wellness Visit Note    Name: Sreekanth Pitts Jr.  Clinic Number: [id number]  Physician: No ref. provider found  Diagnosis: No diagnosis found.  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  Reviewed last week's topic of Stages of Change.      Subjective:   Patient reports he has no low back pain. He stayed busy with errands and his usual walks with his dog. He did his stretches almost everyday but mostly when he needs it. He does not ice outside of Wellness. He plans to tour several gyms within the next two weeks.     Patient and HC discussed joining a gym after Wellness is over in September.     Objective:   Sreekanth completed therapeutic stretches (EIL, CHANTELLE) and the following MedX exercise machines: core lumbar, torso rotation l/r, leg extension, leg curl, upright row, chest press, biceps curl, triceps extension, leg press    See exercise log in patient folder for rate of exertion and repetitions completed.     Fitness Machine Education Key:  E=education on equipment initiated and further follow up and education needed  I=independent with  and  exercise.  The patient:  Adjusts machines to his/her settings  Uses equipment levers, pins, weights safely  Maintains safe and correct posture while exercising  Moves through exercise with correct pace and control  Gets on and off equipment safely      Lumbar/Cervical Ext. I Torso Rotation I Leg Press I   Leg Extension I Seated Leg Curl I Chest Press I   Seated Row I Hip ADD I Hip ABD I   Triceps Extension I Bicep Curl I Other: X       Assessment:   Patient tolerated Patient tolerated MedX Core Lumbar Strength and all other peripheral exercises without an increase in symptoms. Patient warmed up on Treadmill for 5 minutes, stretched, and iced low back for 5 minutes after the workout.     Plan:  Continue with established plan of care towards wellness goals.     Health  : Luciana Earl  8/23/2023

## 2023-08-29 ENCOUNTER — ANESTHESIA EVENT (OUTPATIENT)
Dept: ENDOSCOPY | Facility: HOSPITAL | Age: 78
End: 2023-08-29
Payer: MEDICARE

## 2023-08-29 ENCOUNTER — HOSPITAL ENCOUNTER (OUTPATIENT)
Facility: HOSPITAL | Age: 78
Discharge: HOME | End: 2023-08-29
Attending: INTERNAL MEDICINE | Admitting: INTERNAL MEDICINE
Payer: MEDICARE

## 2023-08-29 ENCOUNTER — ANESTHESIA (OUTPATIENT)
Dept: ENDOSCOPY | Facility: HOSPITAL | Age: 78
End: 2023-08-29
Payer: MEDICARE

## 2023-08-29 VITALS
OXYGEN SATURATION: 95 % | DIASTOLIC BLOOD PRESSURE: 67 MMHG | SYSTOLIC BLOOD PRESSURE: 110 MMHG | HEART RATE: 74 BPM | BODY MASS INDEX: 33.8 KG/M2 | RESPIRATION RATE: 16 BRPM | WEIGHT: 255 LBS | TEMPERATURE: 98 F | HEIGHT: 73 IN

## 2023-08-29 DIAGNOSIS — K22.70 BARRETT'S ESOPHAGUS: ICD-10-CM

## 2023-08-29 DIAGNOSIS — K22.70 BARRETT'S ESOPHAGUS WITHOUT DYSPLASIA: Primary | ICD-10-CM

## 2023-08-29 PROCEDURE — 88342 IMHCHEM/IMCYTCHM 1ST ANTB: CPT | Performed by: PATHOLOGY

## 2023-08-29 PROCEDURE — 25000003 PHARM REV CODE 250: Performed by: NURSE ANESTHETIST, CERTIFIED REGISTERED

## 2023-08-29 PROCEDURE — 37000008 HC ANESTHESIA 1ST 15 MINUTES: Performed by: INTERNAL MEDICINE

## 2023-08-29 PROCEDURE — 37000009 HC ANESTHESIA EA ADD 15 MINS: Performed by: INTERNAL MEDICINE

## 2023-08-29 PROCEDURE — 88342 CHG IMMUNOCYTOCHEMISTRY: ICD-10-PCS | Mod: 26,,, | Performed by: PATHOLOGY

## 2023-08-29 PROCEDURE — 43239 PR EGD, FLEX, W/BIOPSY, SGL/MULTI: ICD-10-PCS | Mod: ,,, | Performed by: INTERNAL MEDICINE

## 2023-08-29 PROCEDURE — 88305 TISSUE EXAM BY PATHOLOGIST: ICD-10-PCS | Mod: 26,,, | Performed by: PATHOLOGY

## 2023-08-29 PROCEDURE — 88313 SPECIAL STAINS GROUP 2: CPT | Performed by: PATHOLOGY

## 2023-08-29 PROCEDURE — 88305 TISSUE EXAM BY PATHOLOGIST: CPT | Performed by: PATHOLOGY

## 2023-08-29 PROCEDURE — 88342 IMHCHEM/IMCYTCHM 1ST ANTB: CPT | Mod: 26,,, | Performed by: PATHOLOGY

## 2023-08-29 PROCEDURE — 43239 EGD BIOPSY SINGLE/MULTIPLE: CPT | Performed by: INTERNAL MEDICINE

## 2023-08-29 PROCEDURE — 88313 SPECIAL STAINS GROUP 2: CPT | Mod: 26,,, | Performed by: PATHOLOGY

## 2023-08-29 PROCEDURE — 88305 TISSUE EXAM BY PATHOLOGIST: CPT | Mod: 26,,, | Performed by: PATHOLOGY

## 2023-08-29 PROCEDURE — 63600175 PHARM REV CODE 636 W HCPCS: Performed by: NURSE ANESTHETIST, CERTIFIED REGISTERED

## 2023-08-29 PROCEDURE — 43239 EGD BIOPSY SINGLE/MULTIPLE: CPT | Mod: ,,, | Performed by: INTERNAL MEDICINE

## 2023-08-29 PROCEDURE — E9220 PRA ENDO ANESTHESIA: ICD-10-PCS | Mod: ,,, | Performed by: NURSE ANESTHETIST, CERTIFIED REGISTERED

## 2023-08-29 PROCEDURE — 88313 PR  SPECIAL STAINS,GROUP II: ICD-10-PCS | Mod: 26,,, | Performed by: PATHOLOGY

## 2023-08-29 PROCEDURE — E9220 PRA ENDO ANESTHESIA: HCPCS | Mod: ,,, | Performed by: NURSE ANESTHETIST, CERTIFIED REGISTERED

## 2023-08-29 PROCEDURE — 27201012 HC FORCEPS, HOT/COLD, DISP: Performed by: INTERNAL MEDICINE

## 2023-08-29 RX ORDER — PANTOPRAZOLE SODIUM 40 MG/1
40 TABLET, DELAYED RELEASE ORAL
Qty: 90 TABLET | Refills: 3 | Status: SHIPPED | OUTPATIENT
Start: 2023-08-29 | End: 2024-08-28

## 2023-08-29 RX ORDER — LIDOCAINE HYDROCHLORIDE 20 MG/ML
INJECTION INTRAVENOUS
Status: DISCONTINUED | OUTPATIENT
Start: 2023-08-29 | End: 2023-08-29

## 2023-08-29 RX ORDER — PROPOFOL 10 MG/ML
VIAL (ML) INTRAVENOUS
Status: DISCONTINUED | OUTPATIENT
Start: 2023-08-29 | End: 2023-08-29

## 2023-08-29 RX ORDER — SODIUM CHLORIDE 9 MG/ML
INJECTION, SOLUTION INTRAVENOUS CONTINUOUS
Status: DISCONTINUED | OUTPATIENT
Start: 2023-08-29 | End: 2023-08-29 | Stop reason: HOSPADM

## 2023-08-29 RX ADMIN — SODIUM CHLORIDE: 9 INJECTION, SOLUTION INTRAVENOUS at 12:08

## 2023-08-29 RX ADMIN — PROPOFOL 80 MG: 10 INJECTION, EMULSION INTRAVENOUS at 01:08

## 2023-08-29 RX ADMIN — PROPOFOL 175 MCG/KG/MIN: 10 INJECTION, EMULSION INTRAVENOUS at 01:08

## 2023-08-29 RX ADMIN — LIDOCAINE HYDROCHLORIDE 80 MG: 20 INJECTION INTRAVENOUS at 01:08

## 2023-08-29 NOTE — PROVATION PATIENT INSTRUCTIONS
Discharge Summary/Instructions after an Endoscopic Procedure  Patient Name: Sreekanth Pitts  Patient MRN: [id number]  Patient YOB: 1945 Tuesday, August 29, 2023  Dion Mckinney MD  Dear patient,  As a result of recent federal legislation (The Federal Cures Act), you may   receive lab or pathology results from your procedure in your [facility]   account before your physician is able to contact you. Your physician or   their representative will relay the results to you with their   recommendations at their soonest availability.  Thank you,  RESTRICTIONS:  During your procedure today, you received medications for sedation.  These   medications may affect your judgment, balance and coordination.  Therefore,   for 24 hours, you have the following restrictions:   - DO NOT drive a car, operate machinery, make legal/financial decisions,   sign important papers or drink alcohol.    ACTIVITY:  Today: no heavy lifting, straining or running due to procedural   sedation/anesthesia.  The following day: return to full activity including work.  DIET:  Eat and drink normally unless instructed otherwise.     TREATMENT FOR COMMON SIDE EFFECTS:  - Mild abdominal pain, nausea, belching, bloating or excessive gas:  rest,   eat lightly and use a heating pad.  - Sore Throat: treat with throat lozenges and/or gargle with warm salt   water.  - Because air was used during the procedure, expelling large amounts of air   from your rectum or belching is normal.  - If a bowel prep was taken, you may not have a bowel movement for 1-3 days.    This is normal.  SYMPTOMS TO WATCH FOR AND REPORT TO YOUR PHYSICIAN:  1. Abdominal pain or bloating, other than gas cramps.  2. Chest pain.  3. Back pain.  4. Signs of infection such as: chills or fever occurring within 24 hours   after the procedure.  5. Rectal bleeding, which would show as bright red, maroon, or black stools.   (A tablespoon of blood from the rectum is not serious, especially if    hemorrhoids are present.)  6. Vomiting.  7. Weakness or dizziness.  GO DIRECTLY TO THE NEAREST EMERGENCY ROOM IF YOU HAVE ANY OF THE FOLLOWING:      Difficulty breathing              Chills and/or fever over 101 F   Persistent vomiting and/or vomiting blood   Severe abdominal pain   Severe chest pain   Black, tarry stools   Bleeding- more than one tablespoon   Any other symptom or condition that you feel may need urgent attention  Your doctor recommends these additional instructions:  If any biopsies were taken, your doctors clinic will contact you in 1 to 2   weeks with any results.  - Discharge patient to home.   - Follow an antireflux regimen indefinitely.   - Use Protonix 40 mg once daily (or any other full strength proton pump   inhibitor) - best taken 45 minutes before your first protein containing   meal.   - For the next 1 week only Consider avoiding all non-steroidal   anti-inflammatory drugs (Mobic, ibuprofen, naproxen, etc.), unless needed   for cardiovascular protection.  O.K. to take aspirin if needed for   cardiovascular protection.  - Await pathology results.   - Telephone endoscopist for pathology results in 3 weeks.   - Repeat upper endoscopy in 3 years for surveillance based on pathology   results.   - Return to GI clinic at the next available appointment.   - The findings and recommendations were discussed with the patient.  For questions, problems or results please call your physician - Dion Mckinney MD at Work:  [phone].  [facility] [place] [facility], EMERGENCY ROOM PHONE NUMBER: [phone]  IF A COMPLICATION OR EMERGENCY SITUATION ARISES AND YOU ARE UNABLE TO REACH   YOUR PHYSICIAN - GO DIRECTLY TO THE EMERGENCY ROOM.  Dion Mckinney MD  8/29/2023 1:46:20 PM  This report has been verified and signed electronically.  Dear patient,  As a result of recent federal legislation (The Federal Cures Act), you may   receive lab or pathology results from your procedure in your [facility]    account before your physician is able to contact you. Your physician or   their representative will relay the results to you with their   recommendations at their soonest availability.  Thank you,  PROVATION

## 2023-08-29 NOTE — ANESTHESIA PREPROCEDURE EVALUATION
08/29/2023  Sreekanth Pitts Jr. is a 78 y.o., male.    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    AK (actinic keratosis)     Past Medical History:   Diagnosis Date    Achilles bursitis or tendinitis 9/25/2014    Allergy 12/3/2015    Arthritis     BPH without obstruction/lower urinary tract symptoms 01/04/2018    Chronic fatigue 7/10/2018    Degenerative disc disease     GERD (gastroesophageal reflux disease) 12/2/2020    Hypertension     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    Nuclear sclerosis - Both Eyes 7/30/2012     Past Surgical History:   Procedure Laterality Date    ESOPHAGOGASTRODUODENOSCOPY N/A 12/2/2020    Procedure: EGD (ESOPHAGOGASTRODUODENOSCOPY);  Surgeon: Dion Mckinney MD;  Location: [facility] F[facility]);  Service: Endoscopy;  Laterality: N/A;  covid-11/29-[facility]    EXCISION OF PAROTID GLAND Left 3/27/2023    Procedure: EXCISION, PAROTID GLAND;  Surgeon: Mook Newton MD;  [address]  Service: ENT;  Laterality: Left;    EYE FOREIGN BODY REMOVAL      childhood    LUMBAR LAMINECTOMY WITH FUSION  2002    MINIMALLY INVASIVE TRANSFORAMINAL LUMBAR INTERBODY FUSION (TLIF) N/A 2/14/2022    Procedure: FUSION, SPINE, LUMBAR, TLIF, MINIMALLY INVASIVE Right L4-5 Jose Juan PARK notified;  Surgeon: Anibal Beebe MD;  Location: [facility];  Service: Neurosurgery;  Laterality: N/A;  ASA1  TOR1  T&Screen  EMG  C-Arm  LSO  Jamie 4  poster  NEURO MONITORING RENALDO OSBORN [phone]  SPINEWAVE RENALDO POLLOCK [phone] TEXTED ON 2/2/2022 @ 3:54PM/ RESPONDED ON 2/2/2022 @4:22 PM-LO  PREOP DONE A    TRANSURETHRAL RESECTION OF PROSTATE (TURP) WITHOUT USE OF LASER N/A 9/11/2018    Procedure: TURP, WITHOUT USING LASER;  Surgeon: Uma Gaona MD;  Location: [facility] OR [address] [place] [address];  Service: Urology;  Laterality: N/A;  1.5 hours           Pre-op Assessment    I have reviewed the Patient Summary Reports.     I have reviewed the Nursing Notes. I have reviewed the NPO Status.   I have reviewed the Medications.     Review of Systems  Anesthesia Hx:  No problems with previous Anesthesia  History of prior surgery of interest to airway management or planning: Previous anesthesia: General Denies Family Hx of Anesthesia complications.   Denies Personal Hx of Anesthesia complications.   Hematology/Oncology:  Hematology Normal        Cardiovascular:   Hypertension    Hepatic/GI:   Bowel Prep.    Musculoskeletal:   Arthritis     Neurological:  Neurology Normal    Dermatological:  Skin Normal        Physical Exam  General: Cooperative, Alert and Oriented    Airway:  Mallampati: II   Mouth Opening: Normal  TM Distance: Normal  Tongue: Normal  Neck ROM: Normal ROM        Anesthesia Plan  Type of Anesthesia, risks & benefits discussed:    Anesthesia Type: Gen Natural Airway  Intra-op Monitoring Plan: Standard ASA Monitors  Induction:  IV  Informed Consent: Informed consent signed with the Patient and all parties understand the risks and agree with anesthesia plan.  All questions answered.   ASA Score: 3  Day of Surgery Review of History & Physical: H&P Update referred to the surgeon/provider.I have interviewed and examined the patient. I have reviewed the patient's H&P dated: There are no significant changes.     Ready For Surgery From Anesthesia Perspective.     .

## 2023-08-29 NOTE — ANESTHESIA POSTPROCEDURE EVALUATION
Anesthesia Post Evaluation    Patient: Sreekanth Pitts JrLisa    Procedure(s) Performed: Procedure(s) (LRB):  EGD (ESOPHAGOGASTRODUODENOSCOPY) (N/A)    Final Anesthesia Type: general      Patient location during evaluation: GI PACU  Patient participation: Yes- Able to Participate  Level of consciousness: awake and alert  Post-procedure vital signs: reviewed and stable  Pain management: adequate  Airway patency: patent    PONV status at discharge: No PONV  Anesthetic complications: no      Cardiovascular status: blood pressure returned to baseline and stable  Respiratory status: unassisted, room air and spontaneous ventilation  Hydration status: euvolemic  Follow-up not needed.          Vitals Value Taken Time   /67 08/29/23 1417   Temp 36.5 °C (97.7 °F) 08/29/23 1347   Pulse 74 08/29/23 1417   Resp 16 08/29/23 1417   SpO2 95 % 08/29/23 1417         Event Time   Out of Recovery 14:32:04         Pain/Darcy Score: Darcy Score: 9 (8/29/2023  1:48 PM)

## 2023-08-29 NOTE — H&P
[facility]  History & Physical    Subjective:      Chief Complaint/Reason for Admission:     EGD for Loaiza's surveillance    Sreekanth Pitts Jr. is a 78 y.o. male.    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     Past Surgical History:   Procedure Laterality Date    ESOPHAGOGASTRODUODENOSCOPY N/A 12/2/2020    Procedure: EGD (ESOPHAGOGASTRODUODENOSCOPY);  Surgeon: Dion Mckinney MD;  Location: [facility] (4TH FLR);  Service: Endoscopy;  Laterality: N/A;  covid-11/[facility]    EXCISION OF PAROTID GLAND Left 3/27/2023    Procedure: EXCISION, PAROTID GLAND;  Surgeon: Mook Newton MD;  Location: [facility] OR 2ND FLR;  Service: ENT;  Laterality: Left;    EYE FOREIGN BODY REMOVAL      childhood    LUMBAR LAMINECTOMY WITH FUSION  2002    MINIMALLY INVASIVE TRANSFORAMINAL LUMBAR INTERBODY FUSION (TLIF) N/A 2/14/2022    Procedure: FUSION, SPINE, LUMBAR, TLIF, MINIMALLY INVASIVE Right L4-5 Renaldo V notified;  Surgeon: Anibal Beebe MD;  Location: [facility];  Service: Neurosurgery;  Laterality: N/A;  ASA1  TOR1  T&Screen  EMG  C-Arm  LSO  Jamie 4 poster  NEURO MONITORING RENALDO OSBORN [phone]  SPINEWAVE RENALDO POLLOCK [phone] TEXTED ON 2/2/2022 @ 3:54PM/ RESPONDED ON 2/2/2022 @4:22 PM-LO  PREOP DONE A    TRANSURETHRAL RESECTION OF PROSTATE (TURP) WITHOUT USE OF LASER N/A 9/11/2018    Procedure: TURP, WITHOUT USING LASER;  Surgeon: Uma Gaona MD;  Location: [facility] OR 1ST FLR;  Service: Urology;  Laterality: N/A;  1.5 hours     Family History   Problem Relation Age of Onset    Cataracts Mother      Hypertension Mother     Cancer Father     Heart disease Father     Heart disease Brother     Prostate cancer Brother     Stroke Paternal Grandfather     Heart disease Paternal Grandfather     Colon cancer Neg Hx     Esophageal cancer Neg Hx      Social History     Tobacco Use    Smoking status: Former     Current packs/day: 0.00     Average packs/day: 2.0 packs/day for 4.0 years (8.0 ttl pk-yrs)     Types: Cigarettes, Cigars     Start date: 1969     Quit date: 1973     Years since quittin.6    Smokeless tobacco: Never   Substance Use Topics    Alcohol use: Yes     Alcohol/week: 1.0 standard drink of alcohol     Types: 1 Cans of beer per week     Comment: rarely    Drug use: No       PTA Medications   Medication Sig    atorvastatin (LIPITOR) 10 MG tablet Take 1 tablet (10 mg total) by mouth once daily.    dutasteride (AVODART) 0.5 mg capsule Take 1 capsule (0.5 mg total) by mouth once daily.    ibuprofen (ADVIL,MOTRIN) 200 MG tablet Take 200 mg by mouth every 6 (six) hours as needed for Pain.    lisinopriL (PRINIVIL,ZESTRIL) 20 MG tablet Take 1 tablet (20 mg total) by mouth once daily.    pantoprazole (PROTONIX) 40 MG tablet Take 1 tablet (40 mg total) by mouth before breakfast. Best taken 45-60 minutes before your 1st protein meal of the day breakfast    tamsulosin (FLOMAX) 0.4 mg Cap TAKE 1 CAPSULE (0.4 MG TOTAL) BY MOUTH EVERY EVENING. (Patient taking differently: Take 0.4 mg by mouth once daily.)    cyanocobalamin (VITAMIN B-12) 100 MCG tablet Take 1 tablet (100 mcg total) by mouth once daily.    HYDROcodone-acetaminophen (NORCO) 5-325 mg per tablet Take 1 tablet by mouth every 6 (six) hours as needed for Pain.    MULTIVITAMIN W-MINERALS/LUTEIN (CENTRUM SILVER ORAL) Take 1 tablet by mouth once daily.    ondansetron (ZOFRAN-ODT) 4 MG TbDL Dissolve 1 tablet (4 mg total) by mouth every 6 (six) hours as needed (nausea).     Review of patient's allergies indicates:  No Known Allergies     Review of  Systems   Constitutional:  Negative for fever.       Objective:      Vital Signs (Most Recent)  Temp: 97.7 °F (36.5 °C) (08/29/23 1251)  Pulse: 69 (08/29/23 1251)  Resp: 17 (08/29/23 1251)  BP: 130/86 (08/29/23 1251)  SpO2: 96 % (08/29/23 1251)    Vital Signs Range (Last 24H):  Temp:  [97.7 °F (36.5 °C)]   Pulse:  [69]   Resp:  [17]   BP: (130)/(86)   SpO2:  [96 %]     Physical Exam  Cardiovascular:      Rate and Rhythm: Normal rate.   Pulmonary:      Effort: Pulmonary effort is normal.   Neurological:      Mental Status: He is alert and oriented to person, place, and time.           Assessment:      EGD for Loaiza's surveillance      Plan:      EGD for Loaiza's surveillance

## 2023-08-29 NOTE — TRANSFER OF CARE
"Anesthesia Transfer of Care Note    Patient: Sreekanth Pitts Jr.    Procedure(s) Performed: Procedure(s) (LRB):  EGD (ESOPHAGOGASTRODUODENOSCOPY) (N/A)    Patient location: PACU    Anesthesia Type: general    Transport from OR: Transported from OR on room air with adequate spontaneous ventilation    Post pain: adequate analgesia    Post assessment: no apparent anesthetic complications and tolerated procedure well    Post vital signs: stable    Level of consciousness: awake, alert and oriented    Nausea/Vomiting: no nausea/vomiting    Complications: none    Transfer of care protocol was followed      Last vitals:   Visit Vitals  /68(BP Location: Left arm, Patient Position: Lying)   Pulse 72   Temp 36.5 °C (97.7 °F) (Temporal)   Resp 17   Ht 6' 1" (1.854 m)   Wt 115.7 kg (255 lb)   SpO2 96%   BMI 33.64 kg/m²     "

## 2023-08-30 ENCOUNTER — DOCUMENTATION ONLY (OUTPATIENT)
Dept: REHABILITATION | Facility: HOSPITAL | Age: 78
End: 2023-08-30
Payer: MEDICARE

## 2023-08-30 NOTE — PROGRESS NOTES
Health  Wellness Visit Note    Name: Sreekanth Pitts Jr.  Clinic Number: [id number]  Physician: No ref. provider found  Diagnosis: No diagnosis found.  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  Reviewed last week's topic of Journaling.     Subjective:   Patient reports he has no low back pain. He stayed busy with errands and his usual walks with his dog. He did his stretches almost everyday but mostly when he needs it. He does not ice outside of Wellness. He plans to tour several gyms within the next two weeks.     Today is the patient's last Wellness session. He is feeling confident in continuing his fitness journey solo. He left Wellness with a copy of his weight card and intermediate HEP.      Objective:   Sreekanth completed therapeutic stretches (EIL, CHANTELLE) and the following MedX exercise machines: core lumbar, torso rotation l/r, leg extension, leg curl, upright row, chest press, biceps curl, triceps extension, leg press    See exercise log in patient folder for rate of exertion and repetitions completed.     Fitness Machine Education Key:  E=education on  equipment initiated and further follow up and education needed  I=independent with  and exercise.  The patient:  Adjusts machines to his/her settings  Uses equipment levers, pins, weights safely  Maintains safe and correct posture while exercising  Moves through exercise with correct pace and control  Gets on and off equipment safely      Lumbar/Cervical Ext. I Torso Rotation I Leg Press I   Leg Extension I Seated Leg Curl I Chest Press I   Seated Row I Hip ADD I Hip ABD I   Triceps Extension I Bicep Curl I Other: X       Assessment:   Patient tolerated Patient tolerated MedX Core Lumbar Strength and all other peripheral exercises without an increase in symptoms. Patient warmed up on Treadmill for 5 minutes, stretched, and iced low back for 5 minutes after the workout.     Plan:  Continue with established plan of care towards wellness goals.     Health  : Luciana Earl  8/30/2023

## 2023-09-05 ENCOUNTER — OFFICE VISIT (OUTPATIENT)
Dept: SLEEP MEDICINE | Facility: CLINIC | Age: 78
End: 2023-09-05
Payer: MEDICARE

## 2023-09-05 VITALS
WEIGHT: 253.5 LBS | HEIGHT: 73 IN | HEART RATE: 77 BPM | DIASTOLIC BLOOD PRESSURE: 72 MMHG | BODY MASS INDEX: 33.6 KG/M2 | SYSTOLIC BLOOD PRESSURE: 114 MMHG

## 2023-09-05 DIAGNOSIS — G47.33 OSA (OBSTRUCTIVE SLEEP APNEA): Primary | ICD-10-CM

## 2023-09-05 LAB
FINAL PATHOLOGIC DIAGNOSIS: NORMAL
GROSS: NORMAL
Lab: NORMAL

## 2023-09-05 PROCEDURE — 99213 OFFICE O/P EST LOW 20 MIN: CPT | Mod: PBBFAC | Performed by: PHYSICIAN ASSISTANT

## 2023-09-05 PROCEDURE — 99213 OFFICE O/P EST LOW 20 MIN: CPT | Mod: S$PBB,,, | Performed by: PHYSICIAN ASSISTANT

## 2023-09-05 PROCEDURE — 99999 PR PBB SHADOW E&M-EST. PATIENT-LVL III: CPT | Mod: PBBFAC,,, | Performed by: PHYSICIAN ASSISTANT

## 2023-09-05 PROCEDURE — 99999 PR PBB SHADOW E&M-EST. PATIENT-LVL III: ICD-10-PCS | Mod: PBBFAC,,, | Performed by: PHYSICIAN ASSISTANT

## 2023-09-05 PROCEDURE — 99213 PR OFFICE/OUTPT VISIT, EST, LEVL III, 20-29 MIN: ICD-10-PCS | Mod: S$PBB,,, | Performed by: PHYSICIAN ASSISTANT

## 2023-09-05 NOTE — PROGRESS NOTES
Sreekanth your EGD pathology was benign no H pylori no dysplasia but your path does show Barretts esophagus which your aware of recommend you take your acid suppressive medicine (PPI) as directed once daily best taken 45-60 minutes before your 1st protein meal of the day breakfast and follow-up EGD 3 years for Barretts surveillance     1. Stomach, biopsy:   - Oxyntic mucosa with regenerative/reactive changes.     - Immunostain for H.pylori is negative.     - [place] red stain for amyloid is negative.     - See comment.     2. Esophagus, distal, biopsy:   - Intestinal (goblet cell) metaplasia, negative for dysplasia.     - Cardiac mucosa with mild chronic inflammation.     - Squamous mucosa with reflux esophagitis.     COMMENT: Appropriate positive controls are examined.   Comment: Interp By Pati Azul MD, Signed on 09/05/2023

## 2023-09-05 NOTE — PROGRESS NOTES
Referred by No ref. provider found     NEW PATIENT VISIT  Previously seen in sleep medicine by DACIA Castañeda, last visit 1/2020      Sreekanth Pitts Jr.  is a pleasant 78 y.o. male  with PMH significant for HTN, HLD, GERD, BPH, DDD (s/p spinal fusion), BMI 33+, STEPHIE who presents for management of STEPHIE.      Here today for : annual follow up    PLAN last visit 1/29/2020:   1. Continue Auto PAP 10-16 cm. Continue nightly use.Switch from [employer] to [facility]. GET [facility]  And climate control hose  2. Encouraged continued weight loss efforts for potential improvement of STEPHIE and overall health benefits. See PCP re: HTN/continue med      Since last visit:   Using CPAP nightly without complaints. Reports mask interface and pressure settings are comfortable.       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     Patient Active Problem List   Diagnosis    Primary hypertension    Obstructive sleep apnea    Kidney cysts    GERD (gastroesophageal reflux disease)    Decreased strength of trunk and back    Decreased back mobility    Other hyperlipidemia    Benign prostatic hyperplasia with lower urinary tract symptoms    S/P lumbar spinal fusion    Severe obesity (BMI 35.0-39.9) with comorbidity    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    AK (actinic keratosis)       Current Outpatient Medications:     atorvastatin (LIPITOR) 10 MG tablet, Take 1  "tablet (10 mg total) by mouth once daily., Disp: 90 tablet, Rfl: 11    cyanocobalamin (VITAMIN B-12) 100 MCG tablet, Take 1 tablet (100 mcg total) by mouth once daily., Disp: , Rfl:     dutasteride (AVODART) 0.5 mg capsule, Take 1 capsule (0.5 mg total) by mouth once daily., Disp: 90 capsule, Rfl: 3    HYDROcodone-acetaminophen (NORCO) 5-325 mg per tablet, Take 1 tablet by mouth every 6 (six) hours as needed for Pain., Disp: 20 tablet, Rfl: 0    ibuprofen (ADVIL,MOTRIN) 200 MG tablet, Take 200 mg by mouth every 6 (six) hours as needed for Pain., Disp: , Rfl:     lisinopriL (PRINIVIL,ZESTRIL) 20 MG tablet, Take 1 tablet (20 mg total) by mouth once daily., Disp: 90 tablet, Rfl: 11    MULTIVITAMIN W-MINERALS/LUTEIN (CENTRUM SILVER ORAL), Take 1 tablet by mouth once daily., Disp: , Rfl:     ondansetron (ZOFRAN-ODT) 4 MG TbDL, Dissolve 1 tablet (4 mg total) by mouth every 6 (six) hours as needed (nausea)., Disp: 15 tablet, Rfl: 0    pantoprazole (PROTONIX) 40 MG tablet, Take 1 tablet (40 mg total) by mouth before breakfast. Best taken 45-60 minutes before your 1st protein meal of the day breakfast, Disp: 90 tablet, Rfl: 3    tamsulosin (FLOMAX) 0.4 mg Cap, TAKE 1 CAPSULE (0.4 MG TOTAL) BY MOUTH EVERY EVENING. (Patient taking differently: Take 0.4 mg by mouth once daily.), Disp: 90 capsule, Rfl: 3       Vitals:    09/05/23 1130   BP: 114/72   BP Location: Left arm   Patient Position: Sitting   BP Method: Medium (Automatic)   Pulse: 77   Weight: 115 kg (253 lb 8.5 oz)   Height: 6' 1" (1.854 m)     Physical Exam:    GEN:   Well-appearing  Psych:  Appropriate affect, demonstrates insight  SKIN:  No rash on the face or bridge of the nose      LABS:   No results found for: "HGB", "CO2"    RECORDS REVIEWED PREVIOUSLY:    PSG 9/21/15: AHI was 30.2 with an oxygen damián of 82.0%. Supine AHI was 102.5, and AHI on sides was 10.3 and 16.2. Initial improvement was noted at 8 cm, but higher pressures were needed during REM. Good " control of sleep disordered breathing was seen during side position NREM and REM stages of sleep at a pressure of 10 cm of water. Upon turning supine, an effective pressure was not demonstrable due to onset of central apnea, frequent arousals, and sleep-wake transitions. An effective pressure was not demonstrated for supine position sleep     ASSESSMENT    [place] Sleepiness Scale:  Sitting and readin  Watching TV:    1  Passenger in a car x 1 hr:  2  Sitting quietly after lunch:  1  Lying down to rest in PM:  1  Sitting, inactive in public:  0  Sitting+ talking to someone:  0  Stopped in traffic:   0  Total          PROBLEM DESCRIPTION/ Sx on Presentation Interval Hx STATUS    STEPHIE   +snoring, +gasping arousals  Dx   PSG  AHI 30.2 (supine .5)  Using CPAP nightly with good control of sx Good usage and efficiency  Well controlled   Daytime Sx   + sleepiness when inactive   ESS  on intake (reviewed from 10/14/13) Less sleepy on CPAP improved   Other issues:     PLAN     -using and benefiting from CPAP therapy  -continue CPAP 10-16cwp nightly  -CPAP supplies ordered  -discussed STEPHIE and CPAP with patient in detail, including possible complications of untreated STEPHIE like heart attack/stroke  -advised on strict driving precautions; advised never to drive drowsy    Advised on plan of care. Answered all patient questions. Patient verbalized understanding and voiced agreement with plan of care.       RTC 12 months or as needed       The patient was given open opportunity to ask questions and/or express concerns about treatment plan.   All questions/concerns were discussed.     Two patient identifiers used prior to evaluation.

## 2023-10-03 ENCOUNTER — OFFICE VISIT (OUTPATIENT)
Dept: DERMATOLOGY | Facility: CLINIC | Age: 78
End: 2023-10-03
Payer: MEDICARE

## 2023-10-03 DIAGNOSIS — L82.1 SK (SEBORRHEIC KERATOSIS): ICD-10-CM

## 2023-10-03 DIAGNOSIS — Z12.83 SCREENING EXAM FOR SKIN CANCER: ICD-10-CM

## 2023-10-03 DIAGNOSIS — D18.01 ANGIOMA OF SKIN: ICD-10-CM

## 2023-10-03 DIAGNOSIS — L72.0 MILIA: ICD-10-CM

## 2023-10-03 DIAGNOSIS — D22.9 MULTIPLE BENIGN NEVI: ICD-10-CM

## 2023-10-03 DIAGNOSIS — L57.0 AK (ACTINIC KERATOSIS): Primary | ICD-10-CM

## 2023-10-03 PROCEDURE — 17000 DESTRUCT PREMALG LESION: CPT | Mod: PBBFAC | Performed by: DERMATOLOGY

## 2023-10-03 PROCEDURE — 99213 OFFICE O/P EST LOW 20 MIN: CPT | Mod: PBBFAC,25 | Performed by: DERMATOLOGY

## 2023-10-03 PROCEDURE — 17000 PR DESTRUCTION(LASER SURGERY,CRYOSURGERY,CHEMOSURGERY),PREMALIGNANT LESIONS,FIRST LESION: ICD-10-PCS | Mod: S$PBB,,, | Performed by: DERMATOLOGY

## 2023-10-03 PROCEDURE — 99999 PR PBB SHADOW E&M-EST. PATIENT-LVL III: ICD-10-PCS | Mod: PBBFAC,,, | Performed by: DERMATOLOGY

## 2023-10-03 PROCEDURE — 99213 OFFICE O/P EST LOW 20 MIN: CPT | Mod: 25,S$PBB,, | Performed by: DERMATOLOGY

## 2023-10-03 PROCEDURE — 17003 DESTRUCT PREMALG LES 2-14: CPT | Mod: PBBFAC | Performed by: DERMATOLOGY

## 2023-10-03 PROCEDURE — 99213 PR OFFICE/OUTPT VISIT, EST, LEVL III, 20-29 MIN: ICD-10-PCS | Mod: 25,S$PBB,, | Performed by: DERMATOLOGY

## 2023-10-03 PROCEDURE — 17003 DESTRUCTION, PREMALIGNANT LESIONS; SECOND THROUGH 14 LESIONS: ICD-10-PCS | Mod: S$PBB,,, | Performed by: DERMATOLOGY

## 2023-10-03 PROCEDURE — 17000 DESTRUCT PREMALG LESION: CPT | Mod: S$PBB,,, | Performed by: DERMATOLOGY

## 2023-10-03 PROCEDURE — 17003 DESTRUCT PREMALG LES 2-14: CPT | Mod: S$PBB,,, | Performed by: DERMATOLOGY

## 2023-10-03 PROCEDURE — 99999 PR PBB SHADOW E&M-EST. PATIENT-LVL III: CPT | Mod: PBBFAC,,, | Performed by: DERMATOLOGY

## 2023-10-03 NOTE — PROGRESS NOTES
Subjective:      Patient ID:  Sreekanth Pitts Jr. is a 78 y.o. male who presents for   Chief Complaint   Patient presents with    Lesion     L wrist    Skin Check     UBSE     History of Present Illness: The patient presents for follow up of skin check.    The patient was last seen on: 4/04/23 for cryosurgery to actinic keratoses which have resolved.   No h/o nmsc or mm.    Other skin complaints:   Patient with new complaint of lesion(s)  Location: L wrist  Duration: 1 month  Symptoms: itchy occ  Relieving factors/Previous treatments: neosporin oint prn, calamine lotion prn            Review of Systems   Skin:  Positive for activity-related sunscreen use and wears hat (99%). Negative for daily sunscreen use and recent sunburn.   Hematologic/Lymphatic: Bruises/bleeds easily (bruise).       Objective:   Physical Exam   Constitutional: He appears well-developed and well-nourished. No distress.   Neurological: He is alert and oriented to person, place, and time. He is not disoriented.   Psychiatric: He has a normal mood and affect.   Skin:   Areas Examined (abnormalities noted in diagram):   Scalp / Hair Palpated and Inspected  Head / Face Inspection Performed  Neck Inspection Performed  Chest / Axilla Inspection Performed  Back Inspection Performed  RUE Inspected  LUE Inspection Performed  Nails and Digits Inspection Performed                 Diagram Legend     Erythematous scaling macule/papule c/w actinic keratosis       Vascular papule c/w angioma      Pigmented verrucoid papule/plaque c/w seborrheic keratosis      Yellow umbilicated papule c/w sebaceous hyperplasia      Irregularly shaped tan macule c/w lentigo     1-2 mm smooth white papules consistent with Milia      Movable subcutaneous cyst with punctum c/w epidermal inclusion cyst      Subcutaneous movable cyst c/w pilar cyst      Firm pink to brown papule c/w dermatofibroma      Pedunculated fleshy papule(s) c/w skin tag(s)      Evenly pigmented macule c/w  junctional nevus     Mildly variegated pigmented, slightly irregular-bordered macule c/w mildly atypical nevus      Flesh colored to evenly pigmented papule c/w intradermal nevus       Pink pearly papule/plaque c/w basal cell carcinoma      Erythematous hyperkeratotic cursted plaque c/w SCC      Surgical scar with no sign of skin cancer recurrence      Open and closed comedones      Inflammatory papules and pustules      Verrucoid papule consistent consistent with wart     Erythematous eczematous patches and plaques     Dystrophic onycholytic nail with subungual debris c/w onychomycosis     Umbilicated papule    Erythematous-base heme-crusted tan verrucoid plaque consistent with inflamed seborrheic keratosis     Erythematous Silvery Scaling Plaque c/w Psoriasis     See annotation      Assessment / Plan:        AK (actinic keratosis) - 1 HAK on right wrist  Cryosurgery Procedure Note    Verbal consent from the patient is obtained including, but not limited to, risk of hypopigmentation/hyperpigmentation, scar, recurrence of lesion. The patient is aware of the precancerous quality and need for treatment of these lesions. Liquid nitrogen cryosurgery is applied to the 3 actinic keratoses, as detailed in the physical exam, to produce a freeze injury. The patient is aware that blisters may form and is instructed on wound care with gentle cleansing and use of vaseline ointment to keep moist until healed. The patient is supplied a handout on cryosurgery and is instructed to call if lesions do not completely resolve.    Cont wear hat always!    SK (seborrheic keratosis)   - minor problem and chronic.   Reassurance given to patient. No treatment necessary.     Multiple benign nevi   - minor problem and chronic.   Reassurance given to patient. No treatment necessary.     Angioma of skin   - minor problem and chronic.   Reassurance given to patient. No treatment necessary.     Milia   - minor problem and chronic.   Reassurance  given to patient. No treatment necessary.     Screening exam for skin cancer  Upper body skin examination performed today including at least 6 points as noted in physical examination. No lesions suspicious for malignancy noted.    Recommend daily sun protection/avoidance and use of at least SPF 30, broad spectrum sunscreen (OTC drug).              Follow up in about 6 months (around 4/3/2024), or if symptoms worsen or fail to improve.

## 2023-10-03 NOTE — PATIENT INSTRUCTIONS

## 2023-10-06 ENCOUNTER — LAB VISIT (OUTPATIENT)
Dept: LAB | Facility: HOSPITAL | Age: 78
End: 2023-10-06
Payer: MEDICARE

## 2023-10-06 DIAGNOSIS — I10 ESSENTIAL HYPERTENSION: ICD-10-CM

## 2023-10-06 DIAGNOSIS — G47.33 OBSTRUCTIVE SLEEP APNEA: Chronic | ICD-10-CM

## 2023-10-06 DIAGNOSIS — K21.9 GASTROESOPHAGEAL REFLUX DISEASE, UNSPECIFIED WHETHER ESOPHAGITIS PRESENT: ICD-10-CM

## 2023-10-06 DIAGNOSIS — R59.1 LYMPHADENOPATHY OF HEAD AND NECK: ICD-10-CM

## 2023-10-06 DIAGNOSIS — E66.01 SEVERE OBESITY (BMI 35.0-39.9) WITH COMORBIDITY: ICD-10-CM

## 2023-10-06 DIAGNOSIS — Z51.81 ENCOUNTER FOR MONITORING LONG-TERM PROTON PUMP INHIBITOR THERAPY: ICD-10-CM

## 2023-10-06 DIAGNOSIS — E53.8 LOW SERUM VITAMIN B12: ICD-10-CM

## 2023-10-06 DIAGNOSIS — K22.70 BARRETT'S ESOPHAGUS WITHOUT DYSPLASIA: ICD-10-CM

## 2023-10-06 DIAGNOSIS — K21.9 GASTROESOPHAGEAL REFLUX DISEASE WITHOUT ESOPHAGITIS: ICD-10-CM

## 2023-10-06 DIAGNOSIS — Z79.899 ENCOUNTER FOR MONITORING LONG-TERM PROTON PUMP INHIBITOR THERAPY: ICD-10-CM

## 2023-10-06 LAB
25(OH)D3+25(OH)D2 SERPL-MCNC: 20 NG/ML (ref 30–96)
ALBUMIN SERPL BCP-MCNC: 4 G/DL (ref 3.5–5.2)
ALP SERPL-CCNC: 63 U/L (ref 55–135)
ALT SERPL W/O P-5'-P-CCNC: 24 U/L (ref 10–44)
ANION GAP SERPL CALC-SCNC: 8 MMOL/L (ref 8–16)
AST SERPL-CCNC: 24 U/L (ref 10–40)
BASOPHILS # BLD AUTO: 0.07 K/UL (ref 0–0.2)
BASOPHILS NFR BLD: 1 % (ref 0–1.9)
BILIRUB SERPL-MCNC: 1.1 MG/DL (ref 0.1–1)
BUN SERPL-MCNC: 13 MG/DL (ref 8–23)
CALCIUM SERPL-MCNC: 9.2 MG/DL (ref 8.7–10.5)
CHLORIDE SERPL-SCNC: 106 MMOL/L (ref 95–110)
CHOLEST SERPL-MCNC: 118 MG/DL (ref 120–199)
CHOLEST/HDLC SERPL: 3.6 {RATIO} (ref 2–5)
CO2 SERPL-SCNC: 25 MMOL/L (ref 23–29)
CREAT SERPL-MCNC: 0.8 MG/DL (ref 0.5–1.4)
DIFFERENTIAL METHOD: NORMAL
EOSINOPHIL # BLD AUTO: 0.1 K/UL (ref 0–0.5)
EOSINOPHIL NFR BLD: 1.5 % (ref 0–8)
ERYTHROCYTE [DISTWIDTH] IN BLOOD BY AUTOMATED COUNT: 13 % (ref 11.5–14.5)
EST. GFR  (NO RACE VARIABLE): >60 ML/MIN/1.73 M^2
GLUCOSE SERPL-MCNC: 97 MG/DL (ref 70–110)
HCT VFR BLD AUTO: 47.2 % (ref 40–54)
HDLC SERPL-MCNC: 33 MG/DL (ref 40–75)
HDLC SERPL: 28 % (ref 20–50)
HGB BLD-MCNC: 15.3 G/DL (ref 14–18)
IMM GRANULOCYTES # BLD AUTO: 0.02 K/UL (ref 0–0.04)
IMM GRANULOCYTES NFR BLD AUTO: 0.3 % (ref 0–0.5)
LDLC SERPL CALC-MCNC: 63.8 MG/DL (ref 63–159)
LYMPHOCYTES # BLD AUTO: 1.5 K/UL (ref 1–4.8)
LYMPHOCYTES NFR BLD: 22.5 % (ref 18–48)
MCH RBC QN AUTO: 30.1 PG (ref 27–31)
MCHC RBC AUTO-ENTMCNC: 32.4 G/DL (ref 32–36)
MCV RBC AUTO: 93 FL (ref 82–98)
MONOCYTES # BLD AUTO: 0.6 K/UL (ref 0.3–1)
MONOCYTES NFR BLD: 8.7 % (ref 4–15)
NEUTROPHILS # BLD AUTO: 4.4 K/UL (ref 1.8–7.7)
NEUTROPHILS NFR BLD: 66 % (ref 38–73)
NONHDLC SERPL-MCNC: 85 MG/DL
NRBC BLD-RTO: 0 /100 WBC
PLATELET # BLD AUTO: 263 K/UL (ref 150–450)
PMV BLD AUTO: 10.2 FL (ref 9.2–12.9)
POTASSIUM SERPL-SCNC: 4.1 MMOL/L (ref 3.5–5.1)
PROT SERPL-MCNC: 7 G/DL (ref 6–8.4)
RBC # BLD AUTO: 5.09 M/UL (ref 4.6–6.2)
SODIUM SERPL-SCNC: 139 MMOL/L (ref 136–145)
TRIGL SERPL-MCNC: 106 MG/DL (ref 30–150)
VIT B12 SERPL-MCNC: 237 PG/ML (ref 210–950)
WBC # BLD AUTO: 6.7 K/UL (ref 3.9–12.7)

## 2023-10-06 PROCEDURE — 82306 VITAMIN D 25 HYDROXY: CPT | Performed by: INTERNAL MEDICINE

## 2023-10-06 PROCEDURE — 36415 COLL VENOUS BLD VENIPUNCTURE: CPT | Performed by: INTERNAL MEDICINE

## 2023-10-06 PROCEDURE — 80061 LIPID PANEL: CPT | Performed by: INTERNAL MEDICINE

## 2023-10-06 PROCEDURE — 82607 VITAMIN B-12: CPT | Performed by: INTERNAL MEDICINE

## 2023-10-06 PROCEDURE — 80053 COMPREHEN METABOLIC PANEL: CPT | Performed by: INTERNAL MEDICINE

## 2023-10-06 PROCEDURE — 85025 COMPLETE CBC W/AUTO DIFF WBC: CPT | Performed by: INTERNAL MEDICINE

## 2023-10-07 DIAGNOSIS — E55.9 VITAMIN D INSUFFICIENCY: Primary | ICD-10-CM

## 2023-10-07 RX ORDER — ERGOCALCIFEROL 1.25 MG/1
50000 CAPSULE ORAL
Qty: 12 CAPSULE | Refills: 0 | Status: SHIPPED | OUTPATIENT
Start: 2023-10-07 | End: 2023-12-24

## 2023-10-07 RX ORDER — ACETAMINOPHEN 500 MG
4000 TABLET ORAL DAILY
Qty: 180 CAPSULE | Refills: 3 | Status: SHIPPED | OUTPATIENT
Start: 2023-10-07 | End: 2024-10-06

## 2023-10-07 NOTE — PROGRESS NOTES
[facility] team - Please tell patient that their Vitamin D level is low and recommend that they take Vitamin D 50,000 units ONCE A WEEK (every 7-days) for 12 weeks AND THEN start Over-The-Counter Vitamin D3 (4,000 units) over-the-counter ONCE DAILY.     [facility] team - please recheck their vitamin D level in 6 months - Orders placed.

## 2023-10-10 ENCOUNTER — OFFICE VISIT (OUTPATIENT)
Dept: INTERNAL MEDICINE | Facility: CLINIC | Age: 78
End: 2023-10-10
Payer: MEDICARE

## 2023-10-10 VITALS
WEIGHT: 256.38 LBS | HEIGHT: 73 IN | RESPIRATION RATE: 18 BRPM | HEART RATE: 83 BPM | OXYGEN SATURATION: 95 % | DIASTOLIC BLOOD PRESSURE: 62 MMHG | SYSTOLIC BLOOD PRESSURE: 116 MMHG | BODY MASS INDEX: 33.98 KG/M2

## 2023-10-10 DIAGNOSIS — Z91.89 MULTIPLE RISK FACTORS FOR CORONARY ARTERY DISEASE: ICD-10-CM

## 2023-10-10 DIAGNOSIS — I70.0 AORTIC ATHEROSCLEROSIS: Primary | ICD-10-CM

## 2023-10-10 DIAGNOSIS — E53.8 LOW SERUM VITAMIN B12: ICD-10-CM

## 2023-10-10 DIAGNOSIS — I10 ESSENTIAL HYPERTENSION: ICD-10-CM

## 2023-10-10 PROCEDURE — 99999 PR PBB SHADOW E&M-EST. PATIENT-LVL III: ICD-10-PCS | Mod: PBBFAC,,, | Performed by: INTERNAL MEDICINE

## 2023-10-10 PROCEDURE — 99213 OFFICE O/P EST LOW 20 MIN: CPT | Mod: PBBFAC | Performed by: INTERNAL MEDICINE

## 2023-10-10 PROCEDURE — 99214 OFFICE O/P EST MOD 30 MIN: CPT | Mod: S$PBB,,, | Performed by: INTERNAL MEDICINE

## 2023-10-10 PROCEDURE — 99214 PR OFFICE/OUTPT VISIT, EST, LEVL IV, 30-39 MIN: ICD-10-PCS | Mod: S$PBB,,, | Performed by: INTERNAL MEDICINE

## 2023-10-10 PROCEDURE — 99999 PR PBB SHADOW E&M-EST. PATIENT-LVL III: CPT | Mod: PBBFAC,,, | Performed by: INTERNAL MEDICINE

## 2023-10-10 RX ORDER — ATORVASTATIN CALCIUM 10 MG/1
10 TABLET, FILM COATED ORAL DAILY
Qty: 90 TABLET | Refills: 11 | Status: SHIPPED | OUTPATIENT
Start: 2023-10-10

## 2023-10-10 RX ORDER — PNV NO.95/FERROUS FUM/FOLIC AC 28MG-0.8MG
100 TABLET ORAL DAILY
Qty: 90 TABLET | Refills: 11 | Status: SHIPPED | OUTPATIENT
Start: 2023-10-10

## 2023-10-10 RX ORDER — LISINOPRIL 20 MG/1
20 TABLET ORAL DAILY
Qty: 90 TABLET | Refills: 11 | Status: SHIPPED | OUTPATIENT
Start: 2023-10-10

## 2023-10-10 NOTE — PROGRESS NOTES
Health  Wellness Visit Note    Name: Sreekanth Pitts Jr.  Clinic Number: [id number]  Physician: No ref. provider found  Diagnosis: No diagnosis found.  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  Reviewed last week's topic of Laughter Therapy.    Subjective:   Patient reports he has no low back pain coming into Wellness today. Over the last week nothing changed in his routine. He stayed active going on walks with his dog, doing yard work and stretching everyday.  He does not ice his back outside of Wellness. He has a lot of meetings this week and will plan to stay active between active.     Patient and HC discussed joining a gym after Wellness is over in September.     Objective:   Sreekanth completed therapeutic stretches (EIL, CHANTELLE) and the following MedX exercise machines: core lumbar, torso rotation l/r, leg extension, leg curl, upright row, chest press, biceps curl, triceps extension, leg press    See exercise log in patient folder for rate of exertion and repetitions completed.     Fitness Machine Education Key:  E=education on equipment initiated and further  follow up and education needed  I=independent with  and exercise.  The patient:  Adjusts machines to his/her settings  Uses equipment levers, pins, weights safely  Maintains safe and correct posture while exercising  Moves through exercise with correct pace and control  Gets on and off equipment safely      Lumbar/Cervical Ext. E Torso Rotation E Leg Press E   Leg Extension E Seated Leg Curl E Chest Press E   Seated Row E Hip ADD E Hip ABD E   Triceps Extension E Bicep Curl E Other: X       Assessment:   Patient tolerated Patient tolerated MedX Core Lumbar Strength and all other peripheral exercises without an increase in symptoms. Patient warmed up on Treadmill for 5 minutes, stretched, and iced low back for 5 minutes after the workout.     Plan:  Continue with established plan of care towards wellness goals.     Health  : Luciana Earl  7/26/2023     Rinvoq Pregnancy And Lactation Text: Based on animal studies, Rinvoq may cause embryo-fetal harm when administered to pregnant women.  The medication should not be used in pregnancy.  Breastfeeding is not recommended during treatment and for 6 days after the last dose.

## 2023-10-10 NOTE — PROGRESS NOTES
Subjective:       Patient ID: Sreekanth Pitts Jr. is a 78 y.o. male.    Chief Complaint: Annual Exam    Patient is here for followup for chronic conditions.    He would like to review blood work.    When asked abt whether taking B12 he does report bilat hand numbness and tingling, symptoms do not wake him up. Symptoms are not severe.      Review of Systems   Constitutional:  Negative for appetite change, diaphoresis, fatigue, fever and unexpected weight change.   Respiratory:  Negative for chest tightness and shortness of breath.    Cardiovascular:  Negative for chest pain.   Gastrointestinal:  Negative for abdominal distention, abdominal pain, nausea and vomiting.   Genitourinary:  Negative for difficulty urinating, scrotal swelling and testicular pain.   Musculoskeletal:  Negative for arthralgias and back pain.   Skin:  Negative for rash.        No lesions   Neurological:  Positive for numbness (tingling bilat hands).        Tingling which is quick onset and offset bilat hands, none in the feet   Psychiatric/Behavioral:  Negative for sleep disturbance.            Objective:      Physical Exam  Vitals reviewed.   Constitutional:       General: He is not in acute distress.     Appearance: Normal appearance. He is well-developed. He is obese. He is not ill-appearing, toxic-appearing or diaphoretic.   HENT:      Head: Normocephalic and atraumatic.   Eyes:      General: No scleral icterus.  Neck:      Thyroid: No thyromegaly.      Comments: Healed L neck surgical excision site with no nodule or growth  Cardiovascular:      Rate and Rhythm: Normal rate and regular rhythm.      Heart sounds: Normal heart sounds. No murmur heard.     No friction rub. No gallop.   Pulmonary:      Effort: Pulmonary effort is normal. No respiratory distress.      Breath sounds: Normal breath sounds. No wheezing or rales.   Abdominal:      General: Bowel sounds are normal. There is no distension.      Palpations: Abdomen is soft. There is no  mass.      Tenderness: There is no abdominal tenderness. There is no guarding or rebound.   Musculoskeletal:         General: Normal range of motion.      Cervical back: Normal range of motion.      Comments: + Tinels and neg Phalens bilat  Nml hand  strength and LT sensation   Lymphadenopathy:      Cervical: No cervical adenopathy.   Skin:     Findings: No lesion.      Comments: A few scattered actinic areas   Neurological:      Mental Status: He is alert and oriented to person, place, and time.   Psychiatric:         Thought Content: Thought content normal.         Assessment:       1. Aortic atherosclerosis    2. Low serum vitamin B12    3. Multiple risk factors for coronary artery disease    4. Essential hypertension        Plan:       Sreekanth was seen today for annual exam.    Diagnoses and all orders for this visit:    Aortic atherosclerosis  On statin    Low serum vitamin B12  -     cyanocobalamin (VITAMIN B-12) 100 MCG tablet; Take 1 tablet (100 mcg total) by mouth once daily.    Multiple risk factors for coronary artery disease  -     atorvastatin (LIPITOR) 10 MG tablet; Take 1 tablet (10 mg total) by mouth once daily.    Essential hypertension  -     lisinopriL (PRINIVIL,ZESTRIL) 20 MG tablet; Take 1 tablet (20 mg total) by mouth once daily.  Controlled    Bilat hands mild tingling -- ? CTS bilat. I offered referral to hand specialist, he declines for now. Will call if symptoms worsen    Advised to restart B12 since level slightly low and his has some neuropathy symptoms    Health Maintenance         Date Due Completion Date    TETANUS VACCINE Never done ---    PROSTATE-SPECIFIC ANTIGEN 05/13/2023 5/13/2022    COVID-19 Vaccine (5 - 2023-24 season) 09/01/2023 10/11/2022    Lipid Panel 10/06/2028 10/6/2023   Seeing back urology for prostate  He will get covid booster         Follow up in about 1 year (around 10/10/2024).    Future Appointments   Date Time Provider Department Center   10/16/2023  9:40 AM  Uma Gaona MD [facility]   4/8/2024  8:00 AM [facility] [place]   10/10/2024  9:40 AM Torsten Bejarano MD [facility]

## 2023-10-11 PROBLEM — I70.0 AORTIC ATHEROSCLEROSIS: Status: ACTIVE | Noted: 2023-10-11

## 2023-10-16 ENCOUNTER — LAB VISIT (OUTPATIENT)
Dept: LAB | Facility: HOSPITAL | Age: 78
End: 2023-10-16
Attending: UROLOGY
Payer: MEDICARE

## 2023-10-16 ENCOUNTER — OFFICE VISIT (OUTPATIENT)
Dept: UROLOGY | Facility: CLINIC | Age: 78
End: 2023-10-16
Payer: MEDICARE

## 2023-10-16 VITALS
HEIGHT: 73 IN | WEIGHT: 253.06 LBS | BODY MASS INDEX: 33.54 KG/M2 | DIASTOLIC BLOOD PRESSURE: 84 MMHG | SYSTOLIC BLOOD PRESSURE: 133 MMHG | HEART RATE: 86 BPM

## 2023-10-16 DIAGNOSIS — R33.9 URINARY RETENTION: ICD-10-CM

## 2023-10-16 DIAGNOSIS — N40.0 BENIGN PROSTATIC HYPERPLASIA WITHOUT LOWER URINARY TRACT SYMPTOMS: Primary | ICD-10-CM

## 2023-10-16 DIAGNOSIS — Z12.5 PROSTATE CANCER SCREENING: ICD-10-CM

## 2023-10-16 DIAGNOSIS — B37.2 YEAST DERMATITIS: ICD-10-CM

## 2023-10-16 LAB — COMPLEXED PSA SERPL-MCNC: 2.2 NG/ML (ref 0–4)

## 2023-10-16 PROCEDURE — 84153 ASSAY OF PSA TOTAL: CPT | Performed by: UROLOGY

## 2023-10-16 PROCEDURE — 99999 PR PBB SHADOW E&M-EST. PATIENT-LVL III: CPT | Mod: PBBFAC,,, | Performed by: UROLOGY

## 2023-10-16 PROCEDURE — 36415 COLL VENOUS BLD VENIPUNCTURE: CPT | Performed by: UROLOGY

## 2023-10-16 PROCEDURE — 99214 PR OFFICE/OUTPT VISIT, EST, LEVL IV, 30-39 MIN: ICD-10-PCS | Mod: S$PBB,,, | Performed by: UROLOGY

## 2023-10-16 PROCEDURE — 99214 OFFICE O/P EST MOD 30 MIN: CPT | Mod: S$PBB,,, | Performed by: UROLOGY

## 2023-10-16 PROCEDURE — 99213 OFFICE O/P EST LOW 20 MIN: CPT | Mod: PBBFAC | Performed by: UROLOGY

## 2023-10-16 PROCEDURE — 99999 PR PBB SHADOW E&M-EST. PATIENT-LVL III: ICD-10-PCS | Mod: PBBFAC,,, | Performed by: UROLOGY

## 2023-10-16 RX ORDER — NYSTATIN 100000 U/G
CREAM TOPICAL 2 TIMES DAILY
Qty: 30 G | Refills: 3 | Status: SHIPPED | OUTPATIENT
Start: 2023-10-16 | End: 2024-02-20

## 2023-10-16 RX ORDER — DUTASTERIDE 0.5 MG/1
0.5 CAPSULE, LIQUID FILLED ORAL DAILY
Qty: 90 CAPSULE | Refills: 3 | Status: SHIPPED | OUTPATIENT
Start: 2023-10-16

## 2023-10-16 RX ORDER — TAMSULOSIN HYDROCHLORIDE 0.4 MG/1
0.4 CAPSULE ORAL NIGHTLY
Qty: 90 CAPSULE | Refills: 3 | Status: SHIPPED | OUTPATIENT
Start: 2023-10-16

## 2023-10-16 NOTE — PROGRESS NOTES
CHIEF COMPLAINT:    Mr. Pitts is a 78 y.o. male presenting for an annual  follow up.    PRESENTING ILLNESS:    Sreekanth Pitts Jr. is a 78 y.o. male who presents with a history of BPH with urinary retention.  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  He states his urinary symptoms are at baseline.  No slowing of his stream, he continues on the tamsulosin.  He has been out of the dutesteride for about 1 month.      REVIEW OF SYSTEMS:    Review of Systems   Constitutional: Negative.    HENT: Negative.     Eyes: Negative.    Respiratory: Negative.     Cardiovascular: Negative.    Gastrointestinal: Negative.    Genitourinary:  Positive for urgency.   Musculoskeletal: Negative.    Skin: Negative.    Neurological: Negative.    Endo/Heme/Allergies: Negative.    Psychiatric/Behavioral: Negative.         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       Past Surgical History:   Procedure Laterality Date    ESOPHAGOGASTRODUODENOSCOPY N/A 12/2/2020    Procedure: EGD (ESOPHAGOGASTRODUODENOSCOPY);  Surgeon: Dion Mckinney MD;  Location: [address]);  Service: Endoscopy;  Laterality: N/A;  covid-11/29-[facility]    ESOPHAGOGASTRODUODENOSCOPY N/A 8/29/2023    Procedure: EGD (ESOPHAGOGASTRODUODENOSCOPY);  Surgeon: Dion Mckinney MD;  Location: [facility] ([address] [place] [address]);  Service: Endoscopy;  Laterality: N/A;  prep instructions sent to pt vai portal -  5-12 week time frame    EXCISION OF PAROTID GLAND Left 3/27/2023    Procedure: EXCISION,  PAROTID GLAND;  Surgeon: Mook Newton MD;  Location: [facility] OR 2ND FLR;  Service: ENT;  Laterality: Left;    EYE FOREIGN BODY REMOVAL      childhood    LUMBAR LAMINECTOMY WITH FUSION      MINIMALLY INVASIVE TRANSFORAMINAL LUMBAR INTERBODY FUSION (TLIF) N/A 2022    Procedure: FUSION, SPINE, LUMBAR, TLIF, MINIMALLY INVASIVE Right L4-5 Renaldo PARK notified;  Surgeon: Anibal Beebe MD;  Location: [facility] OR;  Service: Neurosurgery;  Laterality: N/A;  ASA1  TOR1  T&Screen  EMG  C-Arm  LSO  Jamie 4 poster  NEURO MONITORING RENALDO OSBORN [phone]  SPINEWAVE RENALDO POLLOCK [phone] TEXTED ON 2022 @ 3:54PM/ RESPONDED ON 2022 @4:22 PM-LO  PREOP DONE A    TRANSURETHRAL RESECTION OF PROSTATE (TURP) WITHOUT USE OF LASER N/A 2018    Procedure: TURP, WITHOUT USING LASER;  Surgeon: Uma Gaona MD;  Location: [facility] OR 1ST FLR;  Service: Urology;  Laterality: N/A;  1.5 hours       Family History   Problem Relation Age of Onset    Cataracts Mother     Hypertension Mother     Cancer Father     Heart disease Father     Heart disease Brother     Prostate cancer Brother     Stroke Paternal Grandfather     Heart disease Paternal Grandfather      Social History     Socioeconomic History    Marital status:    Occupational History     Employer: [employer]   Tobacco Use    Smoking status: Former     Current packs/day: 0.00     Average packs/day: 2.0 packs/day for 4.0 years (8.0 ttl pk-yrs)     Types: Cigarettes, Cigars     Start date: 1969     Quit date: 1973     Years since quittin.8    Smokeless tobacco: Never   Substance and Sexual Activity    Alcohol use: Yes     Alcohol/week: 1.0 standard drink of alcohol     Types: 1 Cans of beer per week     Comment: rarely    Drug use: No   Social History Narrative    , [employer] firm , mostly [employer] work. wife (healthy) lives at home, 2 dtrs, 46, 44. frequ walking each day.     Social Determinants of Health      Financial Resource Strain: Unknown (1/25/2021)    Overall Financial Resource Strain (CARDIA)     Difficulty of Paying Living Expenses: Patient refused   Food Insecurity: Unknown (1/25/2021)    Hunger Vital Sign     Worried About Running Out of Food in the Last Year: Patient refused     Ran Out of Food in the Last Year: Patient refused   Transportation Needs: Unknown (1/25/2021)    PRAPARE - Transportation     Lack of Transportation (Medical): Patient refused     Lack of Transportation (Non-Medical): Patient refused   Physical Activity: Sufficiently Active (1/25/2021)    Exercise Vital Sign     Days of Exercise per Week: 7 days     Minutes of Exercise per Session: 70 min   Stress: Stress Concern Present (1/25/2021)    [employer] [place] [employer] - Occupational Stress Questionnaire     Feeling of Stress : To some extent   Social Connections: Unknown (1/25/2021)    Social Connection and Isolation Panel [NHANES]     Frequency of Communication with Friends and Family: Patient refused     Frequency of Social Gatherings with Friends and Family: Twice a week     Active Member of Clubs or Organizations: Patient refused     Attends Club or Organization Meetings: Patient refused     Marital Status:        Allergies:  Patient has no known allergies.    Medications:  Outpatient Encounter Medications as of 10/16/2023   Medication Sig Dispense Refill    atorvastatin (LIPITOR) 10 MG tablet Take 1 tablet (10 mg total) by mouth once daily. 90 tablet 11    cholecalciferol, vitamin D3, (VITAMIN D3) 50 mcg (2,000 unit) Cap capsule Take 2 capsules (4,000 Units total) by mouth once daily. 180 capsule 3    cyanocobalamin (VITAMIN B-12) 100 MCG tablet Take 1 tablet (100 mcg total) by mouth once daily. 90 tablet 11    dutasteride (AVODART) 0.5 mg capsule Take 1 capsule (0.5 mg total) by mouth once daily. 90 capsule 3    lisinopriL (PRINIVIL,ZESTRIL) 20 MG tablet Take 1 tablet (20 mg total) by mouth once daily. 90  tablet 11    MULTIVITAMIN W-MINERALS/LUTEIN (CENTRUM SILVER ORAL) Take 1 tablet by mouth once daily.      pantoprazole (PROTONIX) 40 MG tablet Take 1 tablet (40 mg total) by mouth before breakfast. Best taken 45-60 minutes before your 1st protein meal of the day breakfast 90 tablet 3    tamsulosin (FLOMAX) 0.4 mg Cap TAKE 1 CAPSULE (0.4 MG TOTAL) BY MOUTH EVERY EVENING. (Patient taking differently: Take 0.4 mg by mouth once daily.) 90 capsule 3    ergocalciferol (ERGOCALCIFEROL) 50,000 unit Cap Take 1 capsule (50,000 Units total) by mouth every 7 days. for 12 doses (Patient not taking: Reported on 10/16/2023) 12 capsule 0     No facility-administered encounter medications on file as of 10/16/2023.         PHYSICAL EXAMINATION:    The patient generally appears in good health, is appropriately interactive, and is in no apparent distress.    Skin: No lesions.    Mental: Cooperative with normal affect.    Neuro: Grossly intact.    HEENT: Normal. No evidence of lymphadenopathy.    Chest:  normal inspiratory effort.    Abdomen: Soft, non-tender. No masses or organomegaly. Bladder is not palpable. No evidence of flank discomfort. No evidence of inguinal hernia.    Extremities: No clubbing, cyanosis, or edema    Scrotum showed no rashes or lesions. He does have a rash in the intertriginous areas consistent with yeast dermatitis.  Testicles showed no masses or tenderness.  Epididymis showed no masses or tenderness.  Penis was circumcised. No meatal stenosis. No penile discharge.  No inguinal hernias.  No inguinal lymphadenopathy.    RIK:  50 g prostate without nodularity.  Normal rectal tone, no anal masses.   PVR by bladder scan was 0 ml    LABS:    Lab Results   Component Value Date    BUN 13 10/06/2023    CREATININE 0.8 10/06/2023       Lab Results   Component Value Date    PSA 1.7 05/13/2022    PSA 1.3 03/10/2021    PSA 1.5 01/27/2020    PSADIAG 2.7 12/19/2013       UA 1.005, pH 7, otherwise, negative.      IMPRESSION:    BPH without LUTS  Yeast dermatitis    PLAN:    1. He has longevity on his mother;'s side, maternal grand mother lived to be 96, mother lived to 97.  Brother is 65, has had cancer and heart disease.  He favors his mother, will check a PSA  2.  Refilled tamsulosin and dutesteride.  3.  Follow up in 1 year, if PSA is stable.  Has not been on it for 1 month so will need to kep this in mind with the results of the PSA.    4.  Nystatin cream prescried.  He is not having symptoms today. (Not itching or irritation)    I spent 30 minutes with the patient of which more than half was spent in direct consultation with the patient in regards to our treatment and plan.    Addendum, was 2.2 from 1.7.  will plan to repeat PSA in 6 months.

## 2023-10-17 ENCOUNTER — TELEPHONE (OUTPATIENT)
Dept: UROLOGY | Facility: CLINIC | Age: 78
End: 2023-10-17
Payer: MEDICARE

## 2023-10-17 NOTE — TELEPHONE ENCOUNTER
----- Message from Uma Gaona MD sent at 10/16/2023  6:55 PM CDT -----  Please schedule PSA in 6 months (April 2024) thanks.

## 2023-12-04 ENCOUNTER — OFFICE VISIT (OUTPATIENT)
Dept: PRIMARY CARE CLINIC | Facility: CLINIC | Age: 78
End: 2023-12-04
Payer: MEDICARE

## 2023-12-04 VITALS
DIASTOLIC BLOOD PRESSURE: 88 MMHG | BODY MASS INDEX: 34.09 KG/M2 | WEIGHT: 257.25 LBS | HEART RATE: 79 BPM | OXYGEN SATURATION: 98 % | SYSTOLIC BLOOD PRESSURE: 128 MMHG | HEIGHT: 73 IN

## 2023-12-04 DIAGNOSIS — Z01.818 PRE-OP EXAMINATION: Primary | ICD-10-CM

## 2023-12-04 DIAGNOSIS — H25.9 SENILE CATARACT OF LEFT EYE, UNSPECIFIED AGE-RELATED CATARACT TYPE: ICD-10-CM

## 2023-12-04 DIAGNOSIS — I10 PRIMARY HYPERTENSION: Chronic | ICD-10-CM

## 2023-12-04 PROCEDURE — 99214 PR OFFICE/OUTPT VISIT, EST, LEVL IV, 30-39 MIN: ICD-10-PCS | Mod: S$PBB,,, | Performed by: STUDENT IN AN ORGANIZED HEALTH CARE EDUCATION/TRAINING PROGRAM

## 2023-12-04 PROCEDURE — 99213 OFFICE O/P EST LOW 20 MIN: CPT | Mod: PBBFAC | Performed by: STUDENT IN AN ORGANIZED HEALTH CARE EDUCATION/TRAINING PROGRAM

## 2023-12-04 PROCEDURE — 99214 OFFICE O/P EST MOD 30 MIN: CPT | Mod: S$PBB,,, | Performed by: STUDENT IN AN ORGANIZED HEALTH CARE EDUCATION/TRAINING PROGRAM

## 2023-12-04 PROCEDURE — 99999 PR PBB SHADOW E&M-EST. PATIENT-LVL III: ICD-10-PCS | Mod: PBBFAC,,, | Performed by: STUDENT IN AN ORGANIZED HEALTH CARE EDUCATION/TRAINING PROGRAM

## 2023-12-04 PROCEDURE — 99999 PR PBB SHADOW E&M-EST. PATIENT-LVL III: CPT | Mod: PBBFAC,,, | Performed by: STUDENT IN AN ORGANIZED HEALTH CARE EDUCATION/TRAINING PROGRAM

## 2023-12-04 NOTE — PROGRESS NOTES
SUBJECTIVE     Chief Complaint   Patient presents with    Pre-op Exam       HPI  Sreekanth Pitts Jr. is a 78 y.o. male with medical diagnoses as listed in the medical history and problem list that presents for pre op.    PCP: Dr. Torsten Bejarano    Preop examination:  Procedure-left cataract removal  Date-12/12/2023  Not on blood thinners  No problems with anesthesia in the past  Vital signs within normal limits in clinic today.  Nicotine use: n/a    Hypertension:  On lisinopril 20 mg daily.  Taking without issues.  Blood pressure 128/88 in clinic today.    BPH:  On Flomax 0.4 mg nightly and dutasteride 0.5 mg daily.    History of salivary gland cancer and removal.    Pt is UTD on age appropriate CA screening.    Family, social, surgical Hx reviewed     Health Maintenance         Date Due Completion Date    TETANUS VACCINE Never done ---    RSV Vaccine (Age 60+ and Pregnant patients) (1 - 1-dose 60+ series) Never done ---    COVID-19 Vaccine (5 - 2023-24 season) 09/01/2023 10/11/2022    PROSTATE-SPECIFIC ANTIGEN 10/16/2024 10/16/2023    Lipid Panel 10/06/2028 10/6/2023              PAST MEDICAL HISTORY:  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       PAST SURGICAL HISTORY:  Past Surgical History:   Procedure Laterality Date    ESOPHAGOGASTRODUODENOSCOPY N/A 12/2/2020    Procedure: EGD (ESOPHAGOGASTRODUODENOSCOPY);  Surgeon: Dion Mckinney MD;  Location: [address]);  Service: Endoscopy;  Laterality: N/A;  covid-11/29-[facility]    ESOPHAGOGASTRODUODENOSCOPY N/A 8/29/2023    Procedure: EGD (ESOPHAGOGASTRODUODENOSCOPY);   Surgeon: Dion Mckinney MD;  Location: [facility] ENDO (4TH FLR);  Service: Endoscopy;  Laterality: N/A;  prep instructions sent to pt vai [place] -  5-12 week time frame    EXCISION OF PAROTID GLAND Left 3/27/2023    Procedure: EXCISION, PAROTID GLAND;  Surgeon: Mook Newton MD;  Location: [facility] OR 2ND FLR;  Service: ENT;  Laterality: Left;    EYE FOREIGN BODY REMOVAL      childhood    LUMBAR LAMINECTOMY WITH FUSION      MINIMALLY INVASIVE TRANSFORAMINAL LUMBAR INTERBODY FUSION (TLIF) N/A 2022    Procedure: FUSION, SPINE, LUMBAR, TLIF, MINIMALLY INVASIVE Right L4-5 Renaldo PARK notified;  Surgeon: Anibal Beebe MD;  Location: [facility];  Service: Neurosurgery;  Laterality: N/A;  ASA1  TOR1  T&Screen  EMG  C-Arm  LSO  Jamie 4 poster  NEURO MONITORING RENALDO ANURAG [phone]  SPINEWAVE RENALDO OKEEFEQUEZ [phone] TEXTED ON 2022 @ 3:54PM/ RESPONDED ON 2022 @4:22 PM-LO  PREOP DONE A    TRANSURETHRAL RESECTION OF PROSTATE (TURP) WITHOUT USE OF LASER N/A 2018    Procedure: TURP, WITHOUT USING LASER;  Surgeon: Uma Gaona MD;  Location: [facility] OR 1ST FLR;  Service: Urology;  Laterality: N/A;  1.5 hours       SOCIAL HISTORY:  Social History     Socioeconomic History    Marital status:    Occupational History     Employer: [employer]   Tobacco Use    Smoking status: Former     Current packs/day: 0.00     Average packs/day: 2.0 packs/day for 4.0 years (8.0 ttl pk-yrs)     Types: Cigarettes, Cigars     Start date: 1969     Quit date: 1973     Years since quittin.9    Smokeless tobacco: Never   Substance and Sexual Activity    Alcohol use: Yes     Alcohol/week: 1.0 standard drink of alcohol     Types: 1 Cans of beer per week     Comment: rarely    Drug use: No   Social History Narrative    , consulting firm , 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 wife (healthy) lives at home, 2 dtrs, 44, 42. frequ walking each day.     Social Determinants of Health     Financial  Resource Strain: Unknown (1/25/2021)    Overall Financial Resource Strain (CARDIA)     Difficulty of Paying Living Expenses: Patient refused   Food Insecurity: Unknown (1/25/2021)    Hunger Vital Sign     Worried About Running Out of Food in the Last Year: Patient refused     Ran Out of Food in the Last Year: Patient refused   Transportation Needs: Unknown (1/25/2021)    PRAPARE - Transportation     Lack of Transportation (Medical): Patient refused     Lack of Transportation (Non-Medical): Patient refused   Physical Activity: Sufficiently Active (1/25/2021)    Exercise Vital Sign     Days of Exercise per Week: 7 days     Minutes of Exercise per Session: 70 min   Stress: Stress Concern Present (1/25/2021)    [place] [employer] - Occupational Stress Questionnaire     Feeling of Stress : To some extent   Social Connections: Unknown (1/25/2021)    Social Connection and Isolation Panel [NHANES]     Frequency of Communication with Friends and Family: Patient refused     Frequency of Social Gatherings with Friends and Family: Twice a week     Active Member of Clubs or Organizations: Patient refused     Attends Club or Organization Meetings: Patient refused     Marital Status:        FAMILY HISTORY:  Family History   Problem Relation Age of Onset    Cataracts Mother     Hypertension Mother     Cancer Father     Heart disease Father     Heart disease Brother     Prostate cancer Brother     Stroke Paternal Grandfather     Heart disease Paternal Grandfather     Colon cancer Neg Hx     Esophageal cancer Neg Hx        ALLERGIES AND MEDICATIONS: updated and reviewed.  Review of patient's allergies indicates:  No Known Allergies  Current Outpatient Medications   Medication Sig Dispense Refill    atorvastatin (LIPITOR) 10 MG tablet Take 1 tablet (10 mg total) by mouth once daily. 90 tablet 11    cholecalciferol, vitamin D3, (VITAMIN D3) 50 mcg (2,000 unit) Cap capsule Take 2 capsules (4,000 Units total)  "by mouth once daily. 180 capsule 3    cyanocobalamin (VITAMIN B-12) 100 MCG tablet Take 1 tablet (100 mcg total) by mouth once daily. 90 tablet 11    dutasteride (AVODART) 0.5 mg capsule Take 1 capsule (0.5 mg total) by mouth once daily. 90 capsule 3    lisinopriL (PRINIVIL,ZESTRIL) 20 MG tablet Take 1 tablet (20 mg total) by mouth once daily. 90 tablet 11    MULTIVITAMIN W-MINERALS/LUTEIN (CENTRUM SILVER ORAL) Take 1 tablet by mouth once daily.      nystatin (MYCOSTATIN) cream Apply topically 2 (two) times daily. 30 g 3    pantoprazole (PROTONIX) 40 MG tablet Take 1 tablet (40 mg total) by mouth before breakfast. Best taken 45-60 minutes before your 1st protein meal of the day breakfast 90 tablet 3    tamsulosin (FLOMAX) 0.4 mg Cap Take 1 capsule (0.4 mg total) by mouth every evening. 90 capsule 3    ergocalciferol (ERGOCALCIFEROL) 50,000 unit Cap Take 1 capsule (50,000 Units total) by mouth every 7 days. for 12 doses (Patient not taking: Reported on 10/16/2023) 12 capsule 0     No current facility-administered medications for this visit.       ROS  Review of Systems   Constitutional:  Negative for fever and weight loss.   Respiratory:  Negative for cough and shortness of breath.    Cardiovascular:  Negative for chest pain and palpitations.   Gastrointestinal:  Negative for abdominal pain, constipation, diarrhea, nausea and vomiting.   Genitourinary:  Negative for dysuria.   Musculoskeletal:  Negative for back pain and joint pain.   Skin:  Negative for rash.   Neurological:  Negative for dizziness, weakness and headaches.   Psychiatric/Behavioral:  Negative for depression. The patient is not nervous/anxious.            OBJECTIVE     Physical Exam  Vitals:    12/04/23 1329   BP: 128/88   Pulse: 79    Body mass index is 33.94 kg/m².  Weight: 116.7 kg (257 lb 4.4 oz)   Height: 6' 1" (185.4 cm)     Physical Exam  HENT:      Head: Normocephalic and atraumatic.      Nose: Nose normal.      Mouth/Throat:      Mouth: " Mucous membranes are moist.      Pharynx: Oropharynx is clear.   Eyes:      Extraocular Movements: Extraocular movements intact.      Conjunctiva/sclera: Conjunctivae normal.      Pupils: Pupils are equal, round, and reactive to light.   Cardiovascular:      Rate and Rhythm: Normal rate and regular rhythm.   Pulmonary:      Effort: Pulmonary effort is normal.      Breath sounds: Normal breath sounds.   Musculoskeletal:         General: No swelling. Normal range of motion.      Cervical back: Normal range of motion.      Right lower leg: No edema.      Left lower leg: No edema.   Skin:     General: Skin is warm.      Findings: No lesion or rash.   Neurological:      General: No focal deficit present.      Mental Status: He is alert and oriented to person, place, and time.      Motor: No weakness.               ASSESSMENT     78 y.o. male with     1. Pre-op examination    2. Senile cataract of left eye, unspecified age-related cataract type    3. Primary hypertension        PLAN:     1. Pre-op examination   - the patient is personally medium risk preoperatively for a general low risk procedure; that said, understanding was expressed that there is an ever present/irreducible operative risk which was found acceptable; the patient wishes to proceed   - medical management:  No medications required discontinuation   - Sreekanth Pitts Jr. is optimized to proceed with surgery    2. Senile cataract of left eye, unspecified age-related cataract type  Plan per 1    3. Primary hypertension  Stable on medications, continue regimen        Sarina Pool MD

## 2023-12-07 ENCOUNTER — TELEPHONE (OUTPATIENT)
Dept: UROLOGY | Facility: CLINIC | Age: 78
End: 2023-12-07
Payer: MEDICARE

## 2023-12-07 NOTE — TELEPHONE ENCOUNTER
Pt states he was instructed to hold Tamsulosin for 3 days prior to his eye surgery on 12/12/23 and wanted to know if it would be okay. Pt instructed that it is okay to hold Tamsulosin for 3 days. He can resume after his surgery and to take it night due to first dose syncope. Pt voiced understanding.

## 2023-12-07 NOTE — TELEPHONE ENCOUNTER
"----- Message from Marce Chenuire sent at 12/7/2023  2:00 PM CST -----  Consult/Advisory:      Name Of Caller: Self    Contact Preference:   [phone]     What is the nature of the call?: stated that he has an  (eye surgery on 12/12) and was ask to stop taking Rx: tamsulosin (FLOMAX) 0.4 mg Cap there days prior to surgery. Pt would like to know if it is okay.       Additional Notes:  "Thank you for all that you do for our patients"          "

## 2024-01-11 DIAGNOSIS — Z00.00 ENCOUNTER FOR MEDICARE ANNUAL WELLNESS EXAM: ICD-10-CM

## 2024-02-05 ENCOUNTER — TELEPHONE (OUTPATIENT)
Dept: DERMATOLOGY | Facility: CLINIC | Age: 79
End: 2024-02-05
Payer: MEDICARE

## 2024-02-05 NOTE — TELEPHONE ENCOUNTER
----- Message from Danyelle Melgoza MA sent at 2024  1:56 PM CST -----  Regarding: FW: no availability  Contact: Pt @ [phone]    ----- Message -----  From: Samira Villa  Sent: 2024   9:45 AM CST  To: Janie Zimmerman Staff  Subject: no availability                                  Pt called in to schedule an appt; no available appts in Epic. Pt is asking for a call back soon to schedule. Thanks.         Reason appt not schedule: no availability          Patient's DX:annual         Patient requesting call back or [facility] [phone]

## 2024-02-06 ENCOUNTER — OFFICE VISIT (OUTPATIENT)
Dept: URGENT CARE | Facility: CLINIC | Age: 79
End: 2024-02-06
Payer: MEDICARE

## 2024-02-06 VITALS
BODY MASS INDEX: 34.06 KG/M2 | WEIGHT: 257 LBS | HEART RATE: 115 BPM | SYSTOLIC BLOOD PRESSURE: 115 MMHG | TEMPERATURE: 99 F | HEIGHT: 73 IN | RESPIRATION RATE: 20 BRPM | DIASTOLIC BLOOD PRESSURE: 78 MMHG | OXYGEN SATURATION: 95 %

## 2024-02-06 DIAGNOSIS — J30.9 ALLERGIC RHINITIS, UNSPECIFIED SEASONALITY, UNSPECIFIED TRIGGER: ICD-10-CM

## 2024-02-06 DIAGNOSIS — K21.9 GASTROESOPHAGEAL REFLUX DISEASE, UNSPECIFIED WHETHER ESOPHAGITIS PRESENT: ICD-10-CM

## 2024-02-06 DIAGNOSIS — A08.4 VIRAL GASTROENTERITIS: Primary | ICD-10-CM

## 2024-02-06 PROCEDURE — 99213 OFFICE O/P EST LOW 20 MIN: CPT | Mod: S$GLB,,,

## 2024-02-06 RX ORDER — OFLOXACIN 3 MG/ML
SOLUTION/ DROPS OPHTHALMIC
COMMUNITY
Start: 2023-12-06 | End: 2024-02-20

## 2024-02-06 RX ORDER — KETOROLAC TROMETHAMINE 5 MG/ML
SOLUTION OPHTHALMIC
COMMUNITY
Start: 2023-12-06 | End: 2024-02-20

## 2024-02-06 RX ORDER — PREDNISOLONE ACETATE 10 MG/ML
SUSPENSION/ DROPS OPHTHALMIC
COMMUNITY
Start: 2023-12-26

## 2024-02-06 NOTE — PATIENT INSTRUCTIONS
- Rest.    - Drink plenty of fluids.  Drink things like Pedialyte or Gatorade.   - BRAT diet- Bananas, Rice, Applesauce and Toast.   - Okay to take pepcid if diet is not helping with acid reflux. Can take 20 mg 2 times per day.   - Okay to take imodium for diarrhea if diarrhea starts to affect daily activities.   - For runny nose, start using flonase nasal spray once a day.       - You must understand that you have received an Urgent Care treatment only and that you may be released before all of your medical problems are known or treated.   - You, the patient, will arrange for follow up care as instructed.   - If your condition worsens or fails to improve we recommend that you receive another evaluation at the ER immediately or contact your PCP to discuss your concerns or return here.   - Follow up with your PCP or specialty clinic as directed in the next 1-2 weeks if not improved or as needed.  You can call [phone] to schedule an appointment with the appropriate provider.    If your symptoms do not improve or worsen, go to the emergency room immediately.

## 2024-02-06 NOTE — PROGRESS NOTES
"Subjective:      Patient ID: Sreekanth Pitts Jr. is a 78 y.o. male.    Vitals:  height is 6' 1" (1.854 m) and weight is 116.6 kg (257 lb). His temperature is 99 °F (37.2 °C). His blood pressure is 115/78 and his pulse is 115 (abnormal). His respiration is 20 and oxygen saturation is 95%.     Chief Complaint: Abdominal Pain    This is a 78 y.o. male   who presents today with a chief complaint of abdominal pain that began two days ago. He's complaining of abdominal swelling, diarrhea, cramping and belching. He states that his belching began three weeks ago. He's been taking mylanta and tums to help relieve his symptoms.     Abdominal Pain  This is a new problem. The current episode started in the past 7 days. The problem occurs constantly. The problem has been gradually worsening. The pain is located in the generalized abdominal region. The pain is at a severity of 5/10. The pain is mild. The quality of the pain is cramping. The abdominal pain does not radiate. Associated symptoms include belching and diarrhea. Pertinent negatives include no anorexia, arthralgias, constipation, dysuria, fever, flatus, frequency, headaches, hematochezia, hematuria, melena, myalgias, nausea, vomiting or weight loss. The pain is relieved by Nothing. Treatments tried: mylanta and tums. The treatment provided no relief.     Constitution: Negative for fever.   Gastrointestinal:  Positive for abdominal pain and diarrhea. Negative for nausea, vomiting, constipation and bright red blood in stool.   Genitourinary:  Negative for dysuria, frequency and hematuria.   Musculoskeletal:  Negative for joint pain and muscle ache.   Neurological:  Negative for headaches.      Objective:     Physical Exam    Assessment:     No diagnosis found.    Plan:       There are no diagnoses linked to this encounter.                "

## 2024-02-06 NOTE — PROGRESS NOTES
"Subjective:      Patient ID: Sreekanth Pitts Jr. is a 78 y.o. male.    Vitals:  height is 6' 1" (1.854 m) and weight is 116.6 kg (257 lb). His temperature is 99 °F (37.2 °C). His blood pressure is 115/78 and his pulse is 115 (abnormal). His respiration is 20 and oxygen saturation is 95%.     Chief Complaint: Abdominal Pain     78 y.o. male presents with  abdominal pain, cramping, belching, and watery diarrhea that started on Sunday. He reports 7 episodes of watery diarrhea per day. Pertinent negatives include N/V, fever, headache, hematuria, and melena. No recent travels or sick contacts. He states that his belching began three weeks ago. He's been taking mylanta and tums to help relieve his symptoms.    Abdominal Pain  This is a new problem. The current episode started in the past 7 days. The problem occurs constantly. The problem has been gradually worsening. The pain is located in the generalized abdominal region. The pain is at a severity of 5/10. The pain is mild. The quality of the pain is cramping. The abdominal pain does not radiate. Associated symptoms include belching and diarrhea. Pertinent negatives include no anorexia, arthralgias, constipation, dysuria, fever, flatus, frequency, headaches, hematochezia, hematuria, melena, myalgias, nausea, vomiting or weight loss. The pain is relieved by Nothing. Treatments tried: mylanta and tums. The treatment provided no relief.     Constitution: Negative for fever.   HENT: Negative.     Neck: neck negative.   Eyes: Negative.    Respiratory: Negative.     Gastrointestinal:  Positive for abdominal pain and diarrhea. Negative for nausea, vomiting, constipation and bright red blood in stool.   Genitourinary:  Negative for dysuria, frequency and hematuria.   Musculoskeletal: Negative.  Negative for joint pain and muscle ache.   Skin: Negative.    Allergic/Immunologic: Negative.    Neurological: Negative.  Negative for headaches.   Hematologic/Lymphatic: Negative.     "   Objective:     Physical Exam   Constitutional: He is oriented to person, place, and time. He appears well-developed.   HENT:   Head: Normocephalic and atraumatic.   Ears:   Right Ear: External ear normal.   Left Ear: External ear normal.   Nose: Nose normal.   Mouth/Throat: Mucous membranes are normal.   Eyes: Conjunctivae and lids are normal.   Neck: Trachea normal. Neck supple.   Cardiovascular: Regular rhythm and normal heart sounds. Tachycardia present.   Pulmonary/Chest: Effort normal and breath sounds normal. No respiratory distress.   Abdominal: Normal appearance and bowel sounds are normal. He exhibits no shifting dullness, no distension, no fluid wave and no mass. Soft. There is no hepatomegaly. There is no abdominal tenderness. There is no rebound, no guarding, no left CVA tenderness and no right CVA tenderness.   Musculoskeletal: Normal range of motion.         General: Normal range of motion.   Neurological: He is alert and oriented to person, place, and time. He has normal strength.   Skin: Skin is warm, dry, intact, not diaphoretic and not pale.   Psychiatric: His speech is normal and behavior is normal. Judgment and thought content normal.   Nursing note and vitals reviewed.      Assessment:     1. Viral gastroenteritis    2. Gastroesophageal reflux disease, unspecified whether esophagitis present    3. Allergic rhinitis, unspecified seasonality, unspecified trigger        Plan:       Viral gastroenteritis    Gastroesophageal reflux disease, unspecified whether esophagitis present    Allergic rhinitis, unspecified seasonality, unspecified trigger                Patient Instructions   - Rest.    - Drink plenty of fluids.  Drink things like Pedialyte or Gatorade.   - BRAT diet- Bananas, Rice, Applesauce and Toast.   - Okay to take pepcid if diet is not helping with acid reflux. Can take 20 mg 2 times per day.   - Okay to take imodium for diarrhea if diarrhea starts to affect daily activities.   - For  runny nose, start using flonase nasal spray once a day.       - You must understand that you have received an Urgent Care treatment only and that you may be released before all of your medical problems are known or treated.   - You, the patient, will arrange for follow up care as instructed.   - If your condition worsens or fails to improve we recommend that you receive another evaluation at the ER immediately or contact your PCP to discuss your concerns or return here.   - Follow up with your PCP or specialty clinic as directed in the next 1-2 weeks if not improved or as needed.  You can call [phone] to schedule an appointment with the appropriate provider.    If your symptoms do not improve or worsen, go to the emergency room immediately.

## 2024-02-16 ENCOUNTER — HOSPITAL ENCOUNTER (EMERGENCY)
Facility: HOSPITAL | Age: 79
Discharge: HOME | End: 2024-02-16
Attending: EMERGENCY MEDICINE
Payer: MEDICARE

## 2024-02-16 VITALS
RESPIRATION RATE: 10 BRPM | HEIGHT: 73 IN | SYSTOLIC BLOOD PRESSURE: 123 MMHG | DIASTOLIC BLOOD PRESSURE: 87 MMHG | TEMPERATURE: 99 F | BODY MASS INDEX: 33.8 KG/M2 | OXYGEN SATURATION: 95 % | HEART RATE: 99 BPM | WEIGHT: 255 LBS

## 2024-02-16 DIAGNOSIS — R33.9 URINARY RETENTION: Primary | ICD-10-CM

## 2024-02-16 DIAGNOSIS — R00.0 TACHYCARDIA: ICD-10-CM

## 2024-02-16 LAB
ALBUMIN SERPL BCP-MCNC: 4.4 G/DL (ref 3.5–5.2)
ALP SERPL-CCNC: 77 U/L (ref 55–135)
ALT SERPL W/O P-5'-P-CCNC: 31 U/L (ref 10–44)
ANION GAP SERPL CALC-SCNC: 8 MMOL/L (ref 8–16)
AST SERPL-CCNC: 28 U/L (ref 10–40)
BASOPHILS # BLD AUTO: 0.08 K/UL (ref 0–0.2)
BASOPHILS NFR BLD: 0.5 % (ref 0–1.9)
BILIRUB SERPL-MCNC: 1.2 MG/DL (ref 0.1–1)
BILIRUB UR QL STRIP: NEGATIVE
BUN SERPL-MCNC: 12 MG/DL (ref 8–23)
CALCIUM SERPL-MCNC: 10.1 MG/DL (ref 8.7–10.5)
CHLORIDE SERPL-SCNC: 104 MMOL/L (ref 95–110)
CLARITY UR REFRACT.AUTO: CLEAR
CO2 SERPL-SCNC: 25 MMOL/L (ref 23–29)
COLOR UR AUTO: YELLOW
CREAT SERPL-MCNC: 1 MG/DL (ref 0.5–1.4)
DIFFERENTIAL METHOD BLD: ABNORMAL
EOSINOPHIL # BLD AUTO: 0.3 K/UL (ref 0–0.5)
EOSINOPHIL NFR BLD: 2.1 % (ref 0–8)
ERYTHROCYTE [DISTWIDTH] IN BLOOD BY AUTOMATED COUNT: 12.9 % (ref 11.5–14.5)
EST. GFR  (NO RACE VARIABLE): >60 ML/MIN/1.73 M^2
GLUCOSE SERPL-MCNC: 102 MG/DL (ref 70–110)
GLUCOSE UR QL STRIP: NEGATIVE
HCT VFR BLD AUTO: 50.8 % (ref 40–54)
HCV AB SERPL QL IA: NORMAL
HGB BLD-MCNC: 17.2 G/DL (ref 14–18)
HGB UR QL STRIP: NEGATIVE
HIV 1+2 AB+HIV1 P24 AG SERPL QL IA: NORMAL
IMM GRANULOCYTES # BLD AUTO: 0.05 K/UL (ref 0–0.04)
IMM GRANULOCYTES NFR BLD AUTO: 0.3 % (ref 0–0.5)
KETONES UR QL STRIP: ABNORMAL
LEUKOCYTE ESTERASE UR QL STRIP: NEGATIVE
LYMPHOCYTES # BLD AUTO: 1.4 K/UL (ref 1–4.8)
LYMPHOCYTES NFR BLD: 8.9 % (ref 18–48)
MCH RBC QN AUTO: 30.1 PG (ref 27–31)
MCHC RBC AUTO-ENTMCNC: 33.9 G/DL (ref 32–36)
MCV RBC AUTO: 89 FL (ref 82–98)
MONOCYTES # BLD AUTO: 1.1 K/UL (ref 0.3–1)
MONOCYTES NFR BLD: 6.7 % (ref 4–15)
NEUTROPHILS # BLD AUTO: 12.7 K/UL (ref 1.8–7.7)
NEUTROPHILS NFR BLD: 81.5 % (ref 38–73)
NITRITE UR QL STRIP: NEGATIVE
NRBC BLD-RTO: 0 /100 WBC
PH UR STRIP: 6 [PH] (ref 5–8)
PLATELET # BLD AUTO: 296 K/UL (ref 150–450)
PMV BLD AUTO: 9.4 FL (ref 9.2–12.9)
POTASSIUM SERPL-SCNC: 4.7 MMOL/L (ref 3.5–5.1)
PROT SERPL-MCNC: 7.6 G/DL (ref 6–8.4)
PROT UR QL STRIP: NEGATIVE
RBC # BLD AUTO: 5.72 M/UL (ref 4.6–6.2)
SODIUM SERPL-SCNC: 137 MMOL/L (ref 136–145)
SP GR UR STRIP: 1.01 (ref 1–1.03)
URN SPEC COLLECT METH UR: ABNORMAL
WBC # BLD AUTO: 15.62 K/UL (ref 3.9–12.7)

## 2024-02-16 PROCEDURE — 25000003 PHARM REV CODE 250

## 2024-02-16 PROCEDURE — 86803 HEPATITIS C AB TEST: CPT | Performed by: PHYSICIAN ASSISTANT

## 2024-02-16 PROCEDURE — 81003 URINALYSIS AUTO W/O SCOPE: CPT

## 2024-02-16 PROCEDURE — 80053 COMPREHEN METABOLIC PANEL: CPT | Performed by: EMERGENCY MEDICINE

## 2024-02-16 PROCEDURE — 93010 ELECTROCARDIOGRAM REPORT: CPT | Mod: ,,, | Performed by: INTERNAL MEDICINE

## 2024-02-16 PROCEDURE — 51702 INSERT TEMP BLADDER CATH: CPT

## 2024-02-16 PROCEDURE — 93005 ELECTROCARDIOGRAM TRACING: CPT | Mod: 59

## 2024-02-16 PROCEDURE — 85025 COMPLETE CBC W/AUTO DIFF WBC: CPT | Performed by: EMERGENCY MEDICINE

## 2024-02-16 PROCEDURE — 87389 HIV-1 AG W/HIV-1&-2 AB AG IA: CPT | Performed by: PHYSICIAN ASSISTANT

## 2024-02-16 PROCEDURE — 99284 EMERGENCY DEPT VISIT MOD MDM: CPT | Mod: 25

## 2024-02-16 RX ORDER — LIDOCAINE HYDROCHLORIDE 20 MG/ML
JELLY TOPICAL
Status: COMPLETED | OUTPATIENT
Start: 2024-02-16 | End: 2024-02-16

## 2024-02-16 RX ADMIN — LIDOCAINE HYDROCHLORIDE: 20 JELLY TOPICAL at 08:02

## 2024-02-17 ENCOUNTER — NURSE TRIAGE (OUTPATIENT)
Dept: ADMINISTRATIVE | Facility: CLINIC | Age: 79
End: 2024-02-17
Payer: MEDICARE

## 2024-02-17 ENCOUNTER — HOSPITAL ENCOUNTER (EMERGENCY)
Facility: HOSPITAL | Age: 79
Discharge: HOME | End: 2024-02-17
Attending: EMERGENCY MEDICINE
Payer: MEDICARE

## 2024-02-17 VITALS
BODY MASS INDEX: 33.8 KG/M2 | RESPIRATION RATE: 16 BRPM | DIASTOLIC BLOOD PRESSURE: 85 MMHG | WEIGHT: 255 LBS | SYSTOLIC BLOOD PRESSURE: 133 MMHG | HEIGHT: 73 IN | TEMPERATURE: 98 F | OXYGEN SATURATION: 99 % | HEART RATE: 93 BPM

## 2024-02-17 DIAGNOSIS — K56.41 FECAL IMPACTION: Primary | ICD-10-CM

## 2024-02-17 LAB
ALBUMIN SERPL BCP-MCNC: 4.3 G/DL (ref 3.5–5.2)
ALP SERPL-CCNC: 77 U/L (ref 55–135)
ALT SERPL W/O P-5'-P-CCNC: 26 U/L (ref 10–44)
ANION GAP SERPL CALC-SCNC: 12 MMOL/L (ref 8–16)
AST SERPL-CCNC: 24 U/L (ref 10–40)
BASOPHILS # BLD AUTO: 0.03 K/UL (ref 0–0.2)
BASOPHILS NFR BLD: 0.2 % (ref 0–1.9)
BILIRUB SERPL-MCNC: 1.5 MG/DL (ref 0.1–1)
BUN SERPL-MCNC: 13 MG/DL (ref 6–30)
BUN SERPL-MCNC: 13 MG/DL (ref 8–23)
CALCIUM SERPL-MCNC: 9.9 MG/DL (ref 8.7–10.5)
CHLORIDE SERPL-SCNC: 102 MMOL/L (ref 95–110)
CHLORIDE SERPL-SCNC: 104 MMOL/L (ref 95–110)
CO2 SERPL-SCNC: 21 MMOL/L (ref 23–29)
CREAT SERPL-MCNC: 0.9 MG/DL (ref 0.5–1.4)
CREAT SERPL-MCNC: 1 MG/DL (ref 0.5–1.4)
DIFFERENTIAL METHOD BLD: ABNORMAL
EOSINOPHIL # BLD AUTO: 0 K/UL (ref 0–0.5)
EOSINOPHIL NFR BLD: 0.2 % (ref 0–8)
ERYTHROCYTE [DISTWIDTH] IN BLOOD BY AUTOMATED COUNT: 13.2 % (ref 11.5–14.5)
EST. GFR  (NO RACE VARIABLE): >60 ML/MIN/1.73 M^2
GLUCOSE SERPL-MCNC: 119 MG/DL (ref 70–110)
GLUCOSE SERPL-MCNC: 119 MG/DL (ref 70–110)
HCT VFR BLD AUTO: 50.2 % (ref 40–54)
HCT VFR BLD CALC: 51 %PCV (ref 36–54)
HGB BLD-MCNC: 17 G/DL (ref 14–18)
IMM GRANULOCYTES # BLD AUTO: 0.04 K/UL (ref 0–0.04)
IMM GRANULOCYTES NFR BLD AUTO: 0.3 % (ref 0–0.5)
LYMPHOCYTES # BLD AUTO: 1.3 K/UL (ref 1–4.8)
LYMPHOCYTES NFR BLD: 9.8 % (ref 18–48)
MCH RBC QN AUTO: 30 PG (ref 27–31)
MCHC RBC AUTO-ENTMCNC: 33.9 G/DL (ref 32–36)
MCV RBC AUTO: 89 FL (ref 82–98)
MONOCYTES # BLD AUTO: 1 K/UL (ref 0.3–1)
MONOCYTES NFR BLD: 7.5 % (ref 4–15)
NEUTROPHILS # BLD AUTO: 10.4 K/UL (ref 1.8–7.7)
NEUTROPHILS NFR BLD: 82 % (ref 38–73)
NRBC BLD-RTO: 0 /100 WBC
OHS QRS DURATION: 88 MS
OHS QTC CALCULATION: 422 MS
PLATELET # BLD AUTO: 287 K/UL (ref 150–450)
PMV BLD AUTO: 9.5 FL (ref 9.2–12.9)
POC IONIZED CALCIUM: 1.12 MMOL/L (ref 1.06–1.42)
POC TCO2 (MEASURED): 25 MMOL/L (ref 23–29)
POTASSIUM BLD-SCNC: 3.9 MMOL/L (ref 3.5–5.1)
POTASSIUM SERPL-SCNC: 4 MMOL/L (ref 3.5–5.1)
PROT SERPL-MCNC: 7.6 G/DL (ref 6–8.4)
RBC # BLD AUTO: 5.66 M/UL (ref 4.6–6.2)
SAMPLE: ABNORMAL
SODIUM BLD-SCNC: 138 MMOL/L (ref 136–145)
SODIUM SERPL-SCNC: 137 MMOL/L (ref 136–145)
WBC # BLD AUTO: 12.7 K/UL (ref 3.9–12.7)

## 2024-02-17 PROCEDURE — 80048 BASIC METABOLIC PNL TOTAL CA: CPT | Mod: XB

## 2024-02-17 PROCEDURE — 96360 HYDRATION IV INFUSION INIT: CPT

## 2024-02-17 PROCEDURE — 25000003 PHARM REV CODE 250: Performed by: PHYSICIAN ASSISTANT

## 2024-02-17 PROCEDURE — 99285 EMERGENCY DEPT VISIT HI MDM: CPT | Mod: 25

## 2024-02-17 PROCEDURE — 85025 COMPLETE CBC W/AUTO DIFF WBC: CPT | Performed by: PHYSICIAN ASSISTANT

## 2024-02-17 PROCEDURE — 25500020 PHARM REV CODE 255: Performed by: EMERGENCY MEDICINE

## 2024-02-17 PROCEDURE — 63600175 PHARM REV CODE 636 W HCPCS: Performed by: PHYSICIAN ASSISTANT

## 2024-02-17 PROCEDURE — 80053 COMPREHEN METABOLIC PANEL: CPT | Performed by: PHYSICIAN ASSISTANT

## 2024-02-17 RX ORDER — POLYETHYLENE GLYCOL 3350 17 G/17G
17 POWDER, FOR SOLUTION ORAL DAILY
Qty: 30 EACH | Refills: 0 | Status: SHIPPED | OUTPATIENT
Start: 2024-02-17 | End: 2024-03-18

## 2024-02-17 RX ORDER — LACTULOSE 10 G/15ML
20 SOLUTION ORAL
Status: COMPLETED | OUTPATIENT
Start: 2024-02-17 | End: 2024-02-17

## 2024-02-17 RX ADMIN — IOHEXOL 100 ML: 350 INJECTION, SOLUTION INTRAVENOUS at 02:02

## 2024-02-17 RX ADMIN — LACTULOSE 20 G: 20 SOLUTION ORAL at 12:02

## 2024-02-17 RX ADMIN — ENEMA 1 ENEMA: 19; 7 ENEMA RECTAL at 01:02

## 2024-02-17 RX ADMIN — SODIUM CHLORIDE, SODIUM LACTATE, POTASSIUM CHLORIDE, AND CALCIUM CHLORIDE 1000 ML: .6; .31; .03; .02 INJECTION, SOLUTION INTRAVENOUS at 12:02

## 2024-02-17 NOTE — DISCHARGE INSTRUCTIONS
You were seen in the ED for constipation.  There was a small impaction does disimpacted in the ED. please stay hydrated and increase your fiber intake which will help you stay regular.  I have prescribed you MiraLax to help with the constipation.  If you continue to have issues you may follow-up with your primary care doctor.  If you develop any new or worsening symptoms you may return to ED sooner.

## 2024-02-17 NOTE — ED PROVIDER NOTES
Encounter Date: 2/16/2024       History     Chief Complaint   Patient presents with    Male  Problem     Constipation since wed, no urine since 6 am     78-year-old gentleman with a history of BPH status post TURP x2 presents to the emergency department with chief complaint of urinary retention.  Patient endorses being constipated since Wednesday and believes this is was making it difficult for him to urinate.  The patient has not been able to urinate since this morning at approximately 6:00 a.m..  He feels distended and discomfort.  Otherwise he endorses feeling well.  He has not had any fever, chills, nausea, vomiting, he does not have any CVA tenderness.  He has not seen any blood in his urine he does not have a history of stones.  And he has otherwise been well.  Patient has no further concerns at this time.        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     Past Surgical History:   Procedure Laterality Date    ESOPHAGOGASTRODUODENOSCOPY N/A 12/2/2020    Procedure: EGD (ESOPHAGOGASTRODUODENOSCOPY);  Surgeon: Dion Mckinney MD;  Location: [address]);  Service: Endoscopy;  Laterality: N/A;  covid-11/29-[facility]    ESOPHAGOGASTRODUODENOSCOPY N/A 8/29/2023    Procedure: EGD (ESOPHAGOGASTRODUODENOSCOPY);  Surgeon: Dion Mckinney MD;  [facility]  Service: Endoscopy;  Laterality: N/A;  [facility] -  5-12 week time frame    EXCISION OF PAROTID GLAND Left 3/27/2023    Procedure: EXCISION, PAROTID  GLAND;  Surgeon: Mook Newton MD;  Location: [facility] OR 2ND FLR;  Service: ENT;  Laterality: Left;    EYE FOREIGN BODY REMOVAL      childhood    LUMBAR LAMINECTOMY WITH FUSION      MINIMALLY INVASIVE TRANSFORAMINAL LUMBAR INTERBODY FUSION (TLIF) N/A 2022    Procedure: FUSION, SPINE, LUMBAR, TLIF, MINIMALLY INVASIVE Right L4-5 Renaldo PARK notified;  Surgeon: Anibal Beebe MD;  Location: [facility] OR;  Service: Neurosurgery;  Laterality: N/A;  ASA1  TOR1  T&Screen  EMG  C-Arm  LSO  Jamie 4 poster  NEURO MONITORING RENALDO OSBORN [phone]  SPINEWAVE RENALDO POLLOCK [phone] TEXTED ON 2022 @ 3:54PM/ RESPONDED ON 2022 @4:22 PM-LO  PREOP DONE A    TRANSURETHRAL RESECTION OF PROSTATE (TURP) WITHOUT USE OF LASER N/A 2018    Procedure: TURP, WITHOUT USING LASER;  Surgeon: Uma Gaona MD;  Location: [facility] OR 1ST FLR;  Service: Urology;  Laterality: N/A;  1.5 hours     Family History   Problem Relation Age of Onset    Cataracts Mother     Hypertension Mother     Cancer Father     Heart disease Father     Heart disease Brother     Prostate cancer Brother     Stroke Paternal Grandfather     Heart disease Paternal Grandfather     Colon cancer Neg Hx     Esophageal cancer Neg Hx      Social History     Tobacco Use    Smoking status: Former     Current packs/day: 0.00     Average packs/day: 2.0 packs/day for 4.0 years (8.0 ttl pk-yrs)     Types: Cigarettes, Cigars     Start date: 1969     Quit date: 1973     Years since quittin.1    Smokeless tobacco: Never   Substance Use Topics    Alcohol use: Yes     Alcohol/week: 1.0 standard drink of alcohol     Types: 1 Cans of beer per week     Comment: rarely    Drug use: No     Review of Systems    Physical Exam     Initial Vitals [24 1800]   BP Pulse Resp Temp SpO2   125/74 (!) 130 20 99.6 °F (37.6 °C) 97 %      MAP       --         Physical Exam    Vitals reviewed.  Constitutional: He appears well-developed and well-nourished. He is not  diaphoretic. No distress.   HENT:   Head: Normocephalic and atraumatic.   Eyes: EOM are normal. Pupils are equal, round, and reactive to light.   Neck: Neck supple.   Normal range of motion.  Cardiovascular:  Normal rate, normal heart sounds and intact distal pulses.           Pulmonary/Chest: Breath sounds normal. No respiratory distress. He has no wheezes. He has no rhonchi. He has no rales. He exhibits no tenderness.   Abdominal: Abdomen is soft. He exhibits distension. There is no abdominal tenderness.   Patient's suprapubic region is distended consistent with urinary retention.  He is modestly tender to palpation in that region.  No rigidity, guarding, rebound tenderness.  No evidence of acute abdomen There is no rebound.   Musculoskeletal:         General: No tenderness or edema. Normal range of motion.      Cervical back: Normal range of motion and neck supple.     Neurological: He is alert and oriented to person, place, and time. He has normal strength. GCS score is 15. GCS eye subscore is 4. GCS verbal subscore is 5. GCS motor subscore is 6.   Skin: Skin is warm and dry.   Psychiatric: He has a normal mood and affect. His behavior is normal. Judgment and thought content normal.         ED Course   Procedures  Labs Reviewed   HIV 1 / 2 ANTIBODY   HEPATITIS C ANTIBODY   CBC W/ AUTO DIFFERENTIAL   COMPREHENSIVE METABOLIC PANEL   URINALYSIS, REFLEX TO URINE CULTURE          Imaging Results    None          Medications - No data to display  Medical Decision Making  78-year-old male with chief complaint of urinary retention.  Patient is hemodynamically stable but tachycardic upon presentation the emergency department.  No other signs of systemic infection.  Tachycardia likely secondary to discomfort associated with urinary retention.  Performed bedside ultrasound and estimated at least 700 cc of urine in his bladder.  We will provide Siddiqi catheter and look for signs of CARLEE.  Patient has no history of  nephrolithiasis.  He has no CVA tenderness.  No fever.  And he has not endorsed any hematuria or dysuria.  He has a known history of urinary retention.  Unlikely to be a stone or UTI.  UA was unremarkable.  There was no sign of infection or blood which would indicate stone.  CMP was unremarkable.  No signs of CARLEE postrenal or otherwise.  Tachycardia mostly resolved after voiding of the bladder.  The patient does have a leukocytosis there was no sign or symptoms of infection.  Patient was afebrile and normotensive upon discharge.  Of discharge the patient with return precautions and an ambulatory referral to Urology.    Amount and/or Complexity of Data Reviewed  Labs:  Decision-making details documented in ED Course.    Risk  Prescription drug management.               ED Course as of 02/16/24 2149 Fri Feb 16, 2024 2020 Comprehensive metabolic panel(!)  CMP shows no sign of CARLEE secondary to urinary retention. [VC]   2020 CBC auto differential(!)  Patient does have leukocytosis.  Uncertain significance at this time.  We will evaluate UA [VC]   2030 Siddiqi produced approximately 1 L of urine.  Patient reports immediate relief. [VC]   2117 Tachycardia resolved after postvoid [VC]   2125 Patient has mild tachycardia in the setting of leukocytosis.  We have no reason to believe that there was any sort of infectious process.  However, we will establish an accurate temperature prior to discharge. [VC]      ED Course User Index  [VC] Josue Dia MD                           Clinical Impression:  Final diagnoses:  [R00.0] Tachycardia  [R33.9] Urinary retention (Primary)                 Josue Dia MD  Resident  02/16/24 2151

## 2024-02-17 NOTE — TELEPHONE ENCOUNTER
"Sreekanth reports no BM since Wednesday. Tried Dulcolax, fleet enema and Mag Citrate yesterday. Went to ED last night for urinary retention and dc'd home with posadas catheter. Pt reports no issues with catheter and draining. Now having terrible abdominal cramps, abdominal swelling and hardness. Not passing gas. Pt states main concern at this time is constipation and abdominal cramping which pt describes as intermittent, but severe when it occurs, last approximately 20 seconds, occurs 2-3 more times within 5 minute period which pt tries to have BM but unable to. Pain occurs more frequently with walking/movement & even drinking water.Pt states normal routine is having BM twice daily. Pt states he does not want to go back to ED.  Pt advised per triage protocol urgent home treatment with follow up call from RN within next 30 -60 mins. Pt v/u.   Reason for Disposition   [1] SEVERE pain AND [2] present < 1 hour    Additional Information   [1] Abdomen pain is main symptom AND [2] male   Negative: Shock suspected (e.g., cold/pale/clammy skin, too weak to stand, low BP, rapid pulse)   Negative: Difficult to awaken or acting confused (e.g., disoriented, slurred speech)   Negative: Passed out (i.e., lost consciousness, collapsed and was not responding)   Negative: Sounds like a life-threatening emergency to the triager   Negative: [1] SEVERE pain (e.g., excruciating) AND [2] present > 1 hour   Negative: [1] SEVERE pain AND [2] age > 60 years   Negative: [1] Vomiting AND [2] contains red blood or black ("coffee ground") material  (Exception: Few red streaks in vomit that only happened once.)   Negative: Blood in bowel movements  (Exception: Blood on surface of BM with constipation.)   Negative: Black or tarry bowel movements  (Exception: Chronic-unchanged black-grey BMs AND is taking iron pills or Pepto-Bismol.)   Negative: [1] Unable to urinate (or only a few drops) > 4 hours AND [2] bladder feels very full (e.g., palpable bladder " or strong urge to urinate)   Negative: [1] Vomiting AND [2] contains bile (green color)   Negative: [1] Pain in the scrotum or testicle AND [2] present > 1 hour   Negative: Patient sounds very sick or weak to the triager   Negative: [1] MILD-MODERATE pain AND [2] constant AND [3] present > 2 hours   Negative: [1] Vomiting AND [2] abdomen looks much more swollen than usual   Negative: White of the eyes have turned yellow (i.e., jaundice)   Negative: Fever > 103 F (39.4 C)   Negative: [1] Fever > 101 F (38.3 C) AND [2] age > 60 years   Negative: [1] Fever > 100.0 F (37.8 C) AND [2] bedridden (e.g., CVA, chronic illness, recovering from surgery)   Negative: [1] Fever > 100.0 F (37.8 C) AND [2] diabetes mellitus or weak immune system (e.g., HIV positive, cancer chemo, splenectomy, organ transplant, chronic steroids)    Protocols used: Constipation-A-AH, Abdominal Pain - Male-A-AH

## 2024-02-17 NOTE — ED TRIAGE NOTES
Pt identifiers Sreekanth Pitts Jr.checked and correct    CC: Pt presents to ED with c/o anuria since 6am this morning and constipation since Wednesday. Pt reports tried fleet enema at home was unsuccessful. Pt reports lower abd pain 8/10. Pt denies chest pain, sob, nausea, vomiting, fevers.       LOC: The patient is awake, alert, aware of environment with an appropriate affect. Oriented x3, speaking appropriately  APPEARANCE: Pt resting comfortably, in no acute distress, pt is clean and well groomed, clothing properly fastened  SKIN: Skin warm, dry and intact, normal skin turgor, moist mucus membranes  RESPIRATORY: Airway is open and patent, respirations are spontaneous, even and unlabored, normal effort and rate  CARDIAC: Normal rate and rhythm, no peripheral edema noted, capillary refill < 3 seconds, bilateral radial pulses 2+. Pt denies chest pain  ABDOMEN: Patient reports lower abd pain 8/10. Abd is tender to touch and distended   NEUROLOGIC: Facial expression is symmetrical, patient moving all extremities spontaneously, normal sensation in all extremities when touched with a finger.  Follows all commands appropriately  MUSCULOSKELETAL: No obvious deformities. Patient moves extremities sponatneously

## 2024-02-17 NOTE — TELEPHONE ENCOUNTER
Call back to pt regarding urgent home treatment, Sreekanth  reports no relief, no BM, pattern of abdominal cramping continues and last approximately 25 seconds when cramping occurs. Per triage protocol, pt advised to go to nearest ED now for physician mike. XAVIER/agapito.

## 2024-02-17 NOTE — ED PROVIDER NOTES
Encounter Date: 2/17/2024       History     Chief Complaint   Patient presents with    Abdominal Cramping     Thinks constipation, seen here last night and posadas cath in, lower abd cramping     78-year-old male with past medical history hypertension, GERD, BPH s/p TURP x2 who presents emergency department for constipation and lower abdominal cramping.  Patient states his last bowel movement was 3 days ago.  States he has been using Dulcolax, Mag citrate and a Fleet enema with no relief.  Was seen last night in the ED for urinary retention and had a Posadas placed.  States since going home he is attempted to have a bowel movement multiple times unsuccessfully and reports cramping across his lower abdomen.  Denies any fevers/chills.    The history is provided by the patient.     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     Past Surgical History:   Procedure Laterality Date    ESOPHAGOGASTRODUODENOSCOPY N/A 12/2/2020    Procedure: EGD (ESOPHAGOGASTRODUODENOSCOPY);  Surgeon: Dion Mckinney MD;  Location: [facility] ([address] [place] [address]);  Service: Endoscopy;  Laterality: N/A;  covid-11/29-[facility]    ESOPHAGOGASTRODUODENOSCOPY N/A 8/29/2023    Procedure: EGD (ESOPHAGOGASTRODUODENOSCOPY);  Surgeon: Dion Mckinney MD;  Location: [facility] ([address] [place] [address]);  Service: Endoscopy;  Laterality: N/A;  prep instructions sent to pt vai portal -  5-12 week time frame    EXCISION OF PAROTID GLAND Left 3/27/2023    Procedure: EXCISION, PAROTID GLAND;  Surgeon: Mook Newton MD;  Location:  [facility] OR 2ND FLR;  Service: ENT;  Laterality: Left;    EYE FOREIGN BODY REMOVAL      childhood    LUMBAR LAMINECTOMY WITH FUSION      MINIMALLY INVASIVE TRANSFORAMINAL LUMBAR INTERBODY FUSION (TLIF) N/A 2022    Procedure: FUSION, SPINE, LUMBAR, TLIF, MINIMALLY INVASIVE Right L4-5 Renaldo PARK notified;  Surgeon: Anibal Beebe MD;  Location: [facility] OR;  Service: Neurosurgery;  Laterality: N/A;  ASA1  TOR1  T&Screen  EMG  C-Arm  LSO  Jamie 4 poster  NEURO MONITORING RENALDO OSBORN [phone]  SPINEWAVE RENALDO POLLOCK [phone] TEXTED ON 2022 @ 3:54PM/ RESPONDED ON 2022 @4:22 PM-LO  PREOP DONE A    TRANSURETHRAL RESECTION OF PROSTATE (TURP) WITHOUT USE OF LASER N/A 2018    Procedure: TURP, WITHOUT USING LASER;  Surgeon: Uma Gaona MD;  Location: [facility] OR 1ST FLR;  Service: Urology;  Laterality: N/A;  1.5 hours     Family History   Problem Relation Age of Onset    Cataracts Mother     Hypertension Mother     Cancer Father     Heart disease Father     Heart disease Brother     Prostate cancer Brother     Stroke Paternal Grandfather     Heart disease Paternal Grandfather     Colon cancer Neg Hx     Esophageal cancer Neg Hx      Social History     Tobacco Use    Smoking status: Former     Current packs/day: 0.00     Average packs/day: 2.0 packs/day for 4.0 years (8.0 ttl pk-yrs)     Types: Cigarettes, Cigars     Start date: 1969     Quit date: 1973     Years since quittin.1    Smokeless tobacco: Never   Substance Use Topics    Alcohol use: Yes     Alcohol/week: 1.0 standard drink of alcohol     Types: 1 Cans of beer per week     Comment: rarely    Drug use: No     Review of Systems    Physical Exam     Initial Vitals [24 1154]   BP Pulse Resp Temp SpO2   (!) 140/88 108 18 98.1 °F (36.7 °C) 98 %      MAP       --         Physical Exam    Nursing note and vitals reviewed.  Constitutional: He appears well-developed and well-nourished.   HENT:   Head: Normocephalic and  atraumatic.   Eyes: Conjunctivae are normal.   Neck: Neck supple.   Normal range of motion.  Cardiovascular:  Regular rhythm, normal heart sounds and intact distal pulses.           Pulmonary/Chest: Breath sounds normal.   Abdominal: Abdomen is soft. There is no abdominal tenderness.   Genitourinary:    Genitourinary Comments: Chaperone present for RIK.  Fecal impaction noted on rectal exam.  No bleeding.  Impaction was manually disimpacted at bedside.     Musculoskeletal:         General: Normal range of motion.      Cervical back: Normal range of motion and neck supple.     Neurological: He is alert and oriented to person, place, and time. GCS score is 15. GCS eye subscore is 4. GCS verbal subscore is 5. GCS motor subscore is 6.   Skin: Skin is warm and dry. Capillary refill takes less than 2 seconds.         ED Course   Procedures  Labs Reviewed   CBC W/ AUTO DIFFERENTIAL - Abnormal; Notable for the following components:       Result Value    Gran # (ANC) 10.4 (*)     Gran % 82.0 (*)     Lymph % 9.8 (*)     All other components within normal limits   COMPREHENSIVE METABOLIC PANEL - Abnormal; Notable for the following components:    CO2 21 (*)     Glucose 119 (*)     Total Bilirubin 1.5 (*)     All other components within normal limits   ISTAT PROCEDURE - Abnormal; Notable for the following components:    POC Glucose 119 (*)     All other components within normal limits   ISTAT CHEM8          Imaging Results               CT Abdomen Pelvis With IV Contrast NO Oral Contrast (Final result)  Result time 02/17/24 14:54:19      Final result by Hernando Galvin MD (02/17/24 14:54:19)                   Impression:      This report was flagged in Epic as abnormal.    1. There is large amount of stool within the rectum and distal sigmoid colon noting there may be some early indistinctness about the rectal and sigmoid colonic walls.  Developing stercoral proctitis is a consideration.  Correlation is advised.  2. There is  liquid stool within the right colon, superimposed diarrheal illness is a consideration.  3. Low attenuating lesions arise from the kidneys, most of which are too small for characterization, others measure attenuation higher than would be expected for simple cysts.  Nonemergent ultrasound is recommended to confirm cystic nature.  4. Left nonobstructive nephrolithiasis.  5. Findings suggesting hepatic steatosis, correlation with LFTs recommended.  6. The urinary bladder is decompressed around a Siddiqi catheter, may account for wall thickening however correlation with urinalysis recommended.  7. Please see above for additional findings.      Electronically signed by: Hernando Galvin MD  Date:    02/17/2024  Time:    14:54               Narrative:    EXAMINATION:  CT ABDOMEN PELVIS WITH IV CONTRAST    CLINICAL HISTORY:  LLQ abdominal pain;    TECHNIQUE:  Low dose axial images, sagittal and coronal reformations were obtained from the lung bases to the pubic symphysis following the IV administration of 100 mL of Omnipaque 350 .  Oral contrast was not given.    COMPARISON:  03/10/2021    FINDINGS:  Images of the lower thorax are remarkable for bilateral dependent atelectasis.    The liver is hypoattenuating suggesting steatosis, correlation with LFTs recommended.  There are subcentimeter low attenuating lesions within the hepatic parenchyma, too small for characterization.  The spleen, pancreas, gallbladder and adrenal glands are grossly unremarkable.  There is no biliary dilation or ascites.  The portal vein, splenic vein, SMV, celiac axis and SMA all are patent.  There are a few scattered prominent abdominal/lui hepatic lymph nodes.    The kidneys enhance symmetrically without hydronephrosis.  There is left nonobstructive nephrolithiasis versus calcification along a cyst wall..  There are several low attenuating lesions arise from the kidneys, several of which are too small for characterization.  There is a low  attenuating lesion arising from the interpolar region of the left kidney measuring 2.5 cm with attenuation higher than would be expected for a simple cyst.  A similar appearing lesion arises from the lower pole of the right kidney measuring 1.6 cm.  The bilateral ureters are unable to be followed in their entirety to the urinary bladder, no definite calculi seen.  The urinary bladder is decompressed around a Siddiqi catheter noting there may be residual wall thickening.  The prostate is enlarged.    There is a large amount of stool in the rectum noting there may be early rectal wall thickening and perirectal inflammation.  There are several scattered colonic diverticula without convincing surrounding inflammation to suggest diverticulitis.  There is moderate to large amount of stool throughout the remainder of the large bowel noting primarily liquid stool within the transverse colon, ascending colon, and cecum.  The terminal ileum is unremarkable.  The appendix is not confidently identified, no pericecal inflammation.  The small bowel is grossly unremarkable.  There are a few scattered shotty periaortic, pericaval, and mesenteric lymph nodes.  There is atherosclerotic calcification of the aorta and its branches.  No focal organized pelvic fluid collection.    There is osteopenia.  There are degenerative changes of the spine.  Posterior fusion hardware spans L4-L5 noting disc spacing material at L4-L5.  There is grade 1 anterolisthesis of L4 on L5.  No significant inguinal lymphadenopathy.                                       Medications   lactated ringers bolus 1,000 mL (0 mLs Intravenous Stopped 2/17/24 1358)   lactulose 20 gram/30 mL solution Soln 20 g (20 g Oral Given 2/17/24 1230)   sodium phosphates 19-7 gram/118 mL enema 1 enema (1 enema Rectal Given 2/17/24 1330)   iohexoL (OMNIPAQUE 350) injection 100 mL (100 mLs Intravenous Given 2/17/24 1408)     Medical Decision Making  78-year-old male presents ED for  abdominal cramping and constipation.  Reports 3 days of constipation despite taking Mag citrate, Dulcolax and Fleet enema.    Differential includes but not limited to dehydration, fecal impaction, SBO, electrolyte derangement    There was a impaction noted on rectal exam which was disimpacted at bedside.  Lab work was unremarkable.  CT of his abdomen pelvis shows significant stool burden but no SBO.  He was given lactulose as well as a Fleet enema in the ED in his had 2 moderate-sized bowel movements here.  Will discharge home with MiraLax as needed.  Return ED precautions given.    Amount and/or Complexity of Data Reviewed  Labs: ordered. Decision-making details documented in ED Course.  Radiology: ordered.    Risk  OTC drugs.  Prescription drug management.               ED Course as of 02/17/24 1529   Sat Feb 17, 2024   1308 WBC: 12.70  No leukocytosis [HJ]      ED Course User Index  [HJ] Juliana Lai PA-C                           Clinical Impression:  Final diagnoses:  [K56.41] Fecal impaction (Primary)          ED Disposition Condition    Discharge Stable          ED Prescriptions       Medication Sig Dispense Start Date End Date Auth. Provider    polyethylene glycol (GLYCOLAX) 17 gram PwPk Take 17 g by mouth once daily. 30 each 2/17/2024 3/18/2024 Juliana Lai PA-C          Follow-up Information       Follow up With Specialties Details Why Contact Info    Torsten Bejarano MD Internal Medicine Schedule an appointment as soon as possible for a visit   [address]  [place] [address]  [phone]               Juliana Lai PA-C  02/17/24 1529

## 2024-02-19 ENCOUNTER — PATIENT MESSAGE (OUTPATIENT)
Dept: GASTROENTEROLOGY | Facility: CLINIC | Age: 79
End: 2024-02-19
Payer: MEDICARE

## 2024-02-19 ENCOUNTER — TELEPHONE (OUTPATIENT)
Dept: UROLOGY | Facility: CLINIC | Age: 79
End: 2024-02-19
Payer: MEDICARE

## 2024-02-19 NOTE — TELEPHONE ENCOUNTER
----- Message from Elise Fallon sent at 2/19/2024 11:11 AM CST -----  Type:  Needs Medical Advice    Who Called: Pt  Symptoms (please be specific):  Bowel concerns  How long has patient had these symptoms:    Would the patient rather a call back or a response via [facility]? Call  Best Call Back Number:  [phone]  Additional Information: Pt would like to speak to provider related to removal of catheter and bowel concerns. Pt states he visited ER on Saturday.  Pt would like to speak to provider directly related to issues he is currently experiencing.

## 2024-02-20 ENCOUNTER — TELEPHONE (OUTPATIENT)
Dept: GASTROENTEROLOGY | Facility: CLINIC | Age: 79
End: 2024-02-20
Payer: MEDICARE

## 2024-02-20 ENCOUNTER — OFFICE VISIT (OUTPATIENT)
Dept: GASTROENTEROLOGY | Facility: CLINIC | Age: 79
End: 2024-02-20
Payer: MEDICARE

## 2024-02-20 DIAGNOSIS — K59.00 CONSTIPATION, ACUTE: ICD-10-CM

## 2024-02-20 DIAGNOSIS — G47.33 OBSTRUCTIVE SLEEP APNEA: Chronic | ICD-10-CM

## 2024-02-20 DIAGNOSIS — K76.0 FATTY LIVER: ICD-10-CM

## 2024-02-20 DIAGNOSIS — Z97.8 INDWELLING FOLEY CATHETER PRESENT: ICD-10-CM

## 2024-02-20 DIAGNOSIS — N28.9 KIDNEY LESION: ICD-10-CM

## 2024-02-20 DIAGNOSIS — E66.9 OBESITY WITH SERIOUS COMORBIDITY, UNSPECIFIED CLASSIFICATION, UNSPECIFIED OBESITY TYPE: ICD-10-CM

## 2024-02-20 DIAGNOSIS — R33.9 URINARY RETENTION: Primary | ICD-10-CM

## 2024-02-20 DIAGNOSIS — N20.0 RENAL STONE: ICD-10-CM

## 2024-02-20 DIAGNOSIS — R93.429 ABNORMAL CT SCAN, KIDNEY: ICD-10-CM

## 2024-02-20 PROCEDURE — 99204 OFFICE O/P NEW MOD 45 MIN: CPT | Mod: 95,,, | Performed by: INTERNAL MEDICINE

## 2024-02-20 NOTE — TELEPHONE ENCOUNTER
"----- Message from Marce Horvath sent at 2/20/2024  9:44 AM CST -----  Consult/Advisory:      Name Of Caller: Self    Contact Preference:  [phone]     What is the nature of the call?: Pt would like to speak with the RN regarding LAB on 4/8. Please call       NON FASTING LAB [[id number]]        Additional Notes:  "Thank you for all that you do for our patients"          "

## 2024-02-20 NOTE — PATIENT INSTRUCTIONS
"Procedure: EGD/Colonoscopy    Diagnosis:  Barretts esophagus, acute constipation Surveillance colonoscopy - Hx of colon polyps    Procedure Timin-6 weeks    *If within 4 weeks selected, please christel as high priority*    *If greater than 12 weeks, please select "5-12 weeks" and delay sending until 3 months prior to requested date*    Provider: Myself    Location: [address]    Additional Scheduling Information: No scheduling concerns    Prep Specifications:Extended/Constipation prep    Is the patient taking a GLP-1 Agonist:no    Have you attached a patient to this message: yes   "

## 2024-02-20 NOTE — PROGRESS NOTES
The patient location is: At home  The chief complaint leading to consultation is:  Acute urinary retention indwelling Siddiqi catheter and acute constipation, Barretts esophagus    Visit type: audiovisual    Face to Face time with patient: 45 minutes of total time spent on the encounter, which includes face to face time and non-face to face time preparing to see the patient (eg, review of tests), Obtaining and/or reviewing separately obtained history, Documenting clinical information in the electronic or other health record, Independently interpreting results (not separately reported) and communicating results to the patient/family/caregiver, or Care coordination (not separately reported).     Each patient to whom he or she provides medical services by telemedicine is:  (1) informed of the relationship between the physician and patient and the respective role of any other health care provider with respect to management of the patient; and (2) notified that he or she may decline to receive medical services by telemedicine and may withdraw from such care at any time.    Notes:         [facility] Consultation Note    Reason for Consult:  The primary encounter diagnosis was Urinary retention. Diagnoses of Constipation, acute, Indwelling Siddiqi catheter present, Obesity with serious comorbidity, unspecified classification, unspecified obesity type, and Obstructive sleep apnea were also pertinent to this visit.    PCP:   Torsten Bejarano       Referring MD:  No referring provider defined for this encounter.    Initial History of Present Illness (HPI):  This is a 78 y.o. male here for evaluation of acute onset of urinary retention and constipation that brought him into the ER on February 16, 2024 with urinary tension had a Siddiqi catheter placed his last bowel movement prior to that was 2 days prior to that on February 14, 2024 prior to that he was doing pretty well then the following day on 02/17/2024 he  went in for CT scan and they put him on MiraLax he took his 1st dose the following day on 02/18/2024 gave him a bowel movement he still has a Siddiqi catheter any he has been unable to schedule a follow-up Urology appointment with his urologist will refer him to Urology to see if they can work him in this week he has currently not having any hematuria he has Siddiqi catheter in place with a leg bag he is now having bowel movements is passing gas no nausea or vomiting no chest pain will recommend he take 1 more dose of MiraLax 17 g in 12 oz water now and then he can hold off on further MiraLax will set him up again for a referral to endoscopy schedulers for EGD colonoscopy for Barretts surveillance and screening colonoscopy orders were placed in the past a couple of times but for some reason he was unable to get those scheduled due to various issues he said he is interested in getting an EGD colonoscopy is well controlled with his heartburn on his pantoprazole 40 mg once daily knows best taken 45-60 minutes before his 1st protein meal of the day breakfast no dysphagia no odynophagia no early satiety no shortness of breath or chest pain no blood in his stool about 2 weeks ago he said he had a diarrhea illness that last 3 days he went to urgent care but he was not treated with any anti diarrhea medicine other than maybe some Pepto-Bismol or Pepcid he did not take Imodium or Lomotil.  No family history of GI malignancies nobody with esophageal cancer or stomach cancer nobody with small-bowel cancer nobody with pancreatitis or pancreatic cancer nobody with ovarian uterine kidney bladder or ureter cancer nobody with colon cancer or advanced colon adenomas polyps no FAP no attenuated FAP no MA P no Ramon syndrome nobody with celiac sprue or inflammatory bowel disease.  Feeling well now no chest pain no shortness of breath no fever or chills no change in urine color.      Abdominal pain - no  Reflux - as above  Dysphagia - no    Bowel habits - as above  GI bleeding - none  NSAID usage - none    Interval HPI 02/20/2024:  The patient's last visit with me was on 2/12/2021.      ROS:  Constitutional: No fevers, chills, No weight loss  ENT:  Well controlled heartburn no dysphagia no odynophagia no hoarseness  CV: No chest pain, no palpitation  Pulm: No cough, No shortness of breath, no wheezing  Ophtho: No vision changes  GI: see HPI  Derm: No rash, no itching  Heme: No lymphadenopathy, No easy bruising  MSK:  Some arthritis  : No dysuria, No hematuria  Endo: No hot or cold intolerance  Neuro: No syncope, No seizure, no strokes  Psych: No uncontrolled anxiety, No uncontrolled depression    Medical History:  has a past medical history of Achilles bursitis or tendinitis (9/25/2014), Allergy (12/3/2015), Arthritis, BPH without obstruction/lower urinary tract symptoms (01/04/2018), Chronic fatigue (7/10/2018), Degenerative disc disease, GERD (gastroesophageal reflux disease) (12/2/2020), Hypertension, 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    Surgical History:  has a past surgical history that includes Eye foreign body removal; Transurethral resection of prostate (TURP) without use of laser (N/A, 9/11/2018); Esophagogastroduodenoscopy (N/A, 12/2/2020); Lumbar laminectomy with fusion (2002); Minimally invasive transforaminal lumbar interbody fusion (TLIF) (N/A, 2/14/2022); Excision of parotid gland (Left, 3/27/2023); and Esophagogastroduodenoscopy (N/A, 8/29/2023).    Family History: family history includes Cancer in his father; Cataracts in his mother; Heart disease in his brother, father, and paternal grandfather; Hypertension in his mother; Prostate cancer in his brother; Stroke in his paternal grandfather..     Social History:  reports that he quit smoking about 51 years ago. His smoking use included cigarettes and cigars. He started smoking about 55 years ago. He has  a 8.0 pack-year smoking history. He has never used smokeless tobacco. He reports current alcohol use of about 1.0 standard drink of alcohol per week. He reports that he does not use drugs.    Review of patient's allergies indicates:  No Known Allergies    Medication List with Changes/Refills   Current Medications    ATORVASTATIN (LIPITOR) 10 MG TABLET    Take 1 tablet (10 mg total) by mouth once daily.    CHOLECALCIFEROL, VITAMIN D3, (VITAMIN D3) 50 MCG (2,000 UNIT) CAP CAPSULE    Take 2 capsules (4,000 Units total) by mouth once daily.    CYANOCOBALAMIN (VITAMIN B-12) 100 MCG TABLET    Take 1 tablet (100 mcg total) by mouth once daily.    DUTASTERIDE (AVODART) 0.5 MG CAPSULE    Take 1 capsule (0.5 mg total) by mouth once daily.    LISINOPRIL (PRINIVIL,ZESTRIL) 20 MG TABLET    Take 1 tablet (20 mg total) by mouth once daily.    MULTIVITAMIN W-MINERALS/LUTEIN (CENTRUM SILVER ORAL)    Take 1 tablet by mouth once daily.    PANTOPRAZOLE (PROTONIX) 40 MG TABLET    Take 1 tablet (40 mg total) by mouth before breakfast. Best taken 45-60 minutes before your 1st protein meal of the day breakfast    POLYETHYLENE GLYCOL (GLYCOLAX) 17 GRAM PWPK    Take 17 g by mouth once daily.    PREDNISOLONE ACETATE (PRED FORTE) 1 % DRPS    Place into both eyes.    TAMSULOSIN (FLOMAX) 0.4 MG CAP    Take 1 capsule (0.4 mg total) by mouth every evening.   Discontinued Medications    KETOROLAC 0.5% (ACULAR) 0.5 % DROP    Place into both eyes.    NYSTATIN (MYCOSTATIN) CREAM    Apply topically 2 (two) times daily.    OFLOXACIN (OCUFLOX) 0.3 % OPHTHALMIC SOLUTION             Objective Findings:    Vital Signs:  There were no vitals taken for this visit.  There is no height or weight on file to calculate BMI.    Physical Exam:  Telemedicine video visit  General Appearance: Well appearing in no acute distress  Neurologic:  Alert and oriented x4  Psychiatric:  Normal speech mentation and affect    Labs:  Lab Results   Component Value Date    WBC 12.70  02/17/2024    HGB 17.0 02/17/2024    HCT 51 02/17/2024     02/17/2024    CHOL 118 (L) 10/06/2023    TRIG 106 10/06/2023    HDL 33 (L) 10/06/2023    ALT 26 02/17/2024    AST 24 02/17/2024     02/17/2024    K 4.0 02/17/2024     02/17/2024    CREATININE 1.0 02/17/2024    BUN 13 02/17/2024    CO2 21 (L) 02/17/2024    TSH 1.195 10/01/2021    PSA 2.2 10/16/2023    INR 1.0 01/26/2022    HGBA1C 5.4 07/10/2018       Medical Decision Making:  Lab work reviewed  Prior EGD colonoscopy images and path personally reviewed by myself  CT scan images reviewed CT scan report reviewed  Referral to Urology talk given orders again placed for referral for  EGD colonoscopy for Barretts surveillance and colonoscopy colon cancer screening  MiraLax talk given  EXAMINATION:  CT ABDOMEN PELVIS WITH IV CONTRAST     CLINICAL HISTORY:  LLQ abdominal pain;     TECHNIQUE:  Low dose axial images, sagittal and coronal reformations were obtained from the lung bases to the pubic symphysis following the IV administration of 100 mL of Omnipaque 350 .  Oral contrast was not given.     COMPARISON:  03/10/2021     FINDINGS:  Images of the lower thorax are remarkable for bilateral dependent atelectasis.     The liver is hypoattenuating suggesting steatosis, correlation with LFTs recommended.  There are subcentimeter low attenuating lesions within the hepatic parenchyma, too small for characterization.  The spleen, pancreas, gallbladder and adrenal glands are grossly unremarkable.  There is no biliary dilation or ascites.  The portal vein, splenic vein, SMV, celiac axis and SMA all are patent.  There are a few scattered prominent abdominal/lui hepatic lymph nodes.     The kidneys enhance symmetrically without hydronephrosis.  There is left nonobstructive nephrolithiasis versus calcification along a cyst wall..  There are several low attenuating lesions arise from the kidneys, several of which are too small for characterization.  There is  a low attenuating lesion arising from the interpolar region of the left kidney measuring 2.5 cm with attenuation higher than would be expected for a simple cyst.  A similar appearing lesion arises from the lower pole of the right kidney measuring 1.6 cm.  The bilateral ureters are unable to be followed in their entirety to the urinary bladder, no definite calculi seen.  The urinary bladder is decompressed around a Siddiqi catheter noting there may be residual wall thickening.  The prostate is enlarged.     There is a large amount of stool in the rectum noting there may be early rectal wall thickening and perirectal inflammation.  There are several scattered colonic diverticula without convincing surrounding inflammation to suggest diverticulitis.  There is moderate to large amount of stool throughout the remainder of the large bowel noting primarily liquid stool within the transverse colon, ascending colon, and cecum.  The terminal ileum is unremarkable.  The appendix is not confidently identified, no pericecal inflammation.  The small bowel is grossly unremarkable.  There are a few scattered shotty periaortic, pericaval, and mesenteric lymph nodes.  There is atherosclerotic calcification of the aorta and its branches.  No focal organized pelvic fluid collection.     There is osteopenia.  There are degenerative changes of the spine.  Posterior fusion hardware spans L4-L5 noting disc spacing material at L4-L5.  There is grade 1 anterolisthesis of L4 on L5.  No significant inguinal lymphadenopathy.     Impression:     This report was flagged in Epic as abnormal.     1. There is large amount of stool within the rectum and distal sigmoid colon noting there may be some early indistinctness about the rectal and sigmoid colonic walls.  Developing stercoral proctitis is a consideration.  Correlation is advised.  2. There is liquid stool within the right colon, superimposed diarrheal illness is a consideration.  3. Low  attenuating lesions arise from the kidneys, most of which are too small for characterization, others measure attenuation higher than would be expected for simple cysts.  Nonemergent ultrasound is recommended to confirm cystic nature.  4. Left nonobstructive nephrolithiasis.  5. Findings suggesting hepatic steatosis, correlation with LFTs recommended.  6. The urinary bladder is decompressed around a Siddiqi catheter, may account for wall thickening however correlation with urinalysis recommended.  7. Please see above for additional findings.        Electronically signed by: Hernando Galvin MD  Date:                                            02/17/2024  Assessment:  1. Urinary retention    2. Constipation, acute    3. Indwelling Siddiqi catheter present    4. Obesity with serious comorbidity, unspecified classification, unspecified obesity type    5. Obstructive sleep apnea    6.      Fatty liver     Recommendations:  1. Referral to Urology forLow attenuating lesions arise from the kidneys, most of which are too small for characterization, others measure attenuation higher than would be expected for simple cysts and acute urinary retention in a patient who has had a TURP x2 and also acute urinary retention in the past where he had a dilated bladder and felt constipated very similar to this current episode.  2. Renal ultrasound orders  3. Patient is taking MiraLax 17 g in 12 oz of water once on 02/18/2024 with a good bowel movement he will take 1 more dose now but CT scan does not show like he has a whole bunch a stool in him.  4. Referral again to endoscopy schedulers for EGD for Barretts surveillance as well as colonoscopy for colon rectal cancer surveillance will do constipation bowel prep protocol just in case to ensure patient's clean he is past due on screening colonoscopy.  5. Fatty liver recommend gradual weight loss ideally 25 lb over the next 12-18 months should help out the liver significantly.  6. Return GI  clinic 3 months for follow-up okay for telemedicine video visit    Follow up in about 3 months (around 5/20/2024).      Order summary:  Orders Placed This Encounter    Ambulatory referral/consult to Urology         Thank you so much for allowing me to participate in the care of Sreekanth Mckinney MD    DISCLAIMER: This note was prepared with [employer] voice recognition transcription software. Garbled syntax, mangled or inadvertent pronouns, and other bizarre constructions may be attributed to that software system. While efforts were made to correct any mistakes made by this voice recognition program, some errors and/or omissions may remain in the note that were missed when the note was originally created.

## 2024-02-20 NOTE — TELEPHONE ENCOUNTER
He is having some issues with constipation.   He has not seen you in clinic for awhile.   Is it ok to offer a fellow appointment?

## 2024-02-20 NOTE — Clinical Note
"Procedure: EGD/Colonoscopy  Diagnosis:  Barretts esophagus, acute constipation Surveillance colonoscopy - Hx of colon polyps  Procedure Timin-6 weeks  *If within 4 weeks selected, please christel as high priority*  *If greater than 12 weeks, please select "5-12 weeks" and delay sending until 3 months prior to requested date*  Provider: Myself  Location: [address]  Additional Scheduling Information: No scheduling concerns  Prep Specifications:Extended/Constipation prep  Is the patient taking a GLP-1 Agonist:no  Have you attached a patient to this message: yes "

## 2024-02-20 NOTE — Clinical Note
Vanessa please schedule Sreekanth for Urology appointment hopefully Thursday or Friday this week for acute urinary retention history of BPH status post TURP x2 with acute urinary retention with Siddiqi catheter placed on February 16, 2024 in the ER.  Need urology follow-up

## 2024-02-21 ENCOUNTER — HOSPITAL ENCOUNTER (OUTPATIENT)
Dept: RADIOLOGY | Facility: HOSPITAL | Age: 79
Discharge: HOME | End: 2024-02-21
Attending: INTERNAL MEDICINE
Payer: MEDICARE

## 2024-02-21 ENCOUNTER — TELEPHONE (OUTPATIENT)
Dept: ENDOSCOPY | Facility: HOSPITAL | Age: 79
End: 2024-02-21
Payer: MEDICARE

## 2024-02-21 DIAGNOSIS — K59.00 CONSTIPATION, UNSPECIFIED CONSTIPATION TYPE: ICD-10-CM

## 2024-02-21 DIAGNOSIS — R93.429 ABNORMAL CT SCAN, KIDNEY: ICD-10-CM

## 2024-02-21 DIAGNOSIS — Z12.11 SCREEN FOR COLON CANCER: Primary | ICD-10-CM

## 2024-02-21 DIAGNOSIS — N28.9 KIDNEY LESION: ICD-10-CM

## 2024-02-21 DIAGNOSIS — N20.0 RENAL STONE: ICD-10-CM

## 2024-02-21 PROCEDURE — 76770 US EXAM ABDO BACK WALL COMP: CPT | Mod: TC

## 2024-02-21 PROCEDURE — 76770 US EXAM ABDO BACK WALL COMP: CPT | Mod: 26,,, | Performed by: RADIOLOGY

## 2024-02-21 NOTE — TELEPHONE ENCOUNTER
----- Message from Xi Alvarez LPN sent at 2/20/2024  4:16 PM CST -----  Regarding: FW: pt advice    ----- Message -----  From: Abdoulaye Perdue  Sent: 2/20/2024   9:52 AM CST  To: [facility] Staff  Subject: pt advice                                        PATIENT CALL    Pt called, states that he went to the ED last Friday for urinary retention. Would now like to speak to someone about when he can have his Siddiqi removed. Please call back at [phone]

## 2024-02-21 NOTE — TELEPHONE ENCOUNTER
"Spoke to pt to schedule procedure(s) Colonoscopy       Physician to perform procedure(s) Dr. RYAN Mckinney  Date of Procedure (s) 3/19/24  Arrival Time 1:30 PM  Time of Procedure(s) 2:30 PM   Location of Procedure(s) [place] [address] [facility]  Type of Rx Prep sent to patient: PEG Extended  Instructions provided to patient via [facility] and mailed to address on file    Patient was informed on the following information and verbalized understanding. Screening questionnaire reviewed with patient and complete. If procedure requires anesthesia, a responsible adult needs to be present to accompany the patient home, patient cannot drive after receiving anesthesia. Appointment details are tentative, especially check-in time. Patient will receive a prep-op call 7 days prior to confirm check-in time for procedure. If applicable the patient should contact their pharmacy to verify Rx for procedure prep is ready for pick-up. Patient was advised to call the scheduling department at [phone] if pharmacy states no Rx is available. Patient was advised to call the endoscopy scheduling department if any questions or concerns arise.       Endoscopy Scheduling Department      From: Dion Mckinney MD   Sent: 2/19/2024   6:34 PM CST   To: [facility]     Procedure: Colonoscopy     Diagnosis:  Screening colonoscopy constipation     Procedure Timing: 3-4 weeks     *If within 4 weeks selected, please christel as high priority*     *If greater than 12 weeks, please select "5-12 weeks" and delay sending until 3 months prior to requested date*     Provider: Myself     Location: No Preference     Additional Scheduling Information:  Please expedite colonoscopy with me for change in bowel habits constipation and screening     Prep Specifications:Extended/Constipation prep     Is the patient taking a GLP-1 Agonist:no     Have you attached a patient to this message: yes     "

## 2024-02-21 NOTE — TELEPHONE ENCOUNTER
----- Message from Uma Gaona MD sent at 2/21/2024  4:28 PM CST -----  Can you remove his catheter in the am and then a follow up with me when I get back?  Thanks.   ----- Message -----  From: Dion Mckinney MD  Sent: 2/21/2024   9:52 AM CST  To: Uma Gaona MD    Yes he is; I am thinking his acute urinary retention caused his constipation is what I am guessing he said he had a very similar episode in the past  ----- Message -----  From: Uma Gaona MD  Sent: 2/20/2024   8:35 PM CST  To: Dion Mckinney MD    Is he pooping?    ----- Message -----  From: Dion Mckinney MD  Sent: 2/20/2024   3:43 PM CST  To: Tere Galo MA; MD Vanessa Regalado please schedule Sreekanth for Urology appointment hopefully Thursday or Friday this week for acute urinary retention history of BPH status post TURP x2 with acute urinary retention with Siddiqi catheter placed on February 16, 2024 in the ER.  Need urology follow-up

## 2024-02-21 NOTE — TELEPHONE ENCOUNTER
----- Message from Janice Butts sent at 2/21/2024  9:55 AM CST -----  Regarding: Urgent  Contact: [phone]  Pt calling to speak with someone in provider office regards having catheter removed. Stated he called multiple times haven't got a call back.   Please call pt back at  [phone]

## 2024-02-21 NOTE — TELEPHONE ENCOUNTER
"Telephoned pt regarding scheduling colonoscopy procedure with Dr. Mckinney.  Spoke with pt and informed him I have sent a message to Dr. Mckinney for assistance in coordinating procedure date. Will follow up with pt upon his response to schedule. Pt verbalized understanding.    From: Dion Mckinney MD   Sent: 2/19/2024   6:34 PM CST   To: [facility]     Procedure: Colonoscopy     Diagnosis:  Screening colonoscopy constipation     Procedure Timing: 3-4 weeks     *If within 4 weeks selected, please christel as high priority*     *If greater than 12 weeks, please select "5-12 weeks" and delay sending until 3 months prior to requested date*     Provider: Myself     Location: No Preference     Additional Scheduling Information:  Please expedite colonoscopy with me for change in bowel habits constipation and screening     Prep Specifications:Extended/Constipation prep     Is the patient taking a GLP-1 Agonist:no     Have you attached a patient to this message: yes   "

## 2024-02-22 ENCOUNTER — TELEPHONE (OUTPATIENT)
Dept: UROLOGY | Facility: CLINIC | Age: 79
End: 2024-02-22

## 2024-02-22 ENCOUNTER — OFFICE VISIT (OUTPATIENT)
Dept: UROLOGY | Facility: CLINIC | Age: 79
End: 2024-02-22
Payer: MEDICARE

## 2024-02-22 ENCOUNTER — PATIENT MESSAGE (OUTPATIENT)
Dept: GASTROENTEROLOGY | Facility: CLINIC | Age: 79
End: 2024-02-22
Payer: MEDICARE

## 2024-02-22 VITALS
BODY MASS INDEX: 32.26 KG/M2 | HEART RATE: 84 BPM | WEIGHT: 244.5 LBS | SYSTOLIC BLOOD PRESSURE: 125 MMHG | DIASTOLIC BLOOD PRESSURE: 71 MMHG

## 2024-02-22 DIAGNOSIS — N40.0 BENIGN PROSTATIC HYPERPLASIA WITHOUT LOWER URINARY TRACT SYMPTOMS: ICD-10-CM

## 2024-02-22 DIAGNOSIS — R33.9 URINARY RETENTION: Primary | ICD-10-CM

## 2024-02-22 PROCEDURE — 99214 OFFICE O/P EST MOD 30 MIN: CPT | Mod: S$PBB,,, | Performed by: UROLOGY

## 2024-02-22 PROCEDURE — 99999 PR PBB SHADOW E&M-EST. PATIENT-LVL III: CPT | Mod: PBBFAC,,, | Performed by: UROLOGY

## 2024-02-22 PROCEDURE — 99213 OFFICE O/P EST LOW 20 MIN: CPT | Mod: PBBFAC | Performed by: UROLOGY

## 2024-02-22 NOTE — PROGRESS NOTES
CHIEF COMPLAINT:    Mr. Pitts is a 78 y.o. male presenting for an urgent appointment after going into urinary retention.    PRESENTING ILLNESS:    Sreekanth Pitts Jr. is a 78 y.o. male who presents with a history of constipation for 2 days before he went into urinary retention.  He went to the ER where a posadas catheter was placed on 2/16/2024.  At the time of catheterization, 1 liter of urine was drained.  He was given lactulose and an enema had a small bowel movement.  He was discharged home but the following day went back to the ER because he had ongoing constipation, lower abdominal pain and had to be manually disimpacted.  He has been on Miralax and has been having soft bowel movements.     He continues to take tamsulosin and dutesteride.  He had an elevated PSA (2.2 ng/ml) in October and another PSA is ordered for 4/8/2024 blood draw (verified on the chart)     He sees Dr. Mckinney for the constipation.  Just had a virtual visit with him.    REVIEW OF SYSTEMS:    Review of Systems   Constitutional: Negative.    HENT: Negative.     Eyes: Negative.    Respiratory: Negative.     Cardiovascular: Negative.    Gastrointestinal:  Positive for constipation.   Genitourinary:         Urinary retention   Musculoskeletal: Negative.    Skin: Negative.    Neurological: Negative.    Endo/Heme/Allergies: Negative.    Psychiatric/Behavioral: Negative.         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       Past Surgical History:   Procedure Laterality Date    CATARACT EXTRACTION Bilateral     ESOPHAGOGASTRODUODENOSCOPY N/A  12/02/2020    Procedure: EGD (ESOPHAGOGASTRODUODENOSCOPY);  Surgeon: Dion Mckinney MD;  Location: [facility] ENDO (4TH FLR);  Service: Endoscopy;  Laterality: N/A;  covid-11/29-[facility]    ESOPHAGOGASTRODUODENOSCOPY N/A 08/29/2023    Procedure: EGD (ESOPHAGOGASTRODUODENOSCOPY);  Surgeon: Dion Mckinney MD;  Location: [facility] ENDO (4TH FLR);  Service: Endoscopy;  Laterality: N/A;  prep instructions sent to pt vai portal -  5-12 week time frame    EXCISION OF PAROTID GLAND Left 03/27/2023    Procedure: EXCISION, PAROTID GLAND;  Surgeon: Mook Newton MD;  Location: [facility] OR 2ND FLR;  Service: ENT;  Laterality: Left;    EYE FOREIGN BODY REMOVAL      childhood    LUMBAR LAMINECTOMY WITH FUSION  2002    MINIMALLY INVASIVE TRANSFORAMINAL LUMBAR INTERBODY FUSION (TLIF) N/A 02/14/2022    Procedure: FUSION, SPINE, LUMBAR, TLIF, MINIMALLY INVASIVE Right L4-5 Renaldo V notified;  Surgeon: Anibal Beebe MD;  Location: [facility];  Service: Neurosurgery;  Laterality: N/A;  ASA1  TOR1  T&Screen  EMG  C-Arm  LSO  Jamie 4 poster  NEURO MONITORING RENALDO ANURAG [phone]  SPINEWAVE RENALDO OKEEFEQUEZ [phone] TEXTED ON 2/2/2022 @ 3:54PM/ RESPONDED ON 2/2/2022 @4:22 PM-LO  PREOP DONE A    TRANSURETHRAL RESECTION OF PROSTATE (TURP) WITHOUT USE OF LASER N/A 09/11/2018    Procedure: TURP, WITHOUT USING LASER;  Surgeon: Uma Gaona MD;  Location: [facility] OR 1ST FLR;  Service: Urology;  Laterality: N/A;  1.5 hours       Family History   Problem Relation Age of Onset    Cataracts Mother     Hypertension Mother     Cancer Father     Heart disease Father     Heart disease Brother     Prostate cancer Brother     Stroke Paternal Grandfather     Heart disease Paternal Grandfather     Colon cancer Neg Hx     Esophageal cancer Neg Hx        Social History     Socioeconomic History    Marital status:    Occupational History     Employer: [employer]   Tobacco Use    Smoking status: Former     Current  packs/day: 0.00     Average packs/day: 2.0 packs/day for 4.0 years (8.0 ttl pk-yrs)     Types: Cigarettes, Cigars     Start date: 1969     Quit date: 1973     Years since quittin.1    Smokeless tobacco: Never   Substance and Sexual Activity    Alcohol use: Yes     Alcohol/week: 1.0 standard drink of alcohol     Types: 1 Cans of beer per week     Comment: rarely    Drug use: No   Social History Narrative    , consulting firm , 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 wife (healthy) lives at home, 2 dtrs, 46, 44. frequ walking each day.     Social Determinants of Health     Financial Resource Strain: Low Risk  (2024)    Overall Financial Resource Strain (CARDIA)     Difficulty of Paying Living Expenses: Not hard at all   Food Insecurity: No Food Insecurity (2024)    Hunger Vital Sign     Worried About Running Out of Food in the Last Year: Never true     Ran Out of Food in the Last Year: Never true   Transportation Needs: No Transportation Needs (2024)    PRAPARE - Transportation     Lack of Transportation (Medical): No     Lack of Transportation (Non-Medical): No   Physical Activity: Sufficiently Active (2024)    Exercise Vital Sign     Days of Exercise per Week: 7 days     Minutes of Exercise per Session: 30 min   Stress: Stress Concern Present (2024)    [place] [employer] - Occupational Stress Questionnaire     Feeling of Stress : To some extent   Social Connections: Unknown (2024)    Social Connection and Isolation Panel [NHANES]     Frequency of Communication with Friends and Family: Once a week     Frequency of Social Gatherings with Friends and Family: Once a week     Active Member of Clubs or Organizations: No     Attends Club or Organization Meetings: Patient declined     Marital Status:    Housing Stability: Low Risk  (2024)    Housing Stability Vital Sign     Unable to Pay for Housing in the Last Year: No     Number of Places Lived in the  Last Year: 1     Unstable Housing in the Last Year: No       Allergies:  Patient has no known allergies.    Medications:  Outpatient Encounter Medications as of 2024   Medication Sig Dispense Refill    atorvastatin (LIPITOR) 10 MG tablet Take 1 tablet (10 mg total) by mouth once daily. 90 tablet 11    cholecalciferol, vitamin D3, (VITAMIN D3) 50 mcg (2,000 unit) Cap capsule Take 2 capsules (4,000 Units total) by mouth once daily. 180 capsule 3    cyanocobalamin (VITAMIN B-12) 100 MCG tablet Take 1 tablet (100 mcg total) by mouth once daily. 90 tablet 11    dutasteride (AVODART) 0.5 mg capsule Take 1 capsule (0.5 mg total) by mouth once daily. 90 capsule 3    lisinopriL (PRINIVIL,ZESTRIL) 20 MG tablet Take 1 tablet (20 mg total) by mouth once daily. 90 tablet 11    MULTIVITAMIN W-MINERALS/LUTEIN (CENTRUM SILVER ORAL) Take 1 tablet by mouth once daily.      pantoprazole (PROTONIX) 40 MG tablet Take 1 tablet (40 mg total) by mouth before breakfast. Best taken 45-60 minutes before your 1st protein meal of the day breakfast 90 tablet 3    [] polyethylene glycol (COLYTE) 240-22.72-6.72 -5.84 gram SolR Take 8,000 mLs by mouth once. for 1 dose 8000 mL 0    polyethylene glycol (GLYCOLAX) 17 gram PwPk Take 17 g by mouth once daily. 30 each 0    prednisoLONE acetate (PRED FORTE) 1 % DrpS Place into both eyes.      tamsulosin (FLOMAX) 0.4 mg Cap Take 1 capsule (0.4 mg total) by mouth every evening. 90 capsule 3     No facility-administered encounter medications on file as of 2024.         PHYSICAL EXAMINATION:    The patient generally appears in good health, is appropriately interactive, and is in no apparent distress.    Skin: No lesions.    Mental: Cooperative with normal affect.    Neuro: Grossly intact.    HEENT: Normal. No evidence of lymphadenopathy.    Chest:  normal inspiratory effort.    Extremities: No clubbing, cyanosis, or edema      LABS:    Lab Results   Component Value Date    BUN 13 2024     CREATININE 1.0 02/17/2024       Lab Results   Component Value Date    PSA 2.2 10/16/2023    PSA 1.7 05/13/2022    PSA 1.3 03/10/2021    PSADIAG 2.7 12/19/2013     CT abd/pelvis 2/17/2024 was reviewed and he has a looping sigmoid colon, had a lot of stool in the rectum, sigmoid colon and the large bowel loops back in the splenic flexure.     Renal ultrasound 2/21/2024 showed a minimally complex cyst (small calcification associated with it) middle pole of the left kidney, which can be followed up in 6 months.    Images and reports reviewed for both studies    IMPRESSION:    Urinary retention associated with constipation.    PLAN:    1. Catheter was removed  2.  Asked him to let me know when he urinates  3.  Discussed probiotics and fermented foods  4.  Follow up pending repeat PSA.     I spent 30 minutes with the patient of which more than half was spent in direct consultation with the patient in regards to our treatment and plan.

## 2024-02-22 NOTE — PROGRESS NOTES
Sreekanth your ultrasound shows an enlarged prostate which probably at least partially explains your urinary retention and also shows bilateral simple renal cyst no follow-up needed and a small minimally complex left kidney cyst you should have these reviewed by your urologist I think you have a follow-up appointment this week.    Thank you.    Impression:    Bilateral simple cysts.  Small minimally complex cyst in the left kidney.  No solid renal lesions.    Prostatomegaly.    Electronically signed by resident: Hilario Gillette  Date:                                            02/21/2024  Time:                                           16:43    Electronically signed by: Armando Montanez MD  Date:                                            02/21/2024

## 2024-02-22 NOTE — PATIENT INSTRUCTIONS
Apple Cider Vinegar (Jaylin) is a good source of probiotics    Refrigerated Pickles also have natural probiotics    Probiotics are available over the counter.  Optimum dose is 25-50 billion.  Make sure it has multiple strains (greater than 10).  If you would like a specific one, [employer] has a probiotic by [employer] that has over 10 strains.  (Orange and white box)  Multiple strains are particularly important as the greater variety of strains is better.  Make sure the product is not .

## 2024-02-23 ENCOUNTER — TELEPHONE (OUTPATIENT)
Dept: UROLOGY | Facility: CLINIC | Age: 79
End: 2024-02-23
Payer: MEDICARE

## 2024-02-23 NOTE — TELEPHONE ENCOUNTER
----- Message from Denise Hinton sent at 2/23/2024  9:53 AM CST -----  Contact: [phone]  Pt is calling to speak with someone in provider office is asking for a return call Pt returning call stating he is feeling well  please call pt at  [phone] (home) [phone] (work)      Left message on machine to return call

## 2024-02-26 ENCOUNTER — TELEPHONE (OUTPATIENT)
Dept: GASTROENTEROLOGY | Facility: CLINIC | Age: 79
End: 2024-02-26
Payer: MEDICARE

## 2024-02-26 NOTE — TELEPHONE ENCOUNTER
----- Message from Anabela Montiel sent at 2/26/2024  9:43 AM CST -----  Regarding: pt advice  Contact: pt@ [phone]  Pt requesting a call back to discuss plan of care also to discuss Rx that was given to pt on last week please call pt @[phone]

## 2024-02-26 NOTE — TELEPHONE ENCOUNTER
----- Message from Alissa Banegas sent at 2/26/2024  3:36 PM CST -----  Regarding: Pt advice  Contact: [phone]  Pt is following up on a call that he made earlier regarding plan of care since the pt was recently in emergency room for constipation that cause a blockage. Advised pt that it can take up to 72 hours for a response

## 2024-03-13 ENCOUNTER — TELEPHONE (OUTPATIENT)
Dept: ENDOSCOPY | Facility: HOSPITAL | Age: 79
End: 2024-03-13
Payer: MEDICARE

## 2024-03-14 ENCOUNTER — TELEPHONE (OUTPATIENT)
Dept: ENDOSCOPY | Facility: HOSPITAL | Age: 79
End: 2024-03-14
Payer: MEDICARE

## 2024-03-14 NOTE — TELEPHONE ENCOUNTER
Patient called to confirm Colonoscopy on 3/19/24. Reviewed instructions with pt, pt verbalized understanding.

## 2024-03-19 ENCOUNTER — ANESTHESIA EVENT (OUTPATIENT)
Dept: ENDOSCOPY | Facility: HOSPITAL | Age: 79
End: 2024-03-19
Payer: MEDICARE

## 2024-03-19 ENCOUNTER — ANESTHESIA (OUTPATIENT)
Dept: ENDOSCOPY | Facility: HOSPITAL | Age: 79
End: 2024-03-19
Payer: MEDICARE

## 2024-03-19 ENCOUNTER — HOSPITAL ENCOUNTER (OUTPATIENT)
Facility: HOSPITAL | Age: 79
Discharge: HOME | End: 2024-03-19
Attending: INTERNAL MEDICINE | Admitting: INTERNAL MEDICINE
Payer: MEDICARE

## 2024-03-19 VITALS
DIASTOLIC BLOOD PRESSURE: 71 MMHG | SYSTOLIC BLOOD PRESSURE: 128 MMHG | RESPIRATION RATE: 17 BRPM | HEIGHT: 73 IN | OXYGEN SATURATION: 97 % | WEIGHT: 244 LBS | TEMPERATURE: 98 F | BODY MASS INDEX: 32.34 KG/M2 | HEART RATE: 70 BPM

## 2024-03-19 PROCEDURE — E9220 PRA ENDO ANESTHESIA: HCPCS | Mod: PT,,, | Performed by: NURSE ANESTHETIST, CERTIFIED REGISTERED

## 2024-03-19 PROCEDURE — 25000003 PHARM REV CODE 250: Performed by: NURSE ANESTHETIST, CERTIFIED REGISTERED

## 2024-03-19 PROCEDURE — 37000008 HC ANESTHESIA 1ST 15 MINUTES: Performed by: INTERNAL MEDICINE

## 2024-03-19 PROCEDURE — 27201089 HC SNARE, DISP (ANY): Performed by: INTERNAL MEDICINE

## 2024-03-19 PROCEDURE — 88305 TISSUE EXAM BY PATHOLOGIST: CPT | Performed by: STUDENT IN AN ORGANIZED HEALTH CARE EDUCATION/TRAINING PROGRAM

## 2024-03-19 PROCEDURE — 37000009 HC ANESTHESIA EA ADD 15 MINS: Performed by: INTERNAL MEDICINE

## 2024-03-19 PROCEDURE — 88305 TISSUE EXAM BY PATHOLOGIST: CPT | Mod: 26,,, | Performed by: STUDENT IN AN ORGANIZED HEALTH CARE EDUCATION/TRAINING PROGRAM

## 2024-03-19 PROCEDURE — 63600175 PHARM REV CODE 636 W HCPCS: Performed by: NURSE ANESTHETIST, CERTIFIED REGISTERED

## 2024-03-19 PROCEDURE — 45380 COLONOSCOPY AND BIOPSY: CPT | Mod: PT,59,, | Performed by: INTERNAL MEDICINE

## 2024-03-19 PROCEDURE — 45380 COLONOSCOPY AND BIOPSY: CPT | Mod: PT,59 | Performed by: INTERNAL MEDICINE

## 2024-03-19 PROCEDURE — 45385 COLONOSCOPY W/LESION REMOVAL: CPT | Mod: PT | Performed by: INTERNAL MEDICINE

## 2024-03-19 PROCEDURE — 45385 COLONOSCOPY W/LESION REMOVAL: CPT | Mod: PT,,, | Performed by: INTERNAL MEDICINE

## 2024-03-19 RX ORDER — SODIUM CHLORIDE 9 MG/ML
INJECTION, SOLUTION INTRAVENOUS CONTINUOUS
Status: DISCONTINUED | OUTPATIENT
Start: 2024-03-19 | End: 2024-03-19 | Stop reason: HOSPADM

## 2024-03-19 RX ORDER — LIDOCAINE HYDROCHLORIDE 20 MG/ML
INJECTION INTRAVENOUS
Status: DISCONTINUED | OUTPATIENT
Start: 2024-03-19 | End: 2024-03-19

## 2024-03-19 RX ORDER — PROPOFOL 10 MG/ML
VIAL (ML) INTRAVENOUS CONTINUOUS PRN
Status: DISCONTINUED | OUTPATIENT
Start: 2024-03-19 | End: 2024-03-19

## 2024-03-19 RX ORDER — PROPOFOL 10 MG/ML
VIAL (ML) INTRAVENOUS
Status: DISCONTINUED | OUTPATIENT
Start: 2024-03-19 | End: 2024-03-19

## 2024-03-19 RX ADMIN — SODIUM CHLORIDE: 0.9 INJECTION, SOLUTION INTRAVENOUS at 03:03

## 2024-03-19 RX ADMIN — LIDOCAINE HYDROCHLORIDE 80 MG: 20 INJECTION INTRAVENOUS at 03:03

## 2024-03-19 RX ADMIN — PROPOFOL 100 MG: 10 INJECTION, EMULSION INTRAVENOUS at 03:03

## 2024-03-19 RX ADMIN — PROPOFOL 150 MCG/KG/MIN: 10 INJECTION, EMULSION INTRAVENOUS at 03:03

## 2024-03-19 NOTE — H&P
[facility]  History & Physical    Subjective:      Chief Complaint/Reason for Admission:     Screening colonoscopy average risk for CRC by personal and family history today.    Sreekanth Pitts Jr. is a 78 y.o. male.    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     Past Surgical History:   Procedure Laterality Date    CATARACT EXTRACTION Bilateral     ESOPHAGOGASTRODUODENOSCOPY N/A 12/02/2020    Procedure: EGD (ESOPHAGOGASTRODUODENOSCOPY);  Surgeon: Dion Mckinney MD;  Location: [facility] (4TH FLR);  Service: Endoscopy;  Laterality: N/A;  covid-11/29-[facility]    ESOPHAGOGASTRODUODENOSCOPY N/A 08/29/2023    Procedure: EGD (ESOPHAGOGASTRODUODENOSCOPY);  Surgeon: Dion Mckinney MD;  Location: [facility] (4TH FLR);  Service: Endoscopy;  Laterality: N/A;  prep instructions sent to pt vai portal -  5-12 week time frame    EXCISION OF PAROTID GLAND Left 03/27/2023    Procedure: EXCISION, PAROTID GLAND;  Surgeon: Mook Newton MD;  Location: [facility] 2ND FLR;  Service: ENT;  Laterality: Left;    EYE FOREIGN BODY REMOVAL      childhood    LUMBAR LAMINECTOMY WITH FUSION  2002    MINIMALLY INVASIVE TRANSFORAMINAL LUMBAR INTERBODY FUSION (TLIF) N/A 02/14/2022    Procedure: FUSION, SPINE, LUMBAR, TLIF, MINIMALLY INVASIVE Right L4-5 Renaldo V notified;  Surgeon: Anibal Beebe MD;  Location: [facility];  Service: Neurosurgery;  Laterality: N/A;  ASA1  TOR1  T&Screen  EMG  C-Arm  LSO  Jamie 4 poster  NEURO MONITORING RENALDO OSBORN [phone]  SPINEWAVE RENALDO POLLOCK [phone] TEXTED ON 2/2/2022 @  3:54PM/ RESPONDED ON 2022 @4:22 PM-LO  PREOP DONE A    TRANSURETHRAL RESECTION OF PROSTATE (TURP) WITHOUT USE OF LASER N/A 2018    Procedure: TURP, WITHOUT USING LASER;  Surgeon: Uma Gaona MD;  Location: [facility] OR [address] [place] [address];  Service: Urology;  Laterality: N/A;  1.5 hours     Social History     Tobacco Use    Smoking status: Former     Current packs/day: 0.00     Average packs/day: 2.0 packs/day for 4.0 years (8.0 ttl pk-yrs)     Types: Cigarettes, Cigars     Start date: 1969     Quit date: 1973     Years since quittin.2    Smokeless tobacco: Never   Substance Use Topics    Alcohol use: Yes     Alcohol/week: 1.0 standard drink of alcohol     Types: 1 Cans of beer per week     Comment: occasionally/rarely    Drug use: No       PTA Medications   Medication Sig    atorvastatin (LIPITOR) 10 MG tablet Take 1 tablet (10 mg total) by mouth once daily.    cholecalciferol, vitamin D3, (VITAMIN D3) 50 mcg (2,000 unit) Cap capsule Take 2 capsules (4,000 Units total) by mouth once daily.    cyanocobalamin (VITAMIN B-12) 100 MCG tablet Take 1 tablet (100 mcg total) by mouth once daily.    dutasteride (AVODART) 0.5 mg capsule Take 1 capsule (0.5 mg total) by mouth once daily.    lisinopriL (PRINIVIL,ZESTRIL) 20 MG tablet Take 1 tablet (20 mg total) by mouth once daily.    pantoprazole (PROTONIX) 40 MG tablet Take 1 tablet (40 mg total) by mouth before breakfast. Best taken 45-60 minutes before your 1st protein meal of the day breakfast    tamsulosin (FLOMAX) 0.4 mg Cap Take 1 capsule (0.4 mg total) by mouth every evening.    MULTIVITAMIN W-MINERALS/LUTEIN (CENTRUM SILVER ORAL) Take 1 tablet by mouth once daily.    [] polyethylene glycol (GLYCOLAX) 17 gram PwPk Take 17 g by mouth once daily.    prednisoLONE acetate (PRED FORTE) 1 % DrpS Place into both eyes.     Review of patient's allergies indicates:  No Known Allergies     Review of Systems   Constitutional:  Negative for fever.        Objective:      Vital Signs (Most Recent)  Temp: 98.1 °F (36.7 °C) (03/19/24 1348)  Pulse: 87 (03/19/24 1348)  Resp: 17 (03/19/24 1348)  BP: (!) 149/85 (03/19/24 1348)  SpO2: 96 % (03/19/24 1348)    Vital Signs Range (Last 24H):  Temp:  [98.1 °F (36.7 °C)]   Pulse:  [87]   Resp:  [17]   BP: (149)/(85)   SpO2:  [96 %]     Physical Exam  Cardiovascular:      Rate and Rhythm: Normal rate.   Pulmonary:      Effort: Pulmonary effort is normal.   Neurological:      Mental Status: He is alert and oriented to person, place, and time.             Assessment:    Screening colonoscopy average risk for CRC by personal and family history today.      Plan:    Screening colonoscopy average risk for CRC by personal and family history today.

## 2024-03-19 NOTE — ANESTHESIA PREPROCEDURE EVALUATION
03/19/2024  Sreekanth Pitts Jr. is a 78 y.o., male.      Pre-op Assessment    I have reviewed the Patient Summary Reports.     I have reviewed the Nursing Notes. I have reviewed the NPO Status.   I have reviewed the Medications.     Review of Systems  Anesthesia Hx:  No problems with previous Anesthesia                Hematology/Oncology:  Hematology Normal   Oncology Normal                                   EENT/Dental:  EENT/Dental Normal           Cardiovascular:  Exercise tolerance: good   Hypertension                                        Pulmonary:        Sleep Apnea, CPAP           Education provided regarding risk of obstructive sleep apnea            Renal/:  Renal/ Normal                 Hepatic/GI:     GERD             Musculoskeletal:  Arthritis               Neurological:  Neurology Normal                                      Endocrine:  Endocrine Normal            Psych:  Psychiatric Normal                    Physical Exam  General: Well nourished, Cooperative and Alert    Airway:  Mallampati: II / II  Mouth Opening: Normal  TM Distance: Normal  Tongue: Normal  Neck ROM: Normal ROM    Dental:  Intact    Chest/Lungs:  Clear to auscultation, Normal Respiratory Rate    Heart:  Rate: Normal  Rhythm: Regular Rhythm        Anesthesia Plan  Type of Anesthesia, risks & benefits discussed:    Anesthesia Type: Gen Natural Airway  Intra-op Monitoring Plan: Standard ASA Monitors  Post Op Pain Control Plan: IV/PO Opioids PRN  Induction:  IV  Informed Consent: Informed consent signed with the Patient and all parties understand the risks and agree with anesthesia plan.  All questions answered.   ASA Score: 2  Day of Surgery Review of History & Physical: H&P Update referred to the surgeon/provider.    Ready For Surgery From Anesthesia Perspective.     .  Past Surgical History:   Procedure Laterality Date     CATARACT EXTRACTION Bilateral     ESOPHAGOGASTRODUODENOSCOPY N/A 12/02/2020    Procedure: EGD (ESOPHAGOGASTRODUODENOSCOPY);  Surgeon: Dion Mckinney MD;  Location: [facility] ENDO (4TH FLR);  Service: Endoscopy;  Laterality: N/A;  covid-11/29-[facility]    ESOPHAGOGASTRODUODENOSCOPY N/A 08/29/2023    Procedure: EGD (ESOPHAGOGASTRODUODENOSCOPY);  Surgeon: Dion Mckinney MD;  Location: [facility] ENDO (4TH FLR);  Service: Endoscopy;  Laterality: N/A;  prep instructions sent to pt vai portal -  5-12 week time frame    EXCISION OF PAROTID GLAND Left 03/27/2023    Procedure: EXCISION, PAROTID GLAND;  Surgeon: Mook Newton MD;  Location: [facility] OR 2ND FLR;  Service: ENT;  Laterality: Left;    EYE FOREIGN BODY REMOVAL      childhood    LUMBAR LAMINECTOMY WITH FUSION  2002    MINIMALLY INVASIVE TRANSFORAMINAL LUMBAR INTERBODY FUSION (TLIF) N/A 02/14/2022    Procedure: FUSION, SPINE, LUMBAR, TLIF, MINIMALLY INVASIVE Right L4-5 Renaldo V notified;  Surgeon: Anibal Beebe MD;  Location: [facility];  Service: Neurosurgery;  Laterality: N/A;  ASA1  TOR1  T&Screen  EMG  C-Arm  LSO  Jamie 4 poster  NEURO MONITORING RENALDO ANURAG [phone]  SPINEWAVE RENALDO OKEEFEQUEZ [phone] TEXTED ON 2/2/2022 @ 3:54PM/ RESPONDED ON 2/2/2022 @4:22 PM-LO  PREOP DONE A    TRANSURETHRAL RESECTION OF PROSTATE (TURP) WITHOUT USE OF LASER N/A 09/11/2018    Procedure: TURP, WITHOUT USING LASER;  Surgeon: Uma Gaona MD;  Location: [facility] OR 1ST FLR;  Service: Urology;  Laterality: N/A;  1.5 hours     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

## 2024-03-19 NOTE — PROVATION PATIENT INSTRUCTIONS
Discharge Summary/Instructions after an Endoscopic Procedure  Patient Name: Sreekanth Pitts  Patient MRN: [id number]  Patient YOB: 1945 Tuesday, March 19, 2024  Dion Mckinney MD  Dear patient,  As a result of recent federal legislation (The Federal Cures Act), you may   receive lab or pathology results from your procedure in your [facility]   account before your physician is able to contact you. Your physician or   their representative will relay the results to you with their   recommendations at their soonest availability.  Thank you,  RESTRICTIONS:  During your procedure today, you received medications for sedation.  These   medications may affect your judgment, balance and coordination.  Therefore,   for 24 hours, you have the following restrictions:   - DO NOT drive a car, operate machinery, make legal/financial decisions,   sign important papers or drink alcohol.    ACTIVITY:  Today: no heavy lifting, straining or running due to procedural   sedation/anesthesia.  The following day: return to full activity including work.  DIET:  Eat and drink normally unless instructed otherwise.     TREATMENT FOR COMMON SIDE EFFECTS:  - Mild abdominal pain, nausea, belching, bloating or excessive gas:  rest,   eat lightly and use a heating pad.  - Sore Throat: treat with throat lozenges and/or gargle with warm salt   water.  - Because air was used during the procedure, expelling large amounts of air   from your rectum or belching is normal.  - If a bowel prep was taken, you may not have a bowel movement for 1-3 days.    This is normal.  SYMPTOMS TO WATCH FOR AND REPORT TO YOUR PHYSICIAN:  1. Abdominal pain or bloating, other than gas cramps.  2. Chest pain.  3. Back pain.  4. Signs of infection such as: chills or fever occurring within 24 hours   after the procedure.  5. Rectal bleeding, which would show as bright red, maroon, or black stools.   (A tablespoon of blood from the rectum is not serious, especially if    hemorrhoids are present.)  6. Vomiting.  7. Weakness or dizziness.  GO DIRECTLY TO THE NEAREST EMERGENCY ROOM IF YOU HAVE ANY OF THE FOLLOWING:      Difficulty breathing              Chills and/or fever over 101 F   Persistent vomiting and/or vomiting blood   Severe abdominal pain   Severe chest pain   Black, tarry stools   Bleeding- more than one tablespoon   Any other symptom or condition that you feel may need urgent attention  Your doctor recommends these additional instructions:  If any biopsies were taken, your doctors clinic will contact you in 1 to 2   weeks with any results.  - Discharge patient to home.   - Await pathology results.   - Telephone endoscopist for pathology results in 3 weeks.   - Repeat colonoscopy in 7 years for surveillance of multiple polyps.   - Return to primary care physician.   - Return to GI clinic.   - The findings and recommendations were discussed with the patient.  For questions, problems or results please call your physician - Dion Mckinney MD at Work:  [phone].  [facility], EMERGENCY ROOM PHONE NUMBER: [phone]  IF A COMPLICATION OR EMERGENCY SITUATION ARISES AND YOU ARE UNABLE TO REACH   YOUR PHYSICIAN - GO DIRECTLY TO THE EMERGENCY ROOM.  Dion Mckinney MD  3/19/2024 3:56:51 PM  This report has been verified and signed electronically.  Dear patient,  As a result of recent federal legislation (The Federal Cures Act), you may   receive lab or pathology results from your procedure in your [facility]   account before your physician is able to contact you. Your physician or   their representative will relay the results to you with their   recommendations at their soonest availability.  Thank you,  PROVATION

## 2024-03-19 NOTE — TRANSFER OF CARE
"Anesthesia Transfer of Care Note    Patient: Sreekanth Pitts Jr.    Procedure(s) Performed: Procedure(s) (LRB):  COLONOSCOPY (N/A)    Patient location: GI    Anesthesia Type: general    Transport from OR: Transported from OR on room air with adequate spontaneous ventilation    Post pain: adequate analgesia    Post assessment: no apparent anesthetic complications and tolerated procedure well    Post vital signs: stable    Level of consciousness: awake, alert and oriented    Nausea/Vomiting: no nausea/vomiting    Complications: none    Transfer of care protocol was followed      Last vitals: Visit Vitals  BP (!) 149/85 (BP Location: Left arm, Patient Position: Lying)   Pulse 87   Temp 36.7 °C (98.1 °F) (Temporal)   Resp 17   Ht 6' 1" (1.854 m)   Wt 110.7 kg (244 lb)   SpO2 96%   BMI 32.19 kg/m²     "

## 2024-03-19 NOTE — ANESTHESIA POSTPROCEDURE EVALUATION
Anesthesia Post Evaluation    Patient: Sreekanth Pitts Jr.    Procedure(s) Performed: Procedure(s) (LRB):  COLONOSCOPY (N/A)    Final Anesthesia Type: general      Patient location during evaluation: GI PACU  Patient participation: Yes- Able to Participate  Level of consciousness: awake and alert and oriented  Post-procedure vital signs: reviewed and stable  Pain management: adequate  Airway patency: patent    PONV status at discharge: No PONV  Anesthetic complications: no      Cardiovascular status: blood pressure returned to baseline and hemodynamically stable  Respiratory status: unassisted, spontaneous ventilation and room air  Hydration status: euvolemic  Follow-up not needed.              Vitals Value Taken Time   /71 03/19/24 1626   Temp 36.9 °C (98.4 °F) 03/19/24 1555   Pulse 70 03/19/24 1626   Resp 17 03/19/24 1626   SpO2 97 % 03/19/24 1626         No case tracking events are documented in the log.      Pain/Darcy Score: Darcy Score: 10 (3/19/2024  4:25 PM)

## 2024-03-23 ENCOUNTER — NURSE TRIAGE (OUTPATIENT)
Dept: ADMINISTRATIVE | Facility: CLINIC | Age: 79
End: 2024-03-23
Payer: MEDICARE

## 2024-03-23 NOTE — TELEPHONE ENCOUNTER
Pt calling that he is on day 3 post colonoscopy and he hasn't had a BM and he is concerned because he ended up with a catheter because stool blocked up urethra and doesn't want that to happen again. Pt triaged and care advice to see MD within 3 days if no BM by tomorrow and to call us back. Pt will try prunes, increase water and try to take his colace that he has to see if he can get something going but will call us back if any other questions or concerns or no BM by tomorrow. Pt told that he was cleaned out after prep so it may take a little but should by tomorrow.                          Reason for Disposition   Unable to have a bowel movement (BM) without laxative or enema    Additional Information   Negative: [1] Vomiting AND [2] contains bile (green color)   Negative: Patient sounds very sick or weak to the triager   Negative: [1] Vomiting AND [2] abdomen looks much more swollen than usual   Negative: [1] Constant abdominal pain AND [2] present > 2 hours   Negative: [1] Rectal pain or fullness from fecal impaction (rectum full of stool) AND [2] NOT better after SITZ bath, suppository or enema   Negative: [1] Intermittent mild abdominal pain AND [2] fever   Negative: Abdomen is more swollen than usual   Negative: Last bowel movement (BM) > 4 days ago   Negative: Leaking stool   Negative: Unable to have a bowel movement (BM) without manually removing stool (using finger to pull out stool or perform disimpaction)    Protocols used: Constipation-A-AH    
No

## 2024-03-25 ENCOUNTER — TELEPHONE (OUTPATIENT)
Dept: INTERNAL MEDICINE | Facility: CLINIC | Age: 79
End: 2024-03-25
Payer: MEDICARE

## 2024-03-27 LAB
FINAL PATHOLOGIC DIAGNOSIS: NORMAL
GROSS: NORMAL
Lab: NORMAL

## 2024-03-29 NOTE — PROGRESS NOTES
Sreekanth your colonoscopy pathology was benign      1. Colon, transverse, 'polyp', biopsy:     - Colonic mucosa with underlying lymphoid aggregate.     - Negative for dysplasia or malignancy.    2. Colon, transverse, polyp, polypectomy:     - Benign fibroid polyp     - Multiple levels examined.   Comment: Interp By Nohemy Bailey M.D., Signed on 03/27/2024

## 2024-04-02 ENCOUNTER — OFFICE VISIT (OUTPATIENT)
Dept: DERMATOLOGY | Facility: CLINIC | Age: 79
End: 2024-04-02
Payer: MEDICARE

## 2024-04-02 DIAGNOSIS — L57.8 CHRONIC SOLAR DERMATITIS: ICD-10-CM

## 2024-04-02 DIAGNOSIS — B35.1 ONYCHOMYCOSIS: ICD-10-CM

## 2024-04-02 DIAGNOSIS — L57.0 AK (ACTINIC KERATOSIS): ICD-10-CM

## 2024-04-02 DIAGNOSIS — D22.9 MULTIPLE BENIGN NEVI: ICD-10-CM

## 2024-04-02 DIAGNOSIS — L72.3 INFLAMED SEBACEOUS CYST: ICD-10-CM

## 2024-04-02 DIAGNOSIS — D48.5 NEOPLASM OF UNCERTAIN BEHAVIOR OF SKIN: Primary | ICD-10-CM

## 2024-04-02 PROCEDURE — 17000 DESTRUCT PREMALG LESION: CPT | Mod: S$PBB,XS,, | Performed by: DERMATOLOGY

## 2024-04-02 PROCEDURE — 88305 TISSUE EXAM BY PATHOLOGIST: CPT | Performed by: DERMATOLOGY

## 2024-04-02 PROCEDURE — 99214 OFFICE O/P EST MOD 30 MIN: CPT | Mod: 25,S$PBB,, | Performed by: DERMATOLOGY

## 2024-04-02 PROCEDURE — 11102 TANGNTL BX SKIN SINGLE LES: CPT | Mod: S$PBB,,, | Performed by: DERMATOLOGY

## 2024-04-02 PROCEDURE — 11102 TANGNTL BX SKIN SINGLE LES: CPT | Mod: PBBFAC | Performed by: DERMATOLOGY

## 2024-04-02 PROCEDURE — 17000 DESTRUCT PREMALG LESION: CPT | Mod: 59,PBBFAC | Performed by: DERMATOLOGY

## 2024-04-02 PROCEDURE — 87070 CULTURE OTHR SPECIMN AEROBIC: CPT | Performed by: DERMATOLOGY

## 2024-04-02 PROCEDURE — 17003 DESTRUCT PREMALG LES 2-14: CPT | Mod: 59,PBBFAC | Performed by: DERMATOLOGY

## 2024-04-02 PROCEDURE — 99999 PR PBB SHADOW E&M-EST. PATIENT-LVL II: CPT | Mod: PBBFAC,,, | Performed by: DERMATOLOGY

## 2024-04-02 PROCEDURE — 99212 OFFICE O/P EST SF 10 MIN: CPT | Mod: PBBFAC,25 | Performed by: DERMATOLOGY

## 2024-04-02 PROCEDURE — 88305 TISSUE EXAM BY PATHOLOGIST: CPT | Mod: 26,,, | Performed by: DERMATOLOGY

## 2024-04-02 PROCEDURE — 17003 DESTRUCT PREMALG LES 2-14: CPT | Mod: S$PBB,,, | Performed by: DERMATOLOGY

## 2024-04-02 RX ORDER — TERBINAFINE HYDROCHLORIDE 250 MG/1
TABLET ORAL
Qty: 90 TABLET | Refills: 0 | Status: SHIPPED | OUTPATIENT
Start: 2024-04-02

## 2024-04-02 RX ORDER — DOXYCYCLINE 100 MG/1
CAPSULE ORAL
Qty: 14 CAPSULE | Refills: 0 | Status: SHIPPED | OUTPATIENT
Start: 2024-04-02

## 2024-04-02 RX ORDER — FLUOROURACIL 50 MG/G
CREAM TOPICAL
Qty: 30 G | Refills: 0 | Status: SHIPPED | OUTPATIENT
Start: 2024-04-02

## 2024-04-02 NOTE — PATIENT INSTRUCTIONS
Shave Biopsy Wound Care    Your doctor has performed a shave biopsy today.  A band aid and vaseline ointment has been placed over the site.  This should remain in place for NO LONGER THAN 48 hours.  It is fine to remove the bandaid after 24 hours, if the area is no longer bleeding. It is recommended that you keep the area dry (do not wet)) for the first 24 hours.  After 24 hours, wash the area with warm soap and water and apply Vaseline jelly.  Many patients prefer to use Neosporin or Bacitracin ointment.  This is acceptable; however, know that you can develop an allergy to this medication even if you have used it safely for years.  It is important to keep the area moist.  Letting it dry out and get air slows healing time, and will worsen the scar.        If you notice increasing redness, tenderness, pain, or yellow drainage at the biopsy site, please notify your doctor.  These are signs of an infection.    If your biopsy site is bleeding, apply firm pressure for 15 minutes straight.  Repeat for another 15 minutes, if it is still bleeding.   If the surgical site continues to bleed, then please contact your doctor.      For [facility] users:   You will receive your biopsy results in [facility] as soon as they are available. Please be assured that your physician/provider will review your results and will then determine what further treatment, evaluation, or planning is required. You should be contacted by your physician's/provider's office within 5 business days of receiving your results; If not, please reach out to directly. This is one more way [facility] is putting you first.     [facility] [place] [address]/ [phone] FAX/ [email or web address]         CRYOSURGERY      Your doctor has used a method called cryosurgery to treat your skin condition. Cryosurgery refers to the use of very cold substances to treat a variety of skin conditions such as warts, pre-skin cancers, molluscum  contagiosum, sun spots, and several benign growths. The substance we use in cryosurgery is liquid nitrogen and is so cold (-195 degrees Celsius) that is burns when administered.     Following treatment in the office, the skin may immediately burn and become red. You may find the area around the lesion is affected as well. It is sometimes necessary to treat not only the lesion, but a small area of the surrounding normal skin to achieve a good response.     A blister, and even a blood filled blister, may form after treatment.   This is a normal response. If the blister is painful, it is acceptable to sterilize a needle and with rubbing alcohol and gently pop the blister. It is important that you gently wash the area with soap and warm water as the blister fluid may contain wart virus if a wart was treated. Do no remove the roof of the blister.     The area treated can take anywhere from 1-3 weeks to heal. Healing time depends on the kind skin lesion treated, the location, and how aggressively the lesion was treated. It is recommended that the areas treated are covered with Vaseline or bacitracin ointment and a band-aid. If a band-aid is not practical, just ointment applied several times per day will do. Keeping these areas moist will speed the healing time.    Treatment with liquid nitrogen can leave a scar. In dark skin, it may be a light or dark scar, in light skin it may be a white or pink scar. These will generally fade with time, but may never go away completely.     If you have any concerns after your treatment, please feel free to call the office.       [facility] [place] [address]/ [phone] FAX/ [email or web address]       Field Treatment for Actinic Keratoses (precancerous lesions)    5-Fluoruracil + Dovonex (calcipotriene) - This is a topical chemotherapy for your skin. This cream should never be applied without discussing with you dermatologist.    This treatment gets your  immune system involved in fighting precancers (actinic keratoses), even those we can't yet see.     HOW TO APPLY: Apply the combination cream to the affected area right temple and right cheek 2x/day x 7 days. Apply a fingertip length amount to the entire area. Wash your hands carefully after applying the creams and wipe glasses, BIPAP/CPAP machines, or anything else that comes in frequent contact with the creams.    WHAT TO EXPECT: Your skin will likely become red, crusted, sore, and tender during the treatment usually starting around day 3 and continuing for about 1 week after last application.     HOW TO HEAL FAST: To speed healing, wash with a gentle wash and apply Vaseline jelly especially to crusted open areas.    WE CAN HELP: Please contact us for any questions or problem shooting the application of these creams: [phone] or [email or web address]    GREAT NEWS: Newer studies suggest that the combination of these creams not only decrease the sun damage spots and precancers (actinic keratoses) but also decrease your risk of getting skin cancer the year following application.     IT WILL BE WORTH IT!!!        Your prescription has been sent to [place] [facility].  The phone number is [phone].  Their location is:  [facility]. [place], [place], [address] [place] [address] Pharmacy should call you. You can opt to pick the Rx up or have the Rx mailed to you.     Hours of operation:   Monday- Friday:  8 a.m. to 6:00 p.m.  Saturday:  8 a.m. to 1:00 p.m.  Sunday:  Closed

## 2024-04-02 NOTE — ASSESSMENT & PLAN NOTE
Today's Plan:      Cryosurgery Procedure Note    Verbal consent from the patient is obtained including, but not limited to, risk of hypopigmentation/hyperpigmentation, scar, recurrence of lesion. The patient is aware of the precancerous quality and need for treatment of these lesions. Liquid nitrogen cryosurgery is applied to the 5 actinic keratoses, as detailed in the physical exam, to produce a freeze injury. The patient is aware that blisters may form and is instructed on wound care with gentle cleansing and use of vaseline ointment to keep moist until healed. The patient is supplied a handout on cryosurgery and is instructed to call if lesions do not completely resolve.    And    Sent Rx to [facility] for efudex/dovonex bid to AA right temple and right cheek x 7 days (30g $45)     Wear hat always

## 2024-04-02 NOTE — PROGRESS NOTES
Subjective:      Patient ID:  Sreekanth Pitts Jr. is a 78 y.o. male who presents for   Chief Complaint   Patient presents with    Skin Check     ubse     History of Present Illness: The patient presents for follow up of skin check.    The patient was last seen on: 10/03/2023 for cryosurgery to actinic keratoses and HAK on right wrist which have resolved.   No h/o nmsc or mm.    Other skin complaints:  c/o L grt toenail fungus x 1year    Patient with new complaint of lesion(s)  Location: L chin  Duration: Couple months  Symptoms: none  Relieving factors/Previous treatments: none    Patient with new complaint of lesion(s)  Location: L post shoulder  Duration: Couple days  Symptoms: Inflamed, drained  Relieving factors/Previous treatments: none                  Review of Systems   Skin:  Positive for activity-related sunscreen use and wears hat (99%). Negative for daily sunscreen use and recent sunburn.   Hematologic/Lymphatic: Bruises/bleeds easily (bruise).       Objective:   Physical Exam   Constitutional: He appears well-developed and well-nourished. No distress.   Neurological: He is alert and oriented to person, place, and time. He is not disoriented.   Psychiatric: He has a normal mood and affect.   Skin:   Areas Examined (abnormalities noted in diagram):   Scalp / Hair Palpated and Inspected  Head / Face Inspection Performed  Neck Inspection Performed  Chest / Axilla Inspection Performed  Back Inspection Performed  RUE Inspected  LUE Inspection Performed  Nails and Digits Inspection Performed                     Diagram Legend     Erythematous scaling macule/papule c/w actinic keratosis       Vascular papule c/w angioma      Pigmented verrucoid papule/plaque c/w seborrheic keratosis      Yellow umbilicated papule c/w sebaceous hyperplasia      Irregularly shaped tan macule c/w lentigo     1-2 mm smooth white papules consistent with Milia      Movable subcutaneous cyst with punctum c/w epidermal inclusion  cyst      Subcutaneous movable cyst c/w pilar cyst      Firm pink to brown papule c/w dermatofibroma      Pedunculated fleshy papule(s) c/w skin tag(s)      Evenly pigmented macule c/w junctional nevus     Mildly variegated pigmented, slightly irregular-bordered macule c/w mildly atypical nevus      Flesh colored to evenly pigmented papule c/w intradermal nevus       Pink pearly papule/plaque c/w basal cell carcinoma      Erythematous hyperkeratotic cursted plaque c/w SCC      Surgical scar with no sign of skin cancer recurrence      Open and closed comedones      Inflammatory papules and pustules      Verrucoid papule consistent consistent with wart     Erythematous eczematous patches and plaques     Dystrophic onycholytic nail with subungual debris c/w onychomycosis     Umbilicated papule    Erythematous-base heme-crusted tan verrucoid plaque consistent with inflamed seborrheic keratosis     Erythematous Silvery Scaling Plaque c/w Psoriasis     See annotation            Lab Results   Component Value Date    WBC 12.70 02/17/2024    HGB 17.0 02/17/2024    HCT 51 02/17/2024    MCV 89 02/17/2024     02/17/2024         Lab Results   Component Value Date    ALT 26 02/17/2024    AST 24 02/17/2024    ALKPHOS 77 02/17/2024    BILITOT 1.5 (H) 02/17/2024       Assessment / Plan:      Pathology Orders:       Normal Orders This Visit    Specimen to Pathology, Dermatology     Questions:    Procedure Type: Dermatology and skin neoplasms    Number of Specimens: 1    ------------------------: -------------------------    Spec 1 Procedure: Biopsy    Spec 1 Clinical Impression: r/o scc vs isk vs other    Spec 1 Source: left upper neck    Release to patient: Immediate    Release to patient:           Neoplasm of uncertain behavior of skin  -     Specimen to Pathology, Dermatology  Shave biopsy procedure note:    Shave biopsy performed after verbal consent including risk of infection, scar, recurrence, need for additional  treatment of site. Area prepped with alcohol, anesthetized with approximately 1.0cc of 1% lidocaine with epinephrine. Lesional tissue shaved with razor blade. Hemostasis achieved with application of aluminum chloride followed by hyfrecation. No complications. Dressing applied. Wound care explained.      AK (actinic keratosis)  -     fluorouraciL (EFUDEX) 5 % cream; Compound fluorouracil 5% + calcipotriene 0.005% cream. Apply a pea-sized amount to entire right temple and right cheek BID x 7 days.  Dispense: 30 g; Refill: 0    Inflamed sebaceous cyst - left back  -     Aerobic culture  -     doxycycline (VIBRAMYCIN) 100 MG Cap; Take 1 po bid with food and not within 1 hour prior to lying down  Dispense: 14 capsule; Refill: 0    Chronic solar dermatitis  Encourage Am Lactin lotion or cream to arms and hands nightly. Available over-the counter.      Multiple benign nevi   - minor problem and chronic.   Reassurance given to patient. No treatment necessary.       Onychomycosis  -     terbinafine HCL (LAMISIL) 250 mg tablet; Take 1 po qday x 3 months  Dispense: 90 tablet; Refill: 0 - hold till complete doxy      AK (actinic keratosis)  Today's Plan:      Cryosurgery Procedure Note    Verbal consent from the patient is obtained including, but not limited to, risk of hypopigmentation/hyperpigmentation, scar, recurrence of lesion. The patient is aware of the precancerous quality and need for treatment of these lesions. Liquid nitrogen cryosurgery is applied to the 5 actinic keratoses, as detailed in the physical exam, to produce a freeze injury. The patient is aware that blisters may form and is instructed on wound care with gentle cleansing and use of vaseline ointment to keep moist until healed. The patient is supplied a handout on cryosurgery and is instructed to call if lesions do not completely resolve.    And    [facility]dovonex bid to AA right temple and right cheek x 7 days (30g $45)     Wear  hat always      Follow up in about 3 months (around 7/2/2024).

## 2024-04-05 LAB
BACTERIA SPEC AEROBE CULT: NO GROWTH
FINAL PATHOLOGIC DIAGNOSIS: NORMAL
GROSS: NORMAL
Lab: NORMAL
MICROSCOPIC EXAM: NORMAL

## 2024-04-08 ENCOUNTER — LAB VISIT (OUTPATIENT)
Dept: LAB | Facility: HOSPITAL | Age: 79
End: 2024-04-08
Attending: INTERNAL MEDICINE
Payer: MEDICARE

## 2024-04-08 DIAGNOSIS — N40.0 BENIGN PROSTATIC HYPERPLASIA WITHOUT LOWER URINARY TRACT SYMPTOMS: ICD-10-CM

## 2024-04-08 DIAGNOSIS — E55.9 VITAMIN D INSUFFICIENCY: ICD-10-CM

## 2024-04-08 LAB
25(OH)D3+25(OH)D2 SERPL-MCNC: 46 NG/ML (ref 30–96)
COMPLEXED PSA SERPL-MCNC: 2.2 NG/ML (ref 0–4)

## 2024-04-08 PROCEDURE — 82306 VITAMIN D 25 HYDROXY: CPT | Performed by: INTERNAL MEDICINE

## 2024-04-08 PROCEDURE — 36415 COLL VENOUS BLD VENIPUNCTURE: CPT | Mod: PO | Performed by: INTERNAL MEDICINE

## 2024-04-08 PROCEDURE — 84153 ASSAY OF PSA TOTAL: CPT | Performed by: UROLOGY

## 2024-06-04 ENCOUNTER — TELEPHONE (OUTPATIENT)
Dept: UROLOGY | Facility: CLINIC | Age: 79
End: 2024-06-04
Payer: MEDICARE

## 2024-06-04 DIAGNOSIS — N40.0 BENIGN PROSTATIC HYPERPLASIA WITHOUT LOWER URINARY TRACT SYMPTOMS: Primary | ICD-10-CM

## 2024-06-04 NOTE — TELEPHONE ENCOUNTER
----- Message from Uma Gaona MD sent at 6/4/2024  9:45 AM CDT -----  Please put him in for a recall in October and a pre clinic PSA.  Thanks.  ----- Message -----  From: [facility]  Sent: 4/8/2024   4:08 PM CDT  To: Uma Gaona MD

## 2024-06-06 DIAGNOSIS — B37.2 YEAST DERMATITIS: ICD-10-CM

## 2024-06-07 RX ORDER — NYSTATIN 100000 U/G
CREAM TOPICAL 2 TIMES DAILY
Qty: 30 G | Refills: 0 | Status: SHIPPED | OUTPATIENT
Start: 2024-06-07

## 2024-06-10 ENCOUNTER — PATIENT MESSAGE (OUTPATIENT)
Dept: INTERNAL MEDICINE | Facility: CLINIC | Age: 79
End: 2024-06-10
Payer: MEDICARE

## 2024-07-02 ENCOUNTER — OFFICE VISIT (OUTPATIENT)
Dept: DERMATOLOGY | Facility: CLINIC | Age: 79
End: 2024-07-02
Payer: MEDICARE

## 2024-07-02 DIAGNOSIS — L57.8 CHRONIC SOLAR DERMATITIS: ICD-10-CM

## 2024-07-02 DIAGNOSIS — L57.0 AK (ACTINIC KERATOSIS): Primary | ICD-10-CM

## 2024-07-02 PROCEDURE — 99499 UNLISTED E&M SERVICE: CPT | Mod: S$PBB,,, | Performed by: DERMATOLOGY

## 2024-07-02 PROCEDURE — 99213 OFFICE O/P EST LOW 20 MIN: CPT | Mod: PBBFAC | Performed by: DERMATOLOGY

## 2024-07-02 PROCEDURE — 17000 DESTRUCT PREMALG LESION: CPT | Mod: S$PBB,,, | Performed by: DERMATOLOGY

## 2024-07-02 PROCEDURE — 17000 DESTRUCT PREMALG LESION: CPT | Mod: PBBFAC | Performed by: DERMATOLOGY

## 2024-07-02 PROCEDURE — 99999 PR PBB SHADOW E&M-EST. PATIENT-LVL III: CPT | Mod: PBBFAC,,, | Performed by: DERMATOLOGY

## 2024-07-02 PROCEDURE — 17003 DESTRUCT PREMALG LES 2-14: CPT | Mod: PBBFAC | Performed by: DERMATOLOGY

## 2024-07-02 PROCEDURE — 17003 DESTRUCT PREMALG LES 2-14: CPT | Mod: S$PBB,,, | Performed by: DERMATOLOGY

## 2024-07-02 NOTE — PATIENT INSTRUCTIONS

## 2024-07-02 NOTE — PROGRESS NOTES
Subjective:      Patient ID:  Sreekanth Pitts Jr. is a 79 y.o. male who presents for   Chief Complaint   Patient presents with    Follow-up     Efudex/dovonex     History of Present Illness: The patient presents for follow up of skin check.    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   Pt also seen for inflamed sebaceous cyst- L upper back treated with doxy 100mg bid x 1 week-pt states resolved.     Also pt states has not yet started Lamisil for onychomycosis.     No h/o nmsc or mm.    Using amlactin for arms and hands once a week.    Other skin complaints: none            Review of Systems   Skin:  Positive for activity-related sunscreen use and wears hat (99%). Negative for daily sunscreen use and recent sunburn.   Hematologic/Lymphatic: Bruises/bleeds easily (bruise).       Objective:   Physical Exam   Constitutional: He appears well-developed and well-nourished. No distress.   Neurological: He is alert and oriented to person, place, and time. He is not disoriented.   Psychiatric: He has a normal mood and affect.   Skin:   Areas Examined (abnormalities noted in diagram):   Scalp / Hair Palpated and Inspected  Head / Face Inspection Performed                 Diagram Legend     Erythematous scaling macule/papule c/w actinic keratosis       Vascular papule c/w angioma      Pigmented verrucoid papule/plaque c/w seborrheic keratosis      Yellow umbilicated papule c/w sebaceous hyperplasia      Irregularly shaped tan macule c/w lentigo     1-2 mm smooth white papules consistent with Milia      Movable subcutaneous cyst with punctum c/w epidermal inclusion cyst      Subcutaneous movable cyst c/w pilar cyst      Firm pink to brown papule c/w dermatofibroma      Pedunculated fleshy papule(s) c/w skin tag(s)      Evenly pigmented macule c/w junctional  nevus     Mildly variegated pigmented, slightly irregular-bordered macule c/w mildly atypical nevus      Flesh colored to evenly pigmented papule c/w intradermal nevus       Pink pearly papule/plaque c/w basal cell carcinoma      Erythematous hyperkeratotic cursted plaque c/w SCC      Surgical scar with no sign of skin cancer recurrence      Open and closed comedones      Inflammatory papules and pustules      Verrucoid papule consistent consistent with wart     Erythematous eczematous patches and plaques     Dystrophic onycholytic nail with subungual debris c/w onychomycosis     Umbilicated papule    Erythematous-base heme-crusted tan verrucoid plaque consistent with inflamed seborrheic keratosis     Erythematous Silvery Scaling Plaque c/w Psoriasis     See annotation      Assessment / Plan:        AK (actinic keratosis)  Cryosurgery Procedure Note    Verbal consent from the patient is obtained including, but not limited to, risk of hypopigmentation/hyperpigmentation, scar, recurrence of lesion. The patient is aware of the precancerous quality and need for treatment of these lesions. Liquid nitrogen cryosurgery is applied to the 2 actinic keratoses, as detailed in the physical exam, to produce a freeze injury. The patient is aware that blisters may form and is instructed on wound care with gentle cleansing and use of vaseline ointment to keep moist until healed. The patient is supplied a handout on cryosurgery and is instructed to call if lesions do not completely resolve.    Cont hat always      Chronic solar dermatitis  Encourage Am Lactin lotion or cream to arms and hands nightly. Available over-the counter.               Follow up in about 6 months (around 1/2/2025) for UBSE.

## 2024-07-09 ENCOUNTER — TELEPHONE (OUTPATIENT)
Dept: ADMINISTRATIVE | Facility: CLINIC | Age: 79
End: 2024-07-09
Payer: MEDICARE

## 2024-07-10 ENCOUNTER — OFFICE VISIT (OUTPATIENT)
Dept: INTERNAL MEDICINE | Facility: CLINIC | Age: 79
End: 2024-07-10
Payer: MEDICARE

## 2024-07-10 VITALS
OXYGEN SATURATION: 96 % | WEIGHT: 251.13 LBS | DIASTOLIC BLOOD PRESSURE: 84 MMHG | TEMPERATURE: 97 F | BODY MASS INDEX: 33.28 KG/M2 | SYSTOLIC BLOOD PRESSURE: 112 MMHG | HEART RATE: 75 BPM | RESPIRATION RATE: 18 BRPM | HEIGHT: 73 IN

## 2024-07-10 DIAGNOSIS — I70.0 AORTIC ATHEROSCLEROSIS: ICD-10-CM

## 2024-07-10 DIAGNOSIS — E66.01 SEVERE OBESITY (BMI 35.0-39.9) WITH COMORBIDITY: ICD-10-CM

## 2024-07-10 DIAGNOSIS — Z00.00 ENCOUNTER FOR MEDICARE ANNUAL WELLNESS EXAM: Primary | ICD-10-CM

## 2024-07-10 DIAGNOSIS — I10 PRIMARY HYPERTENSION: Chronic | ICD-10-CM

## 2024-07-10 PROCEDURE — G0439 PPPS, SUBSEQ VISIT: HCPCS | Mod: ,,, | Performed by: NURSE PRACTITIONER

## 2024-07-10 PROCEDURE — 99215 OFFICE O/P EST HI 40 MIN: CPT | Mod: PBBFAC,PO | Performed by: NURSE PRACTITIONER

## 2024-07-10 PROCEDURE — 99999 PR PBB SHADOW E&M-EST. PATIENT-LVL V: CPT | Mod: PBBFAC,,, | Performed by: NURSE PRACTITIONER

## 2024-07-10 NOTE — PATIENT INSTRUCTIONS
Counseling and Referral of Other Preventative  (Italic type indicates deductible and co-insurance are waived)    Patient Name: Sreekanth Pitts  Today's Date: 7/10/2024    Health Maintenance       Date Due Completion Date    TETANUS VACCINE Never done ---    RSV Vaccine (Age 60+ and Pregnant patients) (1 - 1-dose 60+ series) Never done ---    COVID-19 Vaccine (5 - 2023-24 season) 09/01/2023 10/11/2022    Influenza Vaccine (1) 09/01/2024 9/28/2023    PROSTATE-SPECIFIC ANTIGEN 04/08/2025 4/8/2024    Lipid Panel 10/06/2028 10/6/2023        No orders of the defined types were placed in this encounter.      The following information is provided to all patients.  This information is to help you find resources for any of the problems found today that may be affecting your health:                  Living healthy guide: www.[facility].gov      Understanding Diabetes: www.diabetes.org      Eating healthy: www.cdc.gov/healthyweight      [facility] home safety checklist: www.cdc.gov/steadi/patient.html      Agency on Aging: www.goea.[place].[facility]      Alcoholics anonymous (AA): www.aa.org      Physical Activity: www.dori.nih.gov/lu5bjpa      Tobacco use: www.quitwithusla.org

## 2024-07-10 NOTE — PROGRESS NOTES
"  Sreekanth Pitts presented for a  Medicare AWV and comprehensive Health Risk Assessment today. The following components were reviewed and updated:    Medical history  Family History  Social history  Allergies and Current Medications  Health Risk Assessment  Health Maintenance  Care Team         ** See Completed Assessments for Annual Wellness Visit within the encounter summary.**         The following assessments were completed:  Living Situation  CAGE  Depression Screening  Timed Get Up and Go  Whisper Test  Cognitive Function Screening  Nutrition Screening  ADL Screening  PAQ Screening      Opioid documentation:      Patient does not have a current opioid prescription.        Vitals:    07/10/24 1354   BP: 112/84   BP Location: Left arm   Patient Position: Sitting   BP Method: Medium (Manual)   Pulse: 75   Resp: 18   Temp: 97.2 °F (36.2 °C)   TempSrc: Temporal   SpO2: 96%   Weight: 113.9 kg (251 lb 1.7 oz)   Height: 6' 1" (1.854 m)     Body mass index is 33.13 kg/m².  Physical Exam  Vitals and nursing note reviewed.   Constitutional:       Appearance: He is well-developed.   HENT:      Head: Normocephalic and atraumatic.      Right Ear: External ear normal.      Left Ear: External ear normal.   Eyes:      Conjunctiva/sclera: Conjunctivae normal.      Pupils: Pupils are equal, round, and reactive to light.   Cardiovascular:      Rate and Rhythm: Normal rate and regular rhythm.   Pulmonary:      Effort: Pulmonary effort is normal.      Breath sounds: Normal breath sounds.   Musculoskeletal:         General: Normal range of motion.      Cervical back: Normal range of motion and neck supple.   Skin:     General: Skin is warm and dry.   Neurological:      Mental Status: He is alert and oriented to person, place, and time.               Diagnoses and health risks identified today and associated recommendations/orders:    1. Encounter for Medicare annual wellness exam  Health Maintenance updated   Records reviewed   Exam " done   - Ambulatory Referral/Consult to Enhanced Annual Wellness Visit (eAWV)    2. Aortic atherosclerosis  Stable and chronic. Continue current medications. Followed by PCP.     3. Primary hypertension  Stable, followed by PCP   Take medications as prescribed.   Monitor BP at home, goal BP < or = 140/80, call office if consistently above this range.   Follow low salt DASH diet and exercise.   BMI reviewed.     4. Severe obesity (BMI 35.0-39.9) with comorbidity  BMI reviewed.    Counseling and Referral of Other Preventative  (Italic type indicates deductible and co-insurance are waived)    Patient Name: Sreekanth Pitts  Today's Date: 7/10/2024    Health Maintenance         Date Due Completion Date    TETANUS VACCINE Never done ---    RSV Vaccine (Age 60+ and Pregnant patients) (1 - 1-dose 60+ series) Never done ---    COVID-19 Vaccine (5 - 2023-24 season) 09/01/2023 10/11/2022    Influenza Vaccine (1) 09/01/2024 9/28/2023    PROSTATE-SPECIFIC ANTIGEN 04/08/2025 4/8/2024    Lipid Panel 10/06/2028 10/6/2023          No orders of the defined types were placed in this encounter.      Provided Sreekanth with a 5-10 year written screening schedule and personal prevention plan. Recommendations were developed using the USPSTF age appropriate recommendations. Education, counseling, and referrals were provided as needed. After Visit Summary printed and given to patient which includes a list of additional screenings\tests needed.    Follow up in about 3 months (around 10/10/2024) for PCP visit.    Latonia Mckeon, NP  I offered to discuss advanced care planning, including how to pick a person who would make decisions for you if you were unable to make them for yourself, called a health care power of , and what kind of decisions you might make such as use of life sustaining treatments such as ventilators and tube feeding when faced with a life limiting illness recorded on a living will that they will need to know. (How you  want to be cared for as you near the end of your natural life)     X Patient is interested in learning more about how to make advanced directives.  I provided them paperwork and offered to discuss this with them.

## 2024-07-23 DIAGNOSIS — K22.70 BARRETT'S ESOPHAGUS WITHOUT DYSPLASIA: ICD-10-CM

## 2024-07-23 DIAGNOSIS — R33.9 URINARY RETENTION: ICD-10-CM

## 2024-07-23 RX ORDER — PANTOPRAZOLE SODIUM 40 MG/1
40 TABLET, DELAYED RELEASE ORAL
Qty: 90 TABLET | Refills: 3 | Status: SHIPPED | OUTPATIENT
Start: 2024-07-23 | End: 2025-07-23

## 2024-07-23 RX ORDER — TAMSULOSIN HYDROCHLORIDE 0.4 MG/1
0.4 CAPSULE ORAL NIGHTLY
Qty: 90 CAPSULE | Refills: 2 | Status: SHIPPED | OUTPATIENT
Start: 2024-07-23

## 2024-09-09 ENCOUNTER — TELEPHONE (OUTPATIENT)
Dept: DERMATOLOGY | Facility: CLINIC | Age: 79
End: 2024-09-09
Payer: MEDICARE

## 2024-09-09 NOTE — TELEPHONE ENCOUNTER
----- Message from Don Grant MA sent at 9/5/2024  2:56 PM CDT -----  Regarding: FW: pt advice    ----- Message -----  From: Abdoulaye Perdue  Sent: 9/5/2024   2:34 PM CDT  To: Janie Zimmerman Staff  Subject: pt advice                                        PATIENT CALL    Pt called regarding itching on the arms. States that he resumed using Amlactin after recent office visit in July but shortly thereafter the itching on his arms got worse. It's so bad that it interrupts his sleep and has tried using calamine lotion to no avail. Endorse changing laundry detergent a few weeks ago but no there changes to diet/environment. Please call back at [phone]

## 2024-09-17 ENCOUNTER — OFFICE VISIT (OUTPATIENT)
Dept: DERMATOLOGY | Facility: CLINIC | Age: 79
End: 2024-09-17
Payer: MEDICARE

## 2024-09-17 DIAGNOSIS — L30.0 NUMMULAR ECZEMA: Primary | ICD-10-CM

## 2024-09-17 DIAGNOSIS — L98.9 DISEASE OF SKIN AND SUBCUTANEOUS TISSUE: ICD-10-CM

## 2024-09-17 PROCEDURE — 99999 PR PBB SHADOW E&M-EST. PATIENT-LVL III: CPT | Mod: PBBFAC,,, | Performed by: DERMATOLOGY

## 2024-09-17 PROCEDURE — 99213 OFFICE O/P EST LOW 20 MIN: CPT | Mod: PBBFAC | Performed by: DERMATOLOGY

## 2024-09-17 PROCEDURE — 99214 OFFICE O/P EST MOD 30 MIN: CPT | Mod: S$PBB,,, | Performed by: DERMATOLOGY

## 2024-09-17 RX ORDER — TRIAMCINOLONE ACETONIDE 1 MG/G
CREAM TOPICAL
Qty: 454 G | Refills: 1 | Status: SHIPPED | OUTPATIENT
Start: 2024-09-17

## 2024-09-17 NOTE — PROGRESS NOTES
Subjective:      Patient ID:  Sreekanth Pitts Jr. is a 79 y.o. male who presents for   Chief Complaint   Patient presents with    Rash     Forearms     History of Present Illness: The patient presents for rash on forearms.    The patient was last seen on: 7/2/2024 for cryosurgery to actinic keratoses which have resolved.   No h/o nmsc or mm.    Other skin complaints:   Patient with new area of concern:   Location: L preauricular. X 2 wks. Scaly   Previous treatments: none    Pt states rash started after increasing use of amlactin to arms and hands to daily, so stopped using amlactin 2 weeks ago        Rash - Initial  Affected locations: left ankle, right ankle, right foot, left lower leg, right lower leg, right arm and left arm  Duration: 3 weeks (Pt states it started after increasing use of amlactin to arms)  Signs / symptoms: itching and redness  Timing: constant  Treatments tried: Cerave cream, calamine, benadryl cream and cortisone-10.        Review of Systems   Skin:  Positive for itching, rash and wears hat (99%).   Hematologic/Lymphatic: Bruises/bleeds easily (bruise).       Objective:   Physical Exam   Constitutional: He appears well-developed and well-nourished. No distress.   Neurological: He is alert and oriented to person, place, and time. He is not disoriented.   Psychiatric: He has a normal mood and affect.   Skin:   Areas Examined (abnormalities noted in diagram):   Head / Face Inspection Performed  RUE Inspected  LUE Inspection Performed  RLE Inspected  LLE Inspection Performed            Diagram Legend     Erythematous scaling macule/papule c/w actinic keratosis       Vascular papule c/w angioma      Pigmented verrucoid papule/plaque c/w seborrheic keratosis      Yellow umbilicated papule c/w sebaceous hyperplasia      Irregularly shaped tan macule c/w lentigo     1-2 mm smooth white papules consistent with Milia      Movable subcutaneous cyst with punctum c/w epidermal inclusion cyst       Subcutaneous movable cyst c/w pilar cyst      Firm pink to brown papule c/w dermatofibroma      Pedunculated fleshy papule(s) c/w skin tag(s)      Evenly pigmented macule c/w junctional nevus     Mildly variegated pigmented, slightly irregular-bordered macule c/w mildly atypical nevus      Flesh colored to evenly pigmented papule c/w intradermal nevus       Pink pearly papule/plaque c/w basal cell carcinoma      Erythematous hyperkeratotic cursted plaque c/w SCC      Surgical scar with no sign of skin cancer recurrence      Open and closed comedones      Inflammatory papules and pustules      Verrucoid papule consistent consistent with wart     Erythematous eczematous patches and plaques     Dystrophic onycholytic nail with subungual debris c/w onychomycosis     Umbilicated papule    Erythematous-base heme-crusted tan verrucoid plaque consistent with inflamed seborrheic keratosis     Erythematous Silvery Scaling Plaque c/w Psoriasis     See annotation      Assessment / Plan:        Nummular eczema  -     triamcinolone acetonide 0.1% (KENALOG) 0.1 % cream; AAA lower legs, feet and forearms bid - may occlude foot qhs  Dispense: 454 g; Refill: 1  Good skin care regimen discussed including limiting to one bath or shower/day, using lukewarm water with mild soap and moisturizing cream to skin 1 - 2x/day. Brochure was provided and reviewed with patient.    Disease of skin and subcutaneous tissue - potential ICD 2/2 am lactin  -     triamcinolone acetonide 0.1% (KENALOG) 0.1 % cream; AAA lower legs, feet and forearms bid - may occlude foot qhs  Dispense: 454 g; Refill: 1             Follow up if symptoms worsen or fail to improve.

## 2024-09-17 NOTE — PATIENT INSTRUCTIONS
XEROSIS (DRY SKIN)        Definition    Xerosis is the term for dry skin.  We all have a natural oil coating over our skin produced by the skin oil glands.  If this oil is removed, the skin becomes dry which can lead to cracking, which can lead to inflammation.  Xerosis is usually a long-term problem that recurs often, especially in the winter.    Cause    Long hot baths or showers can remove our natural oil and lead to xerosis.  One should never take more than one bath or shower a day and for no longer than ten minutes.  Use of harsh soaps such as Zest, Dial, and Ivory can worsen and cause xerosis.  Cold winter weather worsens xerosis because the amount of moisture contained in cold air is much less than the amount of moisture in warm air.    Treatment    Treatment is intended to restore the natural oil to your skin.  Keep the skin lubricated.    Do not take more than one bath or shower a day.  Use lukewarm water, not hot.  Hot water dries out the skin.    Use a gentle moisturizing soap such as Cetaphil soap, Oil of Olay, Dove, Basis, Ivory moisture care, Restoraderm cleanser.    When toweling dry, dont rub.  Blot the skin so there is still some water left on the skin.  You should apply a moisturizing cream to all of the skin such as Cerave cream, Cetaphil cream, Lipikar [place] AP+ Intense Repair Moisturizing Cream or Restoraderm or Eucerin Original Formula cream.   Alpha hydroxyacid lotions, i.e., AmLactin, also work very well for preventing dry skin, but may burn when used on inflamed or reddened skin.    If you like to swim during the winter months, you should not use soap when getting out of the pool.  When you have finished swimming, rinse off the chlorine with cool to warm water.  If this will be the only shower of the day, then you may use Cetaphil or another mild soap to cleanse your skin.  After the shower, apply a moisturizing cream to all of the skin as above.        [address] [facility] [place] [address]/ [phone] FAX/ [email or web address]

## 2024-10-10 ENCOUNTER — OFFICE VISIT (OUTPATIENT)
Dept: INTERNAL MEDICINE | Facility: CLINIC | Age: 79
End: 2024-10-10
Payer: MEDICARE

## 2024-10-10 VITALS
SYSTOLIC BLOOD PRESSURE: 116 MMHG | DIASTOLIC BLOOD PRESSURE: 64 MMHG | BODY MASS INDEX: 33.31 KG/M2 | OXYGEN SATURATION: 96 % | HEIGHT: 73 IN | HEART RATE: 87 BPM | WEIGHT: 251.31 LBS

## 2024-10-10 DIAGNOSIS — E53.8 LOW SERUM VITAMIN B12: ICD-10-CM

## 2024-10-10 DIAGNOSIS — I10 ESSENTIAL HYPERTENSION: ICD-10-CM

## 2024-10-10 DIAGNOSIS — R33.8 BENIGN PROSTATIC HYPERPLASIA WITH URINARY RETENTION: Primary | Chronic | ICD-10-CM

## 2024-10-10 DIAGNOSIS — N40.1 BENIGN PROSTATIC HYPERPLASIA WITH URINARY RETENTION: Primary | Chronic | ICD-10-CM

## 2024-10-10 DIAGNOSIS — Z91.89 MULTIPLE RISK FACTORS FOR CORONARY ARTERY DISEASE: ICD-10-CM

## 2024-10-10 PROCEDURE — 99214 OFFICE O/P EST MOD 30 MIN: CPT | Mod: S$PBB,,, | Performed by: INTERNAL MEDICINE

## 2024-10-10 PROCEDURE — 99213 OFFICE O/P EST LOW 20 MIN: CPT | Mod: PBBFAC | Performed by: INTERNAL MEDICINE

## 2024-10-10 PROCEDURE — G2211 COMPLEX E/M VISIT ADD ON: HCPCS | Mod: S$PBB,,, | Performed by: INTERNAL MEDICINE

## 2024-10-10 PROCEDURE — 99999 PR PBB SHADOW E&M-EST. PATIENT-LVL III: CPT | Mod: PBBFAC,,, | Performed by: INTERNAL MEDICINE

## 2024-10-10 RX ORDER — ATORVASTATIN CALCIUM 10 MG/1
10 TABLET, FILM COATED ORAL DAILY
Qty: 90 TABLET | Refills: 11 | Status: SHIPPED | OUTPATIENT
Start: 2024-10-10

## 2024-10-10 RX ORDER — LISINOPRIL 20 MG/1
20 TABLET ORAL DAILY
Qty: 90 TABLET | Refills: 11 | Status: SHIPPED | OUTPATIENT
Start: 2024-10-10

## 2024-10-10 RX ORDER — PNV NO.95/FERROUS FUM/FOLIC AC 28MG-0.8MG
100 TABLET ORAL DAILY
Qty: 90 TABLET | Refills: 11 | Status: SHIPPED | OUTPATIENT
Start: 2024-10-10

## 2024-10-10 NOTE — PROGRESS NOTES
Subjective:       Patient ID: Sreekanth Pitts Jr. is a 79 y.o. male.    Chief Complaint: Annual Exam    Patient is here for followup for chronic conditions.    Has R sided lump in groin, has been present a few yrs. Wants examined. It has not enlarged, no pain.    About 6 months ago patient had urinary retention and constipation he needed a short term Siddiqi catheter.  Since then he has been urinating fine.  His bowels have been improved since then as well.    He stays active and continues to work 12 hours per week.  He walks up the stairs to get to work and down the stairs.        Review of Systems   Constitutional:  Negative for appetite change, diaphoresis, fatigue, fever and unexpected weight change.   Respiratory:  Negative for chest tightness and shortness of breath.    Cardiovascular:  Negative for chest pain.   Gastrointestinal:  Negative for abdominal distention, abdominal pain, nausea and vomiting.   Genitourinary:  Negative for difficulty urinating, scrotal swelling and testicular pain.   Musculoskeletal:  Negative for arthralgias and back pain.   Skin:  Negative for rash.        No lesions   Neurological:  Positive for numbness (tingling bilat hands).        Tingling which is quick onset and offset bilat hands, none in the feet   Psychiatric/Behavioral:  Negative for sleep disturbance.            Objective:      Physical Exam  Vitals reviewed.   Constitutional:       General: He is not in acute distress.     Appearance: Normal appearance. He is well-developed. He is obese. He is not ill-appearing, toxic-appearing or diaphoretic.   HENT:      Head: Normocephalic and atraumatic.   Eyes:      General: No scleral icterus.  Neck:      Thyroid: No thyromegaly.      Comments: Healed L neck surgical excision site with no nodule or growth  Cardiovascular:      Rate and Rhythm: Normal rate and regular rhythm.      Heart sounds: Normal heart sounds. No murmur heard.     No friction rub. No gallop.   Pulmonary:       Effort: Pulmonary effort is normal. No respiratory distress.      Breath sounds: Normal breath sounds. No wheezing or rales.   Abdominal:      General: Bowel sounds are normal. There is no distension.      Palpations: Abdomen is soft. There is no mass.      Tenderness: There is no abdominal tenderness. There is no guarding or rebound.   Musculoskeletal:         General: Normal range of motion.      Cervical back: Normal range of motion.      Comments: + Tinels on R, neg on the L and neg Phalens bilat  Nml hand  strength and LT sensation   Lymphadenopathy:      Cervical: No cervical adenopathy.   Skin:     Findings: No lesion.      Comments: There is a small dermal based nodule on the right side of the groin with a small skin opening.  No hernia felt in the groin area.   Neurological:      Mental Status: He is alert and oriented to person, place, and time.   Psychiatric:         Thought Content: Thought content normal.         Assessment:       1. Benign prostatic hyperplasia with urinary retention    2. Multiple risk factors for coronary artery disease    3. Essential hypertension    4. Low serum vitamin B12        Plan:       Sreekanth was seen today for annual exam.    Diagnoses and all orders for this visit:    Benign prostatic hyperplasia with urinary retention  He sees Urology.  Symptoms are stable he is urinating on his own.    Multiple risk factors for coronary artery disease  -     atorvastatin (LIPITOR) 10 MG tablet; Take 1 tablet (10 mg total) by mouth once daily.    Essential hypertension  -     lisinopriL (PRINIVIL,ZESTRIL) 20 MG tablet; Take 1 tablet (20 mg total) by mouth once daily.  Controlled  Low serum vitamin B12  -     cyanocobalamin (VITAMIN B-12) 100 MCG tablet; Take 1 tablet (100 mcg total) by mouth once daily.  Remain on since he has had a pretty low level in the past.    Discussed likely carpal tunnel in the hands/wrists, he declines orthopedic hand surgery referral at this time.      [facility]  Maintenance         Date Due Completion Date    TETANUS VACCINE Never done ---    COVID-19 Vaccine (5 - 2024-25 season) 09/01/2024 10/11/2022    PROSTATE-SPECIFIC ANTIGEN 04/08/2025 4/8/2024    Lipid Panel 10/06/2028 10/6/2023   He will get the COVID booster with his wife.       Follow up in about 1 year (around 10/10/2025).    Visit today included increased complexity associated with the care of the episodic problem  addressed and managing the longitudinal care of the patient due to the serious and/or complex managed problem(s) .    Follow up in about 1 year (around 10/10/2025).    No future appointments.

## 2024-10-16 DIAGNOSIS — R33.9 URINARY RETENTION: ICD-10-CM

## 2024-10-16 RX ORDER — DUTASTERIDE 0.5 MG/1
0.5 CAPSULE, LIQUID FILLED ORAL DAILY
Qty: 90 CAPSULE | Refills: 3 | OUTPATIENT
Start: 2024-10-16

## 2024-10-16 RX ORDER — TAMSULOSIN HYDROCHLORIDE 0.4 MG/1
0.4 CAPSULE ORAL NIGHTLY
Qty: 90 CAPSULE | Refills: 2 | OUTPATIENT
Start: 2024-10-16

## 2024-10-21 ENCOUNTER — OFFICE VISIT (OUTPATIENT)
Dept: SLEEP MEDICINE | Facility: CLINIC | Age: 79
End: 2024-10-21
Payer: MEDICARE

## 2024-10-21 VITALS
HEIGHT: 73 IN | BODY MASS INDEX: 33.54 KG/M2 | SYSTOLIC BLOOD PRESSURE: 111 MMHG | WEIGHT: 253.06 LBS | DIASTOLIC BLOOD PRESSURE: 73 MMHG | HEART RATE: 90 BPM

## 2024-10-21 DIAGNOSIS — G47.33 OBSTRUCTIVE SLEEP APNEA: Primary | Chronic | ICD-10-CM

## 2024-10-21 PROCEDURE — 99999 PR PBB SHADOW E&M-EST. PATIENT-LVL III: CPT | Mod: PBBFAC,,, | Performed by: NURSE PRACTITIONER

## 2024-10-21 PROCEDURE — 99213 OFFICE O/P EST LOW 20 MIN: CPT | Mod: PBBFAC | Performed by: NURSE PRACTITIONER

## 2024-10-21 PROCEDURE — 99214 OFFICE O/P EST MOD 30 MIN: CPT | Mod: S$PBB,,, | Performed by: NURSE PRACTITIONER

## 2024-10-21 NOTE — PROGRESS NOTES
ESTABLISHED PATIENT VISIT  Previously seen in sleep medicine by JULIETA Ely, last visit 9.5.2023      Sreekanth Pitts Jr.  is a pleasant 79 y.o. male  with PMH significant for HTN, HLD, GERD, BPH, DDD (s/p spinal fusion), BMI 33+, STEPHIE who presents for management of STEPHIE.      Here today for : annual follow up, supplies, new machine    PLAN last visit 9.5.2023:   1. Continue Auto PAP 10-16 cm. Continue nightly use.    Since last visit:   CPAP is going well. He wears it daily and denies any issues or complaints with pressure settings, mask fit, leaks.  Needs prescription for supplies.  CPAP machine is close to 10 years old.  Interested in a new machine.      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     Patient Active Problem List   Diagnosis    Primary hypertension    Obstructive sleep apnea    Kidney cysts    GERD (gastroesophageal reflux disease)    Decreased strength of trunk and back    Decreased back mobility    Other hyperlipidemia    Benign prostatic hyperplasia with lower urinary tract symptoms    S/P lumbar spinal fusion    Severe obesity (BMI 35.0-39.9) with comorbidity    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    AK (actinic keratosis)    Aortic atherosclerosis       Current Outpatient Medications:     atorvastatin (LIPITOR) 10 MG tablet, Take 1 tablet (10 mg total) by mouth once daily., Disp: 90  "tablet, Rfl: 11    cyanocobalamin (VITAMIN B-12) 100 MCG tablet, Take 1 tablet (100 mcg total) by mouth once daily., Disp: 90 tablet, Rfl: 11    doxycycline (VIBRAMYCIN) 100 MG Cap, Take 1 po bid with food and not within 1 hour prior to lying down, Disp: 14 capsule, Rfl: 0    dutasteride (AVODART) 0.5 mg capsule, Take 1 capsule (0.5 mg total) by mouth once daily., Disp: 90 capsule, Rfl: 3    fluorouraciL (EFUDEX) 5 % cream, Compound fluorouracil 5% + calcipotriene 0.005% cream. Apply a pea-sized amount to entire right temple and right cheek BID x 7 days., Disp: 30 g, Rfl: 0    lisinopriL (PRINIVIL,ZESTRIL) 20 MG tablet, Take 1 tablet (20 mg total) by mouth once daily., Disp: 90 tablet, Rfl: 11    MULTIVITAMIN W-MINERALS/LUTEIN (CENTRUM SILVER ORAL), Take 1 tablet by mouth once daily., Disp: , Rfl:     nystatin (MYCOSTATIN) cream, APPLY TOPICALLY 2 (TWO) TIMES DAILY., Disp: 30 g, Rfl: 0    pantoprazole (PROTONIX) 40 MG tablet, TAKE 1 TABLET (40 MG TOTAL) BY MOUTH BEFORE BREAKFAST. BEST TAKEN 45-60 MINUTES BEFORE YOUR 1ST PROTEIN MEAL OF THE DAY BREAKFAST, Disp: 90 tablet, Rfl: 3    prednisoLONE acetate (PRED FORTE) 1 % DrpS, Place into both eyes., Disp: , Rfl:     tamsulosin (FLOMAX) 0.4 mg Cap, TAKE 1 CAPSULE (0.4 MG TOTAL) BY MOUTH EVERY EVENING., Disp: 90 capsule, Rfl: 2    triamcinolone acetonide 0.1% (KENALOG) 0.1 % cream, AAA lower legs, feet and forearms bid - may occlude foot qhs, Disp: 454 g, Rfl: 1       There were no vitals filed for this visit.    Physical Exam:    GEN:   Well-appearing  Psych:  Appropriate affect, demonstrates insight  SKIN:  No rash on the face or bridge of the nose      LABS:   No results found for: "HGB", "CO2"    RECORDS REVIEWED PREVIOUSLY:    PSG 9/21/15: AHI was 30.2 with an oxygen damián of 82.0%. Supine AHI was 102.5, and AHI on sides was 10.3 and 16.2. Initial improvement was noted at 8 cm, but higher pressures were needed during REM. Good control of sleep disordered breathing " was seen during side position NREM and REM stages of sleep at a pressure of 10 cm of water. Upon turning supine, an effective pressure was not demonstrable due to onset of central apnea, frequent arousals, and sleep-wake transitions. An effective pressure was not demonstrated for supine position sleep     ASSESSMENT    Leslie Sleepiness Scale:  Sitting and readin  Watching TV:    1  Passenger in a car x 1 hr:  2  Sitting quietly after lunch:  1  Lying down to rest in PM:  1  Sitting, inactive in public:  0  Sitting+ talking to someone:  0  Stopped in traffic:   0  Total          PROBLEM DESCRIPTION/ Sx on Presentation Interval Hx STATUS    STEPHIE   +snoring, +gasping arousals  Dx   PSG  AHI 30.2 (supine .5)  Using CPAP nightly with good control of sx Good usage and efficiency  Well controlled   Daytime Sx   + sleepiness when inactive   ESS  on intake (reviewed from 10/14/13) Less sleepy on CPAP improved   Other issues:     PLAN     -using and benefiting from CPAP therapy  -continue CPAP 10-16cwp nightly  -CPAP supplies ordered  -CPAP machine has reached the near end of its usable life, pt will need a replacement  -CPAP machine ordered  -Will need compliance visit within 31-90 days after receiving new machine.    Advised on plan of care. Answered all patient questions. Patient verbalized understanding and voiced agreement with plan of care.       RTC 31-90 days after receiving new machine       The patient was given open opportunity to ask questions and/or express concerns about treatment plan.   All questions/concerns were discussed.     Two patient identifiers used prior to evaluation.

## 2024-12-05 ENCOUNTER — OFFICE VISIT (OUTPATIENT)
Dept: UROLOGY | Facility: CLINIC | Age: 79
End: 2024-12-05
Payer: MEDICARE

## 2024-12-05 VITALS
BODY MASS INDEX: 33.77 KG/M2 | HEART RATE: 80 BPM | SYSTOLIC BLOOD PRESSURE: 103 MMHG | DIASTOLIC BLOOD PRESSURE: 69 MMHG | WEIGHT: 255.94 LBS

## 2024-12-05 DIAGNOSIS — R33.9 URINARY RETENTION: ICD-10-CM

## 2024-12-05 PROCEDURE — 99999 PR PBB SHADOW E&M-EST. PATIENT-LVL III: CPT | Mod: PBBFAC,,, | Performed by: UROLOGY

## 2024-12-05 PROCEDURE — 99214 OFFICE O/P EST MOD 30 MIN: CPT | Mod: S$PBB,,, | Performed by: UROLOGY

## 2024-12-05 PROCEDURE — 99213 OFFICE O/P EST LOW 20 MIN: CPT | Mod: PBBFAC | Performed by: UROLOGY

## 2024-12-05 RX ORDER — TAMSULOSIN HYDROCHLORIDE 0.4 MG/1
0.4 CAPSULE ORAL NIGHTLY
Qty: 90 CAPSULE | Refills: 3 | Status: SHIPPED | OUTPATIENT
Start: 2024-12-05

## 2024-12-05 RX ORDER — DUTASTERIDE 0.5 MG/1
0.5 CAPSULE, LIQUID FILLED ORAL DAILY
Qty: 90 CAPSULE | Refills: 3 | Status: SHIPPED | OUTPATIENT
Start: 2024-12-05

## 2024-12-05 NOTE — PROGRESS NOTES
CHIEF COMPLAINT:    Mr. Pitts is a 79 y.o. male presenting for a follow up on LUTS    PRESENTING ILLNESS:    Sreekanth Pitts Jr. is a 79 y.o. male who presents with a history of bladder outlet obstruction with urinary retention returns today for follow up .  He is status post TURP on 4/18/2017 and 9/11/2018 when there was a large adenoma with regrowth on the left side.      He is on dutesteride and tamsulosin.  The last time he was here he had gone into urinary retention following a bout of constipation.    He states he has been doing well.  Continues on both medications but there was a break in therapy because his pharmacy stated that he did not have refills.  They did not send a refill request that I recall.  He was last seen in Feb 2024.      REVIEW OF SYSTEMS:    Review of Systems   Constitutional: Negative.    HENT: Negative.     Eyes: Negative.    Respiratory: Negative.     Cardiovascular: Negative.    Gastrointestinal: Negative.    Genitourinary: Negative.    Musculoskeletal:  Positive for joint pain.   Skin: Negative.    Neurological: Negative.    Endo/Heme/Allergies: Negative.    Psychiatric/Behavioral: Negative.         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 Mckinney pt, approved Dr. Dr. Mckinney to schedule in 30min slot, 3-4wks, PEG extended,  instr portal and mailed to address on file.  3/13-lvm for precall-MS  3/14-precall complete-Kpvt  pt confirmed via portal-    ESOPHAGOGASTRODUODENOSCOPY N/A 12/02/2020    Procedure: EGD (ESOPHAGOGASTRODUODENOSCOPY);  Surgeon: Dion Mckinney MD;  Location: [facility] ENDO (4TH FLR);  Service: Endoscopy;  Laterality: N/A;  covid-11/29-[facility]    ESOPHAGOGASTRODUODENOSCOPY N/A 08/29/2023    Procedure: EGD (ESOPHAGOGASTRODUODENOSCOPY);  Surgeon: Dion Mckinney MD;  Location: [facility] ENDO (4TH FLR);  Service: Endoscopy;  Laterality: N/A;  prep instructions sent to pt vai portal -  5-12 week time frame    EXCISION OF PAROTID GLAND Left 03/27/2023    Procedure: EXCISION, PAROTID GLAND;  Surgeon: Mook Newton MD;  Location: [facility] OR 2ND FLR;  Service: ENT;  Laterality: Left;    EYE FOREIGN BODY REMOVAL      childhood    LUMBAR LAMINECTOMY WITH FUSION  2002    MINIMALLY INVASIVE TRANSFORAMINAL LUMBAR INTERBODY FUSION (TLIF) N/A 02/14/2022    Procedure: FUSION, SPINE, LUMBAR, TLIF, MINIMALLY INVASIVE Right L4-5 Renaldo V notified;  Surgeon: Anibal Beebe MD;  Location: [facility];  Service: Neurosurgery;  Laterality: N/A;  ASA1  TOR1  T&Screen  EMG  C-Arm  LSO  Jamie 4 poster  NEURO MONITORING RENALDO OSBORN [phone]  SPINEWAVE RENALDO POLLOCK [phone] TEXTED ON 2/2/2022 @ 3:54PM/ RESPONDED ON 2/2/2022 @4:22 PM-LO  PREOP DONE A    TRANSURETHRAL RESECTION OF PROSTATE (TURP) WITHOUT USE OF LASER N/A 09/11/2018    Procedure: TURP, WITHOUT USING LASER;  Surgeon: Uma Gaona MD;  Location: [facility] OR 1ST FLR;  Service: Urology;  Laterality: N/A;  1.5 hours       Family History   Problem Relation Name Age of Onset    Cataracts Mother      Hypertension Mother      Cancer Father      Heart disease Father      Heart disease Brother      Prostate cancer Brother      Stroke Paternal Grandfather      Heart disease Paternal Grandfather      Colon cancer Neg Hx      Esophageal cancer Neg Hx         Social  History     Socioeconomic History    Marital status:    Occupational History     Employer: [employer]   Tobacco Use    Smoking status: Former     Current packs/day: 0.00     Average packs/day: 2.0 packs/day for 4.0 years (8.0 ttl pk-yrs)     Types: Cigarettes, Cigars     Start date: 1969     Quit date: 1973     Years since quittin.9    Smokeless tobacco: Never   Substance and Sexual Activity    Alcohol use: Yes     Alcohol/week: 1.0 standard drink of alcohol     Types: 1 Cans of beer per week     Comment: occasionally/rarely    Drug use: No   Social History Narrative    , consulting firm , 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 wife (healthy) lives at home, 2 dtrs, 46, 44. frequ walking each day.     Social Drivers of Health     Financial Resource Strain: Low Risk  (2024)    Overall Financial Resource Strain (CARDIA)     Difficulty of Paying Living Expenses: Not hard at all   Food Insecurity: No Food Insecurity (2024)    Hunger Vital Sign     Worried About Running Out of Food in the Last Year: Never true     Ran Out of Food in the Last Year: Never true   Transportation Needs: No Transportation Needs (2024)    PRAPARE - Transportation     Lack of Transportation (Medical): No     Lack of Transportation (Non-Medical): No   Physical Activity: Sufficiently Active (2024)    Exercise Vital Sign     Days of Exercise per Week: 7 days     Minutes of Exercise per Session: 30 min   Stress: Stress Concern Present (2024)    [employer] [place] [employer] - Occupational Stress Questionnaire     Feeling of Stress : To some extent   Housing Stability: Low Risk  (2024)    Housing Stability Vital Sign     Unable to Pay for Housing in the Last Year: No     Number of Places Lived in the Last Year: 1     Unstable Housing in the Last Year: No       Allergies:  Patient has no known allergies.    Medications:  Outpatient Encounter Medications as of 2024   Medication Sig  Dispense Refill    atorvastatin (LIPITOR) 10 MG tablet Take 1 tablet (10 mg total) by mouth once daily. 90 tablet 11    cyanocobalamin (VITAMIN B-12) 100 MCG tablet Take 1 tablet (100 mcg total) by mouth once daily. 90 tablet 11    doxycycline (VIBRAMYCIN) 100 MG Cap Take 1 po bid with food and not within 1 hour prior to lying down 14 capsule 0    dutasteride (AVODART) 0.5 mg capsule Take 1 capsule (0.5 mg total) by mouth once daily. 90 capsule 3    fluorouraciL (EFUDEX) 5 % cream Compound fluorouracil 5% + calcipotriene 0.005% cream. Apply a pea-sized amount to entire right temple and right cheek BID x 7 days. 30 g 0    lisinopriL (PRINIVIL,ZESTRIL) 20 MG tablet Take 1 tablet (20 mg total) by mouth once daily. 90 tablet 11    MULTIVITAMIN W-MINERALS/LUTEIN (CENTRUM SILVER ORAL) Take 1 tablet by mouth once daily.      nystatin (MYCOSTATIN) cream APPLY TOPICALLY 2 (TWO) TIMES DAILY. 30 g 0    pantoprazole (PROTONIX) 40 MG tablet TAKE 1 TABLET (40 MG TOTAL) BY MOUTH BEFORE BREAKFAST. BEST TAKEN 45-60 MINUTES BEFORE YOUR 1ST PROTEIN MEAL OF THE DAY BREAKFAST 90 tablet 3    prednisoLONE acetate (PRED FORTE) 1 % DrpS Place into both eyes.      tamsulosin (FLOMAX) 0.4 mg Cap Take 1 capsule (0.4 mg total) by mouth every evening. 90 capsule 3    triamcinolone acetonide 0.1% (KENALOG) 0.1 % cream AAA lower legs, feet and forearms bid - may occlude foot qhs 454 g 1    [DISCONTINUED] dutasteride (AVODART) 0.5 mg capsule Take 1 capsule (0.5 mg total) by mouth once daily. 90 capsule 3    [DISCONTINUED] tamsulosin (FLOMAX) 0.4 mg Cap TAKE 1 CAPSULE (0.4 MG TOTAL) BY MOUTH EVERY EVENING. 90 capsule 2     No facility-administered encounter medications on file as of 12/5/2024.         PHYSICAL EXAMINATION:    The patient generally appears in good health, is appropriately interactive, and is in no apparent distress.    Skin: No lesions.    Mental: Cooperative with normal affect.    Neuro: Grossly intact.    HEENT: Normal. No evidence  of lymphadenopathy.    Chest:  normal inspiratory effort.    Abdomen: Soft, non-tender. No masses or organomegaly. Bladder is not palpable. No evidence of flank discomfort. No evidence of inguinal hernia.    Extremities: No clubbing, cyanosis, or edema    Scrotum showed no rashes or lesions. Testicles showed no masses or tenderness.  Epididymis showed no masses or tenderness.  Penis was circumcised. No meatal stenosis. No penile discharge.  No inguinal hernias.  No inguinal lymphadenopathy.    PVR by bladder scan was 0 ml    LABS:    Lab Results   Component Value Date    BUN 13 02/17/2024    CREATININE 1.0 02/17/2024       Lab Results   Component Value Date    PSA 2.2 10/16/2023    PSA 1.7 05/13/2022    PSA 1.3 03/10/2021    PSADIAG 2.2 04/08/2024    PSADIAG 2.7 12/19/2013       UA 1.010, pH 7, otherwise, negative.     IMPRESSION:    History of  BPH with bladder outlet obstruction     PLAN:    1.  Refilled the dutesteride and tamsulosin  2.  Follow up in 1 year.     I spent a total of 30 minutes on the day of the visit.  This includes face to face time and non-face to face time preparing to see the patient (eg, review of tests), obtaining and/or reviewing separately obtained history, documenting clinical information in the electronic or other health record, independently interpreting results and communicating results to the patient/family/caregiver, or care coordinator.

## 2025-01-03 DIAGNOSIS — L98.9 DISEASE OF SKIN AND SUBCUTANEOUS TISSUE: ICD-10-CM

## 2025-01-03 DIAGNOSIS — L30.0 NUMMULAR ECZEMA: ICD-10-CM

## 2025-01-03 RX ORDER — TRIAMCINOLONE ACETONIDE 1 MG/G
CREAM TOPICAL
Qty: 454 G | Refills: 1 | Status: SHIPPED | OUTPATIENT
Start: 2025-01-03

## 2025-02-20 ENCOUNTER — OFFICE VISIT (OUTPATIENT)
Dept: SLEEP MEDICINE | Facility: CLINIC | Age: 80
End: 2025-02-20
Payer: MEDICARE

## 2025-02-20 VITALS
HEIGHT: 73 IN | WEIGHT: 253.06 LBS | DIASTOLIC BLOOD PRESSURE: 68 MMHG | SYSTOLIC BLOOD PRESSURE: 111 MMHG | BODY MASS INDEX: 33.54 KG/M2 | HEART RATE: 99 BPM

## 2025-02-20 DIAGNOSIS — G47.33 OBSTRUCTIVE SLEEP APNEA: Primary | ICD-10-CM

## 2025-02-20 PROCEDURE — 99214 OFFICE O/P EST MOD 30 MIN: CPT | Mod: S$PBB,,, | Performed by: NURSE PRACTITIONER

## 2025-02-20 PROCEDURE — 99213 OFFICE O/P EST LOW 20 MIN: CPT | Mod: PBBFAC | Performed by: NURSE PRACTITIONER

## 2025-02-20 NOTE — PROGRESS NOTES
"  ESTABLISHED PATIENT VISIT    Sreekanth Pitts Jr.  is a pleasant 79 y.o. male established with the [facility].    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 Supine AHI was 102.5, and AHI on sides was 10.3 and 16.2. Initial improvement was noted at 8 cm, but higher pressures were needed during REM. Good control of sleep disordered breathing was seen during side position NREM and REM stages of sleep at a pressure of 10 cm of water. Upon turning supine, an effective pressure was not demonstrable due to onset of central apnea, frequent arousals, and sleep-wake transitions. An effective pressure was not demonstrated for supine position sleep     PMHX;  PROBLEM DESCRIPTION/ Sx on Presentation Interval Hx STATUS PLAN     STEPHIE   Presentation:     PAP history   Dx Study PSG 9.21.15 - AHI 30.2, damián 82%. SCOTT 102.5.   Machine age AV, setup 12.19.24, Airsense 10   Mask Nasal   DME Access   My Air    PAP altn    Benefits    PROBS         Since last visit: "     Loves his new machine.    Only issue is that     Download 02/20/2025 :  30/30 x 5h49: 10-16 (10.3/11.4/12.2), PS 3, leak 14.5/28.8/41.6, AHI 0.8             controlled     PAP PLAN   E min 10 cwp (cont)   I max 16 cwp (cont)   PS/epr    RAMP    Other    Altn.        Residual predicted AHI within optimal range.    Pt is using and benefitting from CPAP therapy.         Daytime Sx     ESS 6/24 on intake      SLEEP SCHEDULE   Duration    Wind- down    Envmnt    CBTi    Meds prior    Meds now    Bed Time 11-1130PM   Lights out    Latency 15 min   Arousals Varies - 0-2x   Back to sleep 5 min   Stim. ctrl    Wake time 530-630AM   Caffeine    Naps 0   Nocturia 0   Work                  ESS 9/24 02/20/2025    controlled   -will reassess sleepiness after evaluation for STEPHIE     Nocturia     x 0 per sleep period    x 0 per sleep period   stable          RTC:  yearly or sooner if problems arise                    [1]   Patient Active Problem List  Diagnosis    Primary hypertension    Obstructive sleep apnea    Kidney cysts    GERD (gastroesophageal reflux disease)    Decreased strength of trunk and back    Decreased back mobility    Other hyperlipidemia    Benign prostatic hyperplasia with lower urinary tract symptoms    S/P lumbar spinal fusion    Severe obesity (BMI 35.0-39.9) with comorbidity    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    AK (actinic keratosis)    Aortic atherosclerosis   [2]   Current Outpatient Medications:     atorvastatin (LIPITOR) 10 MG tablet, Take 1 tablet (10 mg total) by mouth once daily., Disp: 90 tablet, Rfl: 11    cyanocobalamin (VITAMIN B-12) 100 MCG tablet, Take 1 tablet (100 mcg total) by mouth once daily., Disp: 90 tablet, Rfl: 11    doxycycline (VIBRAMYCIN) 100 MG Cap, Take 1 po bid with food and not within 1 hour prior to lying down, Disp: 14 capsule, Rfl: 0    dutasteride (AVODART) 0.5 mg capsule, Take 1 capsule (0.5 mg total) by mouth once daily., Disp: 90 capsule, Rfl:  3    fluorouraciL (EFUDEX) 5 % cream, Compound fluorouracil 5% + calcipotriene 0.005% cream. Apply a pea-sized amount to entire right temple and right cheek BID x 7 days., Disp: 30 g, Rfl: 0    lisinopriL (PRINIVIL,ZESTRIL) 20 MG tablet, Take 1 tablet (20 mg total) by mouth once daily., Disp: 90 tablet, Rfl: 11    MULTIVITAMIN W-MINERALS/LUTEIN (CENTRUM SILVER ORAL), Take 1 tablet by mouth once daily., Disp: , Rfl:     nystatin (MYCOSTATIN) cream, APPLY TOPICALLY 2 (TWO) TIMES DAILY., Disp: 30 g, Rfl: 0    pantoprazole (PROTONIX) 40 MG tablet, TAKE 1 TABLET (40 MG TOTAL) BY MOUTH BEFORE BREAKFAST. BEST TAKEN 45-60 MINUTES BEFORE YOUR 1ST PROTEIN MEAL OF THE DAY BREAKFAST, Disp: 90 tablet, Rfl: 3    prednisoLONE acetate (PRED FORTE) 1 % DrpS, Place into both eyes., Disp: , Rfl:     tamsulosin (FLOMAX) 0.4 mg Cap, Take 1 capsule (0.4 mg total) by mouth every evening., Disp: 90 capsule, Rfl: 3    triamcinolone acetonide 0.1% (KENALOG) 0.1 % cream, APPLY TO AFFECTED AREA ON LOWER LEGS, FEET AND FOREARMS TWICE DAILY. MAY OCCLUDE FOOT AT BEDTIME, Disp: 454 g, Rfl: 1

## 2025-06-03 DIAGNOSIS — K22.70 BARRETT'S ESOPHAGUS WITHOUT DYSPLASIA: ICD-10-CM

## 2025-06-03 RX ORDER — PANTOPRAZOLE SODIUM 40 MG/1
40 TABLET, DELAYED RELEASE ORAL
Qty: 90 TABLET | Refills: 3 | Status: SHIPPED | OUTPATIENT
Start: 2025-06-03 | End: 2026-06-03

## 2025-06-23 DIAGNOSIS — Z00.00 ENCOUNTER FOR MEDICARE ANNUAL WELLNESS EXAM: ICD-10-CM

## 2025-06-26 ENCOUNTER — TELEPHONE (OUTPATIENT)
Dept: DERMATOLOGY | Facility: CLINIC | Age: 80
End: 2025-06-26
Payer: MEDICARE

## 2025-06-26 NOTE — TELEPHONE ENCOUNTER
Copied from CRM #[id number]. Topic: Appointments - Appointment Scheduling  >> Jun 26, 2025  9:39 AM Abdoulaye Perdue wrote:  PATIENT CALL    Pt called to schedule EP appt, last seen 09/2024 + prev dx of nummular eczema. Denies any new onset derm issues at this time, just follow-up skin check. Please call back at [phone]

## 2025-07-03 ENCOUNTER — OFFICE VISIT (OUTPATIENT)
Dept: DERMATOLOGY | Facility: CLINIC | Age: 80
End: 2025-07-03
Payer: MEDICARE

## 2025-07-03 DIAGNOSIS — D22.9 MULTIPLE BENIGN NEVI: ICD-10-CM

## 2025-07-03 DIAGNOSIS — Z12.83 SCREENING EXAM FOR SKIN CANCER: ICD-10-CM

## 2025-07-03 DIAGNOSIS — L82.1 SK (SEBORRHEIC KERATOSIS): ICD-10-CM

## 2025-07-03 DIAGNOSIS — L57.0 AK (ACTINIC KERATOSIS): ICD-10-CM

## 2025-07-03 DIAGNOSIS — D48.5 NEOPLASM OF UNCERTAIN BEHAVIOR OF SKIN: Primary | ICD-10-CM

## 2025-07-03 DIAGNOSIS — L30.0 NUMMULAR ECZEMA: ICD-10-CM

## 2025-07-03 DIAGNOSIS — L72.0 EIC (EPIDERMAL INCLUSION CYST): ICD-10-CM

## 2025-07-03 PROCEDURE — 17000 DESTRUCT PREMALG LESION: CPT | Mod: 59,PBBFAC | Performed by: DERMATOLOGY

## 2025-07-03 PROCEDURE — 99213 OFFICE O/P EST LOW 20 MIN: CPT | Mod: PBBFAC | Performed by: DERMATOLOGY

## 2025-07-03 PROCEDURE — 17003 DESTRUCT PREMALG LES 2-14: CPT | Mod: 59,PBBFAC | Performed by: DERMATOLOGY

## 2025-07-03 PROCEDURE — 99999 PR PBB SHADOW E&M-EST. PATIENT-LVL III: CPT | Mod: PBBFAC,,, | Performed by: DERMATOLOGY

## 2025-07-03 PROCEDURE — 11102 TANGNTL BX SKIN SINGLE LES: CPT | Mod: PBBFAC | Performed by: DERMATOLOGY

## 2025-07-03 NOTE — PATIENT INSTRUCTIONS
Shave Biopsy Wound Care    Your doctor has performed a shave biopsy today.  A band aid and vaseline ointment has been placed over the site.  This should remain in place for NO LONGER THAN 48 hours.  It is fine to remove the bandaid after 24 hours, if the area is no longer bleeding. It is recommended that you keep the area dry (do not wet)) for the first 24 hours.  After 24 hours, wash the area with warm soap and water and apply Vaseline jelly.  Many patients prefer to use Neosporin or Bacitracin ointment.  This is acceptable; however, know that you can develop an allergy to this medication even if you have used it safely for years.  It is important to keep the area moist.  Letting it dry out and get air slows healing time, and will worsen the scar.        If you notice increasing redness, tenderness, pain, or yellow drainage at the biopsy site, please notify your doctor.  These are signs of an infection.    If your biopsy site is bleeding, apply firm pressure for 15 minutes straight.  Repeat for another 15 minutes, if it is still bleeding.   If the surgical site continues to bleed, then please contact your doctor.      For [facility] users:   You will receive your biopsy results in [facility] as soon as they are available. Please be assured that your physician/provider will review your results and will then determine what further treatment, evaluation, or planning is required. You should be contacted by your physician's/provider's office within 5 business days of receiving your results; If not, please reach out to directly. This is one more way [facility] [employer] is putting you first.     [facility] [place] [address]/ [phone] FAX/ [email or web address]         XEROSIS (DRY SKIN)        Definition    Xerosis is the term for dry skin.  We all have a natural oil coating over our skin produced by the skin oil glands.  If this oil is removed, the skin becomes dry which can lead to cracking, which can  lead to inflammation.  Xerosis is usually a long-term problem that recurs often, especially in the winter.    Cause    Long hot baths or showers can remove our natural oil and lead to xerosis.  One should never take more than one bath or shower a day and for no longer than ten minutes.  Use of harsh soaps such as Zest, Dial, and Ivory can worsen and cause xerosis.  Cold winter weather worsens xerosis because the amount of moisture contained in cold air is much less than the amount of moisture in warm air.    Treatment    Treatment is intended to restore the natural oil to your skin.  Keep the skin lubricated.    Do not take more than one bath or shower a day.  Use lukewarm water, not hot.  Hot water dries out the skin.    Use a gentle moisturizing soap such as Cetaphil soap, Oil of Olay, Dove, Basis, Ivory moisture care, Restoraderm cleanser.    When toweling dry, dont rub.  Blot the skin so there is still some water left on the skin.  You should apply a moisturizing cream to all of the skin such as Cerave cream, Cetaphil cream, Lipikar [place] AP+ Intense Repair Moisturizing Cream or Restoraderm or Eucerin Original Formula cream.   Alpha hydroxyacid lotions, i.e., AmLactin, also work very well for preventing dry skin, but may burn when used on inflamed or reddened skin.    If you like to swim during the winter months, you should not use soap when getting out of the pool.  When you have finished swimming, rinse off the chlorine with cool to warm water.  If this will be the only shower of the day, then you may use Cetaphil or another mild soap to cleanse your skin.  After the shower, apply a moisturizing cream to all of the skin as above.        [address] [place] [address]/ [phone] FAX/ [email or web address]

## 2025-07-03 NOTE — PROGRESS NOTES
Subjective:      Patient ID:  Sreekanth Pitts Jr. is a 80 y.o. male who presents for   Chief Complaint   Patient presents with    Skin Check     UBSE     History of Present Illness: The patient presents for UBSE    The patient was last seen on: 9/17/2024 for rash on forearms.  No h/o nmsc or mm.    Patient with new area of concern: Lesion  Location: R lower leg  S/s: itchiness, redness, dryness  Previous treatments: TAC, neosporin - mild relief     Uses cerave anti itch on arms. Did not like am lactin              Review of Systems   Skin:  Positive for itching, rash, activity-related sunscreen use and wears hat (99%). Negative for daily sunscreen use and recent sunburn.   Hematologic/Lymphatic: Bruises/bleeds easily (bruise).       Objective:   Physical Exam   Constitutional: He appears well-developed and well-nourished. No distress.   Neurological: He is alert and oriented to person, place, and time. He is not disoriented.   Psychiatric: He has a normal mood and affect.   Skin:   Areas Examined (abnormalities noted in diagram):   Scalp / Hair Palpated and Inspected  Head / Face Inspection Performed  Neck Inspection Performed  Chest / Axilla Inspection Performed  Back Inspection Performed  RUE Inspected  LUE Inspection Performed  RLE Inspected  LLE Inspection Performed  Nails and Digits Inspection Performed                     Diagram Legend     Erythematous scaling macule/papule c/w actinic keratosis       Vascular papule c/w angioma      Pigmented verrucoid papule/plaque c/w seborrheic keratosis      Yellow umbilicated papule c/w sebaceous hyperplasia      Irregularly shaped tan macule c/w lentigo     1-2 mm smooth white papules consistent with Milia      Movable subcutaneous cyst with punctum c/w epidermal inclusion cyst      Subcutaneous movable cyst c/w pilar cyst      Firm pink to brown papule c/w dermatofibroma      Pedunculated fleshy papule(s) c/w skin tag(s)      Evenly pigmented macule c/w junctional  nevus     Mildly variegated pigmented, slightly irregular-bordered macule c/w mildly atypical nevus      Flesh colored to evenly pigmented papule c/w intradermal nevus       Pink pearly papule/plaque c/w basal cell carcinoma      Erythematous hyperkeratotic cursted plaque c/w SCC      Surgical scar with no sign of skin cancer recurrence      Open and closed comedones      Inflammatory papules and pustules      Verrucoid papule consistent consistent with wart     Erythematous eczematous patches and plaques     Dystrophic onycholytic nail with subungual debris c/w onychomycosis     Umbilicated papule    Erythematous-base heme-crusted tan verrucoid plaque consistent with inflamed seborrheic keratosis     Erythematous Silvery Scaling Plaque c/w Psoriasis     See annotation      Assessment / Plan:            Pathology Orders:       Normal Orders This Visit    Specimen to Pathology, Dermatology     Questions:    Procedure Type: Dermatology and skin neoplasms    Number of Specimens: 1    ------------------------: -------------------------    Spec 1 Procedure: Shave Biopsy    Spec 1 Clinical Impression: r/o superficial bcc    Spec 1 Source: left upper neck    Clinical Information: see EPIC    Clinical History: see EPIC    Specimen Source: Skin    Release to patient: Immediate    Send normal result to authorizing provider's In Basket if patient is active on MyChart: Yes          Neoplasm of uncertain behavior of skin  -     Specimen to Pathology, Dermatology    Shave biopsy procedure note:    Shave biopsy performed after verbal consent including risk of infection, scar, recurrence, need for additional treatment of site. Area prepped with alcohol, anesthetized with approximately 1.0cc of 1% lidocaine with epinephrine. Lesional tissue shaved with razor blade. Hemostasis achieved with application of aluminum chloride followed by hyfrecation. No complications. Dressing applied. Wound care explained.    If biopsy positive for  malignancy, will treat with Aldara 5% cream qhs x 4 weeks.        AK (actinic keratosis)  Cryosurgery Procedure Note    Verbal consent from the patient is obtained including, but not limited to, risk of hypopigmentation/hyperpigmentation, scar, recurrence of lesion. The patient is aware of the precancerous quality and need for treatment of these lesions. Liquid nitrogen cryosurgery is applied to the 6 actinic keratoses, as detailed in the physical exam, to produce a freeze injury. The patient is aware that blisters may form and is instructed on wound care with gentle cleansing and use of vaseline ointment to keep moist until healed. The patient is supplied a handout on cryosurgery and is instructed to call if lesions do not completely resolve.      Nummular eczema  Good skin care regimen discussed including limiting to one bath or shower/day, using lukewarm water with mild soap and moisturizing cream to skin 1 - 2x/day. Brochure was provided and reviewed with patient.  D/c dial soap cont cerave cream daily  Use TAC cream (Rx at home) - bid may occlude qhs    SK (seborrheic keratosis)   - minor problem and chronic.   Reassurance given to patient. No treatment necessary.       Multiple benign nevi   - minor problem and chronic.   Reassurance given to patient. No treatment necessary.       EIC (epidermal inclusion cyst)   - minor problem and chronic.   Reassurance given to patient. No treatment necessary.       Screening exam for skin cancer  Upper body skin examination performed today including at least 6 points as noted in physical examination. Suspicious lesions noted.    Recommend daily sun protection/avoidance and use of at least SPF 30, broad spectrum sunscreen (OTC drug).                No follow-ups on file.

## 2025-07-09 RX ORDER — IMIQUIMOD 12.5 MG/.25G
CREAM TOPICAL
Qty: 12 PACKET | Refills: 1 | Status: SHIPPED | OUTPATIENT
Start: 2025-07-09

## 2025-07-09 NOTE — PROGRESS NOTES
Final Diagnosis   Skin, left upper neck, shave biopsy:  -SQUAMOUS CELL CARCINOMA IN-SITU, EXTENDING TO THE PERIPHERAL BIOPSY EDGES     Spoke to patient.   Sent prescription for Aldara/Imiquimod cream nightly x 4 weeks. Discussed using only a small amount and ok to reuse packet. Do not cover medicine with bandaid.   Explained that area may get inflamed, red, crusty or painful with use of cream. Patient to message me should area become ulcerated or bleed.   Pt expresses understanding.   Needs f/u with me in 3 months to recheck site

## 2025-07-13 ENCOUNTER — NURSE TRIAGE (OUTPATIENT)
Dept: ADMINISTRATIVE | Facility: CLINIC | Age: 80
End: 2025-07-13
Payer: MEDICARE

## 2025-07-13 NOTE — TELEPHONE ENCOUNTER
Reason for Disposition   [1] SEVERE diarrhea AND [2] age > 60 years    Additional Information   Negative: Shock suspected (e.g., cold/pale/clammy skin, too weak to stand, low BP, rapid pulse)   Negative: Difficult to awaken or acting confused (e.g., disoriented, slurred speech)   Negative: Sounds like a life-threatening emergency to the triager   Negative: [1] SEVERE abdominal pain (e.g., excruciating) AND [2] present > 1 hour   Negative: [1] SEVERE abdominal pain AND [2] age > 60 years   Negative: [1] Blood in the stool AND [2] moderate or large amount of blood   Negative: Black or tarry bowel movements  (Exception: Chronic-unchanged black-grey BMs AND is taking iron pills or Pepto-Bismol.)   Negative: [1] Neutropenia known or suspected (e.g., recent cancer chemotherapy) AND [2] new-onset of diarrhea     NO CHEMO   Negative: [1] Drinking very little AND [2] dehydration suspected (e.g., no urine > 12 hours, very dry mouth, very lightheaded)   Negative: Patient sounds very sick or weak to the triager   Negative: [1] SEVERE diarrhea (e.g., 7 or more times / day more than normal) AND [2] receiving chemotherapy or radiation therapy (within past 4 weeks)   Negative: [1] SEVERE diarrhea (e.g., 7 or more times / day more than normal) AND [2] bone marrow or stem cell transplant in past 100 days    Protocols used: Cancer - Diarrhea-A-  Pt states he is a pt of Dr. Lima. States he started a topical treatment for skin cancer Friday 7/11/25. On Saturday 7/12 he developed diarrhea all day. States he went all through the night and everything is coming out of him like water now. He wants to know if the new topical medication caused this. Pt advised per the Triage protocol to go to the ED for evaluation. Pt verbalized understanding.

## 2025-07-14 ENCOUNTER — TELEPHONE (OUTPATIENT)
Dept: DERMATOLOGY | Facility: CLINIC | Age: 80
End: 2025-07-14
Payer: MEDICARE

## 2025-07-14 NOTE — TELEPHONE ENCOUNTER
"Called pt regarding having "severe diarrhea" reaction from taking topical chemo treatment. Called pt and notified him Dr. Lima is currently out of office but discussed concern with another provider in office Dr. Roldan Heath. Informed patient that per Dr. Heath, diarrhea is not typically a side effect and he may stop medication until concern subsides and restart med to see if concern arises again and please do not hesitate to contact us if any concern of has any questions. Pt verbalized an understanding and thanked me for call.   "

## (undated) DEVICE — NDL HYPO REG 25G X 1 1/2

## (undated) DEVICE — GLOVE BIOGEL ECLIPSE SZ 7.5

## (undated) DEVICE — SUT VICRYL+ 27 UR-6 VIOL

## (undated) DEVICE — GLOVE SURGICAL LATEX SZ 6.5

## (undated) DEVICE — GLOVE BIOGEL ECLIPSE SZ 6.5

## (undated) DEVICE — SOL 9P NACL IRR PIC IL

## (undated) DEVICE — DRAPE CORETEMP FLD WRM 56X62IN

## (undated) DEVICE — ELECTRODE BLADE INSULATED 1 IN

## (undated) DEVICE — DURAPREP SURG SCRUB 26ML

## (undated) DEVICE — DRAPE INCISE IOBAN 2 23X17IN

## (undated) DEVICE — BLADE SURG #15 CARBON STEEL

## (undated) DEVICE — SEE MEDLINE ITEM 157194

## (undated) DEVICE — BUR BONE CUT MICRO TPS 3X3.8MM

## (undated) DEVICE — DRESSING MEPILEX BORDER 4 X 4

## (undated) DEVICE — GOWN SMARTGOWN LVL4 X-LONG XL

## (undated) DEVICE — SUT SILK 6-0 BLK BR P-1 P-1

## (undated) DEVICE — SUT 3-0 12-18IN SILK

## (undated) DEVICE — POSITIONER IV ARMBOARD FOAM

## (undated) DEVICE — SOL IRR NACL .9% 3000ML

## (undated) DEVICE — SYR CATHETER TIP 60ML

## (undated) DEVICE — SUT MCRYL PLUS 4-0 PS2 27IN

## (undated) DEVICE — GAUZE SPONGE PEANUT STRL

## (undated) DEVICE — SEE L#95700

## (undated) DEVICE — KIT ACCESS DILATOR SET PROBE

## (undated) DEVICE — PACK CYSTO

## (undated) DEVICE — COVER OVERHEAD SURG LT BLUE

## (undated) DEVICE — SYR 10CC LUER LOCK

## (undated) DEVICE — UNDERGLOVES BIOGEL PI SIZE 8

## (undated) DEVICE — CARTRIDGE OIL

## (undated) DEVICE — DRESSING ADHESIVE ISLAND 3 X 6

## (undated) DEVICE — TRAY CYSTO BASIN

## (undated) DEVICE — CORD FOR BIPOLAR FORCEPS 12

## (undated) DEVICE — SOL WATER STRL IRR 1000ML

## (undated) DEVICE — SYR IRRIGATION BULB STER 60ML

## (undated) DEVICE — SUT 2-0 12-18IN SILK

## (undated) DEVICE — TRAY SKIN SCRUB WET PREMIUM

## (undated) DEVICE — SEE MEDLINE ITEM 152186

## (undated) DEVICE — NDL BLUNT TIP 16GX1/2

## (undated) DEVICE — GLOVE BIOGEL 7.5

## (undated) DEVICE — CORD BIPOLAR 12 FOOT

## (undated) DEVICE — DRESSING ABSRBNT ISLAND 3.6X8

## (undated) DEVICE — DIAMOND TIP TROCAR

## (undated) DEVICE — COVER LIGHT HANDLE 80/CA

## (undated) DEVICE — DIFFUSER

## (undated) DEVICE — DRAPE HALF SURGICAL 40X58IN

## (undated) DEVICE — GLOVE BIOGEL PI MICRO SZ 7.5

## (undated) DEVICE — DRESSING SURGICAL 1/2X1/2

## (undated) DEVICE — IRRIGATION SET Y-TYPE TUR/BLAD

## (undated) DEVICE — SOL NS 1000CC

## (undated) DEVICE — PACK ENDOSCOPY GENERAL

## (undated) DEVICE — SUT LIGACLIP SMALL XTRA

## (undated) DEVICE — SEE MEDLINE ITEM 156905

## (undated) DEVICE — Device

## (undated) DEVICE — SUT 4-0 VICRYL / SH

## (undated) DEVICE — DRAPE STERI INSTRUMENT 1018

## (undated) DEVICE — BAG URINARY DRAINAGE 2000ML

## (undated) DEVICE — SET SINGLE BASIN

## (undated) DEVICE — DRESSING TRANS 4X4 TEGADERM

## (undated) DEVICE — DRAPE C-ARM FOR MOBILE XRAY

## (undated) DEVICE — ELECTRODE LOOP CUTTING BIPOLAR

## (undated) DEVICE — NDL BIOPSY 18G 20CM DISP

## (undated) DEVICE — GELATIN SURGIPOWDER ABSORBABLE

## (undated) DEVICE — SUT SILK 3-0 SH 18IN BLACK

## (undated) DEVICE — ELECTRODE REM PLYHSV RETURN 9

## (undated) DEVICE — GUIDEWIRE PTFE .038INX145CM

## (undated) DEVICE — GOWN X-LG STERILE BACK

## (undated) DEVICE — DRAPE EENT SPLIT STERILE

## (undated) DEVICE — KIT SPINAL PATIENT CARE JACK

## (undated) DEVICE — GAUZE FLUFF XXLG 36X36 2 PLY

## (undated) DEVICE — SUT VICRYL 3-0 27 SH

## (undated) DEVICE — BURR MIS CURVED 3.0MM

## (undated) DEVICE — ELECTRODE NDL

## (undated) DEVICE — DRESSING AQUACEL FOAM 5 X 5

## (undated) DEVICE — SET BASIN 48X48IN 6000ML RING

## (undated) DEVICE — SUT VICRYL+ 2-0 SH 18IN

## (undated) DEVICE — TRAY MINOR GEN SURG OMC

## (undated) DEVICE — DRAPE OPMI STERILE

## (undated) DEVICE — BLADE ELECTRO EDGE INSULATED

## (undated) DEVICE — DRESSING TELFA STRL 4X3 LF

## (undated) DEVICE — SEE MEDLINE ITEM 157150

## (undated) DEVICE — SYR ONLY LUER LOCK 20CC

## (undated) DEVICE — TUBE FRAZIER 5MM 2FT SOFT TIP

## (undated) DEVICE — SPONGE GAUZE 16PLY 4X4

## (undated) DEVICE — SKINMARKER & RULER REGULAR X-F

## (undated) DEVICE — ADHESIVE DERMABOND ADVANCED

## (undated) DEVICE — SUT VICRYL PLUS 3-0 SH 18IN

## (undated) DEVICE — CLIP MED TICALL

## (undated) DEVICE — UNDERGLOVE BIOGEL PI SZ 6.5 LF

## (undated) DEVICE — NDL 18GA X1 1/2 REG BEVEL

## (undated) DEVICE — GOWN SURGICAL X-LARGE

## (undated) DEVICE — TOWEL OR DISP STRL BLUE 4/PK

## (undated) DEVICE — CUSHION PRONE VIEW SMALL

## (undated) DEVICE — HOOK LONE STAR BLUNT 12MM

## (undated) DEVICE — SET Y-TYPE TUR IRRIGATION

## (undated) DEVICE — SUT VICRYL PLUS 4-0 P3 18IN

## (undated) DEVICE — KIT GRAFTMAG GRAFT DELIVERY

## (undated) DEVICE — SUT 2-0 SILK 30IN BLK BRAID

## (undated) DEVICE — SYR 50ML CATH TIP

## (undated) DEVICE — CONTAINER SPECIMEN STRL 4OZ